# Patient Record
Sex: FEMALE | Race: BLACK OR AFRICAN AMERICAN | NOT HISPANIC OR LATINO | Employment: FULL TIME | URBAN - METROPOLITAN AREA
[De-identification: names, ages, dates, MRNs, and addresses within clinical notes are randomized per-mention and may not be internally consistent; named-entity substitution may affect disease eponyms.]

---

## 2017-01-24 ENCOUNTER — HOSPITAL ENCOUNTER (OUTPATIENT)
Dept: RADIOLOGY | Facility: HOSPITAL | Age: 52
Discharge: HOME | End: 2017-01-24
Attending: NURSE PRACTITIONER
Payer: COMMERCIAL

## 2017-01-24 ENCOUNTER — OFFICE VISIT (OUTPATIENT)
Dept: FAMILY MEDICINE | Facility: CLINIC | Age: 52
End: 2017-01-24
Payer: COMMERCIAL

## 2017-01-24 VITALS
SYSTOLIC BLOOD PRESSURE: 126 MMHG | DIASTOLIC BLOOD PRESSURE: 82 MMHG | OXYGEN SATURATION: 98 % | RESPIRATION RATE: 18 BRPM | HEART RATE: 76 BPM | WEIGHT: 246.69 LBS | BODY MASS INDEX: 41.1 KG/M2 | HEIGHT: 65 IN | TEMPERATURE: 98 F

## 2017-01-24 DIAGNOSIS — M79.662 TENDERNESS OF LEFT CALF: Primary | ICD-10-CM

## 2017-01-24 DIAGNOSIS — E66.9 OBESITY, UNSPECIFIED OBESITY SEVERITY, UNSPECIFIED OBESITY TYPE: ICD-10-CM

## 2017-01-24 DIAGNOSIS — M79.662 TENDERNESS OF LEFT CALF: ICD-10-CM

## 2017-01-24 PROCEDURE — 93971 EXTREMITY STUDY: CPT | Mod: 26,,, | Performed by: RADIOLOGY

## 2017-01-24 PROCEDURE — 1159F MED LIST DOCD IN RCRD: CPT | Mod: S$GLB,,, | Performed by: NURSE PRACTITIONER

## 2017-01-24 PROCEDURE — 99999 PR PBB SHADOW E&M-EST. PATIENT-LVL III: CPT | Mod: PBBFAC,,, | Performed by: NURSE PRACTITIONER

## 2017-01-24 PROCEDURE — 93971 EXTREMITY STUDY: CPT | Mod: TC

## 2017-01-24 PROCEDURE — 99214 OFFICE O/P EST MOD 30 MIN: CPT | Mod: S$GLB,,, | Performed by: NURSE PRACTITIONER

## 2017-01-24 RX ORDER — MAGNESIUM 30 MG
TABLET ORAL
COMMUNITY
End: 2017-06-26

## 2017-01-25 ENCOUNTER — LAB VISIT (OUTPATIENT)
Dept: LAB | Facility: HOSPITAL | Age: 52
End: 2017-01-25
Payer: COMMERCIAL

## 2017-01-25 DIAGNOSIS — E66.9 OBESITY, UNSPECIFIED OBESITY SEVERITY, UNSPECIFIED OBESITY TYPE: ICD-10-CM

## 2017-01-25 DIAGNOSIS — M79.662 TENDERNESS OF LEFT CALF: ICD-10-CM

## 2017-01-25 LAB
ALBUMIN SERPL BCP-MCNC: 3.7 G/DL
ALP SERPL-CCNC: 96 U/L
ALT SERPL W/O P-5'-P-CCNC: 27 U/L
ANION GAP SERPL CALC-SCNC: 9 MMOL/L
AST SERPL-CCNC: 22 U/L
BASOPHILS # BLD AUTO: 0.02 K/UL
BASOPHILS NFR BLD: 0.4 %
BILIRUB SERPL-MCNC: 0.6 MG/DL
BUN SERPL-MCNC: 11 MG/DL
CALCIUM SERPL-MCNC: 9.4 MG/DL
CHLORIDE SERPL-SCNC: 111 MMOL/L
CHOLEST/HDLC SERPL: 3.9 {RATIO}
CO2 SERPL-SCNC: 24 MMOL/L
CREAT SERPL-MCNC: 0.8 MG/DL
DIFFERENTIAL METHOD: ABNORMAL
EOSINOPHIL # BLD AUTO: 0.1 K/UL
EOSINOPHIL NFR BLD: 2 %
ERYTHROCYTE [DISTWIDTH] IN BLOOD BY AUTOMATED COUNT: 15.3 %
EST. GFR  (AFRICAN AMERICAN): >60 ML/MIN/1.73 M^2
EST. GFR  (NON AFRICAN AMERICAN): >60 ML/MIN/1.73 M^2
GLUCOSE SERPL-MCNC: 85 MG/DL
HCT VFR BLD AUTO: 34.6 %
HDL/CHOLESTEROL RATIO: 25.7 %
HDLC SERPL-MCNC: 187 MG/DL
HDLC SERPL-MCNC: 48 MG/DL
HGB BLD-MCNC: 11 G/DL
LDLC SERPL CALC-MCNC: 124.2 MG/DL
LYMPHOCYTES # BLD AUTO: 2.6 K/UL
LYMPHOCYTES NFR BLD: 51 %
MCH RBC QN AUTO: 25.9 PG
MCHC RBC AUTO-ENTMCNC: 31.8 %
MCV RBC AUTO: 82 FL
MONOCYTES # BLD AUTO: 0.4 K/UL
MONOCYTES NFR BLD: 8.6 %
NEUTROPHILS # BLD AUTO: 1.9 K/UL
NEUTROPHILS NFR BLD: 38 %
NONHDLC SERPL-MCNC: 139 MG/DL
PLATELET # BLD AUTO: 253 K/UL
PMV BLD AUTO: 10.7 FL
POTASSIUM SERPL-SCNC: 4.5 MMOL/L
PROT SERPL-MCNC: 7.2 G/DL
RBC # BLD AUTO: 4.24 M/UL
SODIUM SERPL-SCNC: 144 MMOL/L
TRIGL SERPL-MCNC: 74 MG/DL
WBC # BLD AUTO: 5.1 K/UL

## 2017-01-25 PROCEDURE — 80053 COMPREHEN METABOLIC PANEL: CPT

## 2017-01-25 PROCEDURE — 80061 LIPID PANEL: CPT

## 2017-01-25 PROCEDURE — 85025 COMPLETE CBC W/AUTO DIFF WBC: CPT

## 2017-01-25 PROCEDURE — 36415 COLL VENOUS BLD VENIPUNCTURE: CPT

## 2017-01-25 NOTE — PROGRESS NOTES
Subjective:       Patient ID: Mary Naranjo is a 51 y.o. female.    Chief Complaint: Leg Pain (left  )    HPI  Ms Naranjo is here for some Left calf pain that began this am, it is an 8/10, she tried tylenol without relief.  No injury, she is a nonsmoker.  Denies any SOB or CP.  She also reports some easy bruising  Review of Systems   Constitutional: Negative for fever.   Respiratory: Negative.    Cardiovascular: Negative.    Musculoskeletal: Positive for myalgias.       Objective:      Physical Exam   Constitutional: She is oriented to person, place, and time. She appears well-developed and well-nourished. She does not appear ill. No distress.   obese   Cardiovascular: Normal rate, regular rhythm and normal heart sounds.  Exam reveals no friction rub.    No murmur heard.  Pulses:       Dorsalis pedis pulses are 2+ on the left side.        Posterior tibial pulses are 2+ on the left side.   Pulmonary/Chest: Effort normal and breath sounds normal. No respiratory distress. She has no decreased breath sounds. She has no wheezes. She has no rhonchi. She has no rales.   Musculoskeletal: Normal range of motion. She exhibits no edema.        Left lower leg: She exhibits tenderness. She exhibits no swelling.   LLE +ttp, no redness or warmth   Neurological: She is alert and oriented to person, place, and time.   Skin: Skin is warm and dry. No erythema.   Psychiatric: She has a normal mood and affect. Her behavior is normal.   Vitals reviewed.      Assessment:       1. Tenderness of left calf    2. Obesity, unspecified obesity severity, unspecified obesity type        Plan:       Tenderness of left calf  -     CBC auto differential; Future; Expected date: 1/24/17  -     Comprehensive metabolic panel; Future; Expected date: 1/24/17  -     US Lower Extremity Veins Left; Future; Expected date: 1/24/17    Obesity, unspecified obesity severity, unspecified obesity type  -     Lipid panel; Future; Expected date: 1/24/17    She is  due for basic labs, I think risk for DVT is low, but she told me that her twin sister was recently hospitalized with similar symptoms and was +DVT and placed on eliquis.  Concern for hereditary clotting d/o, will get U/S    Verbalized understanding

## 2017-06-02 ENCOUNTER — LAB VISIT (OUTPATIENT)
Dept: LAB | Facility: HOSPITAL | Age: 52
End: 2017-06-02
Attending: INTERNAL MEDICINE
Payer: COMMERCIAL

## 2017-06-02 ENCOUNTER — OFFICE VISIT (OUTPATIENT)
Dept: FAMILY MEDICINE | Facility: CLINIC | Age: 52
End: 2017-06-02
Payer: COMMERCIAL

## 2017-06-02 VITALS
OXYGEN SATURATION: 97 % | WEIGHT: 235.69 LBS | RESPIRATION RATE: 18 BRPM | HEART RATE: 71 BPM | SYSTOLIC BLOOD PRESSURE: 124 MMHG | BODY MASS INDEX: 39.22 KG/M2 | TEMPERATURE: 98 F | DIASTOLIC BLOOD PRESSURE: 72 MMHG

## 2017-06-02 DIAGNOSIS — Z12.31 ENCOUNTER FOR SCREENING MAMMOGRAM FOR BREAST CANCER: ICD-10-CM

## 2017-06-02 DIAGNOSIS — R10.13 EPIGASTRIC PAIN: Primary | ICD-10-CM

## 2017-06-02 DIAGNOSIS — K59.00 CONSTIPATION, UNSPECIFIED CONSTIPATION TYPE: ICD-10-CM

## 2017-06-02 DIAGNOSIS — R10.13 EPIGASTRIC PAIN: ICD-10-CM

## 2017-06-02 DIAGNOSIS — M54.41 ACUTE LEFT-SIDED LOW BACK PAIN WITH BILATERAL SCIATICA: ICD-10-CM

## 2017-06-02 DIAGNOSIS — M54.42 ACUTE LEFT-SIDED LOW BACK PAIN WITH BILATERAL SCIATICA: ICD-10-CM

## 2017-06-02 LAB
ALBUMIN SERPL BCP-MCNC: 3.8 G/DL
ALP SERPL-CCNC: 86 U/L
ALT SERPL W/O P-5'-P-CCNC: 24 U/L
ANION GAP SERPL CALC-SCNC: 6 MMOL/L
AST SERPL-CCNC: 20 U/L
BASOPHILS # BLD AUTO: 0.02 K/UL
BASOPHILS NFR BLD: 0.3 %
BILIRUB SERPL-MCNC: 0.4 MG/DL
BUN SERPL-MCNC: 11 MG/DL
CALCIUM SERPL-MCNC: 9.5 MG/DL
CHLORIDE SERPL-SCNC: 110 MMOL/L
CO2 SERPL-SCNC: 27 MMOL/L
CREAT SERPL-MCNC: 0.7 MG/DL
DIFFERENTIAL METHOD: ABNORMAL
EOSINOPHIL # BLD AUTO: 0.1 K/UL
EOSINOPHIL NFR BLD: 1.7 %
ERYTHROCYTE [DISTWIDTH] IN BLOOD BY AUTOMATED COUNT: 15.4 %
EST. GFR  (AFRICAN AMERICAN): >60 ML/MIN/1.73 M^2
EST. GFR  (NON AFRICAN AMERICAN): >60 ML/MIN/1.73 M^2
GLUCOSE SERPL-MCNC: 87 MG/DL
HCT VFR BLD AUTO: 35.9 %
HGB BLD-MCNC: 11.2 G/DL
LIPASE SERPL-CCNC: 27 U/L
LYMPHOCYTES # BLD AUTO: 2.9 K/UL
LYMPHOCYTES NFR BLD: 44.6 %
MCH RBC QN AUTO: 25.1 PG
MCHC RBC AUTO-ENTMCNC: 31.2 %
MCV RBC AUTO: 81 FL
MONOCYTES # BLD AUTO: 0.8 K/UL
MONOCYTES NFR BLD: 11.8 %
NEUTROPHILS # BLD AUTO: 2.7 K/UL
NEUTROPHILS NFR BLD: 41.4 %
PLATELET # BLD AUTO: 293 K/UL
PMV BLD AUTO: 10.7 FL
POTASSIUM SERPL-SCNC: 4.5 MMOL/L
PROT SERPL-MCNC: 7.4 G/DL
RBC # BLD AUTO: 4.46 M/UL
SODIUM SERPL-SCNC: 143 MMOL/L
WBC # BLD AUTO: 6.46 K/UL

## 2017-06-02 PROCEDURE — 83690 ASSAY OF LIPASE: CPT

## 2017-06-02 PROCEDURE — 36415 COLL VENOUS BLD VENIPUNCTURE: CPT | Mod: PN

## 2017-06-02 PROCEDURE — 80053 COMPREHEN METABOLIC PANEL: CPT

## 2017-06-02 PROCEDURE — 99999 PR PBB SHADOW E&M-EST. PATIENT-LVL III: CPT | Mod: PBBFAC,,, | Performed by: INTERNAL MEDICINE

## 2017-06-02 PROCEDURE — 85025 COMPLETE CBC W/AUTO DIFF WBC: CPT

## 2017-06-02 PROCEDURE — 99214 OFFICE O/P EST MOD 30 MIN: CPT | Mod: S$GLB,,, | Performed by: INTERNAL MEDICINE

## 2017-06-02 RX ORDER — IBUPROFEN 600 MG/1
600 TABLET ORAL 3 TIMES DAILY
Qty: 90 TABLET | Refills: 0 | Status: SHIPPED | OUTPATIENT
Start: 2017-06-02 | End: 2017-07-02

## 2017-06-02 NOTE — MEDICAL/APP STUDENT
Subjective:       Patient ID: Mary Naranjo is a 52 y.o. female.    Chief Complaint: Nausea (Left leg pain)    HPI     Ms. Naranjo had nausea and vomiting Wednesday afternoon. She vomited throughout the evening but did not look at her vomitus for blood. She had a sharp epigastric pain that was relieved from vomiting. She had a fever that went down after taking aleve. She remained afebrile after Thursday morning. Earlier on Wednesday, she had eaten breakfast (weems, pancake, eggs) and lunch (ham sandwich). She had eaten the same thing the day before without any issues. On Thursday, she continued to feel nauseated but was able to eat crackers and soup and drink ginger ale and gatorade. This morning, she continues to feel nauseated, bloated, passing flatulence, and diffuse abdominal tenderness. She had a bowel movement this morning that was neither ericka or soft. She has chronic constipation and reports trying various methods of trying to regulating her bowel movements without success. She had tried miralax but said she would need to have a bowel movement too often or suddenly at times.    Thursday night she started to have stinging pins and needle sensation on the posterior aspect of her left leg from her thigh to her foot. It was difficult for her to sleep since no position made it better. She denies weakness, redness or swelling.    Review of Systems   Constitutional: Positive for appetite change, chills and fever.   HENT: Negative for congestion, rhinorrhea, sinus pressure and sore throat.    Eyes: Negative.    Respiratory: Negative for cough.    Cardiovascular: Negative.    Gastrointestinal: Positive for abdominal distention, abdominal pain, constipation, nausea and vomiting. Negative for blood in stool and diarrhea.   Genitourinary: Negative.    Neurological:        Sharp pain down her left leg       Objective:      Physical Exam   Constitutional: She appears well-developed and well-nourished.   HENT:   Head:  Normocephalic.   Eyes: Conjunctivae and EOM are normal. Pupils are equal, round, and reactive to light.   Neck: Normal range of motion. Neck supple.   Cardiovascular: Normal rate, regular rhythm and intact distal pulses.  Exam reveals no gallop.    No murmur heard.  Pulmonary/Chest: Effort normal and breath sounds normal.   Abdominal: Soft. Bowel sounds are normal. She exhibits distension. She exhibits no mass. There is tenderness. There is no rebound and no guarding. No hernia.   Diffuse tenderness but more pain in epigastric region  with left sided tenderness   Neurological:   Normal strength bilateral lower extremities  Pain and tenderness on palpation of posterior leg with shooting pain to    Skin: Skin is warm and dry.       Assessment:       1. Epigastric pain  2. Constipation  3. Sciatica  Plan:       1. Epigastric pain, likely Viral gastroenteritis  - fluid hydration  - BRAT diet  - CBC  - CMP  - Lipase    2. Constipation  - lactulose    3. Sciatica   - ibuprofen    4. Other  - Mammogram screening    F/u if no improvement or symptoms worsen.    Ekaterina Osman  Medical Student Year 4  [employer] [facility] [email or web address]

## 2017-06-06 NOTE — PROGRESS NOTES
Subjective:       Patient ID: Mary Naranjo is a 52 y.o. female.    Chief Complaint: Nausea (Left leg pain)    She presents with complaints of nausea, vomiting and epigastric discomfort.  She has been unable to keep food down.  She also reports continued constipation, and new pain in her left buttock down her thigh to her lower leg.       Review of Systems   Cardiovascular: Negative for leg swelling.   Gastrointestinal: Positive for abdominal pain, constipation, nausea and vomiting. Negative for diarrhea.       Objective:      Physical Exam   Constitutional: She is oriented to person, place, and time. She appears well-developed and well-nourished. No distress.   HENT:   Head: Normocephalic and atraumatic.   Right Ear: External ear normal.   Left Ear: External ear normal.   Eyes: Conjunctivae are normal. No scleral icterus.   Cardiovascular: Normal rate, regular rhythm and normal heart sounds.  Exam reveals no gallop and no friction rub.    No murmur heard.  Pulmonary/Chest: Effort normal and breath sounds normal. No respiratory distress. She has no wheezes. She has no rales.   Abdominal: Soft. Bowel sounds are normal. She exhibits no distension. There is tenderness in the epigastric area. There is no rebound and no guarding.   Mild epigastric   Neurological: She is alert and oriented to person, place, and time. No cranial nerve deficit.   Skin: Skin is warm and dry.   Psychiatric: She has a normal mood and affect.   Vitals reviewed.      Assessment:       1. Epigastric pain    2. Encounter for screening mammogram for breast cancer    3. Constipation, unspecified constipation type    4. Acute left-sided low back pain with bilateral sciatica        Plan:       Mary was seen today for nausea.    Diagnoses and all orders for this visit:    Epigastric pain - No acute abdomen.  No red flags.  Labs as below.  Likely viral GE  -     CBC auto differential; Future  -     Comprehensive metabolic panel; Future  -      Lipase; Future    Encounter for screening mammogram for breast cancer  -     Mammo Digital Screening Bilat with CAD; Future    Constipation, unspecified constipation type  -     lactulose (CEPHULAC) 20 gram Pack; Take 1 packet (20 g total) by mouth 2 (two) times daily.    Acute left-sided low back pain with bilateral sciatica - new onset symptoms.  Trial of NSAIDs.  She would rather avoid steroids.   -     ibuprofen (ADVIL,MOTRIN) 600 MG tablet; Take 1 tablet (600 mg total) by mouth 3 (three) times daily.       f/u if no improvment

## 2017-06-26 ENCOUNTER — OFFICE VISIT (OUTPATIENT)
Dept: FAMILY MEDICINE | Facility: CLINIC | Age: 52
End: 2017-06-26
Payer: COMMERCIAL

## 2017-06-26 ENCOUNTER — PATIENT MESSAGE (OUTPATIENT)
Dept: FAMILY MEDICINE | Facility: CLINIC | Age: 52
End: 2017-06-26

## 2017-06-26 VITALS
TEMPERATURE: 99 F | WEIGHT: 235.88 LBS | BODY MASS INDEX: 39.3 KG/M2 | HEART RATE: 79 BPM | SYSTOLIC BLOOD PRESSURE: 134 MMHG | HEIGHT: 65 IN | RESPIRATION RATE: 17 BRPM | DIASTOLIC BLOOD PRESSURE: 82 MMHG | OXYGEN SATURATION: 98 %

## 2017-06-26 DIAGNOSIS — G43.009 MIGRAINE WITHOUT AURA AND WITHOUT STATUS MIGRAINOSUS, NOT INTRACTABLE: Primary | ICD-10-CM

## 2017-06-26 PROCEDURE — 96372 THER/PROPH/DIAG INJ SC/IM: CPT | Mod: S$GLB,,, | Performed by: INTERNAL MEDICINE

## 2017-06-26 PROCEDURE — 99214 OFFICE O/P EST MOD 30 MIN: CPT | Mod: 25,S$GLB,, | Performed by: INTERNAL MEDICINE

## 2017-06-26 PROCEDURE — 99999 PR PBB SHADOW E&M-EST. PATIENT-LVL III: CPT | Mod: PBBFAC,,, | Performed by: INTERNAL MEDICINE

## 2017-06-26 RX ORDER — MAGNESIUM 250 MG
TABLET ORAL
COMMUNITY
End: 2018-02-08

## 2017-06-26 RX ORDER — KETOROLAC TROMETHAMINE 30 MG/ML
30 INJECTION, SOLUTION INTRAMUSCULAR; INTRAVENOUS
Status: COMPLETED | OUTPATIENT
Start: 2017-06-26 | End: 2017-06-26

## 2017-06-26 RX ORDER — PROCHLORPERAZINE MALEATE 10 MG
10 TABLET ORAL 3 TIMES DAILY PRN
Qty: 10 TABLET | Refills: 0 | Status: SHIPPED | OUTPATIENT
Start: 2017-06-26 | End: 2019-05-10

## 2017-06-26 RX ADMIN — KETOROLAC TROMETHAMINE 30 MG: 30 INJECTION, SOLUTION INTRAMUSCULAR; INTRAVENOUS at 09:06

## 2017-06-26 NOTE — LETTER
June 26, 2017      [facility]  [address]  [place] [address]  Phone: [phone]       Patient: Mary Naranjo   YOB: 1965  Date of Visit: 06/26/2017    To Whom It May Concern:    Mary  was at [facility] on 06/26/2017.  may return to work/school on 06/27/17 with no restrictions. If you have any questions or concerns, or if I can be of further assistance, please do not hesitate to contact me.    Sincerely,    [facility].

## 2017-06-26 NOTE — PROGRESS NOTES
Subjective:       Patient ID: Mary Naranjo is a 52 y.o. female.    Chief Complaint: Headache and Eye Pain (left )    She c/o new headaches since yesterday that is different from her prior headache.  She reports sharp pain in her right eye and tearing, numbness and tearing across her forehead.    Pain was initially 10/10 but now 8/10.      Headache    This is a new problem. The current episode started yesterday. The problem occurs constantly. The problem has been gradually improving. The pain is located in the right unilateral region. The pain quality is not similar to prior headaches. The quality of the pain is described as sharp. The pain is at a severity of 8/10. Associated symptoms include blurred vision, eye pain, photophobia and tingling. Pertinent negatives include no fever, nausea, rhinorrhea, sinus pressure, vomiting or weakness. Nothing aggravates the symptoms. She has tried NSAIDs for the symptoms. The treatment provided no relief. Her past medical history is significant for cluster headaches and obesity.   Eye Pain    Associated symptoms include blurred vision, photophobia and tingling. Pertinent negatives include no fever, nausea, vomiting or weakness.     Review of Systems   Constitutional: Negative for fever.   HENT: Negative for rhinorrhea and sinus pressure.    Eyes: Positive for blurred vision, photophobia and pain.   Gastrointestinal: Negative for nausea and vomiting.   Neurological: Positive for tingling and headaches. Negative for weakness.       Objective:      Physical Exam   Constitutional: She is oriented to person, place, and time. She appears well-developed and well-nourished. No distress.   HENT:   Head: Normocephalic and atraumatic.   Right Ear: External ear normal.   Left Ear: External ear normal.   Mouth/Throat: Oropharynx is clear and moist. No oropharyngeal exudate.   Eyes: Conjunctivae and EOM are normal. Pupils are equal, round, and reactive to light. No scleral icterus.    Cardiovascular: Normal rate, regular rhythm and normal heart sounds.  Exam reveals no gallop and no friction rub.    No murmur heard.  Pulmonary/Chest: Effort normal and breath sounds normal. No respiratory distress. She has no wheezes. She has no rales.   Abdominal: Soft.   Neurological: She is alert and oriented to person, place, and time. She has normal strength. No cranial nerve deficit.   Reflex Scores:       Bicep reflexes are 2+ on the right side and 2+ on the left side.  Skin: Skin is warm and dry.   Psychiatric: She has a normal mood and affect.   Vitals reviewed.      Assessment:       1. Migraine without aura and without status migrainosus, not intractable        Plan:       Mary was seen today for headache and eye pain.    Diagnoses and all orders for this visit:    Migraine without aura and without status migrainosus, not intractable - suspect migraine.  Normal neuro exam.  Comapzine for home prn.  F/u with neurology if recurrent.  -     ketorolac injection 30 mg; Inject 30 mg into the muscle one time.       f/u if no improvement

## 2017-06-26 NOTE — PROGRESS NOTES
30 mg of Ketorolac Tromethamine Given to the pt as order by the MD. The patient tolerated well, I advised the patient to wait 15 minuets to observe for any vaccine reactions. The pt. Expressed an understanding.

## 2017-06-27 ENCOUNTER — TELEPHONE (OUTPATIENT)
Dept: FAMILY MEDICINE | Facility: CLINIC | Age: 52
End: 2017-06-27

## 2017-06-27 NOTE — TELEPHONE ENCOUNTER
----- Message from Ilsa Moore sent at 6/26/2017  4:37 PM CDT -----  Contact: Self/[phone]  Patient states that the Pharmacy never received her prescription. Thank you.

## 2017-06-27 NOTE — TELEPHONE ENCOUNTER
Compazine sent to [facility] on [address].  Is this the correct pharmacy.  If so please call in.

## 2017-06-28 NOTE — TELEPHONE ENCOUNTER
A message was left for the pt. To notify her that the medication has been [facility] on [facility].

## 2017-06-29 NOTE — TELEPHONE ENCOUNTER
Called the pt to see if she had received her medication and if it was providing relief.  There was not answer.

## 2017-06-30 NOTE — TELEPHONE ENCOUNTER
----- Message from Mar Storm sent at 6/29/2017  3:56 PM CDT -----  Patient is returning Dr Chi's call. Please call at [phone] Thank you!

## 2017-08-21 ENCOUNTER — PATIENT MESSAGE (OUTPATIENT)
Dept: FAMILY MEDICINE | Facility: CLINIC | Age: 52
End: 2017-08-21

## 2017-08-25 ENCOUNTER — OFFICE VISIT (OUTPATIENT)
Dept: FAMILY MEDICINE | Facility: CLINIC | Age: 52
End: 2017-08-25
Payer: COMMERCIAL

## 2017-08-25 ENCOUNTER — HOSPITAL ENCOUNTER (OUTPATIENT)
Dept: RADIOLOGY | Facility: HOSPITAL | Age: 52
Discharge: HOME | End: 2017-08-25
Attending: INTERNAL MEDICINE
Payer: COMMERCIAL

## 2017-08-25 VITALS
HEIGHT: 65 IN | HEART RATE: 62 BPM | TEMPERATURE: 98 F | BODY MASS INDEX: 38.38 KG/M2 | WEIGHT: 230.38 LBS | SYSTOLIC BLOOD PRESSURE: 126 MMHG | OXYGEN SATURATION: 98 % | DIASTOLIC BLOOD PRESSURE: 82 MMHG | RESPIRATION RATE: 17 BRPM

## 2017-08-25 VITALS — HEIGHT: 65 IN | BODY MASS INDEX: 39.15 KG/M2 | WEIGHT: 235 LBS

## 2017-08-25 DIAGNOSIS — Z12.4 CERVICAL CANCER SCREENING: ICD-10-CM

## 2017-08-25 DIAGNOSIS — Z00.00 ROUTINE MEDICAL EXAM: Primary | ICD-10-CM

## 2017-08-25 DIAGNOSIS — Z12.31 ENCOUNTER FOR SCREENING MAMMOGRAM FOR BREAST CANCER: ICD-10-CM

## 2017-08-25 DIAGNOSIS — G43.009 MIGRAINE WITHOUT AURA AND WITHOUT STATUS MIGRAINOSUS, NOT INTRACTABLE: ICD-10-CM

## 2017-08-25 DIAGNOSIS — Z01.419 ENCOUNTER FOR GYNECOLOGICAL EXAMINATION: ICD-10-CM

## 2017-08-25 PROCEDURE — 88175 CYTOPATH C/V AUTO FLUID REDO: CPT

## 2017-08-25 PROCEDURE — 99999 PR PBB SHADOW E&M-EST. PATIENT-LVL III: CPT | Mod: PBBFAC,,, | Performed by: INTERNAL MEDICINE

## 2017-08-25 PROCEDURE — 77063 BREAST TOMOSYNTHESIS BI: CPT | Mod: 26,,, | Performed by: RADIOLOGY

## 2017-08-25 PROCEDURE — 77067 SCR MAMMO BI INCL CAD: CPT | Mod: TC

## 2017-08-25 PROCEDURE — 87480 CANDIDA DNA DIR PROBE: CPT

## 2017-08-25 PROCEDURE — 77067 SCR MAMMO BI INCL CAD: CPT | Mod: 26,,, | Performed by: RADIOLOGY

## 2017-08-25 PROCEDURE — 99396 PREV VISIT EST AGE 40-64: CPT | Mod: S$GLB,,, | Performed by: INTERNAL MEDICINE

## 2017-08-25 PROCEDURE — 87660 TRICHOMONAS VAGIN DIR PROBE: CPT

## 2017-08-25 RX ORDER — SUMATRIPTAN 50 MG/1
50 TABLET, FILM COATED ORAL
Qty: 20 TABLET | Refills: 1 | Status: SHIPPED | OUTPATIENT
Start: 2017-08-25 | End: 2017-11-08

## 2017-08-25 RX ORDER — TIZANIDINE 4 MG/1
4 TABLET ORAL EVERY 6 HOURS PRN
Refills: 2 | COMMUNITY
Start: 2017-08-12 | End: 2017-08-25

## 2017-08-25 NOTE — PROGRESS NOTES
Subjective:       Patient ID: Mary Naranjo is a 52 y.o. female.    Chief Complaint: Annual Exam    She presents for her yearly exam.      Review of Systems   Constitutional: Negative for fatigue and fever.   HENT: Negative for congestion and rhinorrhea.    Eyes: Negative for redness and visual disturbance.   Respiratory: Negative for cough and shortness of breath.    Cardiovascular: Negative for chest pain and leg swelling.   Gastrointestinal: Negative for abdominal pain, blood in stool and constipation.   Genitourinary: Negative for dysuria, frequency, menstrual problem and pelvic pain.        Vaginal itching   Musculoskeletal: Negative for arthralgias and back pain.   Skin: Negative for rash.   Neurological: Positive for headaches. Negative for dizziness and weakness.   Psychiatric/Behavioral: Negative for dysphoric mood and sleep disturbance.       Objective:      Physical Exam   Constitutional: She is oriented to person, place, and time. She appears well-developed and well-nourished.   HENT:   Head: Normocephalic and atraumatic.   Right Ear: External ear normal.   Left Ear: External ear normal.   Mouth/Throat: Oropharynx is clear and moist. No oropharyngeal exudate.   Eyes: Conjunctivae and EOM are normal. Pupils are equal, round, and reactive to light. No scleral icterus.   Neck: Neck supple. No tracheal deviation present. No thyromegaly present.   Cardiovascular: Normal rate, regular rhythm and normal heart sounds.  Exam reveals no gallop and no friction rub.    No murmur heard.  Pulmonary/Chest: Effort normal and breath sounds normal. No respiratory distress. She has no wheezes. She has no rales.   Abdominal: Soft. Bowel sounds are normal. She exhibits no distension and no mass. There is no tenderness. There is no rebound and no guarding.   Genitourinary: Vagina normal and uterus normal. There is no lesion on the right labia. There is no lesion on the left labia. Uterus is not enlarged. Cervix exhibits no  motion tenderness and no discharge. Right adnexum displays no mass, no tenderness and no fullness. Left adnexum displays no mass, no tenderness and no fullness. No vaginal discharge found.   Musculoskeletal: Normal range of motion. She exhibits no edema or tenderness.   Lymphadenopathy:     She has no cervical adenopathy.   Neurological: She is alert and oriented to person, place, and time. No cranial nerve deficit.   Skin: Skin is warm and dry. No rash noted.   Psychiatric: She has a normal mood and affect. Her behavior is normal.   Vitals reviewed.      Assessment:       1. Routine medical exam    2. Cervical cancer screening    3. Encounter for gynecological examination    4. Migraine without aura and without status migrainosus, not intractable        Plan:       Mary was seen today for annual exam.    Diagnoses and all orders for this visit:    Routine medical exam - We discussed daily exercise and a healthy diet.  She has started a boot camp 3 weeks ago.  She has lost 5 pounds.  I will follow up her labs and address any abnormalities.  She is a non smoker.   She is up to date on eye and dental exams  -     Hemoglobin A1c; Future    Cervical cancer screening  -     Liquid-based pap smear, screening    Encounter for gynecological examination - Postmenopausal.  Sister with breast cancer at 34  -     Vaginosis Screen by DNA Probe    Migraine without aura and without status migrainosus, not intractable  -     sumatriptan (IMITREX) 50 MG tablet; Take 1 tablet (50 mg total) by mouth every 2 (two) hours as needed for Migraine. Do not exceed 4 tabs in a 24 hour period       f/u 1 year.

## 2017-08-27 LAB
CANDIDA RRNA VAG QL PROBE: NEGATIVE
G VAGINALIS RRNA GENITAL QL PROBE: NEGATIVE
T VAGINALIS RRNA GENITAL QL PROBE: NEGATIVE

## 2017-11-08 ENCOUNTER — OFFICE VISIT (OUTPATIENT)
Dept: FAMILY MEDICINE | Facility: CLINIC | Age: 52
End: 2017-11-08
Payer: COMMERCIAL

## 2017-11-08 VITALS
HEIGHT: 65 IN | BODY MASS INDEX: 39.41 KG/M2 | OXYGEN SATURATION: 99 % | DIASTOLIC BLOOD PRESSURE: 82 MMHG | RESPIRATION RATE: 18 BRPM | TEMPERATURE: 98 F | SYSTOLIC BLOOD PRESSURE: 144 MMHG | WEIGHT: 236.56 LBS | HEART RATE: 82 BPM

## 2017-11-08 DIAGNOSIS — J06.9 ACUTE URI: Primary | ICD-10-CM

## 2017-11-08 PROCEDURE — 99999 PR PBB SHADOW E&M-EST. PATIENT-LVL III: CPT | Mod: PBBFAC,,, | Performed by: INTERNAL MEDICINE

## 2017-11-08 PROCEDURE — 99213 OFFICE O/P EST LOW 20 MIN: CPT | Mod: 25,S$GLB,, | Performed by: INTERNAL MEDICINE

## 2017-11-08 PROCEDURE — 96372 THER/PROPH/DIAG INJ SC/IM: CPT | Mod: S$GLB,,, | Performed by: INTERNAL MEDICINE

## 2017-11-08 RX ORDER — DEXAMETHASONE SODIUM PHOSPHATE 4 MG/ML
8 INJECTION, SOLUTION INTRA-ARTICULAR; INTRALESIONAL; INTRAMUSCULAR; INTRAVENOUS; SOFT TISSUE
Status: COMPLETED | OUTPATIENT
Start: 2017-11-08 | End: 2017-11-08

## 2017-11-08 RX ADMIN — DEXAMETHASONE SODIUM PHOSPHATE 8 MG: 4 INJECTION, SOLUTION INTRA-ARTICULAR; INTRALESIONAL; INTRAMUSCULAR; INTRAVENOUS; SOFT TISSUE at 02:11

## 2017-11-08 NOTE — PROGRESS NOTES
Subjective:       Patient ID: Mary Naranjo is a 52 y.o. female.    Chief Complaint: Sinusitis; Epistaxis; and Blurred Vision    She presents today with complaints of a one-week history of cold symptoms.  She initially had a productive cough, fever and bodyaches a week ago.  This improved and over the past few days she noted sneezing, congestion as well as a nosebleed.  She has tried over-the-counter Robitussin and Kandice-[place] cold with little improvement.  She will try Afrin.        Review of Systems   Constitutional: Positive for fever.   HENT: Positive for congestion, nosebleeds, rhinorrhea and sneezing. Negative for ear pain and sore throat.    Eyes: Positive for visual disturbance.   Respiratory: Positive for cough. Negative for shortness of breath and wheezing.    Musculoskeletal: Positive for myalgias and neck pain.       Objective:      Physical Exam   Constitutional: She is oriented to person, place, and time. She appears well-developed and well-nourished. No distress.   HENT:   Head: Normocephalic and atraumatic.   Right Ear: Tympanic membrane, external ear and ear canal normal.   Left Ear: Tympanic membrane, external ear and ear canal normal.   Nose: Mucosal edema present.   Mouth/Throat: Oropharynx is clear and moist. No oropharyngeal exudate.   Eyes: Conjunctivae and EOM are normal. Pupils are equal, round, and reactive to light. No scleral icterus.   Neck: Neck supple.   Cardiovascular: Normal rate, regular rhythm and normal heart sounds.  Exam reveals no gallop and no friction rub.    No murmur heard.  Pulmonary/Chest: Effort normal and breath sounds normal. No stridor. No respiratory distress. She has no wheezes. She has no rales.   Lymphadenopathy:     She has no cervical adenopathy.   Neurological: She is alert and oriented to person, place, and time.   Skin: Skin is warm and dry. No rash noted.   Psychiatric: She has a normal mood and affect.   Vitals reviewed.      Assessment:       1. Acute  URI        Plan:       Mary was seen today for sinusitis, epistaxis and blurred vision.    Diagnoses and all orders for this visit:    Acute URI - one-week history of symptoms.  She will start Allegra.  Afrin when necessary but no more than 3 days.  Follow-up if no improvement  -     dexamethasone injection 8 mg; Inject 2 mLs (8 mg total) into the muscle one time.

## 2017-11-09 ENCOUNTER — TELEPHONE (OUTPATIENT)
Dept: FAMILY MEDICINE | Facility: CLINIC | Age: 52
End: 2017-11-09

## 2017-11-09 ENCOUNTER — PATIENT MESSAGE (OUTPATIENT)
Dept: FAMILY MEDICINE | Facility: CLINIC | Age: 52
End: 2017-11-09

## 2017-11-09 DIAGNOSIS — J01.90 ACUTE SINUSITIS, RECURRENCE NOT SPECIFIED, UNSPECIFIED LOCATION: Primary | ICD-10-CM

## 2017-11-09 RX ORDER — DOXYCYCLINE HYCLATE 100 MG
100 TABLET ORAL 2 TIMES DAILY
Qty: 20 TABLET | Refills: 0 | Status: SHIPPED | OUTPATIENT
Start: 2017-11-09 | End: 2017-11-19

## 2017-11-09 NOTE — TELEPHONE ENCOUNTER
Spoke with patient and informed her that we did receive email request and message was sent to Dr Chi. Informed her that Provider want be in clinic today until 10 AM.

## 2017-11-09 NOTE — TELEPHONE ENCOUNTER
----- Message from Taiwo Bueno sent at 11/9/2017  9:35 AM CST -----  Contact: Pt/[phone]  Calling TO confirm if emailed was received by Dr Chi regarding scripts

## 2017-12-04 ENCOUNTER — TELEPHONE (OUTPATIENT)
Dept: FAMILY MEDICINE | Facility: CLINIC | Age: 52
End: 2017-12-04

## 2017-12-04 NOTE — TELEPHONE ENCOUNTER
----- Message from Kaylyn Riley sent at 12/4/2017  1:36 PM CST -----  Contact: self  [phone]  Pt is requesting to speak to you regarding paper work for a wellness check . Pls call pt [phone]. Thanks......Tiffany

## 2018-01-11 ENCOUNTER — HOSPITAL ENCOUNTER (OUTPATIENT)
Dept: RADIOLOGY | Facility: HOSPITAL | Age: 53
Discharge: HOME | End: 2018-01-11
Attending: OBSTETRICS & GYNECOLOGY
Payer: COMMERCIAL

## 2018-01-11 ENCOUNTER — OFFICE VISIT (OUTPATIENT)
Dept: OBSTETRICS AND GYNECOLOGY | Facility: CLINIC | Age: 53
End: 2018-01-11
Payer: COMMERCIAL

## 2018-01-11 VITALS
DIASTOLIC BLOOD PRESSURE: 68 MMHG | WEIGHT: 236.44 LBS | HEIGHT: 65 IN | SYSTOLIC BLOOD PRESSURE: 122 MMHG | BODY MASS INDEX: 39.39 KG/M2

## 2018-01-11 DIAGNOSIS — N95.0 POSTMENOPAUSAL BLEEDING: ICD-10-CM

## 2018-01-11 DIAGNOSIS — N88.9 CERVICAL LESION: ICD-10-CM

## 2018-01-11 DIAGNOSIS — N95.0 POSTMENOPAUSAL BLEEDING: Primary | ICD-10-CM

## 2018-01-11 PROCEDURE — 76830 TRANSVAGINAL US NON-OB: CPT | Mod: 26,,, | Performed by: RADIOLOGY

## 2018-01-11 PROCEDURE — 99203 OFFICE O/P NEW LOW 30 MIN: CPT | Mod: S$GLB,,, | Performed by: OBSTETRICS & GYNECOLOGY

## 2018-01-11 PROCEDURE — 76856 US EXAM PELVIC COMPLETE: CPT | Mod: 26,,, | Performed by: RADIOLOGY

## 2018-01-11 PROCEDURE — 99999 PR PBB SHADOW E&M-EST. PATIENT-LVL III: CPT | Mod: PBBFAC,,, | Performed by: OBSTETRICS & GYNECOLOGY

## 2018-01-11 PROCEDURE — 76830 TRANSVAGINAL US NON-OB: CPT | Mod: TC

## 2018-01-11 RX ORDER — SUMATRIPTAN 50 MG/1
TABLET, FILM COATED ORAL
Refills: 1 | COMMUNITY
Start: 2017-11-09 | End: 2019-05-10

## 2018-01-11 NOTE — PROGRESS NOTES
SUBJECTIVE:   52 y.o. female   for postmenopausal bleeding    No LMP recorded. Patient is postmenopausal..    Pt went into menopause @ age 45, denies HRT  She began to have bleeding per vagina onset 5 days ago,  Soaking through 3 panty liners a day.  Also has cramping associated with bleeding.  Not currently sexually active.   Last pap neg in 2017,  MMG normal and up to date  c-scope neg and up to date      OB History    Para Term  AB Living   2 2 2     2   SAB TAB Ectopic Multiple Live Births                  # Outcome Date GA Lbr Miguel/2nd Weight Sex Delivery Anes PTL Lv   2 Term            1 Term               Obstetric Comments   Menopause @ age 45   MMG 2017 NEG   H/o abnormal pap, no treatment needed.  Pap neg 2017   c-scope in      Past Medical History:   Diagnosis Date    Arthritis     Dental bridge present     upper removable partial    Fibrocystic breast      Past Surgical History:   Procedure Laterality Date    BREAST BIOPSY      BREAST CYST ASPIRATION      COLONOSCOPY N/A 10/7/2015    Procedure: COLONOSCOPY;  Surgeon: Eagle Stack MD;  Location: [facility];  Service: Endoscopy;  Laterality: N/A;    fibrocystic breast       KNEE ARTHROSCOPY W/ MENISCAL REPAIR      TUBAL LIGATION       Social History     Social History    Marital status:      Spouse name: N/A    Number of children: N/A    Years of education: N/A     Occupational History    Not on file.     Social History Main Topics    Smoking status: Never Smoker    Smokeless tobacco: Never Used    Alcohol use No    Drug use: No    Sexual activity: Not Currently     Other Topics Concern    Not on file     Social History Narrative    No narrative on file     Family History   Problem Relation Age of Onset    Heart disease Mother     Cancer Father      colon    Cancer Sister      breast    Breast cancer Sister     COPD Neg Hx          Current Outpatient Prescriptions   Medication Sig Dispense  "Refill    ascorbic acid, vitamin C, (VITAMIN C) 250 mg Chew Take 500 mg by mouth.      magnesium 250 mg Tab Take by mouth.      MULTIVITS,CA,MINERALS/IRON/FA (ONE-A-DAY WOMENS FORMULA ORAL) Take by mouth.      prochlorperazine (COMPAZINE) 10 MG tablet Take 1 tablet (10 mg total) by mouth 3 (three) times daily as needed. 10 tablet 0    sumatriptan (IMITREX) 50 MG tablet TAKE 1 TABLET BY MOUTH EVERY 2 HOURS AS NEEDED FOR MIGRAINE. DO NOT EXCEED 4 TABS IN 24 HOURS  1    BUTTERBUR ROOT EXTRACT ORAL Take by mouth.      fish oil-omega-3 fatty acids 300-1,000 mg capsule Take 2 g by mouth once daily.      FLUARIX QUAD 3833-8987, PF, 60 mcg (15 mcg x 4)/0.5 mL vaccine ADM 0.5ML IM UTD  0    multivitamin with minerals tablet Take 1 tablet by mouth once daily.       No current facility-administered medications for this visit.      Allergies: Patient has no known allergies.       ROS:  GENERAL: Denies weight gain or weight loss. Feeling well overall.   SKIN: Denies rash or lesions.   HEAD: Denies head injury or headache.   NODES: Denies enlarged lymph nodes.   CHEST: Denies chest pain or shortness of breath.   CARDIOVASCULAR: Denies palpitations or left sided chest pain.   ABDOMEN: No abdominal pain, constipation, diarrhea, nausea, vomiting or rectal bleeding.   URINARY: No frequency, dysuria, hematuria, or burning on urination.  REPRODUCTIVE: see HPI  BREASTS: The patient performs breast self-examination and denies pain, lumps, or nipple discharge.   HEMATOLOGIC: No easy bruisability or excessive bleeding.  MUSCULOSKELETAL: Denies joint pain or swelling.   NEUROLOGIC: Denies syncope or weakness.   PSYCHIATRIC: Denies depression, anxiety or mood swings.      OBJECTIVE:   /68   Ht 5' 5" (1.651 m)   Wt 107.2 kg (236 lb 7.1 oz)   BMI 39.35 kg/m²   The patient appears well, alert, oriented x 3, in no distress.  NECK: negative, no thyromegaly, trachea midline  SKIN: normal, good color, good turgor and no acne, " striae, hirsutism  BREAST EXAM: not examined  ABDOMEN: no hernias, masses, or hepatosplenomegaly  GENITALIA: normal external genitalia, no erythema, no discharge  URETHRA: normal appearing urethra with no masses, tenderness or lesions and normal urethra, normal urethral meatus  VAGINA: small amount of blood noted in vaginal vault.  No active bleeding  CERVIX: cervix with < 1 cm friable lesion noted at 6 oclock  UTERUS: normal size, contour, position, consistency, mobility, non-tender  ADNEXA: no mass, fullness, tenderness      ASSESSMENT:   1.  PMB:  Will check pelvic Us.  Discussed with pt that if ES >  4mm, she will need an EMB to determine the cause of bleeding.  2.  Cervical lesion:  Will need biopsy given her current postmenopausal bleeding  3.  rtc for cervical biopsy +/- EMB depends on pelvic US.

## 2018-01-11 NOTE — LETTER
January 11, 2018      Olga Blanca, NP-C  [address] [facility] [address]           [facility] - OB/ GYN  [address] [facility] [address]  [facility] [address]  Phone: [phone]          Patient: Mary Naranjo   MR Number: [id number]   YOB: 1965   Date of Visit: 1/11/2018       Dear Olga Blanca:    Thank you for referring Mary Naranjo to me for evaluation. Attached you will find relevant portions of my assessment and plan of care.    If you have questions, please do not hesitate to call me. I look forward to following Mary Naranjo along with you.    Sincerely,    Amish Vera MD    Enclosure  CC:  No Recipients    If you would like to receive this communication electronically, please contact [email or web address] or [phone] to request more information on [facility] access.    For providers and/or their staff who would like to refer a patient to [facility], please contact us through our one-stop-shop provider referral line, [facility], at [phone].    If you feel you have received this communication in error or would no longer like to receive these types of communications, please e-mail [email or web address]

## 2018-01-11 NOTE — LETTER
January 11, 2018    Mary Naranjo  [address] [place] [address]             [facility] - OB/ GYN  [address] [facility] [address]  [facility] [address]  Phone: [phone] To whomever it may concern:      Mrs. Mary Naranjo  was seen in our office today on 1/11/2018  please excuse.        If you have any questions or concerns, please don't hesitate to call.    Sincerely,        Lorelei Rod LPN

## 2018-01-17 ENCOUNTER — TELEPHONE (OUTPATIENT)
Dept: OBSTETRICS AND GYNECOLOGY | Facility: CLINIC | Age: 53
End: 2018-01-17

## 2018-01-17 ENCOUNTER — PROCEDURE VISIT (OUTPATIENT)
Dept: OBSTETRICS AND GYNECOLOGY | Facility: CLINIC | Age: 53
End: 2018-01-17
Payer: COMMERCIAL

## 2018-01-17 VITALS
HEIGHT: 65 IN | DIASTOLIC BLOOD PRESSURE: 64 MMHG | WEIGHT: 236.44 LBS | BODY MASS INDEX: 39.39 KG/M2 | SYSTOLIC BLOOD PRESSURE: 122 MMHG

## 2018-01-17 DIAGNOSIS — N95.0 PMB (POSTMENOPAUSAL BLEEDING): Primary | ICD-10-CM

## 2018-01-17 DIAGNOSIS — D25.0 SUBMUCOUS UTERINE FIBROID: ICD-10-CM

## 2018-01-17 DIAGNOSIS — N88.9 CERVICAL LESION: ICD-10-CM

## 2018-01-17 DIAGNOSIS — N95.0 POSTMENOPAUSAL BLEEDING: Primary | ICD-10-CM

## 2018-01-17 DIAGNOSIS — D25.0 FIBROIDS, SUBMUCOSAL: ICD-10-CM

## 2018-01-17 DIAGNOSIS — R93.89 THICKENED ENDOMETRIUM: ICD-10-CM

## 2018-01-17 PROCEDURE — 99213 OFFICE O/P EST LOW 20 MIN: CPT | Mod: S$GLB,,, | Performed by: OBSTETRICS & GYNECOLOGY

## 2018-01-17 NOTE — PROCEDURES
Procedures     SUBJECTIVE:   52 y.o. female   for result discussion, surgery planning    LMP . Patient is postmenopausal..    Pt with onset of PMB 1-2 weeks ago.  Bleeding has lighten up and minimal today.  She subsequently had a pelvic US with 10 mm ES and a 9 mm submucosal fibroid.   On pelvic exam last week, she was noted to have a < 1 cm friable cervical lesion at 6 oclock.  Pt is here with her sister and wants to discuss her result and treatment plan    Procedure: Pelvic ultrasound    Technique: Transabdominal and transvaginal ultrasound of the pelvis.    Comparison: 2012    Findings:The uterus is measures 7.8 cm in length by 5.7 x 6.5 cm in transverse dimension.  The endometrium has heterogeneous echogenicity with anechoic and echogenic material possibly representing hemorrhage within the endometrial canal measuring approximately 10 mm in greatest thickness.     heterogeneous hypoechoic focus in the left aspect of the uterine fundus adjacent to the endometrium this measures approximately 9 mm concerning for possible submucosal fibroid    Right ovary not well seen with focus in the right adnexa suggestive for ovary measuring 2.5 CM.    Left ovary not well seen with focus in the left adnexa measuring 1.6 cm suggestive for left ovary     No adnexal mass. There is small  free fluid in the posterior cul-de-sac.   Impression      Heterogeneous material likely within the endometrial canal measuring 1 cm in thickness concerning for hemorrhage underlying lesion not excluded..     9 mm hypoechoic focus left aspect of the uterine fundus adjacent to the endometrium concerning for submucosal fibroid.    Ovaries not well seen however no evidence for adnexal mass.    Small nonspecific fluid posterior cul-de-sac.        Electronically signed by: JOHN ROCK DO  Date: 18  Time: 16:00              OB History    Para Term  AB Living   2 2 2     2   SAB TAB Ectopic Multiple Live Births                   # Outcome Date GA Lbr Miguel/2nd Weight Sex Delivery Anes PTL Lv   2 Term            1 Term               Obstetric Comments   Menopause @ age 45   MMG 6/2017 NEG   H/o abnormal pap, no treatment needed.  Pap neg 8/2017   c-scope in 2015     Past Medical History:   Diagnosis Date    Arthritis     Dental bridge present     upper removable partial    Fibrocystic breast      Past Surgical History:   Procedure Laterality Date    BREAST BIOPSY      BREAST CYST ASPIRATION      COLONOSCOPY N/A 10/7/2015    Procedure: COLONOSCOPY;  Surgeon: Eagle Stack MD;  Location: [facility];  Service: Endoscopy;  Laterality: N/A;    fibrocystic breast       KNEE ARTHROSCOPY W/ MENISCAL REPAIR      TUBAL LIGATION       Social History     Social History    Marital status:      Spouse name: N/A    Number of children: N/A    Years of education: N/A     Occupational History    Not on file.     Social History Main Topics    Smoking status: Never Smoker    Smokeless tobacco: Never Used    Alcohol use No    Drug use: No    Sexual activity: Not Currently     Other Topics Concern    Not on file     Social History Narrative    No narrative on file     Family History   Problem Relation Age of Onset    Heart disease Mother     Cancer Father      colon    Cancer Sister 40     breast    Breast cancer Sister     COPD Neg Hx          Current Outpatient Prescriptions   Medication Sig Dispense Refill    ascorbic acid, vitamin C, (VITAMIN C) 250 mg Chew Take 500 mg by mouth.      BUTTERBUR ROOT EXTRACT ORAL Take by mouth.      fish oil-omega-3 fatty acids 300-1,000 mg capsule Take 2 g by mouth once daily.      FLUARIX QUAD [id number], PF, 60 mcg (15 mcg x 4)/0.5 mL vaccine ADM 0.5ML IM UTD  0    magnesium 250 mg Tab Take by mouth.      multivitamin with minerals tablet Take 1 tablet by mouth once daily.      MULTIVITS,CA,MINERALS/IRON/FA (ONE-A-DAY WOMENS FORMULA ORAL) Take by mouth.      prochlorperazine  "(COMPAZINE) 10 MG tablet Take 1 tablet (10 mg total) by mouth 3 (three) times daily as needed. 10 tablet 0    sumatriptan (IMITREX) 50 MG tablet TAKE 1 TABLET BY MOUTH EVERY 2 HOURS AS NEEDED FOR MIGRAINE. DO NOT EXCEED 4 TABS IN 24 HOURS  1     No current facility-administered medications for this visit.      Allergies: Patient has no known allergies.     ROS:  GENERAL: Denies weight gain or weight loss. Feeling well overall.   SKIN: Denies rash or lesions.   HEAD: Denies head injury or headache.   NODES: Denies enlarged lymph nodes.   CHEST: Denies chest pain or shortness of breath.   CARDIOVASCULAR: Denies palpitations or left sided chest pain.   ABDOMEN: No abdominal pain, constipation, diarrhea, nausea, vomiting or rectal bleeding.   URINARY: No frequency, dysuria, hematuria, or burning on urination.  REPRODUCTIVE: Denies vaginal discharge,  lesions, pelvic pain. Vaginal bleeding improving  BREASTS: The patient performs breast self-examination and denies pain, lumps, or nipple discharge.   HEMATOLOGIC: No easy bruisability or excessive bleeding.  MUSCULOSKELETAL: Denies joint pain or swelling.   NEUROLOGIC: Denies syncope or weakness.   PSYCHIATRIC: Denies depression, anxiety or mood swings.      OBJECTIVE:   /64   Ht 5' 5" (1.651 m)   Wt 107.2 kg (236 lb 7.1 oz)   BMI 39.35 kg/m²   The patient appears well, alert, oriented x 3, in no distress.    Counseling appt only      ASSESSMENT:   1.  PMB/thickened endometrium/submucosal fibroid :  Recommend proceeding with hysteroscopy myomectomy with D&C for further evaluation of PMB given that she has a submucosal fibroid vs. EMB only (which will not address the fibroid).    Pt agreed.  I informed pt that I will be out for maternity leave and return in 6-8 weeks.  Recommend pt to have this done asap and not to wait until I return.  Pt agreed.  Case discussed with Dr. Corado who will be performing this surgery.  We will call pt once surgery date is scheduled.  2. "  Cervical lesion:  Will biopsy at the time of h'scopy D&C.

## 2018-01-30 ENCOUNTER — TELEPHONE (OUTPATIENT)
Dept: FAMILY MEDICINE | Facility: CLINIC | Age: 53
End: 2018-01-30

## 2018-01-30 NOTE — TELEPHONE ENCOUNTER
----- Message from Diane Solano sent at 1/30/2018  1:27 PM CST -----  Contact: [phone]  PINK [employer]_ Fever, congestion, cough, chills.

## 2018-01-31 NOTE — TELEPHONE ENCOUNTER
She's outside of the tamiflu window.  She should hydrate and treat her symptoms   Tylenol or ibuprofen as needed for fever or pain, flonase daily and delsym for cough

## 2018-01-31 NOTE — TELEPHONE ENCOUNTER
Patient return call.  PINK [employer] patient    Have you had any of the following symptoms: fever >100.4/chills, URI symptoms, body aches, nausea, vomiting, or diarrhea?   --Spoke with patient and she informed me that on Friday 1/26/18 was having fever of 100 but non since. Still having chill, body aches, and lots of coughing     How long have you had the symptoms?   --Since 1/26/18.    Did the symptoms start suddenly or did they come on gradually?   --Suddenly    Have you been in contact with anyone who has the flu?   -Yes.     Did you receive the flu shot during this current flu season?  --Yes did receive flu vaccine.

## 2018-02-08 ENCOUNTER — OFFICE VISIT (OUTPATIENT)
Dept: OBSTETRICS AND GYNECOLOGY | Facility: CLINIC | Age: 53
End: 2018-02-08
Payer: COMMERCIAL

## 2018-02-08 ENCOUNTER — HOSPITAL ENCOUNTER (OUTPATIENT)
Dept: PREADMISSION TESTING | Facility: HOSPITAL | Age: 53
Discharge: HOME | End: 2018-02-08
Attending: OBSTETRICS & GYNECOLOGY
Payer: COMMERCIAL

## 2018-02-08 VITALS
HEART RATE: 65 BPM | OXYGEN SATURATION: 100 % | SYSTOLIC BLOOD PRESSURE: 135 MMHG | TEMPERATURE: 98 F | RESPIRATION RATE: 18 BRPM | HEIGHT: 64 IN | DIASTOLIC BLOOD PRESSURE: 71 MMHG | WEIGHT: 233 LBS | BODY MASS INDEX: 39.78 KG/M2

## 2018-02-08 VITALS
HEART RATE: 66 BPM | DIASTOLIC BLOOD PRESSURE: 60 MMHG | BODY MASS INDEX: 38.82 KG/M2 | HEIGHT: 65 IN | SYSTOLIC BLOOD PRESSURE: 130 MMHG | WEIGHT: 233 LBS

## 2018-02-08 DIAGNOSIS — N95.0 PMB (POSTMENOPAUSAL BLEEDING): ICD-10-CM

## 2018-02-08 DIAGNOSIS — Z01.818 PRE-OP TESTING: Primary | ICD-10-CM

## 2018-02-08 DIAGNOSIS — N95.0 PMB (POSTMENOPAUSAL BLEEDING): Primary | ICD-10-CM

## 2018-02-08 LAB
BASOPHILS # BLD AUTO: 0.04 K/UL
BASOPHILS NFR BLD: 0.7 %
DIFFERENTIAL METHOD: ABNORMAL
EOSINOPHIL # BLD AUTO: 0.2 K/UL
EOSINOPHIL NFR BLD: 3.4 %
ERYTHROCYTE [DISTWIDTH] IN BLOOD BY AUTOMATED COUNT: 15.3 %
HCT VFR BLD AUTO: 37.7 %
HGB BLD-MCNC: 12 G/DL
LYMPHOCYTES # BLD AUTO: 3.1 K/UL
LYMPHOCYTES NFR BLD: 50.1 %
MCH RBC QN AUTO: 25.2 PG
MCHC RBC AUTO-ENTMCNC: 31.8 G/DL
MCV RBC AUTO: 79 FL
MONOCYTES # BLD AUTO: 0.5 K/UL
MONOCYTES NFR BLD: 8.1 %
NEUTROPHILS # BLD AUTO: 2.3 K/UL
NEUTROPHILS NFR BLD: 37.5 %
PLATELET # BLD AUTO: 291 K/UL
PMV BLD AUTO: 10.5 FL
RBC # BLD AUTO: 4.76 M/UL
WBC # BLD AUTO: 6.15 K/UL

## 2018-02-08 PROCEDURE — 85025 COMPLETE CBC W/AUTO DIFF WBC: CPT

## 2018-02-08 PROCEDURE — 99499 UNLISTED E&M SERVICE: CPT | Mod: S$GLB,,, | Performed by: OBSTETRICS & GYNECOLOGY

## 2018-02-08 PROCEDURE — 99999 PR PBB SHADOW E&M-EST. PATIENT-LVL III: CPT | Mod: PBBFAC,,, | Performed by: OBSTETRICS & GYNECOLOGY

## 2018-02-08 PROCEDURE — 36415 COLL VENOUS BLD VENIPUNCTURE: CPT

## 2018-02-08 NOTE — DISCHARGE INSTRUCTIONS
Your surgery is scheduled for____2/15/2018_____________.    Call [phone] between 2 pm and 5 pm ____2/14/2018________ to find out your arrival time for the day of surgery.    Report to SAME DAY SURGERY UNIT at _______am on the 2nd floor of the hospital.  Use the front entrance of the hospital before 6 am.  If you need wheelchair assistance, call [phone] from your cell phone,  or call 0 from the courtesy phone in the hospital lobby.    Important instructions:   Do not eat or drink after 12 midnight, including water.  It is okay to brush your teeth.  Do not have gum, candy or mints.     Take only these medications with a small swallow of water on the morning of your surgery.___none__________        Return to the hospital lab on __2/13/2018 or 2/14/2018________for additional blood test.       Please shower the night before and the morning of your surgery.       Do not wear make- up, including mascara.     You may wear deodorant only.      Do not wear powder, body lotion or cologne.     Do not wear any jewelry or have any metal on your body.     Please bring any documents given to you by your doctor.     If you are going home on the same day of surgery, you must have arrangements for a ride home.  You will not be able to drive home if you were given anesthesia or sedation.     Stop taking Aspirin, Ibuprofen, Motrin and Aleve at least 7 days before your surgery. You may use Tylenol.     Stop taking fish oil and vitamin E for least 7 days before surgery.     Wear loose fitting clothes allowing for bandages.     Please leave money and valuables home.       You may bring your cell phone.     Call the doctor if fever or illness should occur before your surgery.    Call [phone] to contact us here at [facility] if needed.

## 2018-02-14 ENCOUNTER — LAB VISIT (OUTPATIENT)
Dept: LAB | Facility: HOSPITAL | Age: 53
End: 2018-02-14
Attending: OBSTETRICS & GYNECOLOGY
Payer: COMMERCIAL

## 2018-02-14 DIAGNOSIS — Z01.818 PRE-OP TESTING: ICD-10-CM

## 2018-02-14 LAB
ABO + RH BLD: NORMAL
BLD GP AB SCN CELLS X3 SERPL QL: NORMAL

## 2018-02-14 PROCEDURE — 36415 COLL VENOUS BLD VENIPUNCTURE: CPT

## 2018-02-14 PROCEDURE — 86901 BLOOD TYPING SEROLOGIC RH(D): CPT

## 2018-02-14 NOTE — H&P
[facility] [place] - OB/ GYN  History & Physical  Gynecology      SUBJECTIVE:     Chief Complaint/Reason for Admission: PMB    History of Present Illness:  Mary Naranjo is a 52 y.o.  without a significant past medical history who presents with postmenopausal bleeding. Pt went into menopause @ age 45, denies history of HRT.  She began to have vaginal bleeding in early January. Soaking through 3 panty liners a day. Also has cramping associated with bleeding.  Not currently sexually active.   Last pap neg in 2017,  MMG normal and up to date  c-scope neg and up to date      (Not in a hospital admission)    Review of patient's allergies indicates:  No Known Allergies    Past Medical History:   Diagnosis Date    Arthritis     Dental bridge present     upper removable partial    Fibrocystic breast     Migraines     Obesity      Past Surgical History:   Procedure Laterality Date    BREAST BIOPSY      BREAST CYST ASPIRATION      COLONOSCOPY N/A 10/7/2015    Procedure: COLONOSCOPY;  Surgeon: Eagle Stack MD;  Location: [facility];  Service: Endoscopy;  Laterality: N/A;    fibrocystic breast       KNEE ARTHROSCOPY W/ MENISCAL REPAIR      TUBAL LIGATION       OB History      Para Term  AB Living    2 2 2     2    SAB TAB Ectopic Multiple Live Births                     Obstetric Comments    Menopause @ age 45  MMG 2017 NEG  H/o abnormal pap, no treatment needed.  Pap neg 2017  c-scope in           Family History   Problem Relation Age of Onset    Heart disease Mother     Colon cancer Father 60    Bladder Cancer Father     Lung cancer Father     Prostate cancer Father     Breast cancer Sister 42    COPD Neg Hx      Social History   Substance Use Topics    Smoking status: Never Smoker    Smokeless tobacco: Never Used    Alcohol use No       Review of Systems:  Constitutional: no fever or chills  Respiratory: no cough or shortness of breath  Cardiovascular: no chest pain or  palpitations  Gastrointestinal: no nausea or vomiting, tolerating diet  Genitourinary: no hematuria or dysuria     OBJECTIVE:     Vital Signs (Most Recent):  Pulse: 66 (02/08/18 0839)  BP: 130/60 (02/08/18 0839)  Body mass index is 38.77 kg/m².    Physical Exam:  General: well developed, well nourished, no distress  Lungs:  clear to auscultation bilaterally and normal respiratory effort  Heart: regular rate and rhythm, S1, S2 normal, no murmur, click, rub or gallop  Abdomen: soft, non-tender non-distented; bowel sounds normal; no masses,  no organomegaly  Extremities: no cyanosis or edema, or clubbing    Laboratory:  Lab Results   Component Value Date    WBC 6.15 02/08/2018    HGB 12.0 02/08/2018    HCT 37.7 02/08/2018    MCV 79 (L) 02/08/2018     02/08/2018        Diagnostic Results:  US: Reviewed --  The uterus is measures 7.8 cm in length by 5.7 x 6.5 cm in transverse dimension.  The endometrium has heterogeneous echogenicity with anechoic and echogenic material possibly representing hemorrhage within the endometrial canal measuring approximately 10 mm in greatest thickness.     ASSESSMENT/PLAN:     1. PMB (postmenopausal bleeding)  Vital signs    Up ad janis    POCT glucose    Notify physician if BS > 180 for hysterectomy patients    Notify Physician/Vital Signs Parameters Urine output less than 0.5mL/kg/hr (with indwelling catheter) or 30 mL/hr (without indwelling catheter) or blood glucose greater than 200 mg/dL    Notify physician    Diet NPO    Place sequential compression device    Chlorohexidine Gluconate Bath    Place in Outpatient    CANCELED: Type & Screen Pre Op    CANCELED: CBC auto differential       I have discussed the risks, benefits, indications, and alternatives of the procedure in detail. The patient verbalizes her understanding.  All questions answered.  Consents signed.  The patient agrees to proceed with procedure as planned.       Sheila Corado MD

## 2018-02-15 ENCOUNTER — ANESTHESIA EVENT (OUTPATIENT)
Dept: SURGERY | Facility: HOSPITAL | Age: 53
End: 2018-02-15
Payer: COMMERCIAL

## 2018-02-15 ENCOUNTER — ANESTHESIA (OUTPATIENT)
Dept: SURGERY | Facility: HOSPITAL | Age: 53
End: 2018-02-15
Payer: COMMERCIAL

## 2018-02-15 ENCOUNTER — HOSPITAL ENCOUNTER (OUTPATIENT)
Facility: HOSPITAL | Age: 53
Discharge: HOME | End: 2018-02-15
Attending: OBSTETRICS & GYNECOLOGY | Admitting: OBSTETRICS & GYNECOLOGY
Payer: COMMERCIAL

## 2018-02-15 VITALS
BODY MASS INDEX: 39.78 KG/M2 | SYSTOLIC BLOOD PRESSURE: 140 MMHG | WEIGHT: 233 LBS | HEART RATE: 60 BPM | RESPIRATION RATE: 20 BRPM | HEIGHT: 64 IN | OXYGEN SATURATION: 97 % | TEMPERATURE: 98 F | DIASTOLIC BLOOD PRESSURE: 79 MMHG

## 2018-02-15 DIAGNOSIS — N95.0 PMB (POSTMENOPAUSAL BLEEDING): Primary | ICD-10-CM

## 2018-02-15 PROCEDURE — 36000706: Performed by: OBSTETRICS & GYNECOLOGY

## 2018-02-15 PROCEDURE — 63600175 PHARM REV CODE 636 W HCPCS: Performed by: NURSE ANESTHETIST, CERTIFIED REGISTERED

## 2018-02-15 PROCEDURE — 88305 TISSUE EXAM BY PATHOLOGIST: CPT | Mod: 26,,, | Performed by: PATHOLOGY

## 2018-02-15 PROCEDURE — 71000016 HC POSTOP RECOV ADDL HR: Performed by: OBSTETRICS & GYNECOLOGY

## 2018-02-15 PROCEDURE — 36000707: Performed by: OBSTETRICS & GYNECOLOGY

## 2018-02-15 PROCEDURE — 27200651 HC AIRWAY, LMA: Performed by: NURSE ANESTHETIST, CERTIFIED REGISTERED

## 2018-02-15 PROCEDURE — 63600175 PHARM REV CODE 636 W HCPCS: Performed by: ANESTHESIOLOGY

## 2018-02-15 PROCEDURE — 27201423 OPTIME MED/SURG SUP & DEVICES STERILE SUPPLY: Performed by: OBSTETRICS & GYNECOLOGY

## 2018-02-15 PROCEDURE — D9220A PRA ANESTHESIA: Mod: ANES,,, | Performed by: ANESTHESIOLOGY

## 2018-02-15 PROCEDURE — 25000003 PHARM REV CODE 250: Performed by: OBSTETRICS & GYNECOLOGY

## 2018-02-15 PROCEDURE — 58558 HYSTEROSCOPY BIOPSY: CPT | Mod: ,,, | Performed by: OBSTETRICS & GYNECOLOGY

## 2018-02-15 PROCEDURE — 37000009 HC ANESTHESIA EA ADD 15 MINS: Performed by: OBSTETRICS & GYNECOLOGY

## 2018-02-15 PROCEDURE — 88305 TISSUE EXAM BY PATHOLOGIST: CPT | Performed by: PATHOLOGY

## 2018-02-15 PROCEDURE — 37000008 HC ANESTHESIA 1ST 15 MINUTES: Performed by: OBSTETRICS & GYNECOLOGY

## 2018-02-15 PROCEDURE — 25000003 PHARM REV CODE 250: Performed by: NURSE ANESTHETIST, CERTIFIED REGISTERED

## 2018-02-15 PROCEDURE — C1782 MORCELLATOR: HCPCS | Performed by: OBSTETRICS & GYNECOLOGY

## 2018-02-15 PROCEDURE — 71000039 HC RECOVERY, EACH ADD'L HOUR: Performed by: OBSTETRICS & GYNECOLOGY

## 2018-02-15 PROCEDURE — 71000015 HC POSTOP RECOV 1ST HR: Performed by: OBSTETRICS & GYNECOLOGY

## 2018-02-15 PROCEDURE — 71000033 HC RECOVERY, INTIAL HOUR: Performed by: OBSTETRICS & GYNECOLOGY

## 2018-02-15 PROCEDURE — D9220A PRA ANESTHESIA: Mod: CRNA,,, | Performed by: NURSE ANESTHETIST, CERTIFIED REGISTERED

## 2018-02-15 RX ORDER — LIDOCAINE HCL/PF 100 MG/5ML
SYRINGE (ML) INTRAVENOUS
Status: DISCONTINUED | OUTPATIENT
Start: 2018-02-15 | End: 2018-02-15

## 2018-02-15 RX ORDER — ONDANSETRON 2 MG/ML
INJECTION INTRAMUSCULAR; INTRAVENOUS
Status: DISCONTINUED | OUTPATIENT
Start: 2018-02-15 | End: 2018-02-15

## 2018-02-15 RX ORDER — DIPHENHYDRAMINE HCL 25 MG
25 CAPSULE ORAL EVERY 4 HOURS PRN
Status: DISCONTINUED | OUTPATIENT
Start: 2018-02-15 | End: 2018-02-15 | Stop reason: HOSPADM

## 2018-02-15 RX ORDER — SODIUM CHLORIDE 0.9 % (FLUSH) 0.9 %
3 SYRINGE (ML) INJECTION
Status: DISCONTINUED | OUTPATIENT
Start: 2018-02-15 | End: 2018-02-15 | Stop reason: HOSPADM

## 2018-02-15 RX ORDER — KETOROLAC TROMETHAMINE 30 MG/ML
INJECTION, SOLUTION INTRAMUSCULAR; INTRAVENOUS
Status: DISCONTINUED | OUTPATIENT
Start: 2018-02-15 | End: 2018-02-15

## 2018-02-15 RX ORDER — HYDROCODONE BITARTRATE AND ACETAMINOPHEN 5; 325 MG/1; MG/1
1 TABLET ORAL EVERY 4 HOURS PRN
Status: DISCONTINUED | OUTPATIENT
Start: 2018-02-15 | End: 2018-02-15 | Stop reason: HOSPADM

## 2018-02-15 RX ORDER — IBUPROFEN 600 MG/1
600 TABLET ORAL EVERY 6 HOURS PRN
Qty: 90 TABLET | Refills: 1 | Status: SHIPPED | OUTPATIENT
Start: 2018-02-15 | End: 2019-05-06

## 2018-02-15 RX ORDER — HYDROCODONE BITARTRATE AND ACETAMINOPHEN 5; 325 MG/1; MG/1
1 TABLET ORAL EVERY 4 HOURS PRN
Qty: 5 TABLET | Refills: 0 | Status: SHIPPED | OUTPATIENT
Start: 2018-02-15 | End: 2019-05-10

## 2018-02-15 RX ORDER — HYDROMORPHONE HYDROCHLORIDE 2 MG/ML
0.2 INJECTION, SOLUTION INTRAMUSCULAR; INTRAVENOUS; SUBCUTANEOUS EVERY 5 MIN PRN
Status: DISCONTINUED | OUTPATIENT
Start: 2018-02-15 | End: 2018-02-15 | Stop reason: HOSPADM

## 2018-02-15 RX ORDER — SODIUM CHLORIDE, SODIUM LACTATE, POTASSIUM CHLORIDE, CALCIUM CHLORIDE 600; 310; 30; 20 MG/100ML; MG/100ML; MG/100ML; MG/100ML
INJECTION, SOLUTION INTRAVENOUS CONTINUOUS PRN
Status: DISCONTINUED | OUTPATIENT
Start: 2018-02-15 | End: 2018-02-15

## 2018-02-15 RX ORDER — FENTANYL CITRATE 50 UG/ML
INJECTION, SOLUTION INTRAMUSCULAR; INTRAVENOUS
Status: DISCONTINUED | OUTPATIENT
Start: 2018-02-15 | End: 2018-02-15

## 2018-02-15 RX ORDER — HYDROCODONE BITARTRATE AND ACETAMINOPHEN 10; 325 MG/1; MG/1
1 TABLET ORAL EVERY 4 HOURS PRN
Status: DISCONTINUED | OUTPATIENT
Start: 2018-02-15 | End: 2018-02-15 | Stop reason: HOSPADM

## 2018-02-15 RX ORDER — ONDANSETRON 4 MG/1
8 TABLET, ORALLY DISINTEGRATING ORAL EVERY 8 HOURS PRN
Status: DISCONTINUED | OUTPATIENT
Start: 2018-02-15 | End: 2018-02-15 | Stop reason: HOSPADM

## 2018-02-15 RX ORDER — IBUPROFEN 600 MG/1
600 TABLET ORAL EVERY 6 HOURS PRN
Status: DISCONTINUED | OUTPATIENT
Start: 2018-02-15 | End: 2018-02-15 | Stop reason: HOSPADM

## 2018-02-15 RX ORDER — GLYCOPYRROLATE 0.2 MG/ML
INJECTION INTRAMUSCULAR; INTRAVENOUS
Status: DISCONTINUED | OUTPATIENT
Start: 2018-02-15 | End: 2018-02-15

## 2018-02-15 RX ORDER — MIDAZOLAM HYDROCHLORIDE 1 MG/ML
INJECTION, SOLUTION INTRAMUSCULAR; INTRAVENOUS
Status: DISCONTINUED | OUTPATIENT
Start: 2018-02-15 | End: 2018-02-15

## 2018-02-15 RX ORDER — DEXAMETHASONE SODIUM PHOSPHATE 4 MG/ML
INJECTION, SOLUTION INTRA-ARTICULAR; INTRALESIONAL; INTRAMUSCULAR; INTRAVENOUS; SOFT TISSUE
Status: DISCONTINUED | OUTPATIENT
Start: 2018-02-15 | End: 2018-02-15

## 2018-02-15 RX ORDER — PROPOFOL 10 MG/ML
VIAL (ML) INTRAVENOUS
Status: DISCONTINUED | OUTPATIENT
Start: 2018-02-15 | End: 2018-02-15

## 2018-02-15 RX ADMIN — HYDROMORPHONE HYDROCHLORIDE 0.2 MG: 2 INJECTION INTRAMUSCULAR; INTRAVENOUS; SUBCUTANEOUS at 08:02

## 2018-02-15 RX ADMIN — LIDOCAINE HYDROCHLORIDE 100 MG: 20 INJECTION, SOLUTION INTRAVENOUS at 07:02

## 2018-02-15 RX ADMIN — HYDROCODONE BITARTRATE AND ACETAMINOPHEN 1 TABLET: 10; 325 TABLET ORAL at 09:02

## 2018-02-15 RX ADMIN — ONDANSETRON 4 MG: 2 INJECTION, SOLUTION INTRAMUSCULAR; INTRAVENOUS at 07:02

## 2018-02-15 RX ADMIN — PROPOFOL 50 MG: 10 INJECTION, EMULSION INTRAVENOUS at 07:02

## 2018-02-15 RX ADMIN — SODIUM CHLORIDE, SODIUM LACTATE, POTASSIUM CHLORIDE, AND CALCIUM CHLORIDE: .6; .31; .03; .02 INJECTION, SOLUTION INTRAVENOUS at 07:02

## 2018-02-15 RX ADMIN — IBUPROFEN 600 MG: 600 TABLET, FILM COATED ORAL at 10:02

## 2018-02-15 RX ADMIN — MIDAZOLAM HYDROCHLORIDE 2 MG: 1 INJECTION, SOLUTION INTRAMUSCULAR; INTRAVENOUS at 07:02

## 2018-02-15 RX ADMIN — PROPOFOL 200 MG: 10 INJECTION, EMULSION INTRAVENOUS at 07:02

## 2018-02-15 RX ADMIN — FENTANYL CITRATE 100 MCG: 50 INJECTION INTRAMUSCULAR; INTRAVENOUS at 07:02

## 2018-02-15 RX ADMIN — KETOROLAC TROMETHAMINE 30 MG: 30 INJECTION, SOLUTION INTRAMUSCULAR; INTRAVENOUS at 08:02

## 2018-02-15 RX ADMIN — GLYCOPYRROLATE 0.2 MG: 0.2 INJECTION, SOLUTION INTRAMUSCULAR; INTRAVENOUS at 07:02

## 2018-02-15 RX ADMIN — WHITE PETROLATUM MINERAL OIL 1 TUBE: 150; 830 OINTMENT OPHTHALMIC at 07:02

## 2018-02-15 RX ADMIN — DEXAMETHASONE SODIUM PHOSPHATE 4 MG: 4 INJECTION, SOLUTION INTRAMUSCULAR; INTRAVENOUS at 07:02

## 2018-02-15 NOTE — DISCHARGE INSTRUCTIONS
***  BATHING:                   You may shower after your dressing is removed, but no tub baths, hot tubs, saunas or swimming until you see the doctor.    DRESSING:  ? PERIPAD AS NEEDED   ACTIVITY LEVEL: If you have received sedation or an anesthetic, you may feel sleepy for   several hours. Rest until you are more awake. Gradually resume your normal activities  ? No heavy lifting or straining, nothing over 10 lbs., like a gallon of milk.  ? Pelvic rest- no sex, tampons or douching until follow up or instructed by doctor.  DIET:  You may resume your home diet. If nausea is present, increase your diet gradually with fluids and bland foods.    Medications:  Pain medication should be taken only if needed and as directed. If antibiotics are prescribed, the medication should be taken until completed. You will be given an updated list of you medications.  ? No driving, alcoholic beverages or signing legal documents for next 24 hours or while taking pain medication      YOUR LAST PAIN PILL WAS GIVEN AT 10:41 AM       CALL THE DOCTOR:    For any obvious bleeding (some dried blood over the incision is normal).      Redness, swelling, foul smell around incision or fever over 101.   Shortness of breath, Coughing up Bloody Sputum or Pains or Swelling in your calves.   Persistent pain or nausea not relieved by medication.   If vaginal bleeding is in excess of a normal period.   Problems urinating    If any unusual problems or difficulties occur contact your doctor. If you cannot contact your doctor but feel your signs and symptoms warrant a physicians attention return to the emergency room.    Fall Prevention  Millions of people fall every year and injure themselves. You may have had anesthesia or sedation which may increase your risk of falling. You may have health issues that put you at an increased risk of falling.     Here are ways to reduce your risk of falling.  ·   · Make your home safe by keeping walkways clear of  objects you may trip over.  · Use non-slip pads under rugs. Do not use area rugs or small throw rugs.  · Use non-slip mats in bathtubs and showers.  · Install handrails and lights on staircases.  · Do not walk in poorly lit areas.  · Do not stand on chairs or wobbly ladders.  · Use caution when reaching overhead or looking upward. This position can cause a loss of balance.  · Be sure your shoes fit properly, have non-slip bottoms and are in good condition.   · Wear shoes both inside and out. Avoid going barefoot or wearing slippers.  · Be cautious when going up and down stairs, curbs, and when walking on uneven sidewalks.  · If your balance is poor, consider using a cane or walker.  · If your fall was related to alcohol use, stop or limit alcohol intake.   · If your fall was related to use of sleeping medicines, talk to your doctor about this. You may need to reduce your dosage at bedtime if you awaken during the night to go to the bathroom.    · To reduce the need for nighttime bathroom trips:  ¨ Avoid drinking fluids for several hours before going to bed  ¨ Empty your bladder before going to bed  ¨ Men can keep a urinal at the bedside  · Stay as active as you can. Balance, flexibility, strength, and endurance all come from exercise. They all play a role in preventing falls. Ask your healthcare provider which types of activity are right for you.  · Get your vision checked on a regular basis.  · If you have pets, know where they are before you stand up or walk so you don't trip over them.  · Use night lights.

## 2018-02-15 NOTE — ANESTHESIA PREPROCEDURE EVALUATION
02/15/2018  Mary Naranjo is a 52 y.o., female with hx of migraines, obesity, postmenopausal bleeding   who presents for:    Pre-operative evaluation for Procedure(s) (LRB):  [id number] (N/A)    Previous Anesthesia:  Present Prior to Hospital Arrival?: No; Placement Date: 06/23/15; Placement Time: 0857; Inserted by: CRNA; Airway Device: LMA; Mask Ventilation: Easy; Intubated: Postinduction; Style: Cuffed; Cuff Inflation: Minimal occlusive pressure; Placement Verified By: Auscultation, Capnometry; Grade: Grade II; Complicating Factors: Obesity; Intubation Findings: Positive EtCO2, Bilateral breath sounds, Atraumatic/Condition of teeth unchanged; Securment: Lips; Complications: None; Breath Sounds: Equal Bilateral; Insertion Attempts: 1; Removal Date: 06/23/15;  Removal Time: 0930    Diagnostic Studies:  No relevant studies.    8/22/14 EKG:  Normal sinus rhythm  LVH  Nonspecific T wave abnormality      Patient Active Problem List   Diagnosis    Prediabetes    Obesity (BMI 30-39.9)    Low back pain    Muscle weakness    Stiffness of vertebral column    Right knee pain    Tear of medial cartilage or meniscus of knee, current    Acute intractable headache    PMB (postmenopausal bleeding)       Review of patient's allergies indicates:  No Known Allergies     No current facility-administered medications on file prior to encounter.      Current Outpatient Prescriptions on File Prior to Encounter   Medication Sig Dispense Refill    sumatriptan (IMITREX) 50 MG tablet TAKE 1 TABLET BY MOUTH EVERY 2 HOURS AS NEEDED FOR MIGRAINE. DO NOT EXCEED 4 TABS IN 24 HOURS  1    ascorbic acid, vitamin C, (VITAMIN C) 250 mg Chew Take 500 mg by mouth.      fish oil-omega-3 fatty acids 300-1,000 mg capsule Take 2 g by mouth once daily.      MULTIVITS,CA,MINERALS/IRON/FA (ONE-A-DAY WOMENS FORMULA  ORAL) Take by mouth.      prochlorperazine (COMPAZINE) 10 MG tablet Take 1 tablet (10 mg total) by mouth 3 (three) times daily as needed. 10 tablet 0       Past Surgical History:   Procedure Laterality Date    BREAST BIOPSY      BREAST CYST ASPIRATION      COLONOSCOPY N/A 10/7/2015    Procedure: COLONOSCOPY;  Surgeon: Eagle Stack MD;  Location: [facility];  Service: Endoscopy;  Laterality: N/A;    fibrocystic breast       KNEE ARTHROSCOPY W/ MENISCAL REPAIR      TUBAL LIGATION         Social History     Social History    Marital status:      Spouse name: N/A    Number of children: N/A    Years of education: N/A     Occupational History    Not on file.     Social History Main Topics    Smoking status: Never Smoker    Smokeless tobacco: Never Used    Alcohol use No    Drug use: No    Sexual activity: Not Currently     Other Topics Concern    Not on file     Social History Narrative    No narrative on file         Vital Signs Range (Last 24H):  Temp:  [36.5 °C (97.7 °F)]   Pulse:  [69]   Resp:  [16]   BP: (134)/(79)   SpO2:  [99 %]       CBC: No results for input(s): WBC, RBC, HGB, HCT, PLT, MCV, MCH, MCHC in the last 72 hours.    CMP: No results for input(s): NA, K, CL, CO2, BUN, CREATININE, GLU, MG, PHOS, CALCIUM, ALBUMIN, PROT, ALKPHOS, ALT, AST, BILITOT in the last 72 hours.    INR  No results for input(s): PT, INR, PROTIME, APTT in the last 72 hours.          Anesthesia Evaluation    I have reviewed the Patient Summary Reports.    I have reviewed the Nursing Notes.   I have reviewed the Medications.     Review of Systems  Anesthesia Hx:  No problems with previous Anesthesia Denies Hx of Anesthetic complications  History of prior surgery of interest to airway management or planning:  Denies Personal Hx of Anesthesia complications.   Social:  Non-Smoker, No Alcohol Use    Hematology/Oncology:  Hematology Normal   Oncology Normal     EENT/Dental:EENT/Dental Normal    Cardiovascular:  Cardiovascular Normal Exercise tolerance: good  ECG has been reviewed.    Pulmonary:  Pulmonary Normal    Renal/:  Renal/ Normal     Hepatic/GI:  Hepatic/GI Normal    Musculoskeletal:  Musculoskeletal Normal    OB/GYN/PEDS:  Postmenopausal bleeding   Neurological:   Headaches    Endocrine:  Endocrine Normal    Psych:  Psychiatric Normal           Physical Exam  General:  Obesity    Airway/Jaw/Neck:  Airway Findings: Mouth Opening: Normal Tongue: Normal  General Airway Assessment: Adult  Oropharynx Findings: Normal Mallampati: II  TM Distance: Normal, at least 6 cm  Jaw/Neck Findings:  Neck ROM: Normal ROM  Neck Findings: Normal    Eyes/Ears/Nose:  EYES/EARS/NOSE FINDINGS: Normal   Dental:  Dental Findings: In tact, upper partial dentures   Chest/Lungs:  Chest/Lungs Findings: Normal Respiratory Rate     Heart/Vascular:  Heart Findings: Rate: Normal        Mental Status:  Mental Status Findings:  Cooperative, Alert and Oriented         Anesthesia Plan  Type of Anesthesia, risks & benefits discussed:  Anesthesia Type:  general  Patient's Preference:   Intra-op Monitoring Plan: standard ASA monitors  Intra-op Monitoring Plan Comments:   Post Op Pain Control Plan: per primary service following discharge from PACU, IV/PO Opioids PRN and multimodal analgesia  Post Op Pain Control Plan Comments:   Induction:   IV  Beta Blocker:  Patient is not currently on a Beta-Blocker (No further documentation required).       Informed Consent: Patient understands risks and agrees with Anesthesia plan.  Questions answered. Anesthesia consent signed with patient.  ASA Score: 2     Day of Surgery Review of History & Physical:    H&P update referred to the surgeon.         Ready For Surgery From Anesthesia Perspective.

## 2018-02-15 NOTE — H&P (VIEW-ONLY)
[facility] - OB/ GYN  History & Physical  Gynecology      SUBJECTIVE:     Chief Complaint/Reason for Admission: PMB    History of Present Illness:  Mary Naranjo is a 52 y.o.  without a significant past medical history who presents with postmenopausal bleeding. Pt went into menopause @ age 45, denies history of HRT.  She began to have vaginal bleeding in early January. Soaking through 3 panty liners a day. Also has cramping associated with bleeding.  Not currently sexually active.   Last pap neg in 2017,  MMG normal and up to date  c-scope neg and up to date      (Not in a hospital admission)    Review of patient's allergies indicates:  No Known Allergies    Past Medical History:   Diagnosis Date    Arthritis     Dental bridge present     upper removable partial    Fibrocystic breast     Migraines     Obesity      Past Surgical History:   Procedure Laterality Date    BREAST BIOPSY      BREAST CYST ASPIRATION      COLONOSCOPY N/A 10/7/2015    Procedure: COLONOSCOPY;  Surgeon: Eagle Stack MD;  Location: [facility];  Service: Endoscopy;  Laterality: N/A;    fibrocystic breast       KNEE ARTHROSCOPY W/ MENISCAL REPAIR      TUBAL LIGATION       OB History      Para Term  AB Living    2 2 2     2    SAB TAB Ectopic Multiple Live Births                     Obstetric Comments    Menopause @ age 45  MMG 2017 NEG  H/o abnormal pap, no treatment needed.  Pap neg 2017  c-scope in           Family History   Problem Relation Age of Onset    Heart disease Mother     Colon cancer Father 60    Bladder Cancer Father     Lung cancer Father     Prostate cancer Father     Breast cancer Sister 42    COPD Neg Hx      Social History   Substance Use Topics    Smoking status: Never Smoker    Smokeless tobacco: Never Used    Alcohol use No       Review of Systems:  Constitutional: no fever or chills  Respiratory: no cough or shortness of breath  Cardiovascular: no chest pain or  palpitations  Gastrointestinal: no nausea or vomiting, tolerating diet  Genitourinary: no hematuria or dysuria     OBJECTIVE:     Vital Signs (Most Recent):  Pulse: 66 (02/08/18 0839)  BP: 130/60 (02/08/18 0839)  Body mass index is 38.77 kg/m².    Physical Exam:  General: well developed, well nourished, no distress  Lungs:  clear to auscultation bilaterally and normal respiratory effort  Heart: regular rate and rhythm, S1, S2 normal, no murmur, click, rub or gallop  Abdomen: soft, non-tender non-distented; bowel sounds normal; no masses,  no organomegaly  Extremities: no cyanosis or edema, or clubbing    Laboratory:  Lab Results   Component Value Date    WBC 6.15 02/08/2018    HGB 12.0 02/08/2018    HCT 37.7 02/08/2018    MCV 79 (L) 02/08/2018     02/08/2018        Diagnostic Results:  US: Reviewed --  The uterus is measures 7.8 cm in length by 5.7 x 6.5 cm in transverse dimension.  The endometrium has heterogeneous echogenicity with anechoic and echogenic material possibly representing hemorrhage within the endometrial canal measuring approximately 10 mm in greatest thickness.     ASSESSMENT/PLAN:     1. PMB (postmenopausal bleeding)  Vital signs    Up ad janis    POCT glucose    Notify physician if BS > 180 for hysterectomy patients    Notify Physician/Vital Signs Parameters Urine output less than 0.5mL/kg/hr (with indwelling catheter) or 30 mL/hr (without indwelling catheter) or blood glucose greater than 200 mg/dL    Notify physician    Diet NPO    Place sequential compression device    Chlorohexidine Gluconate Bath    Place in Outpatient    CANCELED: Type & Screen Pre Op    CANCELED: CBC auto differential       I have discussed the risks, benefits, indications, and alternatives of the procedure in detail. The patient verbalizes her understanding.  All questions answered.  Consents signed.  The patient agrees to proceed with procedure as planned.       Sheila Corado MD

## 2018-02-15 NOTE — ANESTHESIA POSTPROCEDURE EVALUATION
"Anesthesia Post Evaluation    Patient: Mary Naranjo    Procedure(s) Performed: Procedure(s) (LRB):  [id number] (N/A)    Final Anesthesia Type: general  Patient location during evaluation: PACU  Patient participation: Yes- Able to Participate  Level of consciousness: awake and alert and oriented  Post-procedure vital signs: reviewed and stable  Pain management: adequate  Airway patency: patent  PONV status at discharge: No PONV  Anesthetic complications: no      Cardiovascular status: blood pressure returned to baseline and hemodynamically stable  Respiratory status: unassisted, spontaneous ventilation and room air  Hydration status: euvolemic  Follow-up not needed.        Visit Vitals  BP (!) 131/59   Pulse 64   Temp 36.5 °C (97.7 °F) (Oral)   Resp 18   Ht 5' 4" (1.626 m)   Wt 105.7 kg (233 lb)   LMP 01/15/2018   SpO2 96%   BMI 39.99 kg/m²       Pain/Ming Score: Pain Assessment Performed: Yes (2/15/2018  8:16 AM)  Presence of Pain: non-verbal indicators absent (2/15/2018  8:16 AM)  Pain Rating Prior to Med Admin: 7 (2/15/2018  8:38 AM)  Pain Rating Post Med Admin: 3 (2/15/2018  8:45 AM)  Ming Score: 9 (2/15/2018  8:45 AM)      "

## 2018-02-15 NOTE — INTERVAL H&P NOTE
The patient has been examined and the H&P has been reviewed:    I concur with the findings and no changes have occurred since H&P was written.    Anesthesia/Surgery risks, benefits and alternative options discussed and understood by patient/family.          Active Hospital Problems    Diagnosis  POA    PMB (postmenopausal bleeding) [N95.0]  Yes      Resolved Hospital Problems    Diagnosis Date Resolved POA   No resolved problems to display.         Sheila Corado MD

## 2018-02-15 NOTE — DISCHARGE SUMMARY
[facility]  Discharge Note  Short Stay    Admit Date: 2/15/2018    Discharge Date and Time: 2/15/2018 11:20 AM     Attending Physician: Sheila Corado MD     Discharge Provider: Sheila Corado    Diagnoses:  Active Hospital Problems    Diagnosis  POA    *PMB (postmenopausal bleeding) [N95.0]  Yes      Resolved Hospital Problems    Diagnosis Date Resolved POA   No resolved problems to display.       Discharged Condition: good    Hospital Course: Patient was admitted for an outpatient procedure and tolerated the procedure well with no complications.    Final Diagnoses: Same as principal problem.    Disposition: Home or Self Care    Follow up/Patient Instructions:    Medications:  Reconciled Home Medications:   Current Discharge Medication List      START taking these medications    Details   hydrocodone-acetaminophen 5-325mg (NORCO) 5-325 mg per tablet Take 1 tablet by mouth every 4 (four) hours as needed for Pain.  Qty: 5 tablet, Refills: 0      ibuprofen (ADVIL,MOTRIN) 600 MG tablet Take 1 tablet (600 mg total) by mouth every 6 (six) hours as needed for Pain.  Qty: 90 tablet, Refills: 1         CONTINUE these medications which have NOT CHANGED    Details   sumatriptan (IMITREX) 50 MG tablet TAKE 1 TABLET BY MOUTH EVERY 2 HOURS AS NEEDED FOR MIGRAINE. DO NOT EXCEED 4 TABS IN 24 HOURS  Refills: 1      ascorbic acid, vitamin C, (VITAMIN C) 250 mg Chew Take 500 mg by mouth.      fish oil-omega-3 fatty acids 300-1,000 mg capsule Take 2 g by mouth once daily.      MULTIVITS,CA,MINERALS/IRON/FA (ONE-A-DAY WOMENS FORMULA ORAL) Take by mouth.      prochlorperazine (COMPAZINE) 10 MG tablet Take 1 tablet (10 mg total) by mouth 3 (three) times daily as needed.  Qty: 10 tablet, Refills: 0    Associated Diagnoses: Migraine without aura and without status migrainosus, not intractable             Discharge Procedure Orders  Diet general     Activity as tolerated     Call MD for:  temperature >100.4     Call MD for:  persistent  nausea and vomiting     Call MD for:  severe uncontrolled pain     Call MD for:  difficulty breathing, headache or visual disturbances     Call MD for:  redness, tenderness, or signs of infection (pain, swelling, redness, odor or green/yellow discharge around incision site)     Call MD for:  persistent dizziness or light-headedness     No dressing needed       Follow-up Information     Sheila Corado MD In 2 weeks.    Specialty:  Obstetrics and Gynecology  Why:  Post-Op Check  Contact information:  [address] [place], [place] [id number] [phone]                   Discharge Procedure Orders (must include Diet, Follow-up, Activity):    Discharge Procedure Orders (must include Diet, Follow-up, Activity)  Diet general     Activity as tolerated     Call MD for:  temperature >100.4     Call MD for:  persistent nausea and vomiting     Call MD for:  severe uncontrolled pain     Call MD for:  difficulty breathing, headache or visual disturbances     Call MD for:  redness, tenderness, or signs of infection (pain, swelling, redness, odor or green/yellow discharge around incision site)     Call MD for:  persistent dizziness or light-headedness     No dressing needed

## 2018-02-15 NOTE — OP NOTE
[facility]  Surgery Department  Operative Note    SUMMARY     Date of Procedure: 2/15/2018     Procedure: Procedure(s) (LRB):  [id number] (N/A)     Surgeon(s) and Role:     * Sheila Corado MD - Primary    Assisting Surgeon: None    Pre-Operative Diagnosis: PMB (postmenopausal bleeding) [N95.0]  Submucous uterine fibroid [D25.0]    Post-Operative Diagnosis: Post-Op Diagnosis Codes:     * PMB (postmenopausal bleeding) [N95.0]     * Submucous uterine fibroid [D25.0]    Anesthesia: General    Technical Procedures Used: Sound 8cm. Endometrial polyp noted. No uterine fibroid noted.     Description of the Findings of the Procedure: Pt was taken to the OR where general anesthesia was found to be adequate. She was placed in the dorsal lithotomy position with candy cane stirrups. An exam under anesthesia revealed an approximately 10 wk size uterus with closed cervix. Pt was prepared and draped in normal sterile fashion.   A weighted speculum was placed in posterior aspect of the vagina. A single-tooth tenaculum was used to grasp the anterior lip of the cervix. The uterus sounded to 8cm. The 5 mm hysteroscope was introduced without difficulty. The uterus distended with normal saline. A large endometrial polyp was noted with the base at the fundus on the right. The tubal ostia were identified without difficulty, and appeared normal. The truclear device was used to resect the polyp. The hysteroscope was withdrawn without difficulty.   The endometrial scraping and polyp fragments were sent to pathology. The tenaculum was removed and hemostasis was noted at the puncture sites in the cervix.   The patient tolerated the procedure well. Instrument and sponge counts were correct times 2. Patient tolerated the procedure well and was taken to the recovery room in stable condition.       Significant Surgical Tasks Conducted by the Assistant(s), if Applicable: none    Complications: No    Estimated  Blood Loss (EBL): * No values recorded between 2/15/2018  7:45 AM and 2/15/2018  8:13 AM *           Implants: * No implants in log *    Specimens:   Endometrial polyp.             Condition: Good    Disposition: PACU - hemodynamically stable.    Attestation: I was present and scrubbed for the entire procedure.       Sheila Corado MD

## 2018-02-15 NOTE — TRANSFER OF CARE
"Anesthesia Transfer of Care Note    Patient: Mary Naranjo    Procedure(s) Performed: Procedure(s) (LRB):  [id number] (N/A)    Patient location: PACU    Anesthesia Type: general    Transport from OR: Transported from OR on room air with adequate spontaneous ventilation    Post pain: adequate analgesia    Post assessment: no apparent anesthetic complications    Post vital signs: stable    Level of consciousness: sedated    Nausea/Vomiting: no nausea/vomiting    Complications: none    Transfer of care protocol was followed      Last vitals:   Visit Vitals  BP (!) 175/79 (BP Location: Left arm, Patient Position: Lying)   Pulse 85   Temp 36.5 °C (97.7 °F) (Oral)   Resp 16   Ht 5' 4" (1.626 m)   Wt 105.7 kg (233 lb)   LMP 01/15/2018   SpO2 100%   BMI 39.99 kg/m²     "

## 2018-02-20 ENCOUNTER — TELEPHONE (OUTPATIENT)
Dept: OBSTETRICS AND GYNECOLOGY | Facility: CLINIC | Age: 53
End: 2018-02-20

## 2018-02-20 NOTE — TELEPHONE ENCOUNTER
----- Message from Lizzy Storm sent at 2/20/2018  8:00 AM CST -----  Contact: Self   Patient says she has right side burning and vaginal bleeding. Patient says she did go back to work today. Please call patient at [phone].

## 2018-02-20 NOTE — TELEPHONE ENCOUNTER
Patient reports vaginal bleeding none directly after surgery but 2 weeks out and started bleeding on yesterday like a period. Patient appointment made for tomorrow.

## 2018-02-21 ENCOUNTER — OFFICE VISIT (OUTPATIENT)
Dept: OBSTETRICS AND GYNECOLOGY | Facility: CLINIC | Age: 53
End: 2018-02-21
Payer: COMMERCIAL

## 2018-02-21 VITALS
HEIGHT: 64 IN | WEIGHT: 235 LBS | BODY MASS INDEX: 40.12 KG/M2 | SYSTOLIC BLOOD PRESSURE: 135 MMHG | DIASTOLIC BLOOD PRESSURE: 69 MMHG

## 2018-02-21 DIAGNOSIS — G89.18 POST-OP PAIN: Primary | ICD-10-CM

## 2018-02-21 LAB
BILIRUB SERPL-MCNC: NORMAL MG/DL
BLOOD URINE, POC: 2
COLOR, POC UA: YELLOW
GLUCOSE UR QL STRIP: NORMAL
KETONES UR QL STRIP: NORMAL
LEUKOCYTE ESTERASE URINE, POC: NORMAL
NITRITE, POC UA: NORMAL
PH, POC UA: NORMAL
PROTEIN, POC: NORMAL
SPECIFIC GRAVITY, POC UA: NORMAL
UROBILINOGEN, POC UA: NORMAL

## 2018-02-21 PROCEDURE — 81002 URINALYSIS NONAUTO W/O SCOPE: CPT | Mod: S$GLB,,, | Performed by: OBSTETRICS & GYNECOLOGY

## 2018-02-21 PROCEDURE — 99999 PR PBB SHADOW E&M-EST. PATIENT-LVL III: CPT | Mod: PBBFAC,,, | Performed by: OBSTETRICS & GYNECOLOGY

## 2018-02-21 PROCEDURE — 99024 POSTOP FOLLOW-UP VISIT: CPT | Mod: S$GLB,,, | Performed by: OBSTETRICS & GYNECOLOGY

## 2018-02-21 NOTE — PROGRESS NOTES
Subjective:       Patient ID: Mary Naranjo is a 52 y.o. female.    Chief Complaint:  Post-op Evaluation      History of Present Illness  HPI  Postoperative Follow-up  Patient presents to the clinic 1 weeks status post operative hysteroscopy for abnormal uterine bleeding. Eating a regular diet without difficulty. Bowel movements are abnormal with constipation but loose stool. Pt is reporting pain, but she is not taking any medication.   Pt reports lower abdominal pain that radiates around to back. She also reports back pain, and some discomfort in the RUQ. No muscle weakness, some occasional tingling in legs.     Pathology:   Fragments of endometrium (specimen submitted as endometrial polyp):  -Fragments of endometrial polyp      GYN & OB History  Patient's last menstrual period was 01/15/2018.   Date of Last Pap: 2017    OB History    Para Term  AB Living   2 2 2     2   SAB TAB Ectopic Multiple Live Births                  # Outcome Date GA Lbr Miguel/2nd Weight Sex Delivery Anes PTL Lv   2 Term            1 Term               Obstetric Comments   Menopause @ age 45   MMG 2017 NEG   H/o abnormal pap, no treatment needed.  Pap neg 2017   c-scope in        Review of Systems  Review of Systems   Gastrointestinal: Positive for abdominal pain and constipation. Negative for diarrhea, nausea and vomiting.   Genitourinary: Positive for pelvic pain and vaginal bleeding. Negative for vaginal discharge, vaginal pain and vaginal odor.           Objective:    Physical Exam:   Constitutional: She is oriented to person, place, and time. She appears well-developed and well-nourished. No distress.      Neck: Normal range of motion. Neck supple.    Cardiovascular: Normal rate, regular rhythm and normal heart sounds.  Exam reveals no clubbing, no cyanosis and no edema.    No murmur heard.   Pulmonary/Chest: Effort normal and breath sounds normal. No respiratory distress. She has no wheezes. She has no  rales.        Abdominal: Soft. Normal appearance and bowel sounds are normal. She exhibits no distension and no mass. There is tenderness in the right upper quadrant, right lower quadrant and epigastric area. There is no rigidity, no rebound and no guarding.     Genitourinary: Vagina normal and uterus normal. There is no rash, tenderness, lesion or injury on the right labia. There is no rash, tenderness, lesion or injury on the left labia. Uterus is not deviated, not enlarged, not fixed and not hosting fibroids. Cervix is normal. Right adnexum displays no mass, no tenderness and no fullness. Left adnexum displays no mass, no tenderness and no fullness. No erythema, tenderness or bleeding in the vagina. No signs of injury around the vagina. No vaginal discharge found. Cervix exhibits no motion tenderness, no discharge and no friability.   Genitourinary Comments: Old blood in vault. Some light spotting from cervix.                Neurological: She is alert and oriented to person, place, and time.    Skin: Skin is warm and dry. No rash noted. No cyanosis. Nails show no clubbing.    Psychiatric: She has a normal mood and affect. Her behavior is normal.          Assessment:        1. Post-op pain              Plan:      1. Post-op pain  - Pt will need to refrain from work for this week. Recommended patient to take pain medication (ibuprofen) as directed to help with inflammation & rest. Will check urine for bladder infection. Possible some strain on hips from positioning during the surgery. Discussed with patient to monitor for improvement.   - Discussed pathology results-- benign. Explained bleeding will stop over the next week and it shouldn't come back. All questions answered.        Follow-up if symptoms worsen or fail to improve.

## 2018-03-19 ENCOUNTER — TELEPHONE (OUTPATIENT)
Dept: FAMILY MEDICINE | Facility: CLINIC | Age: 53
End: 2018-03-19

## 2018-03-19 ENCOUNTER — OFFICE VISIT (OUTPATIENT)
Dept: FAMILY MEDICINE | Facility: CLINIC | Age: 53
End: 2018-03-19
Payer: COMMERCIAL

## 2018-03-19 VITALS
BODY MASS INDEX: 40.49 KG/M2 | TEMPERATURE: 98 F | SYSTOLIC BLOOD PRESSURE: 132 MMHG | WEIGHT: 237.19 LBS | DIASTOLIC BLOOD PRESSURE: 72 MMHG | HEIGHT: 64 IN | HEART RATE: 60 BPM | OXYGEN SATURATION: 98 % | RESPIRATION RATE: 20 BRPM

## 2018-03-19 DIAGNOSIS — B02.9 HERPES ZOSTER WITHOUT COMPLICATION: Primary | ICD-10-CM

## 2018-03-19 PROCEDURE — 99999 PR PBB SHADOW E&M-EST. PATIENT-LVL IV: CPT | Mod: PBBFAC,,, | Performed by: NURSE PRACTITIONER

## 2018-03-19 PROCEDURE — 96372 THER/PROPH/DIAG INJ SC/IM: CPT | Mod: S$GLB,,, | Performed by: FAMILY MEDICINE

## 2018-03-19 PROCEDURE — 99214 OFFICE O/P EST MOD 30 MIN: CPT | Mod: 25,S$GLB,, | Performed by: NURSE PRACTITIONER

## 2018-03-19 RX ORDER — GABAPENTIN 300 MG/1
300 CAPSULE ORAL EVERY 8 HOURS PRN
Qty: 90 CAPSULE | Refills: 0 | Status: SHIPPED | OUTPATIENT
Start: 2018-03-19 | End: 2019-05-10

## 2018-03-19 RX ORDER — METHYLPREDNISOLONE 4 MG/1
TABLET ORAL
Qty: 1 PACKAGE | Refills: 0 | Status: SHIPPED | OUTPATIENT
Start: 2018-03-19 | End: 2018-04-09

## 2018-03-19 RX ORDER — DEXAMETHASONE SODIUM PHOSPHATE 4 MG/ML
8 INJECTION, SOLUTION INTRA-ARTICULAR; INTRALESIONAL; INTRAMUSCULAR; INTRAVENOUS; SOFT TISSUE
Status: COMPLETED | OUTPATIENT
Start: 2018-03-19 | End: 2018-03-19

## 2018-03-19 RX ORDER — CETIRIZINE HYDROCHLORIDE 10 MG/1
10 TABLET ORAL DAILY
Qty: 30 TABLET | Refills: 0 | Status: SHIPPED | OUTPATIENT
Start: 2018-03-19 | End: 2019-02-13

## 2018-03-19 RX ORDER — ACYCLOVIR 800 MG/1
800 TABLET ORAL
Qty: 35 TABLET | Refills: 0 | Status: CANCELLED | OUTPATIENT
Start: 2018-03-19 | End: 2019-03-19

## 2018-03-19 RX ADMIN — DEXAMETHASONE SODIUM PHOSPHATE 8 MG: 4 INJECTION, SOLUTION INTRA-ARTICULAR; INTRALESIONAL; INTRAMUSCULAR; INTRAVENOUS; SOFT TISSUE at 12:03

## 2018-03-19 NOTE — LETTER
March 19, 2018      [facility]  [address]  [place] [address]  Phone: [phone]       Patient: Mary Naranjo   YOB: 1965  Date of Visit: 03/19/2018    To Whom It May Concern:    Sheila Naranjo  was at [facility] on 03/19/2018. She may return to work/school on 03/21/2018 with no restrictions. If you have any questions or concerns, or if I can be of further assistance, please do not hesitate to contact me.    Sincerely,    Olga Blanca, BRAYAN-C

## 2018-03-19 NOTE — PATIENT INSTRUCTIONS
Follow up with primary care provider if not improved  Go to ER for new, worse or concerning symptoms  Domeboro or Aveeno soaks  Begin steroid tomorrow    Shingles  Shingles is a viral infection caused by the same virus as chicken pox. Anyone who has had chicken pox may get shingles later in life. The virus stays in the body, but remains dormant (asleep). Shingles often occurs in older persons or persons with lowered immunity. But it can affect anyone at any age.  Shingles starts as a tingling patch of skin on one side of the body. Small, painful blisters may then appear. The rash does not spread to the rest of the body.  Exposure to shingles cannot cause shingles. However, it can cause chicken pox in anyone who has not had chicken pox or has not been vaccinated. The contagious period ends when all blisters have crusted over (generally about 2 weeks after the illness begins).  After the blisters heal, the affected skin may be sensitive or painful for months (neuralgia). This often gradually goes away.  A shingles vaccine is available. This can help prevent shingles or make it less painful. It is generally recommended for adults over the age of 60 who have had chicken pox in the past, but who have never had shingles. Adults over 60 who have had neither chicken pox nor shingles can prevent both diseases with the chicken pox vaccine. Ask your healthcare provider about these vaccines.  Home care  · Medicines may be prescribed to help relieve pain. Take these medicines as directed. Ask your healthcare provider or pharmacist before using over-the-counter medicines for helping treat pain and itching.  · In certain cases, antiviral medicines may be prescribed to reduce pain, shorten the illness, and prevent neuralgia. Take these medicines as directed.  · Compresses made from a solution of cool water mixed with cornstarch or baking soda may help relieve pain and itching.   · Gently wash skin daily with soap and water to help  prevent infection.  Be certain to rinse off all of the soap, which can be irritating.  · Trim fingernails and try not to scratch. Scratching the sores may leave scars.  · Stay home from work or school until all blisters have formed a crust and you are no longer contagious.  Follow-up care  Follow up with your healthcare provider or as directed by our staff.  When to seek medical advice  · Fever of 100.4°F (38°C) or higher, or as directed by your healthcare provider  · Affected skin is on the face or neck and any of the following occur:  ¨ Headache  ¨ Eye pain  ¨ Changes in vision  ¨ Sores near the eye  ¨ Weakness of facial muscles  · Pain, redness, or swelling of a joint  · Signs of skin infection: colored drainage from the sores, warmth, increasing redness, or increasing pain  Date Last Reviewed: 9/25/2015  © 3885-7746 [employer]. [address] [place] [address], [place] [address]. All rights reserved. This information is not intended as a substitute for professional medical care. Always follow your healthcare professional's instructions.

## 2018-03-19 NOTE — PROGRESS NOTES
Subjective:       Patient ID: Mary Naranjo is a 52 y.o. female.    Chief Complaint: Herpes Zoster    HPI Ms Naranjo is here for a 1 week h/o rash to her back.  It itches and is painful, she's been using Benadryl topical and oral.  It has not improved.  No fever, no sick contacts.  Review of Systems   Constitutional: Negative.  Negative for fever.   Respiratory: Negative.    Cardiovascular: Negative.    Skin: Positive for rash.       Objective:      Physical Exam   Constitutional: She is oriented to person, place, and time. She appears well-developed and well-nourished. She does not appear ill. No distress.   HENT:   Head: Normocephalic and atraumatic.   Cardiovascular: Normal rate, regular rhythm and normal heart sounds.  Exam reveals no friction rub.    No murmur heard.  Pulmonary/Chest: Effort normal and breath sounds normal. No respiratory distress. She has no wheezes.   Musculoskeletal: Normal range of motion.        Back:    Neurological: She is alert and oriented to person, place, and time.   Skin: Skin is warm and dry.   Psychiatric: She has a normal mood and affect. Her behavior is normal.   Vitals reviewed.      Assessment:       1. Herpes zoster without complication        Plan:       Herpes zoster without complication  -     dexamethasone injection 8 mg; Inject 2 mLs (8 mg total) into the muscle one time.  -     methylPREDNISolone (MEDROL DOSEPACK) 4 mg tablet; use as directed  Dispense: 1 Package; Refill: 0  -     cetirizine (ZYRTEC) 10 MG tablet; Take 1 tablet (10 mg total) by mouth once daily.  Dispense: 30 tablet; Refill: 0  -     gabapentin (NEURONTIN) 300 MG capsule; Take 1 capsule (300 mg total) by mouth every 8 (eight) hours as needed (shingles pain).  Dispense: 90 capsule; Refill: 0    She is a week out, will try symptomatic treatment.  F/u prn  Instructed to use domeboro or aveeno soak to dry the vesicles further    Follow up with primary care provider if not improved  Go to ER for new, worse  or concerning symptoms

## 2018-03-19 NOTE — TELEPHONE ENCOUNTER
----- Message from Lizzy Storm sent at 3/19/2018 12:52 PM CDT -----  Contact: Self   Patient says she need to speak with the nurse about her return to work status . Please call patient at [phone].

## 2018-03-19 NOTE — TELEPHONE ENCOUNTER
Patient said that she received a letter saying she can go back to work on 3/21/18. 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 She need the note to say that she can go back to work on Monday. Please advise.

## 2018-03-20 NOTE — TELEPHONE ENCOUNTER
Spoke with patient and informed her that letter date was approved. She request letter to be fax to employee : Norma Ha at  [phone]. Done.

## 2018-04-17 DIAGNOSIS — B02.9 HERPES ZOSTER WITHOUT COMPLICATION: ICD-10-CM

## 2018-04-17 RX ORDER — GABAPENTIN 300 MG/1
300 CAPSULE ORAL EVERY 8 HOURS PRN
Qty: 90 CAPSULE | Refills: 0 | OUTPATIENT
Start: 2018-04-17 | End: 2019-04-17

## 2018-04-17 RX ORDER — CETIRIZINE HYDROCHLORIDE 10 MG/1
10 TABLET ORAL DAILY
Qty: 30 TABLET | Refills: 0 | OUTPATIENT
Start: 2018-04-17 | End: 2019-04-17

## 2018-07-17 ENCOUNTER — OFFICE VISIT (OUTPATIENT)
Dept: FAMILY MEDICINE | Facility: CLINIC | Age: 53
End: 2018-07-17
Payer: COMMERCIAL

## 2018-07-17 ENCOUNTER — HOSPITAL ENCOUNTER (OUTPATIENT)
Dept: RADIOLOGY | Facility: HOSPITAL | Age: 53
Discharge: HOME | End: 2018-07-17
Attending: FAMILY MEDICINE
Payer: COMMERCIAL

## 2018-07-17 VITALS
WEIGHT: 237.88 LBS | BODY MASS INDEX: 39.63 KG/M2 | OXYGEN SATURATION: 98 % | HEART RATE: 57 BPM | SYSTOLIC BLOOD PRESSURE: 126 MMHG | HEIGHT: 65 IN | DIASTOLIC BLOOD PRESSURE: 78 MMHG | TEMPERATURE: 98 F | RESPIRATION RATE: 18 BRPM

## 2018-07-17 DIAGNOSIS — R10.32 LLQ PAIN: ICD-10-CM

## 2018-07-17 DIAGNOSIS — K57.92 DIVERTICULITIS: ICD-10-CM

## 2018-07-17 DIAGNOSIS — K57.92 DIVERTICULITIS: Primary | ICD-10-CM

## 2018-07-17 LAB
B-HCG UR QL: NEGATIVE
BILIRUB SERPL-MCNC: NEGATIVE MG/DL
BLOOD URINE, POC: NEGATIVE
COLOR, POC UA: NORMAL
CTP QC/QA: YES
GLUCOSE UR QL STRIP: NORMAL
KETONES UR QL STRIP: NEGATIVE
LEUKOCYTE ESTERASE URINE, POC: NORMAL
NITRITE, POC UA: NORMAL
PH, POC UA: 5
PROTEIN, POC: POSITIVE
SPECIFIC GRAVITY, POC UA: 1.02
UROBILINOGEN, POC UA: NORMAL

## 2018-07-17 PROCEDURE — 87088 URINE BACTERIA CULTURE: CPT

## 2018-07-17 PROCEDURE — 99214 OFFICE O/P EST MOD 30 MIN: CPT | Mod: 25,S$GLB,, | Performed by: FAMILY MEDICINE

## 2018-07-17 PROCEDURE — 25500020 PHARM REV CODE 255: Performed by: FAMILY MEDICINE

## 2018-07-17 PROCEDURE — 99999 PR PBB SHADOW E&M-EST. PATIENT-LVL IV: CPT | Mod: PBBFAC,,, | Performed by: FAMILY MEDICINE

## 2018-07-17 PROCEDURE — 81025 URINE PREGNANCY TEST: CPT | Mod: S$GLB,,, | Performed by: FAMILY MEDICINE

## 2018-07-17 PROCEDURE — S0119 ONDANSETRON 4 MG: HCPCS | Mod: S$GLB,,, | Performed by: FAMILY MEDICINE

## 2018-07-17 PROCEDURE — 74177 CT ABD & PELVIS W/CONTRAST: CPT | Mod: 26,,, | Performed by: RADIOLOGY

## 2018-07-17 PROCEDURE — 87147 CULTURE TYPE IMMUNOLOGIC: CPT

## 2018-07-17 PROCEDURE — 87086 URINE CULTURE/COLONY COUNT: CPT

## 2018-07-17 PROCEDURE — 3008F BODY MASS INDEX DOCD: CPT | Mod: CPTII,S$GLB,, | Performed by: FAMILY MEDICINE

## 2018-07-17 PROCEDURE — 81001 URINALYSIS AUTO W/SCOPE: CPT | Mod: S$GLB,,, | Performed by: FAMILY MEDICINE

## 2018-07-17 PROCEDURE — 74177 CT ABD & PELVIS W/CONTRAST: CPT | Mod: TC

## 2018-07-17 RX ORDER — IBUPROFEN 800 MG/1
TABLET ORAL
Refills: 0 | COMMUNITY
Start: 2018-06-07 | End: 2019-05-06

## 2018-07-17 RX ORDER — ONDANSETRON 8 MG/1
8 TABLET, ORALLY DISINTEGRATING ORAL EVERY 8 HOURS PRN
Qty: 15 TABLET | Refills: 0 | Status: SHIPPED | OUTPATIENT
Start: 2018-07-17 | End: 2018-09-25 | Stop reason: SDUPTHER

## 2018-07-17 RX ORDER — ONDANSETRON 4 MG/1
4 TABLET, ORALLY DISINTEGRATING ORAL
Status: COMPLETED | OUTPATIENT
Start: 2018-07-17 | End: 2018-07-17

## 2018-07-17 RX ORDER — AMOXICILLIN AND CLAVULANATE POTASSIUM 875; 125 MG/1; MG/1
1 TABLET, FILM COATED ORAL EVERY 12 HOURS
Qty: 14 TABLET | Refills: 0 | Status: SHIPPED | OUTPATIENT
Start: 2018-07-17 | End: 2018-07-24

## 2018-07-17 RX ORDER — AMOXICILLIN 500 MG/1
CAPSULE ORAL
Refills: 0 | COMMUNITY
Start: 2018-06-07 | End: 2019-05-10

## 2018-07-17 RX ADMIN — IOHEXOL 100 ML: 350 INJECTION, SOLUTION INTRAVENOUS at 01:07

## 2018-07-17 RX ADMIN — IOHEXOL 30 ML: 300 INJECTION, SOLUTION INTRAVENOUS at 01:07

## 2018-07-17 RX ADMIN — ONDANSETRON 4 MG: 4 TABLET, ORALLY DISINTEGRATING ORAL at 11:07

## 2018-07-17 NOTE — LETTER
July 17, 2018      [facility]  [address]  [place] [address]  Phone: [phone]       Patient: Mary Naranjo   YOB: 1965  Date of Visit: 07/17/2018    To Whom It May Concern:    Mary Naranjo  was at [facility] on 07/17/2018. She may return to work on 7/19/2018 with no restrictions. If you have any questions or concerns, or if I can be of further assistance, please do not hesitate to contact me.        Sincerely,    Ramon Olivares Jr., MD

## 2018-07-17 NOTE — PROGRESS NOTES
Routine Office Visit    Patient Name: Mary Naranjo    : 1965  MRN: [id number]    Subjective:  Mary is a 53 y.o. female who presents today for:   Chief Complaint   Patient presents with    Diarrhea     past DX with Diverticulitis    Nausea     53-year-old female with a previous history of diverticulitis comes in with complaint of a left lower quadrant abdominal pain, diarrhea, and nausea for several days.  She states that it feels exactly like when she had diverticulitis previously.  She denies fevers.  She denies urinary symptoms.  She denies vomiting.  She denies blood in her stools.  She denies eating anything out of the ordinary.  She denies any recent travels.  She had been on antibiotics for a dental procedure, but this was over a month ago.    Past Medical History  Past Medical History:   Diagnosis Date    Arthritis     Dental bridge present     upper removable partial    Fibrocystic breast     Migraines     Obesity        Past Surgical History  Past Surgical History:   Procedure Laterality Date    BREAST BIOPSY      BREAST CYST ASPIRATION      COLONOSCOPY N/A 10/7/2015    Procedure: COLONOSCOPY;  Surgeon: Eagle Stack MD;  Location: [facility];  Service: Endoscopy;  Laterality: N/A;    DILATION AND CURETTAGE OF UTERUS      fibrocystic breast       KNEE ARTHROSCOPY W/ MENISCAL REPAIR      TUBAL LIGATION          Family History  Family History   Problem Relation Age of Onset    Heart disease Mother     Colon cancer Father 60    Bladder Cancer Father     Lung cancer Father     Prostate cancer Father     Breast cancer Sister 42    COPD Neg Hx        Social History  Social History     Social History    Marital status:      Spouse name: N/A    Number of children: N/A    Years of education: N/A     Occupational History    Not on file.     Social History Main Topics    Smoking status: Never Smoker    Smokeless tobacco: Never Used    Alcohol use No    Drug use: No     Sexual activity: Not Currently     Other Topics Concern    Not on file     Social History Narrative    No narrative on file       Current Medications  Current Outpatient Prescriptions on File Prior to Visit   Medication Sig Dispense Refill    ascorbic acid, vitamin C, (VITAMIN C) 250 mg Chew Take 500 mg by mouth.      cetirizine (ZYRTEC) 10 MG tablet Take 1 tablet (10 mg total) by mouth once daily. 30 tablet 0    fish oil-omega-3 fatty acids 300-1,000 mg capsule Take 2 g by mouth once daily.      gabapentin (NEURONTIN) 300 MG capsule Take 1 capsule (300 mg total) by mouth every 8 (eight) hours as needed (shingles pain). 90 capsule 0    hydrocodone-acetaminophen 5-325mg (NORCO) 5-325 mg per tablet Take 1 tablet by mouth every 4 (four) hours as needed for Pain. 5 tablet 0    ibuprofen (ADVIL,MOTRIN) 600 MG tablet Take 1 tablet (600 mg total) by mouth every 6 (six) hours as needed for Pain. 90 tablet 1    MULTIVITS,CA,MINERALS/IRON/FA (ONE-A-DAY WOMENS FORMULA ORAL) Take by mouth.      prochlorperazine (COMPAZINE) 10 MG tablet Take 1 tablet (10 mg total) by mouth 3 (three) times daily as needed. 10 tablet 0    sumatriptan (IMITREX) 50 MG tablet TAKE 1 TABLET BY MOUTH EVERY 2 HOURS AS NEEDED FOR MIGRAINE. DO NOT EXCEED 4 TABS IN 24 HOURS  1     No current facility-administered medications on file prior to visit.        Allergies   Review of patient's allergies indicates:  No Known Allergies    Review of Systems   Constitutional: Positive for fatigue. Negative for chills.   Respiratory: Negative for shortness of breath.    Cardiovascular: Negative for chest pain and palpitations.   Gastrointestinal: Positive for abdominal pain, diarrhea and nausea. Negative for constipation and vomiting.   Genitourinary: Negative for dysuria, menstrual problem, vaginal bleeding and vaginal discharge.   Musculoskeletal: Negative for back pain.   Skin: Negative for rash.         /78 (BP Location: Right arm, Patient  "Position: Sitting, BP Method: Medium (Manual))   Pulse (!) 57   Temp 98.1 °F (36.7 °C) (Oral)   Resp 18   Ht 5' 5" (1.651 m)   Wt 107.9 kg (237 lb 14 oz)   LMP 01/15/2018   SpO2 98%   BMI 39.58 kg/m²     Physical Exam   Constitutional: She appears well-developed and well-nourished. No distress.   Cardiovascular: Normal rate, regular rhythm, normal heart sounds and intact distal pulses.    Pulmonary/Chest: Effort normal and breath sounds normal. No respiratory distress. She has no wheezes. She has no rhonchi.   Abdominal: Soft. Bowel sounds are normal. There is generalized tenderness. There is no rigidity, no rebound, no guarding and no CVA tenderness.   Vitals reviewed.      Assessment/Plan:  Mary was seen today for diarrhea and nausea.    Diagnoses and all orders for this visit:    Diverticulitis  -     CT Abdomen Pelvis With Contrast; Future  -     ondansetron disintegrating tablet 4 mg; Take 1 tablet (4 mg total) by mouth one time.  -     ondansetron (ZOFRAN-ODT) 8 MG TbDL; Take 1 tablet (8 mg total) by mouth every 8 (eight) hours as needed.  -     Comprehensive metabolic panel; Future  -     POCT URINE DIPSTICK WITH MICROSCOPE, AUTOMATED  -     Urine culture  -     POCT Urine Pregnancy  -     CBC auto differential; Future  -     amoxicillin-clavulanate 875-125mg (AUGMENTIN) 875-125 mg per tablet; Take 1 tablet by mouth every 12 (twelve) hours. for 7 days  CT scan from 2013 was reviewed and did in fact show diverticulitis.  Dose of Zofran was given in the office.  Urine dipstick in the office did not show leukocyte esterase or nitrites.  Urine pregnancy test was negative.  Patient to do CT abdomen pelvis today.  After CT patient to go to the pharmacy and start antibiotic for diverticulitis.  Will call patient later today with results.  Follow-up in the office next week.  Discussed with patient precautions to go to the emergency room.    LLQ pain  -     CT Abdomen Pelvis With Contrast; Future  -     " ondansetron disintegrating tablet 4 mg; Take 1 tablet (4 mg total) by mouth one time.  -     ondansetron (ZOFRAN-ODT) 8 MG TbDL; Take 1 tablet (8 mg total) by mouth every 8 (eight) hours as needed.  -     Comprehensive metabolic panel; Future  -     POCT URINE DIPSTICK WITH MICROSCOPE, AUTOMATED  -     Urine culture  -     POCT Urine Pregnancy  -     CBC auto differential; Future  -     amoxicillin-clavulanate 875-125mg (AUGMENTIN) 875-125 mg per tablet; Take 1 tablet by mouth every 12 (twelve) hours. for 7 days          This office note has been dictated.  This dictation has been generated using [employer] Fluency Direct dictation; some phonetic errors may occur.

## 2018-07-19 ENCOUNTER — TELEPHONE (OUTPATIENT)
Dept: FAMILY MEDICINE | Facility: CLINIC | Age: 53
End: 2018-07-19

## 2018-07-19 DIAGNOSIS — Z12.39 SCREENING FOR BREAST CANCER: Primary | ICD-10-CM

## 2018-07-19 LAB — BACTERIA UR CULT: NORMAL

## 2018-07-19 NOTE — TELEPHONE ENCOUNTER
Called patient. She reports that she feels better. Symptoms resolved.  She states does not need appt for 23rd.

## 2018-08-27 ENCOUNTER — HOSPITAL ENCOUNTER (OUTPATIENT)
Dept: RADIOLOGY | Facility: HOSPITAL | Age: 53
Discharge: HOME | End: 2018-08-27
Attending: FAMILY MEDICINE
Payer: COMMERCIAL

## 2018-08-27 VITALS — WEIGHT: 237 LBS | BODY MASS INDEX: 39.49 KG/M2 | HEIGHT: 65 IN

## 2018-08-27 DIAGNOSIS — Z12.39 SCREENING FOR BREAST CANCER: ICD-10-CM

## 2018-08-27 PROCEDURE — 77063 BREAST TOMOSYNTHESIS BI: CPT | Mod: TC

## 2018-08-27 PROCEDURE — 77067 SCR MAMMO BI INCL CAD: CPT | Mod: 26,,, | Performed by: RADIOLOGY

## 2018-08-27 PROCEDURE — 77063 BREAST TOMOSYNTHESIS BI: CPT | Mod: 26,,, | Performed by: RADIOLOGY

## 2018-09-25 ENCOUNTER — OFFICE VISIT (OUTPATIENT)
Dept: FAMILY MEDICINE | Facility: CLINIC | Age: 53
End: 2018-09-25
Payer: COMMERCIAL

## 2018-09-25 ENCOUNTER — PATIENT MESSAGE (OUTPATIENT)
Dept: FAMILY MEDICINE | Facility: CLINIC | Age: 53
End: 2018-09-25

## 2018-09-25 VITALS
SYSTOLIC BLOOD PRESSURE: 122 MMHG | DIASTOLIC BLOOD PRESSURE: 78 MMHG | WEIGHT: 235.25 LBS | OXYGEN SATURATION: 99 % | RESPIRATION RATE: 20 BRPM | BODY MASS INDEX: 39.2 KG/M2 | HEART RATE: 75 BPM | HEIGHT: 65 IN | TEMPERATURE: 98 F

## 2018-09-25 DIAGNOSIS — A08.4 VIRAL GASTROENTERITIS: Primary | ICD-10-CM

## 2018-09-25 PROCEDURE — 3008F BODY MASS INDEX DOCD: CPT | Mod: CPTII,S$GLB,, | Performed by: FAMILY MEDICINE

## 2018-09-25 PROCEDURE — 99999 PR PBB SHADOW E&M-EST. PATIENT-LVL III: CPT | Mod: PBBFAC,,, | Performed by: FAMILY MEDICINE

## 2018-09-25 PROCEDURE — 99214 OFFICE O/P EST MOD 30 MIN: CPT | Mod: S$GLB,,, | Performed by: FAMILY MEDICINE

## 2018-09-25 RX ORDER — DIPHENOXYLATE HYDROCHLORIDE AND ATROPINE SULFATE 2.5; .025 MG/1; MG/1
1 TABLET ORAL 4 TIMES DAILY PRN
Qty: 20 TABLET | Refills: 0 | Status: SHIPPED | OUTPATIENT
Start: 2018-09-25 | End: 2018-10-05

## 2018-09-25 RX ORDER — ONDANSETRON 8 MG/1
8 TABLET, ORALLY DISINTEGRATING ORAL EVERY 8 HOURS PRN
Qty: 15 TABLET | Refills: 0 | Status: SHIPPED | OUTPATIENT
Start: 2018-09-25 | End: 2019-05-10

## 2018-09-25 NOTE — LETTER
September 25, 2018                 [facility]  Family Medicine  [address]  [place] [address]  Phone: [phone]   September 25, 2018     Patient: Mary Naranjo   YOB: 1965   Date of Visit: 9/25/2018       To Whom it May Concern:    Mary Naranjo was seen in my clinic on 9/25/2018. Please excuse from school on 9/24/18 and 9/25/18..    If you have any questions or concerns, please don't hesitate to call.    Sincerely,         Ramon Olivares Jr., MD

## 2018-09-26 NOTE — PROGRESS NOTES
Routine Office Visit    Patient Name: Mary Naranjo    : 1965  MRN: [id number]    Subjective:  Mary is a 53 y.o. female who presents today for:   Chief Complaint   Patient presents with    Diarrhea     2 day onset       53-year-old female comes in for evaluation of diarrhea that started this past Saturday at around 3:30 a.m. the patient reports that her grandson had recently visited and he had been having similar symptoms.  She reports that her stools are basically watery.  There is no blood the stools wiping has caused her external skin to become a bit raw and she has noticed some blood on the toilet paper from wiping.  She denies any abdominal pain but does have a bit of bloating.  She also reports some nausea but no vomiting.  She denies any chills or fevers.  She states that her  started to get similar symptoms yesterday.  Prior to her teenage grandson visiting she had a baby grandson in the house last week, who attends .    Past Medical History  Past Medical History:   Diagnosis Date    Arthritis     Dental bridge present     upper removable partial    Fibrocystic breast     Migraines     Obesity        Past Surgical History  Past Surgical History:   Procedure Laterality Date    ARTHROSCOPY-KNEE W/MM Right 2015    [facility] OR    BREAST BIOPSY      BREAST CYST ASPIRATION      COLONOSCOPY N/A 10/7/2015    Procedure: COLONOSCOPY;  Surgeon: Eagle Stack MD;  Location: [facility] ENDO;  Service: Endoscopy;  Laterality: N/A;    COLONOSCOPY N/A 10/7/2015    Performed by Eagle Stack [facility] at [facility] ENDO    DILATION AND CURETTAGE OF UTERUS      fibrocystic breast       [id number] N/A 2/15/2018    [facility]ado [facility] at [facility] OR    KNEE ARTHROSCOPY W/ MENISCAL REPAIR      TUBAL LIGATION          Family History  Family History   Problem Relation Age of Onset    Heart disease Mother     Colon cancer Father 60    Bladder  Cancer Father     Lung cancer Father     Prostate cancer Father     Breast cancer Sister 42    COPD Neg Hx        Social History  Social History     Socioeconomic History    Marital status:      Spouse name: Not on file    Number of children: Not on file    Years of education: Not on file    Highest education level: Not on file   Social Needs    Financial resource strain: Not on file    Food insecurity - worry: Not on file    Food insecurity - inability: Not on file    Transportation needs - medical: Not on file    Transportation needs - non-medical: Not on file   Occupational History    Not on file   Tobacco Use    Smoking status: Never Smoker    Smokeless tobacco: Never Used   Substance and Sexual Activity    Alcohol use: No    Drug use: No    Sexual activity: Not Currently   Other Topics Concern    Not on file   Social History Narrative    Not on file       Current Medications  Current Outpatient Medications on File Prior to Visit   Medication Sig Dispense Refill     mg tablet TAKE 1 TABLET BY MOUTH EVERY 6 TO 8 HOURS AS NEEDED FOR PAIN  0    MULTIVITS,CA,MINERALS/IRON/FA (ONE-A-DAY WOMENS FORMULA ORAL) Take by mouth.      amoxicillin (AMOXIL) 500 MG capsule TAKE 1 CAPSULE BY MOUTH EVERY 8 HOURS UNTIL GONE  0    ascorbic acid, vitamin C, (VITAMIN C) 250 mg Chew Take 500 mg by mouth.      cetirizine (ZYRTEC) 10 MG tablet Take 1 tablet (10 mg total) by mouth once daily. 30 tablet 0    fish oil-omega-3 fatty acids 300-1,000 mg capsule Take 2 g by mouth once daily.      gabapentin (NEURONTIN) 300 MG capsule Take 1 capsule (300 mg total) by mouth every 8 (eight) hours as needed (shingles pain). 90 capsule 0    hydrocodone-acetaminophen 5-325mg (NORCO) 5-325 mg per tablet Take 1 tablet by mouth every 4 (four) hours as needed for Pain. 5 tablet 0    ibuprofen (ADVIL,MOTRIN) 600 MG tablet Take 1 tablet (600 mg total) by mouth every 6 (six) hours as needed for Pain. 90 tablet 1  "   prochlorperazine (COMPAZINE) 10 MG tablet Take 1 tablet (10 mg total) by mouth 3 (three) times daily as needed. 10 tablet 0    sumatriptan (IMITREX) 50 MG tablet TAKE 1 TABLET BY MOUTH EVERY 2 HOURS AS NEEDED FOR MIGRAINE. DO NOT EXCEED 4 TABS IN 24 HOURS  1     No current facility-administered medications on file prior to visit.        Allergies   Review of patient's allergies indicates:  No Known Allergies    Review of Systems   Constitutional: Negative for chills, diaphoresis and fever.   Respiratory: Negative for shortness of breath and wheezing.    Cardiovascular: Negative for chest pain and palpitations.   Gastrointestinal: Positive for diarrhea and nausea. Negative for constipation and vomiting.        See HPI   Genitourinary: Negative for decreased urine volume, dysuria, hematuria, urgency, vaginal bleeding and vaginal discharge.       /78 (BP Location: Left arm, Patient Position: Sitting, BP Method: Medium (Manual))   Pulse 75   Temp 98.1 °F (36.7 °C) (Oral)   Resp 20   Ht 5' 5" (1.651 m)   Wt 106.7 kg (235 lb 3.7 oz)   LMP 01/15/2018   SpO2 99%   BMI 39.14 kg/m²     Physical Exam   Constitutional: She appears well-developed and well-nourished.   HENT:   Head: Normocephalic and atraumatic.   Right Ear: External ear normal.   Left Ear: External ear normal.   Nose: Nose normal.   Mouth/Throat: Oropharynx is clear and moist. No oropharyngeal exudate.   Eyes: Conjunctivae and EOM are normal. Pupils are equal, round, and reactive to light. Right eye exhibits no discharge. Left eye exhibits no discharge.   Neck: Normal range of motion. Neck supple. No tracheal deviation present.   Cardiovascular: Normal rate, regular rhythm, normal heart sounds and intact distal pulses.   No murmur heard.  Pulmonary/Chest: Effort normal and breath sounds normal. She has no wheezes. She has no rales.   Abdominal: Soft. Bowel sounds are normal. She exhibits no mass. There is no tenderness.   Lymphadenopathy:     " She has no cervical adenopathy.   Psychiatric: She has a normal mood and affect.   Vitals reviewed.        Assessment/Plan:  Mary was seen today for diarrhea.    Diagnoses and all orders for this visit:    Viral gastroenteritis  -     diphenoxylate-atropine 2.5-0.025 mg (LOMOTIL) 2.5-0.025 mg per tablet; Take 1 tablet by mouth 4 (four) times daily as needed for Diarrhea.  -     ondansetron (ZOFRAN-ODT) 8 MG TbDL; Take 1 tablet (8 mg total) by mouth every 8 (eight) hours as needed.     Discussed with patient that her symptoms appear to be from viral gastroenteritis.  Advised patient to stay well hydrated.  Advised good hand hygiene to decrease risk of contaminating others.  We also discussed symptomatic treatment with prescribed medications-Lomotil and Zofran.  Side effects of these were reviewed.  Patient to follow up in the office if not resolved within the next 5 days, sooner if any worsening.  I informed the patient that if at any point she started having avery blood in the diarrhea she needs to go to the emergency room, and patient expressed understanding this.      This office note has been dictated.  This dictation has been generated using M-[employer] Fluency Direct dictation; some phonetic errors may occur.

## 2018-10-28 ENCOUNTER — HOSPITAL ENCOUNTER (EMERGENCY)
Facility: HOSPITAL | Age: 53
Discharge: HOME | End: 2018-10-28
Attending: EMERGENCY MEDICINE
Payer: COMMERCIAL

## 2018-10-28 VITALS
RESPIRATION RATE: 18 BRPM | BODY MASS INDEX: 39.09 KG/M2 | TEMPERATURE: 98 F | OXYGEN SATURATION: 100 % | WEIGHT: 229 LBS | HEIGHT: 64 IN | SYSTOLIC BLOOD PRESSURE: 128 MMHG | HEART RATE: 66 BPM | DIASTOLIC BLOOD PRESSURE: 72 MMHG

## 2018-10-28 DIAGNOSIS — S60.222A CONTUSION OF LEFT HAND, INITIAL ENCOUNTER: Primary | ICD-10-CM

## 2018-10-28 PROCEDURE — 99283 EMERGENCY DEPT VISIT LOW MDM: CPT | Mod: 25

## 2018-10-28 PROCEDURE — 25000003 PHARM REV CODE 250: Performed by: EMERGENCY MEDICINE

## 2018-10-28 RX ORDER — KETOROLAC TROMETHAMINE 10 MG/1
10 TABLET, FILM COATED ORAL EVERY 6 HOURS
Qty: 20 TABLET | Refills: 0 | Status: SHIPPED | OUTPATIENT
Start: 2018-10-28 | End: 2019-05-10

## 2018-10-28 RX ORDER — KETOROLAC TROMETHAMINE 10 MG/1
10 TABLET, FILM COATED ORAL
Status: COMPLETED | OUTPATIENT
Start: 2018-10-28 | End: 2018-10-28

## 2018-10-28 RX ADMIN — KETOROLAC TROMETHAMINE 10 MG: 10 TABLET, FILM COATED ORAL at 03:10

## 2018-10-28 NOTE — ED PROVIDER NOTES
Encounter Date: 10/28/2018    SCRIBE #1 NOTE: I, Shmuel Overton, am scribing for, and in the presence of,  Dr. Maynard . I have scribed the following portions of the note - Other sections scribed: HPI, ROS, PE.   SCRIBE #2 NOTE: I, Heydijonathan Pelletier, am scribing for, and in the presence of,  Dr. Maynard. I have scribed the following portions of the note - Other sections scribed: HPI, ROS, PE.     History     Chief Complaint   Patient presents with    Hand Pain     pt reports she was in a resturant, scooting out of a martinez and injured the top of her left hand. Swelling and bruising noted to left hand. pt reports pain that begins in left hand and radiates to left arm     53 year old female presents to ED complaining of left hand pain radiating to arms and fingers since apprx 1:00pm today. She currently has an ice pack on left hand. Patient states she was scooting out of a martinez at a restaurant, using her hand to propel herself, when she heard a cracking sound in her hand. She describes pain as a constant 10 and denies taking any medication for to alleviate it.  Pain worsens when she touches or moves her hand. Patient is experiencing associated symptoms of swelling and bruising. She denies being on blood thinners. Patient is right handed.           The history is provided by the patient.     Review of patient's allergies indicates:  No Known Allergies  Past Medical History:   Diagnosis Date    Arthritis     Dental bridge present     upper removable partial    Fibrocystic breast     Migraines     Obesity      Past Surgical History:   Procedure Laterality Date    ARTHROSCOPY-KNEE W/MM Right 6/23/2015    [facility]    BREAST BIOPSY      BREAST CYST ASPIRATION      COLONOSCOPY N/A 10/7/2015    Procedure: COLONOSCOPY;  Surgeon: Eagle Stack MD;  Location: [facility];  Service: Endoscopy;  Laterality: N/A;    COLONOSCOPY N/A 10/7/2015    Performed by Eagle Stack MD  at [facility]O    DILATION AND CURETTAGE OF UTERUS      fibrocystic breast       [id number] N/A 2/15/2018    [facility] by [facility] at [facility] OR    KNEE ARTHROSCOPY W/ MENISCAL REPAIR      TUBAL LIGATION       Family History   Problem Relation Age of Onset    Heart disease Mother     Colon cancer Father 60    Bladder Cancer Father     Lung cancer Father     Prostate cancer Father     Breast cancer Sister 42    COPD Neg Hx      Social History     Tobacco Use    Smoking status: Never Smoker    Smokeless tobacco: Never Used   Substance Use Topics    Alcohol use: No    Drug use: No     Review of Systems   Musculoskeletal:        +Left hand swelling and pain    Skin: Positive for color change.   Neurological: Negative for weakness and numbness.   Hematological: Does not bruise/bleed easily.       Physical Exam     Initial Vitals [10/28/18 1443]   BP Pulse Resp Temp SpO2   130/75 66 18 97.9 °F (36.6 °C) 100 %      MAP       --         Physical Exam    Nursing note and vitals reviewed.  Constitutional: She appears well-developed. She is Obese .   HENT:   Head: Normocephalic and atraumatic.   Eyes: Conjunctivae are normal.   Neck: Normal range of motion. Neck supple.   Cardiovascular: Normal rate and intact distal pulses.   Pulses:       Radial pulses are 2+ on the left side.   Pulmonary/Chest: Effort normal. No respiratory distress.   Musculoskeletal: She exhibits edema and tenderness.        Left wrist: She exhibits normal range of motion, no tenderness and no deformity.        Left hand: She exhibits decreased range of motion, tenderness and swelling. She exhibits normal capillary refill and no deformity. Normal sensation noted.   Tenderness, decrease ROM, edema, and ecchymosis noted to left hand    Neurological: She is alert and oriented to person, place, and time. No sensory deficit.   Skin: Skin is warm, dry and intact. Capillary refill takes less than 2 seconds. Ecchymosis noted.    Psychiatric: She has a normal mood and affect. Her behavior is normal.             ED Course   Procedures  Labs Reviewed - No data to display       Imaging Results          X-Ray Hand 3 view Left (Final result)  Result time 10/28/18 15:12:08    Final result by Stephanie Talavera MD (10/28/18 15:12:08)                 Impression:      No fracture or dislocation.      Electronically signed by: Stephanie Talavera MD  Date:    10/28/2018  Time:    15:12             Narrative:    EXAMINATION:  XR HAND COMPLETE 3 VIEW LEFT    CLINICAL HISTORY:  left hand xray;.    TECHNIQUE:  PA, lateral, and oblique views of the left hand were performed.    COMPARISON:  None.    FINDINGS:  Bone mineralization is within normal limits. Joint spaces are relatively well maintained.  No fracture or dislocation.                                 Medical Decision Making:   Initial Assessment:   This is a 53 year old female complaining of left hand pain since 1:00pm today after scooting out of a martinez and putting her body weight on her hand. Exam significant for ecchymosis, edema, tenderness, and decreased ROM to left hand.   Clinical Tests:   Radiological Study: Ordered  ED Management:  Will order Toradol 10 mg. Will order x-ray of the left hand. No evidence of fracture dislocation found on x-ray. Patient will be treated with ice pack, ace wrap and toradol            Scribe Attestation:   Scribe #1: I performed the above scribed service and the documentation accurately describes the services I performed. I attest to the accuracy of the note.  Scribe #2: I performed the above scribed service and the documentation accurately describes the services I performed. I attest to the accuracy of the note.       I, Dr. Mildred Maynard, personally performed the services described in this documentation. All medical record entries made by the scribe were at my direction and in my presence.  I have reviewed the chart and agree that the record reflects my personal  performance and is accurate and complete. Mildred Maynard MD.  3:51 PM 10/28/2018             Clinical Impression:     1. Contusion of left hand, initial encounter                                   Mildred Maynard MD  10/28/18 1556

## 2019-02-13 ENCOUNTER — OFFICE VISIT (OUTPATIENT)
Dept: FAMILY MEDICINE | Facility: CLINIC | Age: 54
End: 2019-02-13
Payer: COMMERCIAL

## 2019-02-13 VITALS
RESPIRATION RATE: 19 BRPM | BODY MASS INDEX: 39.9 KG/M2 | HEIGHT: 64 IN | OXYGEN SATURATION: 99 % | DIASTOLIC BLOOD PRESSURE: 76 MMHG | HEART RATE: 78 BPM | SYSTOLIC BLOOD PRESSURE: 126 MMHG | WEIGHT: 233.69 LBS | TEMPERATURE: 98 F

## 2019-02-13 DIAGNOSIS — Z91.09 ENVIRONMENTAL ALLERGIES: Primary | ICD-10-CM

## 2019-02-13 PROCEDURE — 3008F BODY MASS INDEX DOCD: CPT | Mod: CPTII,S$GLB,, | Performed by: FAMILY MEDICINE

## 2019-02-13 PROCEDURE — 3008F PR BODY MASS INDEX (BMI) DOCUMENTED: ICD-10-PCS | Mod: CPTII,S$GLB,, | Performed by: FAMILY MEDICINE

## 2019-02-13 PROCEDURE — 99214 PR OFFICE/OUTPT VISIT, EST, LEVL IV, 30-39 MIN: ICD-10-PCS | Mod: S$GLB,,, | Performed by: FAMILY MEDICINE

## 2019-02-13 PROCEDURE — 99999 PR PBB SHADOW E&M-EST. PATIENT-LVL III: CPT | Mod: PBBFAC,,, | Performed by: FAMILY MEDICINE

## 2019-02-13 PROCEDURE — 99214 OFFICE O/P EST MOD 30 MIN: CPT | Mod: S$GLB,,, | Performed by: FAMILY MEDICINE

## 2019-02-13 PROCEDURE — 99999 PR PBB SHADOW E&M-EST. PATIENT-LVL III: ICD-10-PCS | Mod: PBBFAC,,, | Performed by: FAMILY MEDICINE

## 2019-02-13 RX ORDER — PREDNISONE 20 MG/1
20 TABLET ORAL DAILY
Qty: 3 TABLET | Refills: 0 | Status: SHIPPED | OUTPATIENT
Start: 2019-02-13 | End: 2019-02-16

## 2019-02-13 RX ORDER — CETIRIZINE HYDROCHLORIDE 10 MG/1
10 TABLET ORAL DAILY
Qty: 30 TABLET | Refills: 0 | Status: CANCELLED | OUTPATIENT
Start: 2019-02-13 | End: 2020-02-13

## 2019-02-13 RX ORDER — LEVOCETIRIZINE DIHYDROCHLORIDE 5 MG/1
5 TABLET, FILM COATED ORAL NIGHTLY
Qty: 30 TABLET | Refills: 0 | Status: SHIPPED | OUTPATIENT
Start: 2019-02-13 | End: 2019-03-12 | Stop reason: SDUPTHER

## 2019-02-13 NOTE — LETTER
February 13, 2019      [facility]  [address]  [place] [address]  Phone: [phone]       Patient: Mary Naranjo   YOB: 1965  Date of Visit: 02/13/2019    To Whom It May Concern:    Sheila Naranjo  was at [facility] on 02/13/2019. She may return to work on 2/14/2019. If you have any questions or concerns, or if I can be of further assistance, please do not hesitate to contact me.      Sincerely,      Ramon Olivares Jr., MD

## 2019-02-14 NOTE — PROGRESS NOTES
Routine Office Visit    Patient Name: Mary Naranjo    : 1965  MRN: [id number]    Subjective:  Mary is a 53 y.o. female who presents today for:   Chief Complaint   Patient presents with    Sinusitis    Nasal Congestion     right side    Eye Drainage     right side     53-year-old female comes in with complaint of right-sided nasal congestion, and pressure under the eye.  She has reports a lot of sneezing, and nasal itchiness and sore throat, and itchy throat.  She attributes all her symptoms to a [employer] classroom where she takes classes at night.  She states that her symptoms get worse when she goes to the classroom.  She has been taking a cetirizine every day but this is not helping.  She denies wheezing, fevers, chills, and shortness of breath.  She reports several years ago she went to an ENT but states that she cannot remember what she was told about the findings.  She does not want to use nasal sprays as in the past this has caused nosebleeds.  She is not open to any type of nasal sprays.    Past Medical History  Past Medical History:   Diagnosis Date    Arthritis     Dental bridge present     upper removable partial    Fibrocystic breast     Migraines     Obesity        Past Surgical History  Past Surgical History:   Procedure Laterality Date    ARTHROSCOPY-KNEE W/MM Right 2015    [facility]    BREAST BIOPSY      BREAST CYST ASPIRATION      COLONOSCOPY N/A 10/7/2015    Performed by Eagle Stack [facility]O    DILATION AND CURETTAGE OF UTERUS      fibrocystic breast       [id number] N/A 2/15/2018    [facility]    KNEE ARTHROSCOPY W/ MENISCAL REPAIR      TUBAL LIGATION          Family History  Family History   Problem Relation Age of Onset    Heart disease Mother     Colon cancer Father 60    Bladder Cancer Father     Lung cancer Father     Prostate cancer Father     Breast cancer Sister  42    COPD Neg Hx        Social History  Social History     Socioeconomic History    Marital status:      Spouse name: Not on file    Number of children: Not on file    Years of education: Not on file    Highest education level: Not on file   Social Needs    Financial resource strain: Not on file    Food insecurity - worry: Not on file    Food insecurity - inability: Not on file    Transportation needs - medical: Not on file    Transportation needs - non-medical: Not on file   Occupational History    Not on file   Tobacco Use    Smoking status: Never Smoker    Smokeless tobacco: Never Used   Substance and Sexual Activity    Alcohol use: No    Drug use: No    Sexual activity: Not Currently   Other Topics Concern    Not on file   Social History Narrative    Not on file       Current Medications  Current Outpatient Medications on File Prior to Visit   Medication Sig Dispense Refill    AFLURIA QUAD 1207-0424, PF, 60 mcg (15 mcg x 4)/0.5 mL Syrg vaccine ADM 0.5ML IM UTD  0    amoxicillin (AMOXIL) 500 MG capsule TAKE 1 CAPSULE BY MOUTH EVERY 8 HOURS UNTIL GONE  0    ascorbic acid, vitamin C, (VITAMIN C) 250 mg Chew Take 500 mg by mouth.      fish oil-omega-3 fatty acids 300-1,000 mg capsule Take 2 g by mouth once daily.      gabapentin (NEURONTIN) 300 MG capsule Take 1 capsule (300 mg total) by mouth every 8 (eight) hours as needed (shingles pain). 90 capsule 0    hydrocodone-acetaminophen 5-325mg (NORCO) 5-325 mg per tablet Take 1 tablet by mouth every 4 (four) hours as needed for Pain. 5 tablet 0     mg tablet TAKE 1 TABLET BY MOUTH EVERY 6 TO 8 HOURS AS NEEDED FOR PAIN  0    ibuprofen (ADVIL,MOTRIN) 600 MG tablet Take 1 tablet (600 mg total) by mouth every 6 (six) hours as needed for Pain. 90 tablet 1    ketorolac (TORADOL) 10 mg tablet Take 1 tablet (10 mg total) by mouth every 6 (six) hours. 20 tablet 0    MULTIVITS,CA,MINERALS/IRON/FA (ONE-A-DAY WOMENS FORMULA ORAL) Take by  "mouth.      ondansetron (ZOFRAN-ODT) 8 MG TbDL Take 1 tablet (8 mg total) by mouth every 8 (eight) hours as needed. 15 tablet 0    prochlorperazine (COMPAZINE) 10 MG tablet Take 1 tablet (10 mg total) by mouth 3 (three) times daily as needed. 10 tablet 0    sumatriptan (IMITREX) 50 MG tablet TAKE 1 TABLET BY MOUTH EVERY 2 HOURS AS NEEDED FOR MIGRAINE. DO NOT EXCEED 4 TABS IN 24 HOURS  1    [DISCONTINUED] cetirizine (ZYRTEC) 10 MG tablet Take 1 tablet (10 mg total) by mouth once daily. 30 tablet 0     No current facility-administered medications on file prior to visit.        Allergies   Review of patient's allergies indicates:  No Known Allergies    Review of Systems   Constitutional: Positive for fatigue. Negative for chills and fever.   HENT: Positive for congestion, postnasal drip, rhinorrhea, sinus pressure, sneezing and sore throat.    Respiratory: Positive for cough. Negative for chest tightness, shortness of breath and wheezing.    Cardiovascular: Negative for chest pain and palpitations.   Gastrointestinal: Negative for abdominal pain.   Genitourinary: Negative for dysuria.     /76 (BP Location: Left arm, Patient Position: Sitting, BP Method: Medium (Manual))   Pulse 78   Temp 98 °F (36.7 °C) (Oral)   Resp 19   Ht 5' 4" (1.626 m)   Wt 106 kg (233 lb 11 oz)   LMP 01/15/2018   SpO2 99%   BMI 40.11 kg/m²     Physical Exam   Constitutional: She is active. She does not appear ill. No distress.   HENT:   Head: Normocephalic and atraumatic. Head is without right periorbital erythema and without left periorbital erythema.   Right Ear: Tympanic membrane, external ear and ear canal normal.   Left Ear: Tympanic membrane, external ear and ear canal normal.   Nose: Rhinorrhea present. Right sinus exhibits no maxillary sinus tenderness and no frontal sinus tenderness. Left sinus exhibits no maxillary sinus tenderness and no frontal sinus tenderness.   Mouth/Throat: Oropharynx is clear and moist. No " oropharyngeal exudate.   Eyes: EOM and lids are normal. Pupils are equal, round, and reactive to light. Right eye exhibits chemosis and discharge (clear). No foreign body present in the right eye. Left eye exhibits chemosis and discharge (clear). No foreign body present in the left eye. Right conjunctiva is injected. Left conjunctiva is injected.   Cardiovascular: Normal rate, regular rhythm, normal heart sounds and intact distal pulses.   Pulmonary/Chest: Effort normal and breath sounds normal. No respiratory distress. She has no wheezes. She has no rales.   Neurological: She is alert.   Vitals reviewed.    Asessment/Plan:  Mary was seen today for sinusitis, nasal congestion and eye drainage.    Diagnoses and all orders for this visit:    Environmental allergies  -     predniSONE (DELTASONE) 20 MG tablet; Take 1 tablet (20 mg total) by mouth once daily. for 3 days  -     levocetirizine (XYZAL) 5 MG tablet; Take 1 tablet (5 mg total) by mouth every evening.  -     Ambulatory referral to Allergy  Discussed with patient switching her cetirizine to levocetirizine at bedtime.  Advised patient to use nasal saline washes multiple times today.  Patient was advised to use Atrovent nasal spray however she declined this in lieu of this I offered her oral steroid treatment for 3 days.  Advised patient to follow up with allergist.  Patient to return to office sooner if symptoms worsen.            This office note has been dictated.  This dictation has been generated using M-Modal Fluency Direct dictation; some phonetic errors may occur.

## 2019-03-12 DIAGNOSIS — Z91.09 ENVIRONMENTAL ALLERGIES: ICD-10-CM

## 2019-03-12 RX ORDER — LEVOCETIRIZINE DIHYDROCHLORIDE 5 MG/1
TABLET, FILM COATED ORAL
Qty: 30 TABLET | Refills: 0 | Status: SHIPPED | OUTPATIENT
Start: 2019-03-12 | End: 2019-04-14 | Stop reason: SDUPTHER

## 2019-04-14 DIAGNOSIS — Z91.09 ENVIRONMENTAL ALLERGIES: ICD-10-CM

## 2019-04-14 RX ORDER — LEVOCETIRIZINE DIHYDROCHLORIDE 5 MG/1
TABLET, FILM COATED ORAL
Qty: 30 TABLET | Refills: 0 | Status: SHIPPED | OUTPATIENT
Start: 2019-04-14 | End: 2019-06-18

## 2019-05-06 ENCOUNTER — OFFICE VISIT (OUTPATIENT)
Dept: FAMILY MEDICINE | Facility: CLINIC | Age: 54
End: 2019-05-06
Payer: COMMERCIAL

## 2019-05-06 VITALS
SYSTOLIC BLOOD PRESSURE: 126 MMHG | WEIGHT: 236.56 LBS | RESPIRATION RATE: 18 BRPM | BODY MASS INDEX: 40.39 KG/M2 | HEART RATE: 68 BPM | OXYGEN SATURATION: 98 % | DIASTOLIC BLOOD PRESSURE: 80 MMHG | TEMPERATURE: 99 F | HEIGHT: 64 IN

## 2019-05-06 DIAGNOSIS — R51.9 RECURRENT HEADACHE: Primary | ICD-10-CM

## 2019-05-06 PROCEDURE — 99214 PR OFFICE/OUTPT VISIT, EST, LEVL IV, 30-39 MIN: ICD-10-PCS | Mod: S$GLB,,, | Performed by: FAMILY MEDICINE

## 2019-05-06 PROCEDURE — 3008F PR BODY MASS INDEX (BMI) DOCUMENTED: ICD-10-PCS | Mod: CPTII,S$GLB,, | Performed by: FAMILY MEDICINE

## 2019-05-06 PROCEDURE — 99214 OFFICE O/P EST MOD 30 MIN: CPT | Mod: S$GLB,,, | Performed by: FAMILY MEDICINE

## 2019-05-06 PROCEDURE — 99999 PR PBB SHADOW E&M-EST. PATIENT-LVL IV: CPT | Mod: PBBFAC,,, | Performed by: FAMILY MEDICINE

## 2019-05-06 PROCEDURE — 3008F BODY MASS INDEX DOCD: CPT | Mod: CPTII,S$GLB,, | Performed by: FAMILY MEDICINE

## 2019-05-06 PROCEDURE — 99999 PR PBB SHADOW E&M-EST. PATIENT-LVL IV: ICD-10-PCS | Mod: PBBFAC,,, | Performed by: FAMILY MEDICINE

## 2019-05-06 RX ORDER — IBUPROFEN 800 MG/1
800 TABLET ORAL 3 TIMES DAILY PRN
Qty: 30 TABLET | Refills: 0 | Status: ON HOLD | OUTPATIENT
Start: 2019-05-06 | End: 2019-07-19 | Stop reason: HOSPADM

## 2019-05-06 RX ORDER — SUMATRIPTAN 50 MG/1
TABLET, FILM COATED ORAL
Refills: 1 | Status: CANCELLED | OUTPATIENT
Start: 2019-05-06

## 2019-05-06 NOTE — LETTER
May 6, 2019      [facility]  [address]  [place] [address]  Phone: [phone]       Patient: Mary Naranjo   YOB: 1965  Date of Visit: 05/06/2019    To Whom It May Concern:    Mary Naranjo  was at [facility] on 05/06/2019. She may return to work on 05/07/2019.     If you have any questions or concerns, or if I can be of further assistance, please do not hesitate to contact me.       Sincerely,      Ramon Olivares Jr., MD

## 2019-05-09 NOTE — PROGRESS NOTES
Routine Office Visit    Patient Name: Mary Naranjo    : 1965  MRN: [id number]    Subjective:  Mary is a 54 y.o. female who presents today for:   Chief Complaint   Patient presents with    Migraine     6 day onset & (10) elevated BP pt recorded 161/87 friday--tightness around forehead     54-year-old female comes in for evaluation where she calls migraines.  She states that she was diagnosed with migraines many years ago and she gets flare up from time to time.  She states that she has been having headache for the last six days.  The headache comes and goes.  She states that the headache is located on each side of her head.  It radiates like a band around her head.  The she states lights bother her a little bit but it does not make her nauseous.  Sounds are not bother her.  She does not get any flashing lights when she gets the headaches.  She reports no other neurological symptoms.  She reports no eye tearing.  She was prescribed Imitrex for this in the past however that has never helped.  She does not have a headache today.  She states that the headache started to improve about three days ago.    Past Medical History  Past Medical History:   Diagnosis Date    Arthritis     Dental bridge present     upper removable partial    Fibrocystic breast     Migraines     Obesity        Past Surgical History  Past Surgical History:   Procedure Laterality Date    ARTHROSCOPY-KNEE W/MM Right 2015    [facility]    BREAST BIOPSY      BREAST CYST ASPIRATION      COLONOSCOPY N/A 10/7/2015    [facility] Eagle Stack [facility] ENDO    DILATION AND CURETTAGE OF UTERUS      fibrocystic breast       [id number] N/A 2/15/2018    [facility] OR    KNEE ARTHROSCOPY W/ MENISCAL REPAIR      TUBAL LIGATION          Family History  Family History   Problem Relation Age of Onset    Heart disease Mother     Colon cancer Father 60     Bladder Cancer Father     Lung cancer Father     Prostate cancer Father     Breast cancer Sister 42    COPD Neg Hx        Social History  Social History     Socioeconomic History    Marital status:      Spouse name: Not on file    Number of children: Not on file    Years of education: Not on file    Highest education level: Not on file   Occupational History    Not on file   Social Needs    Financial resource strain: Not on file    Food insecurity:     Worry: Not on file     Inability: Not on file    Transportation needs:     Medical: Not on file     Non-medical: Not on file   Tobacco Use    Smoking status: Never Smoker    Smokeless tobacco: Never Used   Substance and Sexual Activity    Alcohol use: No    Drug use: No    Sexual activity: Not Currently   Lifestyle    Physical activity:     Days per week: Not on file     Minutes per session: Not on file    Stress: Not on file   Relationships    Social connections:     Talks on phone: Not on file     Gets together: Not on file     Attends Denominational service: Not on file     Active member of club or organization: Not on file     Attends meetings of clubs or organizations: Not on file     Relationship status: Not on file   Other Topics Concern    Not on file   Social History Narrative    Not on file       Current Medications  Current Outpatient Medications on File Prior to Visit   Medication Sig Dispense Refill    AFLURIA QUAD 8157-4058, PF, 60 mcg (15 mcg x 4)/0.5 mL Syrg vaccine ADM 0.5ML IM UTD  0    amoxicillin (AMOXIL) 500 MG capsule TAKE 1 CAPSULE BY MOUTH EVERY 8 HOURS UNTIL GONE  0    ascorbic acid, vitamin C, (VITAMIN C) 250 mg Chew Take 500 mg by mouth.      fish oil-omega-3 fatty acids 300-1,000 mg capsule Take 2 g by mouth once daily.      hydrocodone-acetaminophen 5-325mg (NORCO) 5-325 mg per tablet Take 1 tablet by mouth every 4 (four) hours as needed for Pain. 5 tablet 0    ketorolac (TORADOL) 10 mg tablet Take 1  "tablet (10 mg total) by mouth every 6 (six) hours. 20 tablet 0    levocetirizine (XYZAL) 5 MG tablet TAKE 1 TABLET BY MOUTH EVERY DAY IN THE EVENING 30 tablet 0    MULTIVITS,CA,MINERALS/IRON/FA (ONE-A-DAY WOMENS FORMULA ORAL) Take by mouth.      ondansetron (ZOFRAN-ODT) 8 MG TbDL Take 1 tablet (8 mg total) by mouth every 8 (eight) hours as needed. 15 tablet 0    prochlorperazine (COMPAZINE) 10 MG tablet Take 1 tablet (10 mg total) by mouth 3 (three) times daily as needed. 10 tablet 0    sumatriptan (IMITREX) 50 MG tablet TAKE 1 TABLET BY MOUTH EVERY 2 HOURS AS NEEDED FOR MIGRAINE. DO NOT EXCEED 4 TABS IN 24 HOURS  1    gabapentin (NEURONTIN) 300 MG capsule Take 1 capsule (300 mg total) by mouth every 8 (eight) hours as needed (shingles pain). 90 capsule 0     No current facility-administered medications on file prior to visit.        Allergies   Review of patient's allergies indicates:  No Known Allergies    Review of Systems   Constitutional: Negative for chills and fever.   Eyes: Negative for pain, discharge, redness and visual disturbance.   Respiratory: Negative for shortness of breath and wheezing.    Cardiovascular: Negative for chest pain and palpitations.   Neurological: Positive for headaches. Negative for dizziness, tremors, syncope, speech difficulty, weakness and light-headedness.     /80 (BP Location: Left arm, Patient Position: Sitting, BP Method: Medium (Manual))   Pulse 68   Temp 98.5 °F (36.9 °C) (Oral)   Resp 18   Ht 5' 4" (1.626 m)   Wt 107.3 kg (236 lb 8.9 oz)   LMP 01/15/2018   SpO2 98%   BMI 40.60 kg/m²     Physical Exam   Constitutional: She appears well-developed and well-nourished.   HENT:   Head: Normocephalic and atraumatic.   Right Ear: External ear normal.   Left Ear: External ear normal.   Nose: Nose normal.   Mouth/Throat: Oropharynx is clear and moist. No oropharyngeal exudate.   Eyes: Pupils are equal, round, and reactive to light. Conjunctivae and EOM are normal. " Right eye exhibits no discharge. Left eye exhibits no discharge.   Neck: Normal range of motion. Neck supple. No tracheal deviation present.   Cardiovascular: Normal rate, regular rhythm, normal heart sounds and intact distal pulses.   No murmur heard.  Pulmonary/Chest: Effort normal and breath sounds normal. She has no wheezes. She has no rales.   Abdominal: Soft. Bowel sounds are normal. She exhibits no mass. There is no tenderness.   Lymphadenopathy:     She has no cervical adenopathy.   Neurological: She has normal strength. No cranial nerve deficit or sensory deficit.   Psychiatric: She has a normal mood and affect.   Vitals reviewed.        Assessment/Plan:  Mary was seen today for migraine.    Diagnoses and all orders for this visit:    Recurrent headache  -     ibuprofen (ADVIL,MOTRIN) 800 MG tablet; Take 1 tablet (800 mg total) by mouth 3 (three) times daily as needed for Pain. With a meal  -     Ambulatory referral to Neurology  -     CBC auto differential; Future  -     Comprehensive metabolic panel; Future  -     Lipid panel; Future  -     TSH; Future  -     Hemoglobin A1c; Future  Patient's current headaches do not seem consistent with migraines.  Discussed with patient that there are many different types of headaches and people often call them all migraines.  Different headaches are treated differently.  I advised ibuprofen and I will get some preliminary labs.  Also referral placed to Neurology for further evaluation.  The patient was advised that NSAID-type medications have two very important potential side effects: gastrointestinal irritation including hemorrhage and renal injuries. She was asked to take the medication with food and to stop if she experiences any GI upset. I asked her to call for vomiting, abdominal pain or black/bloody stools. The patient expresses understanding of these issues and questions were answered.          This office note has been dictated.  This dictation has been  generated using M-Modal Fluency Direct dictation; some phonetic errors may occur.

## 2019-05-10 ENCOUNTER — LAB VISIT (OUTPATIENT)
Dept: LAB | Facility: HOSPITAL | Age: 54
End: 2019-05-10
Attending: FAMILY MEDICINE
Payer: COMMERCIAL

## 2019-05-10 ENCOUNTER — OFFICE VISIT (OUTPATIENT)
Dept: ALLERGY | Facility: CLINIC | Age: 54
End: 2019-05-10
Payer: COMMERCIAL

## 2019-05-10 VITALS
HEIGHT: 64 IN | SYSTOLIC BLOOD PRESSURE: 122 MMHG | DIASTOLIC BLOOD PRESSURE: 72 MMHG | WEIGHT: 235.88 LBS | BODY MASS INDEX: 40.27 KG/M2

## 2019-05-10 DIAGNOSIS — R09.82 POST-NASAL DRIP: ICD-10-CM

## 2019-05-10 DIAGNOSIS — R51.9 RECURRENT HEADACHE: ICD-10-CM

## 2019-05-10 DIAGNOSIS — J31.0 RHINITIS, UNSPECIFIED TYPE: Primary | ICD-10-CM

## 2019-05-10 DIAGNOSIS — R05.9 COUGH: ICD-10-CM

## 2019-05-10 LAB
ALBUMIN SERPL BCP-MCNC: 4 G/DL (ref 3.5–5.2)
ALP SERPL-CCNC: 101 U/L (ref 55–135)
ALT SERPL W/O P-5'-P-CCNC: 24 U/L (ref 10–44)
ANION GAP SERPL CALC-SCNC: 11 MMOL/L (ref 8–16)
AST SERPL-CCNC: 24 U/L (ref 10–40)
BASOPHILS # BLD AUTO: 0.03 K/UL (ref 0–0.2)
BASOPHILS NFR BLD: 0.5 % (ref 0–1.9)
BILIRUB SERPL-MCNC: 0.4 MG/DL (ref 0.1–1)
BUN SERPL-MCNC: 13 MG/DL (ref 6–20)
CALCIUM SERPL-MCNC: 9.9 MG/DL (ref 8.7–10.5)
CHLORIDE SERPL-SCNC: 108 MMOL/L (ref 95–110)
CHOLEST SERPL-MCNC: 206 MG/DL (ref 120–199)
CHOLEST/HDLC SERPL: 3.9 {RATIO} (ref 2–5)
CO2 SERPL-SCNC: 23 MMOL/L (ref 23–29)
CREAT SERPL-MCNC: 0.7 MG/DL (ref 0.5–1.4)
DIFFERENTIAL METHOD: ABNORMAL
EOSINOPHIL # BLD AUTO: 0.1 K/UL (ref 0–0.5)
EOSINOPHIL NFR BLD: 2.3 % (ref 0–8)
ERYTHROCYTE [DISTWIDTH] IN BLOOD BY AUTOMATED COUNT: 15.7 % (ref 11.5–14.5)
EST. GFR  (AFRICAN AMERICAN): >60 ML/MIN/1.73 M^2
EST. GFR  (NON AFRICAN AMERICAN): >60 ML/MIN/1.73 M^2
ESTIMATED AVG GLUCOSE: 114 MG/DL (ref 68–131)
GLUCOSE SERPL-MCNC: 83 MG/DL (ref 70–110)
HBA1C MFR BLD HPLC: 5.6 % (ref 4–5.6)
HCT VFR BLD AUTO: 36.5 % (ref 37–48.5)
HDLC SERPL-MCNC: 53 MG/DL (ref 40–75)
HDLC SERPL: 25.7 % (ref 20–50)
HGB BLD-MCNC: 10.9 G/DL (ref 12–16)
IMM GRANULOCYTES # BLD AUTO: 0.01 K/UL (ref 0–0.04)
IMM GRANULOCYTES NFR BLD AUTO: 0.2 % (ref 0–0.5)
LDLC SERPL CALC-MCNC: 133.2 MG/DL (ref 63–159)
LYMPHOCYTES # BLD AUTO: 3.1 K/UL (ref 1–4.8)
LYMPHOCYTES NFR BLD: 49.9 % (ref 18–48)
MCH RBC QN AUTO: 24.8 PG (ref 27–31)
MCHC RBC AUTO-ENTMCNC: 29.9 G/DL (ref 32–36)
MCV RBC AUTO: 83 FL (ref 82–98)
MONOCYTES # BLD AUTO: 0.5 K/UL (ref 0.3–1)
MONOCYTES NFR BLD: 8.6 % (ref 4–15)
NEUTROPHILS # BLD AUTO: 2.4 K/UL (ref 1.8–7.7)
NEUTROPHILS NFR BLD: 38.5 % (ref 38–73)
NONHDLC SERPL-MCNC: 153 MG/DL
NRBC BLD-RTO: 0 /100 WBC
PLATELET # BLD AUTO: 283 K/UL (ref 150–350)
PMV BLD AUTO: 11.3 FL (ref 9.2–12.9)
POTASSIUM SERPL-SCNC: 4.7 MMOL/L (ref 3.5–5.1)
PROT SERPL-MCNC: 7.4 G/DL (ref 6–8.4)
RBC # BLD AUTO: 4.39 M/UL (ref 4–5.4)
SODIUM SERPL-SCNC: 142 MMOL/L (ref 136–145)
TRIGL SERPL-MCNC: 99 MG/DL (ref 30–150)
TSH SERPL DL<=0.005 MIU/L-ACNC: 1.84 UIU/ML (ref 0.4–4)
WBC # BLD AUTO: 6.17 K/UL (ref 3.9–12.7)

## 2019-05-10 PROCEDURE — 99203 PR OFFICE/OUTPT VISIT, NEW, LEVL III, 30-44 MIN: ICD-10-PCS | Mod: S$GLB,,, | Performed by: STUDENT IN AN ORGANIZED HEALTH CARE EDUCATION/TRAINING PROGRAM

## 2019-05-10 PROCEDURE — 85025 COMPLETE CBC W/AUTO DIFF WBC: CPT

## 2019-05-10 PROCEDURE — 84443 ASSAY THYROID STIM HORMONE: CPT

## 2019-05-10 PROCEDURE — 83036 HEMOGLOBIN GLYCOSYLATED A1C: CPT

## 2019-05-10 PROCEDURE — 99999 PR PBB SHADOW E&M-EST. PATIENT-LVL III: ICD-10-PCS | Mod: PBBFAC,,, | Performed by: STUDENT IN AN ORGANIZED HEALTH CARE EDUCATION/TRAINING PROGRAM

## 2019-05-10 PROCEDURE — 80061 LIPID PANEL: CPT

## 2019-05-10 PROCEDURE — 3008F PR BODY MASS INDEX (BMI) DOCUMENTED: ICD-10-PCS | Mod: CPTII,S$GLB,, | Performed by: STUDENT IN AN ORGANIZED HEALTH CARE EDUCATION/TRAINING PROGRAM

## 2019-05-10 PROCEDURE — 99203 OFFICE O/P NEW LOW 30 MIN: CPT | Mod: S$GLB,,, | Performed by: STUDENT IN AN ORGANIZED HEALTH CARE EDUCATION/TRAINING PROGRAM

## 2019-05-10 PROCEDURE — 99999 PR PBB SHADOW E&M-EST. PATIENT-LVL III: CPT | Mod: PBBFAC,,, | Performed by: STUDENT IN AN ORGANIZED HEALTH CARE EDUCATION/TRAINING PROGRAM

## 2019-05-10 PROCEDURE — 80053 COMPREHEN METABOLIC PANEL: CPT

## 2019-05-10 PROCEDURE — 36415 COLL VENOUS BLD VENIPUNCTURE: CPT | Mod: PO

## 2019-05-10 PROCEDURE — 3008F BODY MASS INDEX DOCD: CPT | Mod: CPTII,S$GLB,, | Performed by: STUDENT IN AN ORGANIZED HEALTH CARE EDUCATION/TRAINING PROGRAM

## 2019-05-10 RX ORDER — AZELASTINE 1 MG/ML
2 SPRAY, METERED NASAL 2 TIMES DAILY PRN
Qty: 30 ML | Refills: 11 | Status: SHIPPED | OUTPATIENT
Start: 2019-05-10 | End: 2020-08-19

## 2019-05-10 NOTE — PATIENT INSTRUCTIONS
Testing  Blood work for allergy testing today       Check portal in one week for results or call [phone]       Contact me with questions or concerns       I will contact you if anything needs immediate attention.        Treatment  Astelin = azelastine nasal spray:    2 squirts each nostril as needed, up to twice a day   Do not snort (because it burns and tastes bad)

## 2019-05-10 NOTE — LETTER
May 10, 2019      Ramon Olivares Jr., MD  [address] [facility] [address]  [place] [address]           [facility]  [address]  [place] [address]  Phone: [phone]          Patient: Mary Naranjo   MR Number: [id number]   YOB: 1965   Date of Visit: 5/10/2019       Dear Dr. Ramon Olivares Jr.:    Thank you for referring Mary Naranjo to me for evaluation. Attached you will find relevant portions of my assessment and plan of care.    If you have questions, please do not hesitate to call me. I look forward to following Mary Naranjo along with you.    Sincerely,    Lucero Blanco MD    Enclosure  CC:  No Recipients    If you would like to receive this communication electronically, please contact [email or web address] or [phone] to request more information on [employer] access.    For providers and/or their staff who would like to refer a patient to [facility], please contact us through our one-stop-shop provider referral line, [facility], at [phone].    If you feel you have received this communication in error or would no longer like to receive these types of communications, please e-mail [email or web address]

## 2019-05-10 NOTE — LETTER
May 10, 2019      [facility]  [address]  [place] [address]  Phone: [phone]       Patient: Mary Naranjo   YOB: 1965  Date of Visit: 05/10/2019    To Whom It May Concern:    Sheila Naranjo  was at [facility] on 05/10/2019.  If you have any questions or concerns, or if I can be of further assistance, please do not hesitate to contact me.    Sincerely,      Elizabeth A Bosworth, LPN

## 2019-05-10 NOTE — PROGRESS NOTES
"Allergy Clinic Note  [facility]    Subjective:      Patient ID: Mary Naranjo is a 54 y.o. female.    Chief Complaint: Nasal Congestion (multiple nose bleeds) and Allergies (chronic sneezing possibly due to mold)      Referring Provider: Ramon Olivares Jr.    History of Present Illness:  54-year-old female referred from Family Medicine for evaluation and treatment of rhinitis.    Her chief complaints is nasal congestion and postnasal drip.  Associated symptoms include runny nose, itchy eyes, itchy nose, cough at the level of her throat, and throat clearing.  She denies any sneezing, wheezing, shortness of breath, chest tightness, ear symptoms, or skin symptoms.  She believes symptoms are precipitated by mold:  There is 1 particular trailer on the [facility] that smells like mildew and causes her symptoms immediately on arriving.  She is also concerned about carpet and dust.  Smoke is an additional precipitant.  She says her symptoms are worse in the fall.  She has been helped somewhat by saline nasal spray, Xyzal, and Flonase.  She stopped the Flonase about a month ago because it was causing nose bleeds.  She has had no testing to date.    She also has a history of severe, intermittent headaches.  These occur in the left temporoparietal area and prevent her from working.  There is associated with nausea and dizziness (described faint miss) and sometimes blurry vision.  She says they feel like something is going to burst out of [her] head."  Other words she uses are "sharp and stabbing.  These headaches last hours to days.  There is no warning. She has been referred to neurology.    Additional History:  Past medical history is significant for  prediabetes.  No Hx of ENT surgery. Family history is negative for allergies.  She has a sister with asthma..Client  reports that she has never smoked. She has never used smokeless tobacco.  Exposures are notable for mold exposure at school " and mold versus dust exposure in the carpet at work.  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  Denies exposure to household smoke, pets, or unusual exposures.       Patient Active Problem List   Diagnosis    Prediabetes    Obesity (BMI 30-39.9)    Low back pain    Muscle weakness    Stiffness of vertebral column    Right knee pain    Tear of medial cartilage or meniscus of knee, current    Acute intractable headache    PMB (postmenopausal bleeding)     Current Outpatient Medications on File Prior to Visit   Medication Sig Dispense Refill    fish oil-omega-3 fatty acids 300-1,000 mg capsule Take 2 g by mouth once daily.      ibuprofen (ADVIL,MOTRIN) 800 MG tablet Take 1 tablet (800 mg total) by mouth 3 (three) times daily as needed for Pain. With a meal 30 tablet 0    levocetirizine (XYZAL) 5 MG tablet TAKE 1 TABLET BY MOUTH EVERY DAY IN THE EVENING (Patient taking differently: TAKE 1 TABLET BY MOUTH EVERY DAY IN THE EVENING as needed) 30 tablet 0    MULTIVITS,CA,MINERALS/IRON/FA (ONE-A-DAY WOMENS FORMULA ORAL) Take by mouth.      [DISCONTINUED] AFLURIA QUAD 5521-6995, PF, 60 mcg (15 mcg x 4)/0.5 mL Syrg vaccine ADM 0.5ML IM UTD  0    [DISCONTINUED] amoxicillin (AMOXIL) 500 MG capsule TAKE 1 CAPSULE BY MOUTH EVERY 8 HOURS UNTIL GONE  0    [DISCONTINUED] ascorbic acid, vitamin C, (VITAMIN C) 250 mg Chew Take 500 mg by mouth.      [DISCONTINUED] gabapentin (NEURONTIN) 300 MG capsule Take 1 capsule (300 mg total) by mouth every 8 (eight) hours as needed (shingles pain). 90 capsule 0    [DISCONTINUED] hydrocodone-acetaminophen 5-325mg (NORCO) 5-325 mg per tablet Take 1 tablet by mouth every 4 (four) hours as needed for Pain. 5 tablet 0    [DISCONTINUED] ketorolac (TORADOL) 10 mg tablet Take 1 tablet (10 mg total) by mouth every 6 (six) hours. 20 tablet 0    [DISCONTINUED] ondansetron (ZOFRAN-ODT) 8 MG TbDL Take 1 tablet (8 mg total) by mouth every 8 (eight) hours  "as needed. 15 tablet 0    [DISCONTINUED] prochlorperazine (COMPAZINE) 10 MG tablet Take 1 tablet (10 mg total) by mouth 3 (three) times daily as needed. 10 tablet 0    [DISCONTINUED] sumatriptan (IMITREX) 50 MG tablet TAKE 1 TABLET BY MOUTH EVERY 2 HOURS AS NEEDED FOR MIGRAINE. DO NOT EXCEED 4 TABS IN 24 HOURS  1     No current facility-administered medications on file prior to visit.          Review of Systems   Constitutional: Negative for chills and fever.   HENT: Positive for congestion. Negative for ear discharge and nosebleeds.    Eyes: Negative for discharge and redness.   Respiratory: Negative for hemoptysis, sputum production and stridor.    Gastrointestinal: Negative for blood in stool, melena and vomiting.   Genitourinary: Negative for dysuria and hematuria.   Skin: Negative for itching and rash.   Neurological: Positive for headaches. Negative for seizures and loss of consciousness.       Objective:   /72   Ht 5' 4" (1.626 m)   Wt 107 kg (235 lb 14.3 oz)   LMP 01/15/2018   BMI 40.49 kg/m²     Physical Exam   Constitutional: She is oriented to person, place, and time and well-developed, well-nourished, and in no distress.   HENT:   Head: Normocephalic and atraumatic.   Nose: Nose normal.   Mouth/Throat: No oropharyngeal exudate.   TMs clear.  Nares pink with mild to moderate turbinates swelling.  Oropharynx is benign.   Eyes: Conjunctivae are normal. No scleral icterus.   Neck: Neck supple.   Cardiovascular: Normal rate, regular rhythm and intact distal pulses.   Pulmonary/Chest: Effort normal. No stridor. No respiratory distress.   Abdominal: Soft. She exhibits no distension. There is no tenderness.   Musculoskeletal: She exhibits no edema or deformity.   Lymphadenopathy:     She has no cervical adenopathy.   Neurological: She is alert and oriented to person, place, and time.   Skin: No rash noted. No erythema.   Psychiatric: Memory and affect normal.       Data:   Eo count 100 on CBC from " July 2018    Assessment:     1. Rhinitis, unspecified type    2. Cough    3. Post-nasal drip        Plan:     Medical decision making: Ms Dyer is presenting with rhinitis which may or may not be allergic.  For her complaints of postnasal drip cough and throat clearing, I am recommending a trial of topical antihistamines.        I believe her cough is due to postnasal drip.  GERD is also on the differential.  I do not suspect any cardiopulmonary etiology.        I agree with the referral to neurology for evaluation of her severe headaches.    Mary was seen today for nasal congestion and allergies.    Diagnoses and all orders for this visit:    Rhinitis, unspecified type  -     azelastine (ASTELIN) 137 mcg (0.1 %) nasal spray; 2 sprays (274 mcg total) by Nasal route 2 (two) times daily as needed for Rhinitis. Donot snort (because it tastes bad)  -     IgE; Future  -     D. farinae IgE; Future  -     D. pteronyssinus IgE; Future  -     Bermuda grass IgE; Future  -     Mike IgE; Future  -     Allergen-Cedar; Future  -     Plantain, English IgE; Future  -     Oak, white IgE; Future  -     Allergen, Pecan Tree IgE; Future  -     Marsh elder, rough IgE; Future  -     Ragweed, Western IgE; Future  -     Allergen-Alternaria Alternata; Future  -     Aspergillus fumagatus IgE; Future  -     Cat epithelium IgE; Future  -     Cockroach, American IgE; Future  -     Dog dander IgE; Future    Cough probably seconday to Post-nasal drip  -     azelastine (ASTELIN) 137 mcg (0.1 %) nasal spray; 2 sprays (274 mcg total) by Nasal route 2 (two) times daily as needed for Rhinitis. Donot snort (because it tastes bad)        Patient Instructions   Testing  Blood work for allergy testing today       Check portal in one week for results or call [phone]       Contact me with questions or concerns       I will contact you if anything needs immediate attention.        Treatment  Astelin = azelastine nasal spray:    2 squirts each nostril  as needed, up to twice a day   Do not snort (because it burns and tastes bad)            Follow up in about 2 weeks (around 5/24/2019).    Lucero Blanco MD

## 2019-05-15 ENCOUNTER — TELEPHONE (OUTPATIENT)
Dept: ADMINISTRATIVE | Facility: HOSPITAL | Age: 54
End: 2019-05-15

## 2019-05-23 NOTE — PROGRESS NOTES
Allergy Clinic Note  [facility]    Subjective:          Chief Complaint: Follow-up      Allergy problem list  Rhinitis manifest by nasal congestion  Cough attributed to Postnasal drip  Nosebleeds  Possible mold exposure at [place]  Headaches    History of Present Illness: Mary Naranjo is a 54 y.o. female with rhinitis and cough who returns at my request to follow up symptoms and test results.    At last visit, I prescribed topical antihistamine.  Today she reports significant improvement in nasal congestion, postnasal drip, headaches, and marked increase in nasal discharge.  She denies any change in her cough.  She denies any side effects or other problems related to her Astelin.  She believes it is worth continuing despite the lack of affect on her cough    Related medications  Astelin 2 squirts each nostril twice a day as needed    No new problems or complaints.    Additional History:   Interval Hx is unremarkable.  Client is a lifetime nonsmoker.Past medical, family, and social histories are unchanged.       Patient Active Problem List   Diagnosis    Prediabetes    Obesity (BMI 30-39.9)    Low back pain    Muscle weakness    Stiffness of vertebral column    Right knee pain    Tear of medial cartilage or meniscus of knee, current    Acute intractable headache    PMB (postmenopausal bleeding)     Current Outpatient Medications on File Prior to Visit   Medication Sig Dispense Refill    azelastine (ASTELIN) 137 mcg (0.1 %) nasal spray 2 sprays (274 mcg total) by Nasal route 2 (two) times daily as needed for Rhinitis. Donot snort (because it tastes bad) 30 mL 11    fish oil-omega-3 fatty acids 300-1,000 mg capsule Take 2 g by mouth once daily.      MULTIVITS,CA,MINERALS/IRON/FA (ONE-A-DAY WOMENS FORMULA ORAL) Take by mouth.      ibuprofen (ADVIL,MOTRIN) 800 MG tablet Take 1 tablet (800 mg total) by mouth 3 (three) times daily as needed for Pain. With a meal 30 tablet 0    levocetirizine  "(XYZAL) 5 MG tablet TAKE 1 TABLET BY MOUTH EVERY DAY IN THE EVENING (Patient taking differently: TAKE 1 TABLET BY MOUTH EVERY DAY IN THE EVENING as needed) 30 tablet 0     No current facility-administered medications on file prior to visit.          Review of Systems   Constitutional: Negative for chills and fever.   HENT: Negative for ear discharge and nosebleeds.    Eyes: Negative for discharge and redness.   Respiratory: Negative for hemoptysis, sputum production, shortness of breath, wheezing and stridor.    Cardiovascular: Negative for chest pain and palpitations.   Gastrointestinal: Negative for blood in stool, melena and vomiting.   Genitourinary: Negative for dysuria and hematuria.   Skin: Negative for itching and rash.   Neurological: Negative for seizures and loss of consciousness.       Objective:   Ht 5' 4.75" (1.645 m)   Wt 104.7 kg (230 lb 13.2 oz)   LMP 01/15/2018   BMI 38.71 kg/m²       Physical Exam   Constitutional: She is oriented to person, place, and time and well-developed, well-nourished, and in no distress.   HENT:   Head: Normocephalic and atraumatic.   Nose: Nose normal.   Eyes: Conjunctivae are normal. No scleral icterus.   Neck: Neck supple.   Cardiovascular: Normal rate, regular rhythm and intact distal pulses.   Pulmonary/Chest: Effort normal. No stridor. No respiratory distress.   Abdominal: She exhibits no distension.   Musculoskeletal: She exhibits no edema or deformity.   Neurological: She is alert and oriented to person, place, and time.   Skin: No rash noted. No erythema.   Psychiatric: Affect and judgment normal.       Data:   Aeroallergen testing by the serum Immunocap method (05/10/2019) was entirely negative.    Total serum IgE < 35    Eo count 100 on CBC from July 2018  Assessment:     1. Nonallergic rhinitis    2. Cough        Plan:     Medical Decision Making:  Patient is presenting with nonallergic rhinitis and cough.  A topical antihistamine of provided a next " improvement in all of her symptoms except cough.  She is opting to continue the Astelin nasal spray.    Mary was seen today for follow-up.    Diagnoses and all orders for this visit:    Nonallergic rhinitis - improved  Lab results discussed and copy given  Discussed triggers and handout given  Continue Astelin 1-2 squirts each nostril once or twice daily    Cough - unchanged  follow        Patient Instructions   No evidence of allergies.    I do not recommend environmental controls.  I do not recommend allergy shots.    Recommend medicines:    Astelin 1-2 squirts 1-2 times daily      Return as needed.      Follow up if symptoms worsen or fail to improve.    Lucero Blanco MD

## 2019-05-24 ENCOUNTER — OFFICE VISIT (OUTPATIENT)
Dept: ALLERGY | Facility: CLINIC | Age: 54
End: 2019-05-24
Payer: COMMERCIAL

## 2019-05-24 VITALS — HEIGHT: 65 IN | WEIGHT: 230.81 LBS | BODY MASS INDEX: 38.45 KG/M2

## 2019-05-24 DIAGNOSIS — R05.9 COUGH: ICD-10-CM

## 2019-05-24 DIAGNOSIS — J31.0 NONALLERGIC RHINITIS: Primary | ICD-10-CM

## 2019-05-24 PROCEDURE — 99213 OFFICE O/P EST LOW 20 MIN: CPT | Mod: S$GLB,,, | Performed by: STUDENT IN AN ORGANIZED HEALTH CARE EDUCATION/TRAINING PROGRAM

## 2019-05-24 PROCEDURE — 3008F PR BODY MASS INDEX (BMI) DOCUMENTED: ICD-10-PCS | Mod: CPTII,S$GLB,, | Performed by: STUDENT IN AN ORGANIZED HEALTH CARE EDUCATION/TRAINING PROGRAM

## 2019-05-24 PROCEDURE — 99213 PR OFFICE/OUTPT VISIT, EST, LEVL III, 20-29 MIN: ICD-10-PCS | Mod: S$GLB,,, | Performed by: STUDENT IN AN ORGANIZED HEALTH CARE EDUCATION/TRAINING PROGRAM

## 2019-05-24 PROCEDURE — 99999 PR PBB SHADOW E&M-EST. PATIENT-LVL III: ICD-10-PCS | Mod: PBBFAC,,, | Performed by: STUDENT IN AN ORGANIZED HEALTH CARE EDUCATION/TRAINING PROGRAM

## 2019-05-24 PROCEDURE — 3008F BODY MASS INDEX DOCD: CPT | Mod: CPTII,S$GLB,, | Performed by: STUDENT IN AN ORGANIZED HEALTH CARE EDUCATION/TRAINING PROGRAM

## 2019-05-24 PROCEDURE — 99999 PR PBB SHADOW E&M-EST. PATIENT-LVL III: CPT | Mod: PBBFAC,,, | Performed by: STUDENT IN AN ORGANIZED HEALTH CARE EDUCATION/TRAINING PROGRAM

## 2019-05-24 NOTE — PATIENT INSTRUCTIONS
No evidence of allergies.    I do not recommend environmental controls.  I do not recommend allergy shots.    Recommend medicines:    Astelin 1-2 squirts 1-2 times daily      Return as needed.

## 2019-05-24 NOTE — LETTER
May 24, 2019      [facility]  [address]  [place] [address]  Phone: [phone]       Patient: Mary Naranjo   YOB: 1965  Date of Visit: 05/24/2019    To Whom It May Concern:    Sheila Naranjo  was at [facility] on 05/24/2019. Ms. Naranjo may return to work on 5/27/19 with no restrictions. If you have any questions or concerns, or if I can be of further assistance, please do not hesitate to contact me.    Sincerely,        Elizabeth A Bosworth, LPN

## 2019-05-27 ENCOUNTER — TELEPHONE (OUTPATIENT)
Dept: FAMILY MEDICINE | Facility: CLINIC | Age: 54
End: 2019-05-27

## 2019-05-27 NOTE — TELEPHONE ENCOUNTER
Please let patient know that her labs showed worsening anemia compared to previous.  Very important that she follow up in the office so we can discuss possible causes.  It is a very important that she does follow up

## 2019-05-29 NOTE — TELEPHONE ENCOUNTER
Attempted to contact patient regarding lab results. No answer, LVM for patient to contact clinic.

## 2019-05-30 ENCOUNTER — TELEPHONE (OUTPATIENT)
Dept: FAMILY MEDICINE | Facility: CLINIC | Age: 54
End: 2019-05-30

## 2019-05-31 NOTE — TELEPHONE ENCOUNTER
I left a voicemail for the pt to call the office. We have attempted to contact the pt three times by telephone. Letter mailed to pt regarding need for follow up appointment.

## 2019-06-10 ENCOUNTER — PATIENT MESSAGE (OUTPATIENT)
Dept: CARDIOLOGY | Facility: CLINIC | Age: 54
End: 2019-06-10

## 2019-06-18 ENCOUNTER — OFFICE VISIT (OUTPATIENT)
Dept: NEUROLOGY | Facility: CLINIC | Age: 54
End: 2019-06-18
Payer: COMMERCIAL

## 2019-06-18 ENCOUNTER — OFFICE VISIT (OUTPATIENT)
Dept: FAMILY MEDICINE | Facility: CLINIC | Age: 54
End: 2019-06-18
Payer: COMMERCIAL

## 2019-06-18 ENCOUNTER — LAB VISIT (OUTPATIENT)
Dept: LAB | Facility: HOSPITAL | Age: 54
End: 2019-06-18
Attending: PSYCHIATRY & NEUROLOGY
Payer: COMMERCIAL

## 2019-06-18 VITALS
RESPIRATION RATE: 17 BRPM | BODY MASS INDEX: 39.97 KG/M2 | DIASTOLIC BLOOD PRESSURE: 76 MMHG | HEART RATE: 66 BPM | HEIGHT: 64 IN | TEMPERATURE: 98 F | WEIGHT: 234.13 LBS | SYSTOLIC BLOOD PRESSURE: 130 MMHG | OXYGEN SATURATION: 97 %

## 2019-06-18 VITALS
DIASTOLIC BLOOD PRESSURE: 64 MMHG | HEIGHT: 64 IN | BODY MASS INDEX: 39.85 KG/M2 | HEART RATE: 62 BPM | SYSTOLIC BLOOD PRESSURE: 132 MMHG | WEIGHT: 233.44 LBS

## 2019-06-18 DIAGNOSIS — G43.009 MIGRAINE WITHOUT AURA AND WITHOUT STATUS MIGRAINOSUS, NOT INTRACTABLE: Primary | ICD-10-CM

## 2019-06-18 DIAGNOSIS — D50.9 MICROCYTIC ANEMIA: Primary | ICD-10-CM

## 2019-06-18 DIAGNOSIS — G43.009 MIGRAINE WITHOUT AURA AND WITHOUT STATUS MIGRAINOSUS, NOT INTRACTABLE: ICD-10-CM

## 2019-06-18 DIAGNOSIS — H53.8 BLURRY VISION: ICD-10-CM

## 2019-06-18 LAB
CRP SERPL-MCNC: 10 MG/L (ref 0–8.2)
ERYTHROCYTE [SEDIMENTATION RATE] IN BLOOD BY WESTERGREN METHOD: 20 MM/HR (ref 0–20)

## 2019-06-18 PROCEDURE — 99999 PR PBB SHADOW E&M-EST. PATIENT-LVL III: CPT | Mod: PBBFAC,,, | Performed by: FAMILY MEDICINE

## 2019-06-18 PROCEDURE — 99214 OFFICE O/P EST MOD 30 MIN: CPT | Mod: S$GLB,,, | Performed by: FAMILY MEDICINE

## 2019-06-18 PROCEDURE — 99999 PR PBB SHADOW E&M-EST. PATIENT-LVL III: ICD-10-PCS | Mod: PBBFAC,,, | Performed by: FAMILY MEDICINE

## 2019-06-18 PROCEDURE — 99999 PR PBB SHADOW E&M-EST. PATIENT-LVL III: CPT | Mod: PBBFAC,,, | Performed by: PSYCHIATRY & NEUROLOGY

## 2019-06-18 PROCEDURE — 36415 COLL VENOUS BLD VENIPUNCTURE: CPT

## 2019-06-18 PROCEDURE — 99214 OFFICE O/P EST MOD 30 MIN: CPT | Mod: S$GLB,,, | Performed by: PSYCHIATRY & NEUROLOGY

## 2019-06-18 PROCEDURE — 99214 PR OFFICE/OUTPT VISIT, EST, LEVL IV, 30-39 MIN: ICD-10-PCS | Mod: S$GLB,,, | Performed by: FAMILY MEDICINE

## 2019-06-18 PROCEDURE — 99214 PR OFFICE/OUTPT VISIT, EST, LEVL IV, 30-39 MIN: ICD-10-PCS | Mod: S$GLB,,, | Performed by: PSYCHIATRY & NEUROLOGY

## 2019-06-18 PROCEDURE — 86140 C-REACTIVE PROTEIN: CPT

## 2019-06-18 PROCEDURE — 99999 PR PBB SHADOW E&M-EST. PATIENT-LVL III: ICD-10-PCS | Mod: PBBFAC,,, | Performed by: PSYCHIATRY & NEUROLOGY

## 2019-06-18 PROCEDURE — 3008F BODY MASS INDEX DOCD: CPT | Mod: CPTII,S$GLB,, | Performed by: FAMILY MEDICINE

## 2019-06-18 PROCEDURE — 3008F PR BODY MASS INDEX (BMI) DOCUMENTED: ICD-10-PCS | Mod: CPTII,S$GLB,, | Performed by: FAMILY MEDICINE

## 2019-06-18 PROCEDURE — 3008F PR BODY MASS INDEX (BMI) DOCUMENTED: ICD-10-PCS | Mod: CPTII,S$GLB,, | Performed by: PSYCHIATRY & NEUROLOGY

## 2019-06-18 PROCEDURE — 85652 RBC SED RATE AUTOMATED: CPT

## 2019-06-18 PROCEDURE — 3008F BODY MASS INDEX DOCD: CPT | Mod: CPTII,S$GLB,, | Performed by: PSYCHIATRY & NEUROLOGY

## 2019-06-18 NOTE — LETTER
June 18, 2019      [facility]  [address]  [place] [address]  Phone: [phone]       Patient: Mary Naranjo   YOB: 1965  Date of Visit: 06/18/2019    To Whom It May Concern:    Mary Naranjo  was at [facility] on 06/18/2019. She may return to work on 06/19/2019. If you have any questions or concerns, or if I can be of further assistance, please do not hesitate to contact me.      Sincerely,      Ramon Olivares Jr., MD

## 2019-06-18 NOTE — LETTER
June 18, 2019      Ramon Olivares Jr., MD  [facility] [address]           [facility] [address] [facility] [address]  [facility] [address]  Phone: [phone]  Fax: [phone]          Patient: Mary Naranjo   MR Number: [id number]   YOB: 1965   Date of Visit: 6/18/2019       Dear Dr. Ramon Olivares Jr.:    Thank you for referring Mary Naranjo to me for evaluation. Attached you will find relevant portions of my assessment and plan of care.    If you have questions, please do not hesitate to call me. I look forward to following Mary Naranjo along with you.    Sincerely,    Anai Mckeon,     Enclosure  CC:  No Recipients    If you would like to receive this communication electronically, please contact [email or web address] or [phone] to request more information on [facility] Link access.    For providers and/or their staff who would like to refer a patient to [facility], please contact us through our one-stop-shop provider referral line, [facility], at [phone].    If you feel you have received this communication in error or would no longer like to receive these types of communications, please e-mail [email or web address]

## 2019-06-18 NOTE — PROGRESS NOTES
Neurology Consult Note    Chief Complaint: headaches    HPI:   Mary Naranjo is a 54 y.o. female with medical conditions as outlined below who presents for further evaluation of headaches. She states she has been having headaches since her 30s. She describes them as either left or right sided and sharp. She states they start off gradually and can worsen to a 10/10 pain. At times they are associated with blurry vision. She states at times she has blurry vision even without the headaches. She states she see's an eye doctor yearly and was told she had cataracts. She admits to photophobia and nausea with the headaches, but she denies vomiting or phonophobia. She denies neck pain. She denies changes in speech, focal numbness, focal weakness, or difficulty walking with her headaches. She states her headaches occur 3-4 times a month and resolve within 10 minutes with either ibuprofen 800mg as needed or excedrin migraine. She had an MRI brain w/wo contrast in 2016 which was unremarkable. She has no further complaints.     Past Medical History:  Past Medical History:   Diagnosis Date    Arthritis     Dental bridge present     upper removable partial    Fibrocystic breast     Migraines     Obesity        Past Surgical History:  Past Surgical History:   Procedure Laterality Date    ARTHROSCOPY-KNEE W/MM Right 6/23/2015    [facility] OR    BREAST BIOPSY      BREAST CYST ASPIRATION      COLONOSCOPY N/A 10/7/2015    Performed by Eagle Stack MD at [facility] ENDO    DILATION AND CURETTAGE OF UTERUS      fibrocystic breast       [id number] N/A 2/15/2018    Per[facility] by [facility]ila Corado [facility] at [facility] OR    KNEE ARTHROSCOPY W/ MENISCAL REPAIR      TUBAL LIGATION         Social History:  Social History     Socioeconomic History    Marital status:      Spouse name: Not on file    Number of children: Not on file    Years of education: Not on file    Highest education  level: Not on file   Occupational History    Not on file   Social Needs    Financial resource strain: Not on file    Food insecurity:     Worry: Not on file     Inability: Not on file    Transportation needs:     Medical: Not on file     Non-medical: Not on file   Tobacco Use    Smoking status: Never Smoker    Smokeless tobacco: Never Used   Substance and Sexual Activity    Alcohol use: No    Drug use: No    Sexual activity: Not Currently   Lifestyle    Physical activity:     Days per week: Not on file     Minutes per session: Not on file    Stress: Not on file   Relationships    Social connections:     Talks on phone: Not on file     Gets together: Not on file     Attends Buddhism service: Not on file     Active member of club or organization: Not on file     Attends meetings of clubs or organizations: Not on file     Relationship status: Not on file   Other Topics Concern    Not on file   Social History Narrative    Not on file       Family History:  Family History   Problem Relation Age of Onset    Heart disease Mother     Colon cancer Father 60    Bladder Cancer Father     Lung cancer Father     Prostate cancer Father     Breast cancer Sister 42    COPD Neg Hx        Medications:  Current Outpatient Medications   Medication Sig Dispense Refill    azelastine (ASTELIN) 137 mcg (0.1 %) nasal spray 2 sprays (274 mcg total) by Nasal route 2 (two) times daily as needed for Rhinitis. Donot snort (because it tastes bad) 30 mL 11    fish oil-omega-3 fatty acids 300-1,000 mg capsule Take 2 g by mouth once daily.      ibuprofen (ADVIL,MOTRIN) 800 MG tablet Take 1 tablet (800 mg total) by mouth 3 (three) times daily as needed for Pain. With a meal 30 tablet 0    levocetirizine (XYZAL) 5 MG tablet TAKE 1 TABLET BY MOUTH EVERY DAY IN THE EVENING (Patient taking differently: TAKE 1 TABLET BY MOUTH EVERY DAY IN THE EVENING as needed) 30 tablet 0    MULTIVITS,CA,MINERALS/IRON/FA (ONE-A-DAY WOMENS  FORMULA ORAL) Take by mouth.       No current facility-administered medications for this visit.        Allergies:  Review of patient's allergies indicates:  No Known Allergies    ROS:  A 12 point review of system was negative aside from pertinent positives and negatives as outlined above.    Physical Exam  Vitals:    06/18/19 1119   BP: 132/64   Pulse: 62       General: well nourished, well developed  Eyes: no scleral icterus   Nose: nasal turbinates intact  Neck: supple, ROM intact  Skin: no rash or ecchymosis  Joints: no swelling or erythema  Cardiac: regular rate and rhythm  Lungs: clear to auscultation bilaterally    Neuro:  Mental status: AAO x 3, no dysarthria, no aphasia, communicating appropriately  CN: PERRL, EOMI, VFF, V1-V3 sensation intact, no facial asymmetry, hearing grossly intact, tongue midline  Motor:   RUE 5/5  RLE 5/5  LUE 5/5  LLE 5/5    Normal bulk and tone    Reflexes: 2+ throughout, toes equivocal bilaterally  Sensory: intact to light touch throughout  Coordination: no dysmetria on FTN  Gait: steady    Prior Imaging/Labs:  Reviewed      Assessment and Plan:    54 y.o. female with headaches likely due to migraine, however, brief duration is atypical for this. Given associated blurry vision, will refer patient to optometry for dilated eye exam. I will also check and Esr/crp given patient's age, but clinical suspicion for temporal arteritis is low at this time.     1. Migraine  As headaches occur only 3-4 times a month, patient was advised to take ibuprofen 800mg once a day as needed for headache. She was advised not to do this more than 2 -3 times a week. She was educated on medication overuse headache  She was advised to keep a headache diary and bring to her next visit. She was advised to document when she has a headache, a description of the headache and how long it lasts. She was advised to also document any triggers    2. Blurry vision  - Ambulatory Referral to Ophthalmology  -  Sedimentation rate; Future  - C-reactive protein; Future              Patient was advised to notify me for worsening symptoms. I will see patient back in 6 weeks or sooner if necessary.     Thank you for this consultation.    Anai Mckeon DO  [facility]  [address] [facility] [address]  [place] [address]  [phone]

## 2019-06-20 ENCOUNTER — TELEPHONE (OUTPATIENT)
Dept: HEMATOLOGY/ONCOLOGY | Facility: CLINIC | Age: 54
End: 2019-06-20

## 2019-06-20 NOTE — PROGRESS NOTES
Routine Office Visit    Patient Name: Mary Naranjo    : 1965  MRN: [id number]    Subjective:  Mary is a 54 y.o. female who presents today for:   Chief Complaint   Patient presents with    Results     anemia     54-year-old female comes in for evaluation of a anemia after being recalled with the abnormal lab.  The patient has been anemic in the past and used to be on iron.  She has not been on iron in a while.  She no longer has a menstrual period.  She denies any blood in the urine and denies any blood in her stools. he denies dark stools.  She had a colonoscopy approximately four years ago and had a polyp.  She was advised to repeat it in five years.  She states that she is unaware of any family history of blood disorders.  She does report bladder cancer, colon cancer, lung cancer, prostate cancer on her dad's side.  She reports breast cancer with her sister.    Since her last visit she has seen Neurology who has diagnosed her with migraines, but since her headaches are not often a she was advised that she did not need prophylaxis medication.  She was advised to just use ibuprofen when she gets the headaches.    Past Medical History  Past Medical History:   Diagnosis Date    Arthritis     Dental bridge present     upper removable partial    Fibrocystic breast     Migraines     Obesity        Past Surgical History  Past Surgical History:   Procedure Laterality Date    ARTHROSCOPY-KNEE W/MM Right 2015    Per[facility] at [facility] OR    BREAST BIOPSY      BREAST CYST ASPIRATION      COLONOSCOPY N/A 10/7/2015    Performed by Eagle Stack MD at [facility] ENDO    DILATION AND CURETTAGE OF UTERUS      fibrocystic breast       [id number] N/A 2/15/2018    Performed by Sheila Corado MD at [facility] OR    KNEE ARTHROSCOPY W/ MENISCAL REPAIR      TUBAL LIGATION          Family History  Family History   Problem Relation Age of Onset    Heart disease Mother     Colon  cancer Father 60    Bladder Cancer Father     Lung cancer Father     Prostate cancer Father     Breast cancer Sister 42    COPD Neg Hx        Social History  Social History     Socioeconomic History    Marital status:      Spouse name: Not on file    Number of children: Not on file    Years of education: Not on file    Highest education level: Not on file   Occupational History    Not on file   Social Needs    Financial resource strain: Not on file    Food insecurity:     Worry: Not on file     Inability: Not on file    Transportation needs:     Medical: Not on file     Non-medical: Not on file   Tobacco Use    Smoking status: Never Smoker    Smokeless tobacco: Never Used   Substance and Sexual Activity    Alcohol use: No    Drug use: No    Sexual activity: Not Currently   Lifestyle    Physical activity:     Days per week: Not on file     Minutes per session: Not on file    Stress: Not on file   Relationships    Social connections:     Talks on phone: Not on file     Gets together: Not on file     Attends Latter day service: Not on file     Active member of club or organization: Not on file     Attends meetings of clubs or organizations: Not on file     Relationship status: Not on file   Other Topics Concern    Not on file   Social History Narrative    Not on file       Current Medications  Current Outpatient Medications on File Prior to Visit   Medication Sig Dispense Refill    azelastine (ASTELIN) 137 mcg (0.1 %) nasal spray 2 sprays (274 mcg total) by Nasal route 2 (two) times daily as needed for Rhinitis. Donot snort (because it tastes bad) 30 mL 11    fish oil-omega-3 fatty acids 300-1,000 mg capsule Take 2 g by mouth once daily.      ibuprofen (ADVIL,MOTRIN) 800 MG tablet Take 1 tablet (800 mg total) by mouth 3 (three) times daily as needed for Pain. With a meal 30 tablet 0    MULTIVITS,CA,MINERALS/IRON/FA (ONE-A-DAY WOMENS FORMULA ORAL) Take by mouth.       No current  "facility-administered medications on file prior to visit.        Allergies   Review of patient's allergies indicates:  No Known Allergies    Review of Systems   Constitutional: Negative for chills and fever.   Eyes: Negative for pain, discharge, redness and visual disturbance.   Respiratory: Negative for shortness of breath and wheezing.    Cardiovascular: Negative for chest pain and palpitations.   Neurological: Negative for dizziness, tremors, syncope, speech difficulty, weakness and light-headedness.       /76 (BP Location: Left arm, Patient Position: Sitting, BP Method: Medium (Manual))   Pulse 66   Temp 98.3 °F (36.8 °C) (Oral)   Resp 17   Ht 5' 4" (1.626 m)   Wt 106.2 kg (234 lb 2.1 oz)   LMP 01/15/2018   SpO2 97%   BMI 40.19 kg/m²     Physical Exam   Constitutional: She is oriented to person, place, and time. She appears well-developed and well-nourished.   HENT:   Head: Normocephalic and atraumatic.   Right Ear: External ear normal.   Left Ear: External ear normal.   Nose: Nose normal.   Mouth/Throat: Oropharynx is clear and moist. No oropharyngeal exudate.   Eyes: Pupils are equal, round, and reactive to light. Conjunctivae and EOM are normal. Right eye exhibits no discharge. Left eye exhibits no discharge.   Neck: Normal range of motion. Neck supple. No tracheal deviation present.   Cardiovascular: Normal rate, regular rhythm, normal heart sounds and intact distal pulses.   No murmur heard.  Pulmonary/Chest: Effort normal and breath sounds normal. She has no wheezes. She has no rales.   Abdominal: Soft. Bowel sounds are normal. She exhibits no mass. There is no tenderness. There is no rigidity, no guarding and no CVA tenderness.   Lymphadenopathy:     She has no cervical adenopathy.   Neurological: She is oriented to person, place, and time. She has normal strength. She displays no atrophy. No sensory deficit. Gait normal.   Reflex Scores:       Patellar reflexes are 2+ on the right side and " 2+ on the left side.  Psychiatric: She has a normal mood and affect.   Vitals reviewed.        Assessment/Plan:  Mary was seen today for results.    Diagnoses and all orders for this visit:    Microcytic anemia  -     CBC auto differential; Future  -     Iron and TIBC; Future  -     Ferritin; Future  -     Reticulocytes; Future  -     Protein electrophoresis, serum; Future  -     TRANSFERRIN; Future  -     Ambulatory referral to Hematology / Oncology  Will do further anemia labs, and then have patient follow up with Hematology.  I also discussed with patient possible need for colonoscopy however she would prefer to wait until she sees Hematology to see if she will need this.    Migraine without aura and without status migrainosus, not intractable  Neurology's notes were reviewed.  Patient to use ibuprofen when needed.              -Ramon Olivares Jr., MD, AAHIVS          This office note has been dictated.  This dictation has been generated using M-Modal Fluency Direct dictation; some phonetic errors may occur.

## 2019-06-21 ENCOUNTER — TELEPHONE (OUTPATIENT)
Dept: FAMILY MEDICINE | Facility: CLINIC | Age: 54
End: 2019-06-21

## 2019-06-21 NOTE — TELEPHONE ENCOUNTER
Please let patient know that labs do show that anemia is slight better but still anemic. Should see hematology. They tried contacting her but could not reach er on 6/20. She should call back to make an appointment.

## 2019-07-16 ENCOUNTER — INITIAL CONSULT (OUTPATIENT)
Dept: HEMATOLOGY/ONCOLOGY | Facility: CLINIC | Age: 54
End: 2019-07-16
Payer: COMMERCIAL

## 2019-07-16 ENCOUNTER — LAB VISIT (OUTPATIENT)
Dept: LAB | Facility: HOSPITAL | Age: 54
End: 2019-07-16
Attending: INTERNAL MEDICINE
Payer: COMMERCIAL

## 2019-07-16 VITALS
BODY MASS INDEX: 39.37 KG/M2 | WEIGHT: 230.63 LBS | OXYGEN SATURATION: 97 % | HEART RATE: 62 BPM | HEIGHT: 64 IN | DIASTOLIC BLOOD PRESSURE: 60 MMHG | TEMPERATURE: 98 F | SYSTOLIC BLOOD PRESSURE: 125 MMHG

## 2019-07-16 DIAGNOSIS — D64.9 ANEMIA, UNSPECIFIED TYPE: Primary | ICD-10-CM

## 2019-07-16 DIAGNOSIS — D64.9 ANEMIA, UNSPECIFIED TYPE: ICD-10-CM

## 2019-07-16 PROCEDURE — 99999 PR PBB SHADOW E&M-EST. PATIENT-LVL III: ICD-10-PCS | Mod: PBBFAC,,, | Performed by: INTERNAL MEDICINE

## 2019-07-16 PROCEDURE — 99204 PR OFFICE/OUTPT VISIT, NEW, LEVL IV, 45-59 MIN: ICD-10-PCS | Mod: S$GLB,,, | Performed by: INTERNAL MEDICINE

## 2019-07-16 PROCEDURE — 99204 OFFICE O/P NEW MOD 45 MIN: CPT | Mod: S$GLB,,, | Performed by: INTERNAL MEDICINE

## 2019-07-16 PROCEDURE — 99999 PR PBB SHADOW E&M-EST. PATIENT-LVL III: CPT | Mod: PBBFAC,,, | Performed by: INTERNAL MEDICINE

## 2019-07-16 PROCEDURE — 36415 COLL VENOUS BLD VENIPUNCTURE: CPT

## 2019-07-16 PROCEDURE — 83020 HEMOGLOBIN ELECTROPHORESIS: CPT

## 2019-07-16 PROCEDURE — 3008F BODY MASS INDEX DOCD: CPT | Mod: CPTII,S$GLB,, | Performed by: INTERNAL MEDICINE

## 2019-07-16 PROCEDURE — 3008F PR BODY MASS INDEX (BMI) DOCUMENTED: ICD-10-PCS | Mod: CPTII,S$GLB,, | Performed by: INTERNAL MEDICINE

## 2019-07-16 NOTE — LETTER
July 16, 2019      Ramon Olivares Jr., MD  [address]  [facility] [address]           [facility]  [address] [facility] [address]  [facility] [address]  Phone: [phone]          Patient: Mary Naranjo   MR Number: [id number]   YOB: 1965   Date of Visit: 7/16/2019       Dear Dr. Ramon Olivares Jr.:    Thank you for referring Mary Naranjo to me for evaluation. Attached you will find relevant portions of my assessment and plan of care.    If you have questions, please do not hesitate to call me. I look forward to following Mary Naranjo along with you.    Sincerely,    Harriet Vazquez MD    Enclosure  CC:  No Recipients    If you would like to receive this communication electronically, please contact [email or web address] or [phone] to request more information on [facility] [employer] access.    For providers and/or their staff who would like to refer a patient to [facility], please contact us through our one-stop-shop provider referral line, [facility], at [phone].    If you feel you have received this communication in error or would no longer like to receive these types of communications, please e-mail [email or web address]

## 2019-07-16 NOTE — PROGRESS NOTES
Subjective:       Patient ID: Mary Naranjo is a 54 y.o. female.    Chief Complaint: Consult  REASON FOR CONSULT: Microcytic anemia  REFERRING PHYSICIAN: Nkechi LarsenHenna Naranjo is a  54-year-old female comes in for evaluation of a anemia. She has been anemic in the past, her labs at least from 2008 reveal her baseline hemoglobin between 10-11 and microcytic. She currently has no menstruation. Iron studies are normal. She notes fatigue but denies any other complaints especially blood in stool. Colonoscopy in 2015 revealed One 4 mm polyp in the sigmoid colon. Resected and  Retrieved.        Review of Systems   Constitutional: Positive for fatigue. Negative for appetite change and unexpected weight change.   HENT: Negative for mouth sores.    Eyes: Negative for visual disturbance.   Respiratory: Negative for cough and shortness of breath.    Cardiovascular: Negative for chest pain.   Gastrointestinal: Negative for abdominal pain and diarrhea.   Genitourinary: Negative for frequency.   Musculoskeletal: Negative for back pain.   Skin: Negative for rash.   Neurological: Negative for headaches.   Hematological: Negative for adenopathy.   Psychiatric/Behavioral: The patient is not nervous/anxious.    All other systems reviewed and are negative.      Objective:      Physical Exam   Constitutional: She is oriented to person, place, and time. She appears well-developed and well-nourished.   HENT:   Mouth/Throat: No oropharyngeal exudate.   Cardiovascular: Normal rate and normal heart sounds.   Pulmonary/Chest: Effort normal and breath sounds normal. She has no wheezes.   Abdominal: Soft. Bowel sounds are normal. There is no tenderness.   Musculoskeletal: She exhibits no edema or tenderness.   Lymphadenopathy:     She has no cervical adenopathy.   Neurological: She is alert and oriented to person, place, and time. Coordination normal.   Skin: Skin is warm and dry. No rash noted.   Psychiatric: She has a normal mood and  affect. Judgment and thought content normal.   Vitals reviewed.      LABS:  WBC   Date Value Ref Range Status   06/18/2019 5.73 3.90 - 12.70 K/uL Final     Hemoglobin   Date Value Ref Range Status   06/18/2019 11.4 (L) 12.0 - 16.0 g/dL Final     Hematocrit   Date Value Ref Range Status   06/18/2019 36.5 (L) 37.0 - 48.5 % Final     Platelets   Date Value Ref Range Status   06/18/2019 305 150 - 350 K/uL Final     Gran # (ANC)   Date Value Ref Range Status   06/18/2019 2.4 1.8 - 7.7 K/uL Final     Gran%   Date Value Ref Range Status   06/18/2019 41.3 38.0 - 73.0 % Final       Chemistry        Component Value Date/Time     05/10/2019 1450    K 4.7 05/10/2019 1450     05/10/2019 1450    CO2 23 05/10/2019 1450    BUN 13 05/10/2019 1450    CREATININE 0.7 05/10/2019 1450    GLU 83 05/10/2019 1450        Component Value Date/Time    CALCIUM 9.9 05/10/2019 1450    ALKPHOS 101 05/10/2019 1450    AST 24 05/10/2019 1450    ALT 24 05/10/2019 1450    BILITOT 0.4 05/10/2019 1450    ESTGFRAFRICA >60.0 05/10/2019 1450    EGFRNONAA >60.0 05/10/2019 1450        Iron   Date Value Ref Range Status   06/18/2019 47 30 - 160 ug/dL Final     Ferritin   Date Value Ref Range Status   06/18/2019 108 20.0 - 300.0 ng/mL Final        Assessment:       1. Anemia, unspecified type        Plan:        1. Her anemia is chronic and is at baseline. WBC, platelets and iron studies are normal. Recommended an EGD but will send for hemoglobin electrophoresis to evaluate for Thalassemia.   No further work-up is necessary. Will call with results     Above care plan was discussed with patient and all questions were addressed to her satisfaction

## 2019-07-17 LAB
HGB A2 MFR BLD HPLC: 2.5 % (ref 2.2–3.2)
HGB FRACT BLD ELPH-IMP: NORMAL
HGB FRACT BLD ELPH-IMP: NORMAL

## 2019-07-19 ENCOUNTER — HOSPITAL ENCOUNTER (OUTPATIENT)
Facility: HOSPITAL | Age: 54
Discharge: HOME | End: 2019-07-19
Attending: FAMILY MEDICINE | Admitting: INTERNAL MEDICINE
Payer: COMMERCIAL

## 2019-07-19 ENCOUNTER — ANESTHESIA EVENT (OUTPATIENT)
Dept: ENDOSCOPY | Facility: HOSPITAL | Age: 54
End: 2019-07-19
Payer: COMMERCIAL

## 2019-07-19 ENCOUNTER — ANESTHESIA (OUTPATIENT)
Dept: ENDOSCOPY | Facility: HOSPITAL | Age: 54
End: 2019-07-19
Payer: COMMERCIAL

## 2019-07-19 VITALS
WEIGHT: 230 LBS | TEMPERATURE: 98 F | OXYGEN SATURATION: 100 % | RESPIRATION RATE: 18 BRPM | HEIGHT: 64 IN | DIASTOLIC BLOOD PRESSURE: 76 MMHG | HEART RATE: 61 BPM | BODY MASS INDEX: 39.27 KG/M2 | SYSTOLIC BLOOD PRESSURE: 135 MMHG

## 2019-07-19 DIAGNOSIS — D50.9 MICROCYTIC ANEMIA: Primary | ICD-10-CM

## 2019-07-19 DIAGNOSIS — Z12.11 COLON CANCER SCREENING: ICD-10-CM

## 2019-07-19 PROCEDURE — D9220A PRA ANESTHESIA: Mod: ANES,,, | Performed by: ANESTHESIOLOGY

## 2019-07-19 PROCEDURE — 88305 TISSUE EXAM BY PATHOLOGIST: CPT | Performed by: PATHOLOGY

## 2019-07-19 PROCEDURE — 25000003 PHARM REV CODE 250: Performed by: ANESTHESIOLOGY

## 2019-07-19 PROCEDURE — 43239 EGD BIOPSY SINGLE/MULTIPLE: CPT | Mod: ,,, | Performed by: INTERNAL MEDICINE

## 2019-07-19 PROCEDURE — D9220A PRA ANESTHESIA: Mod: CRNA,,, | Performed by: REGISTERED NURSE

## 2019-07-19 PROCEDURE — 63600175 PHARM REV CODE 636 W HCPCS: Performed by: REGISTERED NURSE

## 2019-07-19 PROCEDURE — 27201012 HC FORCEPS, HOT/COLD, DISP: Performed by: INTERNAL MEDICINE

## 2019-07-19 PROCEDURE — D9220A PRA ANESTHESIA: ICD-10-PCS | Mod: CRNA,,, | Performed by: REGISTERED NURSE

## 2019-07-19 PROCEDURE — 43239 PR EGD, FLEX, W/BIOPSY, SGL/MULTI: ICD-10-PCS | Mod: ,,, | Performed by: INTERNAL MEDICINE

## 2019-07-19 PROCEDURE — 88305 TISSUE SPECIMEN TO PATHOLOGY - SURGERY: ICD-10-PCS | Mod: 26,,, | Performed by: PATHOLOGY

## 2019-07-19 PROCEDURE — D9220A PRA ANESTHESIA: ICD-10-PCS | Mod: ANES,,, | Performed by: ANESTHESIOLOGY

## 2019-07-19 PROCEDURE — 37000008 HC ANESTHESIA 1ST 15 MINUTES: Performed by: INTERNAL MEDICINE

## 2019-07-19 PROCEDURE — 43239 EGD BIOPSY SINGLE/MULTIPLE: CPT | Performed by: INTERNAL MEDICINE

## 2019-07-19 PROCEDURE — 88305 TISSUE EXAM BY PATHOLOGIST: CPT | Mod: 26,,, | Performed by: PATHOLOGY

## 2019-07-19 RX ORDER — PROPOFOL 10 MG/ML
VIAL (ML) INTRAVENOUS
Status: DISCONTINUED | OUTPATIENT
Start: 2019-07-19 | End: 2019-07-19

## 2019-07-19 RX ORDER — PROPOFOL 10 MG/ML
VIAL (ML) INTRAVENOUS
Status: COMPLETED
Start: 2019-07-19 | End: 2019-07-19

## 2019-07-19 RX ORDER — SODIUM CHLORIDE 9 MG/ML
INJECTION, SOLUTION INTRAVENOUS CONTINUOUS
Status: DISCONTINUED | OUTPATIENT
Start: 2019-07-19 | End: 2019-07-19 | Stop reason: HOSPADM

## 2019-07-19 RX ORDER — LIDOCAINE HYDROCHLORIDE 10 MG/ML
1 INJECTION, SOLUTION EPIDURAL; INFILTRATION; INTRACAUDAL; PERINEURAL ONCE
Status: DISCONTINUED | OUTPATIENT
Start: 2019-07-19 | End: 2019-07-19 | Stop reason: HOSPADM

## 2019-07-19 RX ORDER — LIDOCAINE HYDROCHLORIDE 20 MG/ML
INJECTION, SOLUTION EPIDURAL; INFILTRATION; INTRACAUDAL; PERINEURAL
Status: DISCONTINUED
Start: 2019-07-19 | End: 2019-07-19 | Stop reason: HOSPADM

## 2019-07-19 RX ORDER — PANTOPRAZOLE SODIUM 40 MG/1
40 TABLET, DELAYED RELEASE ORAL 2 TIMES DAILY
Qty: 60 TABLET | Refills: 0 | Status: SHIPPED | OUTPATIENT
Start: 2019-07-19 | End: 2019-08-15 | Stop reason: SDUPTHER

## 2019-07-19 RX ORDER — LIDOCAINE HCL/PF 100 MG/5ML
SYRINGE (ML) INTRAVENOUS
Status: DISCONTINUED | OUTPATIENT
Start: 2019-07-19 | End: 2019-07-19

## 2019-07-19 RX ADMIN — PROPOFOL 70 MG: 10 INJECTION, EMULSION INTRAVENOUS at 11:07

## 2019-07-19 RX ADMIN — PROPOFOL 20 MG: 10 INJECTION, EMULSION INTRAVENOUS at 11:07

## 2019-07-19 RX ADMIN — SODIUM CHLORIDE: 0.9 INJECTION, SOLUTION INTRAVENOUS at 10:07

## 2019-07-19 RX ADMIN — LIDOCAINE HYDROCHLORIDE 100 MG: 20 INJECTION, SOLUTION INTRAVENOUS at 11:07

## 2019-07-19 NOTE — DISCHARGE INSTRUCTIONS
Gastritis (Adult)    Gastritis is inflammation and irritation of the stomach lining. It can be present for a short time (acute) or be long lasting (chronic). Gastritis is often caused by infection with bacteria called H pylori. More than a third of people in the [place] have this bacteria in their bodies. In many cases, H pylori causes no problems or symptoms. In some people, though, the infection irritates the stomach lining and causes gastritis. Other causes of stomach irritation include drinking alcohol or taking pain-relieving medicines called NSAIDs (such as aspirin or ibuprofen).   Symptoms of gastritis can include:  · Abdominal pain or bloating  · Loss of appetite  · Nausea or vomiting  · Vomiting blood or having black stools  · Feeling more tired than usual  An inflamed and irritated stomach lining is more likely to develop a sore called an ulcer. To help prevent this, gastritis should be treated.  Home care  If needed, medicines may be prescribed. If you have H pylori infection, treating it will likely relieve your symptoms. Other changes can help reduce stomach irritation and help it heal.  · If you have been prescribed medicines for H pylori infection, take them as directed. Take all of the medicine until it is finished or your healthcare provider tells you to stop, even if you feel better.  · Your healthcare provider may recommend avoiding NSAIDs. If you take daily aspirin for your heart or other medical reasons, do not stop without talking to your healthcare provider first.  · Avoid drinking alcohol.  · Stop smoking. Smoking can irritate the stomach and delay healing. As much as possible, stay away from second hand smoke.  Follow-up care  Follow up with your healthcare provider, or as advised by our staff. Testing may be needed to check for inflammation or an ulcer.  When to seek medical advice  Call your healthcare provider for any of the following:  · Stomach pain that gets worse or moves to the lower  right abdomen (appendix area)  · Chest pain that appears or gets worse, or spreads to the back, neck, shoulder, or arm  · Frequent vomiting (cant keep down liquids)  · Blood in the stool or vomit (red or black in color)  · Feeling weak or dizzy  · Fever of 100.4ºF (38ºC) or higher, or as directed by your healthcare provider  Date Last Reviewed: 6/22/2015  © 3654-5905 [employer].. [address] [place] [address]. All rights reserved. This information is not intended as a substitute for professional medical care. Always follow your healthcare professional's instructions.

## 2019-07-19 NOTE — OR NURSING
MD at bedside with patient and family. Results and follow-up discussed, patient and family verbalized understanding. No acute distress voiced or observed.

## 2019-07-19 NOTE — H&P
Endoscopy Pre-Procedure H&P    Reason for Procedure: anemia    HPI:  Pt is a 54 y.o. female who presents for EGD to evaluate microcytic anemia.  No GI symptoms.  Last colonoscopy in 2015 with 1 polyp (HP).      Past Medical History:   Diagnosis Date    Arthritis     Dental bridge present     upper removable partial    Fibrocystic breast     Migraines     Obesity        Past Surgical History:   Procedure Laterality Date    ARTHROSCOPY-KNEE W/MM Right 6/23/2015    [facility]    BREAST BIOPSY      BREAST CYST ASPIRATION      COLONOSCOPY N/A 10/7/2015    [facility] by Eagle Stack MD [facility] ENDO    DILATION AND CURETTAGE OF UTERUS      fibrocystic breast       [id number] N/A 2/15/2018    [facility]ila [facility]ado [facility] at [facility] OR    KNEE ARTHROSCOPY W/ MENISCAL REPAIR      TUBAL LIGATION         Family History   Problem Relation Age of Onset    Heart disease Mother     Colon cancer Father 60    Bladder Cancer Father     Lung cancer Father     Prostate cancer Father     Breast cancer Sister 42    COPD Neg Hx        Social History     Socioeconomic History    Marital status:      Spouse name: Not on file    Number of children: Not on file    Years of education: Not on file    Highest education level: Not on file   Occupational History    Not on file   Social Needs    Financial resource strain: Not on file    Food insecurity:     Worry: Not on file     Inability: Not on file    Transportation needs:     Medical: Not on file     Non-medical: Not on file   Tobacco Use    Smoking status: Never Smoker    Smokeless tobacco: Never Used   Substance and Sexual Activity    Alcohol use: No    Drug use: No    Sexual activity: Not Currently   Lifestyle    Physical activity:     Days per week: Not on file     Minutes per session: Not on file    Stress: Not on file   Relationships    Social connections:     Talks on phone: Not on file      "Gets together: Not on file     Attends Restoration service: Not on file     Active member of club or organization: Not on file     Attends meetings of clubs or organizations: Not on file     Relationship status: Not on file   Other Topics Concern    Not on file   Social History Narrative    Not on file       Review of patient's allergies indicates:  No Known Allergies    No current facility-administered medications on file prior to encounter.      Current Outpatient Medications on File Prior to Encounter   Medication Sig Dispense Refill    azelastine (ASTELIN) 137 mcg (0.1 %) nasal spray 2 sprays (274 mcg total) by Nasal route 2 (two) times daily as needed for Rhinitis. Donot snort (because it tastes bad) 30 mL 11    fish oil-omega-3 fatty acids 300-1,000 mg capsule Take 2 g by mouth once daily.      ibuprofen (ADVIL,MOTRIN) 800 MG tablet Take 1 tablet (800 mg total) by mouth 3 (three) times daily as needed for Pain. With a meal 30 tablet 0    MULTIVITS,CA,MINERALS/IRON/FA (ONE-A-DAY WOMENS FORMULA ORAL) Take by mouth.         ROS: Negative x 10    Patient Vitals for the past 24 hrs:   BP Temp Temp src Pulse Resp SpO2 Height Weight   07/19/19 0945 (!) 135/59 97.9 °F (36.6 °C) Oral 62 20 98 % 5' 4.3" (1.633 m) 104.3 kg (230 lb)     Gen: Well developed, well nourished, no apparent distress  HEENT: Anicteric, PERRL, MMM  CV: Regular rate and rhythm, no murmurs   Lungs: CTAB, no increased work of breathing  Abd: Soft, NT, ND, normal BS, no HSM  Ext: No C/C/E  Neuro: Alert and oriented to person, place, time; no weakness  Skin: No rashes/lesions  Psych: Normal mood and affect    Assessment:  Mary Naranjo is a 54 y.o. female who presents for EGD to evaluate microcytic anemia.  Last colonoscopy was in 2015 with 1 polyp.  No overt bleeding.    Recommendations:  1. EGD  2. F/U with PCP     Cherrie Sheets MD    "

## 2019-07-19 NOTE — ANESTHESIA PREPROCEDURE EVALUATION
07/19/2019    Pre-operative evaluation for Procedure(s) (LRB):  ESOPHAGOGASTRODUODENOSCOPY (EGD) (N/A)    Mary Naranjo is a 54 y.o. female     Patient Active Problem List   Diagnosis    Prediabetes    Obesity (BMI 30-39.9)    Low back pain    Muscle weakness    Stiffness of vertebral column    Right knee pain    Tear of medial cartilage or meniscus of knee, current    Acute intractable headache    PMB (postmenopausal bleeding)       Review of patient's allergies indicates:  No Known Allergies    No current facility-administered medications on file prior to encounter.      Current Outpatient Medications on File Prior to Encounter   Medication Sig Dispense Refill    azelastine (ASTELIN) 137 mcg (0.1 %) nasal spray 2 sprays (274 mcg total) by Nasal route 2 (two) times daily as needed for Rhinitis. Donot snort (because it tastes bad) 30 mL 11    fish oil-omega-3 fatty acids 300-1,000 mg capsule Take 2 g by mouth once daily.      ibuprofen (ADVIL,MOTRIN) 800 MG tablet Take 1 tablet (800 mg total) by mouth 3 (three) times daily as needed for Pain. With a meal 30 tablet 0    MULTIVITS,CA,MINERALS/IRON/FA (ONE-A-DAY WOMENS FORMULA ORAL) Take by mouth.             Anesthesia Evaluation    I have reviewed the Patient Summary Reports.    I have reviewed the Nursing Notes.   I have reviewed the Medications.     Review of Systems  Anesthesia Hx:  No problems with previous Anesthesia Denies Hx of Anesthetic complications  History of prior surgery of interest to airway management or planning:  Denies Personal Hx of Anesthesia complications.   Social:  Non-Smoker, No Alcohol Use    Hematology/Oncology:     Oncology Normal    -- Anemia:   EENT/Dental:EENT/Dental Normal   Cardiovascular:  Cardiovascular Normal Exercise tolerance: good  ECG has been reviewed.    Pulmonary:  Pulmonary Normal     Renal/:  Renal/ Normal     Hepatic/GI:  Hepatic/GI Normal    Musculoskeletal:  Musculoskeletal Normal    OB/GYN/PEDS:  Postmenopausal bleeding   Neurological:   Headaches    Endocrine:  Metabolic Disorders, Obesity / BMI > 30  Psych:  Psychiatric Normal           Physical Exam  General:  Obesity    Airway/Jaw/Neck:  Airway Findings: Mouth Opening: Normal Tongue: Normal  General Airway Assessment: Adult  Oropharynx Findings: Normal Mallampati: II  TM Distance: Normal, at least 6 cm  Jaw/Neck Findings:  Neck ROM: Normal ROM  Neck Findings: Normal    Eyes/Ears/Nose:  EYES/EARS/NOSE FINDINGS: Normal   Dental:  Dental Findings: In tact, upper partial dentures   Chest/Lungs:  Chest/Lungs Findings: Normal Respiratory Rate     Heart/Vascular:  Heart Findings: Rate: Normal        Mental Status:  Mental Status Findings:  Cooperative, Alert and Oriented         Anesthesia Plan  Type of Anesthesia, risks & benefits discussed:  Anesthesia Type:  general  Patient's Preference:   Intra-op Monitoring Plan: standard ASA monitors  Intra-op Monitoring Plan Comments:   Post Op Pain Control Plan: per primary service following discharge from PACU, IV/PO Opioids PRN and multimodal analgesia  Post Op Pain Control Plan Comments:   Induction:   IV  Beta Blocker:  Patient is not currently on a Beta-Blocker (No further documentation required).       Informed Consent: Patient understands risks and agrees with Anesthesia plan.  Questions answered. Anesthesia consent signed with patient.  ASA Score: 2     Day of Surgery Review of History & Physical: I have interviewed and examined the patient. I have reviewed the patient's H&P dated:  There are no significant changes.  H&P update referred to the surgeon.         Ready For Surgery From Anesthesia Perspective.

## 2019-07-19 NOTE — [employer] PATIENT INSTRUCTIONS
Discharge Summary/Instructions after an Endoscopic Procedure  Patient Name: Mary Naranjo  Patient MRN: [id number]  Patient YOB: 1965 Friday, July 19, 2019  Cherrie Sheets MD  RESTRICTIONS:  During your procedure today, you received medications for sedation.  These   medications may affect your judgment, balance and coordination.  Therefore,   for 24 hours, you have the following restrictions:   - DO NOT drive a car, operate machinery, make legal/financial decisions,   sign important papers or drink alcohol.    ACTIVITY:  Today: no heavy lifting, straining or running due to procedural   sedation/anesthesia.  The following day: return to full activity including work.  DIET:  Eat and drink normally unless instructed otherwise.     TREATMENT FOR COMMON SIDE EFFECTS:  - Mild abdominal pain, nausea, belching, bloating or excessive gas:  rest,   eat lightly and use a heating pad.  - Sore Throat: treat with throat lozenges and/or gargle with warm salt   water.  - Because air was used during the procedure, expelling large amounts of air   from your rectum or belching is normal.  - If a bowel prep was taken, you may not have a bowel movement for 1-3 days.    This is normal.  SYMPTOMS TO WATCH FOR AND REPORT TO YOUR PHYSICIAN:  1. Abdominal pain or bloating, other than gas cramps.  2. Chest pain.  3. Back pain.  4. Signs of infection such as: chills or fever occurring within 24 hours   after the procedure.  5. Rectal bleeding, which would show as bright red, maroon, or black stools.   (A tablespoon of blood from the rectum is not serious, especially if   hemorrhoids are present.)  6. Vomiting.  7. Weakness or dizziness.  GO DIRECTLY TO THE NEAREST EMERGENCY ROOM IF YOU HAVE ANY OF THE FOLLOWING:      Difficulty breathing              Chills and/or fever over 101 F   Persistent vomiting and/or vomiting blood   Severe abdominal pain   Severe chest pain   Black, tarry stools   Bleeding- more than one tablespoon   Any  other symptom or condition that you feel may need urgent attention  Your doctor recommends these additional instructions:  If any biopsies were taken, your doctors clinic will contact you in 1 to 2   weeks with any results.  - Patient has a contact number available for emergencies.  The signs and   symptoms of potential delayed complications were discussed with the   patient.  Return to normal activities tomorrow.  Written discharge   instructions were provided to the patient.   - Discharge patient to home.   - Resume previous diet today.   - Continue present medications.   - No ibuprofen, naproxen, or other non-steroidal anti-inflammatory drugs.   - Await pathology results. Treat for H. pylori if positive.  - Use Protonix (pantoprazole) 40 mg PO BID for 1 month.   - Return to primary care physician in 1 month.   - The findings and recommendations were discussed with the patient and their   family.  For questions, problems or results please call your physician - Cherrie Sheets MD at Work:  [phone].  [facility] can be reached at [phone]     IF A COMPLICATION OR EMERGENCY SITUATION ARISES AND YOU ARE UNABLE TO REACH   YOUR PHYSICIAN - GO DIRECTLY TO THE EMERGENCY ROOM.  Cherrie Sheets MD  7/19/2019 11:17:34 AM  This report has been verified and signed electronically.  [employer]

## 2019-07-19 NOTE — ANESTHESIA POSTPROCEDURE EVALUATION
Anesthesia Post Evaluation    Patient: Mary Naranjo    Procedure(s) Performed: Procedure(s) (LRB):  ESOPHAGOGASTRODUODENOSCOPY (EGD) (N/A)    Final Anesthesia Type: general  Patient location during evaluation: GI PACU  Patient participation: Yes- Able to Participate  Level of consciousness: awake and alert and oriented  Post-procedure vital signs: reviewed and stable  Pain management: adequate  Airway patency: patent  PONV status at discharge: No PONV  Anesthetic complications: no      Cardiovascular status: blood pressure returned to baseline and hemodynamically stable  Respiratory status: unassisted, spontaneous ventilation and room air  Hydration status: euvolemic  Follow-up not needed.          Vitals Value Taken Time   /76 7/19/2019 11:45 AM   Temp 36.7 °C (98.1 °F) 7/19/2019 11:15 AM   Pulse 61 7/19/2019 11:45 AM   Resp 18 7/19/2019 11:45 AM   SpO2 100 % 7/19/2019 11:45 AM         Event Time     Out of Recovery 11:48:39          Pain/Ming Score: Ming Score: 10 (7/19/2019 11:45 AM)

## 2019-07-19 NOTE — TRANSFER OF CARE
"Anesthesia Transfer of Care Note    Patient: Mary Naranjo    Procedure(s) Performed: Procedure(s) (LRB):  ESOPHAGOGASTRODUODENOSCOPY (EGD) (N/A)    Patient location: GI    Anesthesia Type: general    Transport from OR: Transported from OR on room air with adequate spontaneous ventilation    Post pain: adequate analgesia    Post assessment: no apparent anesthetic complications and tolerated procedure well    Post vital signs: stable    Level of consciousness: awake and alert    Nausea/Vomiting: no nausea/vomiting    Complications: none    Transfer of care protocol was followed      Last vitals:   Visit Vitals  BP (!) 140/65   Pulse 74   Temp 36.7 °C (98.1 °F) (Oral)   Resp 20   Ht 5' 4.3" (1.633 m)   Wt 104.3 kg (230 lb)   LMP 01/15/2018   SpO2 99%   Breastfeeding? No   BMI 39.11 kg/m²     "

## 2019-07-22 ENCOUNTER — TELEPHONE (OUTPATIENT)
Dept: HEMATOLOGY/ONCOLOGY | Facility: CLINIC | Age: 54
End: 2019-07-22

## 2019-07-22 NOTE — TELEPHONE ENCOUNTER
----- Message from Harriet Vazquez MD sent at 7/22/2019  1:04 PM CDT -----  Please let her know that stomach biopsy is negative and her hemoglobin electrophoresis so she is good. That H/H is her baseline, does not need further follow-up  Please let her know.

## 2019-08-08 ENCOUNTER — TELEPHONE (OUTPATIENT)
Dept: FAMILY MEDICINE | Facility: CLINIC | Age: 54
End: 2019-08-08

## 2019-08-08 ENCOUNTER — OFFICE VISIT (OUTPATIENT)
Dept: FAMILY MEDICINE | Facility: CLINIC | Age: 54
End: 2019-08-08
Payer: COMMERCIAL

## 2019-08-08 VITALS
OXYGEN SATURATION: 97 % | HEIGHT: 64 IN | WEIGHT: 229.06 LBS | BODY MASS INDEX: 39.11 KG/M2 | TEMPERATURE: 98 F | HEART RATE: 72 BPM | RESPIRATION RATE: 16 BRPM | DIASTOLIC BLOOD PRESSURE: 82 MMHG | SYSTOLIC BLOOD PRESSURE: 130 MMHG

## 2019-08-08 DIAGNOSIS — J06.9 VIRAL URI WITH COUGH: Primary | ICD-10-CM

## 2019-08-08 DIAGNOSIS — Z12.39 SCREENING FOR BREAST CANCER: Primary | ICD-10-CM

## 2019-08-08 LAB
CTP QC/QA: YES
S PYO RRNA THROAT QL PROBE: NEGATIVE

## 2019-08-08 PROCEDURE — 99999 PR PBB SHADOW E&M-EST. PATIENT-LVL III: CPT | Mod: PBBFAC,,, | Performed by: NURSE PRACTITIONER

## 2019-08-08 PROCEDURE — 87880 STREP A ASSAY W/OPTIC: CPT | Mod: QW,S$GLB,, | Performed by: NURSE PRACTITIONER

## 2019-08-08 PROCEDURE — 99999 PR PBB SHADOW E&M-EST. PATIENT-LVL III: ICD-10-PCS | Mod: PBBFAC,,, | Performed by: NURSE PRACTITIONER

## 2019-08-08 PROCEDURE — 3008F PR BODY MASS INDEX (BMI) DOCUMENTED: ICD-10-PCS | Mod: CPTII,S$GLB,, | Performed by: NURSE PRACTITIONER

## 2019-08-08 PROCEDURE — 99214 PR OFFICE/OUTPT VISIT, EST, LEVL IV, 30-39 MIN: ICD-10-PCS | Mod: S$GLB,,, | Performed by: NURSE PRACTITIONER

## 2019-08-08 PROCEDURE — 3008F BODY MASS INDEX DOCD: CPT | Mod: CPTII,S$GLB,, | Performed by: NURSE PRACTITIONER

## 2019-08-08 PROCEDURE — 87880 POCT RAPID STREP A: ICD-10-PCS | Mod: QW,S$GLB,, | Performed by: NURSE PRACTITIONER

## 2019-08-08 PROCEDURE — 99214 OFFICE O/P EST MOD 30 MIN: CPT | Mod: S$GLB,,, | Performed by: NURSE PRACTITIONER

## 2019-08-08 RX ORDER — LEVOCETIRIZINE DIHYDROCHLORIDE 5 MG/1
5 TABLET, FILM COATED ORAL NIGHTLY
Qty: 30 TABLET | Refills: 0 | Status: SHIPPED | OUTPATIENT
Start: 2019-08-08 | End: 2019-09-04 | Stop reason: SDUPTHER

## 2019-08-08 RX ORDER — FLUTICASONE PROPIONATE 50 MCG
1 SPRAY, SUSPENSION (ML) NASAL DAILY
Qty: 15.8 ML | Refills: 0 | Status: SHIPPED | OUTPATIENT
Start: 2019-08-08 | End: 2020-08-19

## 2019-08-08 RX ORDER — BENZONATATE 100 MG/1
100 CAPSULE ORAL 3 TIMES DAILY PRN
Qty: 30 CAPSULE | Refills: 0 | Status: SHIPPED | OUTPATIENT
Start: 2019-08-08 | End: 2019-08-18

## 2019-08-08 NOTE — PROGRESS NOTES
"Routine Office Visit    Patient Name: Mary Naranjo    : 1965  MRN: [id number]    Chief Complaint:  URI    Subjective:  Mary is a 54 y.o. female who presents today for:    1.  URI - patient reports today for evaluation of 3 days of sore throat, chills, slight nasal congestion, slight runny nose, and productive cough.  All the symptoms started 3 days ago.  She states that yesterday she did cough up some white phlegm with some bloody streaks but she has not coughed up any blood since then.  She denies ever taking her temperature but she felt hot" and feels hot every night but not as severe as that 1st night.  Denies any chest pain, shortness of breath, wheezing, or palpitations.  Also endorses body aches.  Denies any specific sick contacts.  For the symptoms she has taken [place] cold and flu which is only provided mild relief at best.    Past Medical History  Past Medical History:   Diagnosis Date    Arthritis     Dental bridge present     upper removable partial    Fibrocystic breast     Migraines     Obesity        Past Surgical History  Past Surgical History:   Procedure Laterality Date    ARTHROSCOPY-KNEE W/MM Right 2015    [facility]    BREAST BIOPSY      BREAST CYST ASPIRATION      COLONOSCOPY N/A 10/7/2015    [facility] Eagle Stack [facility]O    DILATION AND CURETTAGE OF UTERUS      ESOPHAGOGASTRODUODENOSCOPY (EGD) N/A 2019    [facility]    fibrocystic breast       [id number] N/A 2/15/2018    [facility] OR    KNEE ARTHROSCOPY W/ MENISCAL REPAIR      TUBAL LIGATION         Family History  Family History   Problem Relation Age of Onset    Heart disease Mother     Colon cancer Father 60    Bladder Cancer Father     Lung cancer Father     Prostate cancer Father     Breast cancer Sister 42    COPD Neg Hx        Social History  Social History " I discussed the case with Dr. Chavez will decide as to what would be the best timing of this upcoming procedure. In my opinion there is no urgency for management of the stones emergently and I would be comfortable waiting 1-3 months if needed.       I saw the pt during the entire office visit, / consultation, interviewed and examined the pt and personally performed patient's clinical impressions and treatment plan while AUBREE Montilla  functioned as my scribe.          Socioeconomic History    Marital status:      Spouse name: Not on file    Number of children: Not on file    Years of education: Not on file    Highest education level: Not on file   Occupational History    Not on file   Social Needs    Financial resource strain: Not on file    Food insecurity:     Worry: Not on file     Inability: Not on file    Transportation needs:     Medical: Not on file     Non-medical: Not on file   Tobacco Use    Smoking status: Never Smoker    Smokeless tobacco: Never Used   Substance and Sexual Activity    Alcohol use: No    Drug use: No    Sexual activity: Not Currently   Lifestyle    Physical activity:     Days per week: Not on file     Minutes per session: Not on file    Stress: Not on file   Relationships    Social connections:     Talks on phone: Not on file     Gets together: Not on file     Attends Spiritism service: Not on file     Active member of club or organization: Not on file     Attends meetings of clubs or organizations: Not on file     Relationship status: Not on file   Other Topics Concern    Not on file   Social History Narrative    Not on file       Current Medications  Current Outpatient Medications on File Prior to Visit   Medication Sig Dispense Refill    fish oil-omega-3 fatty acids 300-1,000 mg capsule Take 2 g by mouth once daily.      MULTIVITS,CA,MINERALS/IRON/FA (ONE-A-DAY WOMENS FORMULA ORAL) Take by mouth.      pantoprazole (PROTONIX) 40 MG tablet Take 1 tablet (40 mg total) by mouth 2 (two) times daily. 60 tablet 0    azelastine (ASTELIN) 137 mcg (0.1 %) nasal spray 2 sprays (274 mcg total) by Nasal route 2 (two) times daily as needed for Rhinitis. Donot snort (because it tastes bad) 30 mL 11     No current facility-administered medications on file prior to visit.        Allergies   Review of patient's allergies indicates:  No Known Allergies    Review of Systems (Pertinent positives)  Review of Systems   Constitutional:  "Positive for chills and fever. Negative for diaphoresis, malaise/fatigue and weight loss.   HENT: Positive for congestion and sore throat. Negative for ear discharge, ear pain, hearing loss, nosebleeds, sinus pain and tinnitus.         Runny nose   Eyes: Negative.    Respiratory: Positive for cough and sputum production. Negative for hemoptysis, shortness of breath, wheezing and stridor.    Cardiovascular: Negative.  Negative for chest pain, palpitations, orthopnea, claudication, leg swelling and PND.   Gastrointestinal: Negative for abdominal pain, blood in stool, constipation, diarrhea, heartburn, melena, nausea and vomiting.   Genitourinary: Negative.    Musculoskeletal: Negative.    Neurological: Negative.    Endo/Heme/Allergies: Negative.    Psychiatric/Behavioral: Negative.        /82 (BP Location: Right arm, Patient Position: Sitting, BP Method: Small (Manual))   Pulse 72   Temp 98.3 °F (36.8 °C) (Oral)   Resp 16   Ht 5' 4.3" (1.633 m)   Wt 103.9 kg (229 lb 0.9 oz)   LMP 01/15/2018   SpO2 97%   BMI 38.95 kg/m²     Physical Exam   Constitutional: She is oriented to person, place, and time. She appears well-developed and well-nourished. No distress.   HENT:   Head: Normocephalic and atraumatic.   Right Ear: Tympanic membrane, external ear and ear canal normal.   Left Ear: Tympanic membrane, external ear and ear canal normal.   Nose: No mucosal edema or rhinorrhea. Right sinus exhibits no maxillary sinus tenderness and no frontal sinus tenderness. Left sinus exhibits no maxillary sinus tenderness and no frontal sinus tenderness.   Mouth/Throat: Uvula is midline and mucous membranes are normal. Posterior oropharyngeal erythema present. No oropharyngeal exudate, posterior oropharyngeal edema or tonsillar abscesses. Tonsils are 0 on the right. Tonsils are 0 on the left. No tonsillar exudate.       Eyes: Pupils are equal, round, and reactive to light. Conjunctivae and EOM are normal.   Neck: Normal " range of motion. Neck supple.   Cardiovascular: Normal rate, regular rhythm, normal heart sounds and intact distal pulses. Exam reveals no gallop and no friction rub.   No murmur heard.  Pulmonary/Chest: Effort normal and breath sounds normal. No stridor. No respiratory distress. She has no wheezes. She has no rales. She exhibits no tenderness.   Abdominal: Soft. Bowel sounds are normal. She exhibits no distension and no mass. There is no tenderness. There is no rebound and no guarding. No hernia.   Musculoskeletal: Normal range of motion.   Lymphadenopathy:     She has cervical adenopathy.   Neurological: She is alert and oriented to person, place, and time.   Skin: Skin is warm and dry. Capillary refill takes less than 2 seconds. She is not diaphoretic.        Assessment/Plan:  Mary Naranjo is a 54 y.o. female who presents today for :    Mary was seen today for sore throat and generalized body aches.    Diagnoses and all orders for this visit:    Viral URI with cough  -     fluticasone propionate (FLONASE) 50 mcg/actuation nasal spray; 1 spray (50 mcg total) by Each Nostril route once daily.  -     levocetirizine (XYZAL) 5 MG tablet; Take 1 tablet (5 mg total) by mouth every evening.  -     benzonatate (TESSALON) 100 MG capsule; Take 1 capsule (100 mg total) by mouth 3 (three) times daily as needed for Cough.  -     POCT Rapid Strep A     Rapid strep negative.  Likely viral cause given nasal congestion, runny nose, and cough.  Will treat symptomatically at this point.  Encouraged hydration, rest, and warm salt water gargles or tea with honey as needed for any sore throat.  Instructed patient to use Flonase and Xyzal daily for at least 2-3 weeks to provide the most relief.  I offered the patient a steroid shot, however she would like to avoid this at this time.  Follow-up to the clinic if no relief in signs or symptoms in the next week.  Patient verbalized understanding of instructions.        This office  note has been dictated.  This dictation has been generated using M-Modal Fluency Direct dictation; some phonetic errors may occur.   My collaborating physician is Dr. Bridger Mittal.

## 2019-08-08 NOTE — LETTER
August 8, 2019      [facility]  [address], [address]  [place] [address]  Phone: [phone]       Patient: Mary Naranjo   YOB: 1965  Date of Visit: 08/08/2019    To Whom It May Concern:    Sheila Naranjo  was at [facility] on 08/08/2019. She may return to work/school on 8/12/19 with no restrictions. If you have any questions or concerns, or if I can be of further assistance, please do not hesitate to contact me.    Sincerely,    Zane Santiago, NP

## 2019-08-17 RX ORDER — PANTOPRAZOLE SODIUM 40 MG/1
TABLET, DELAYED RELEASE ORAL
Qty: 60 TABLET | Refills: 0 | Status: SHIPPED | OUTPATIENT
Start: 2019-08-17 | End: 2020-08-19

## 2019-08-28 ENCOUNTER — HOSPITAL ENCOUNTER (OUTPATIENT)
Dept: RADIOLOGY | Facility: HOSPITAL | Age: 54
Discharge: HOME | End: 2019-08-28
Attending: FAMILY MEDICINE
Payer: COMMERCIAL

## 2019-08-28 ENCOUNTER — OFFICE VISIT (OUTPATIENT)
Dept: OBSTETRICS AND GYNECOLOGY | Facility: CLINIC | Age: 54
End: 2019-08-28
Payer: COMMERCIAL

## 2019-08-28 VITALS
DIASTOLIC BLOOD PRESSURE: 76 MMHG | SYSTOLIC BLOOD PRESSURE: 118 MMHG | BODY MASS INDEX: 39.18 KG/M2 | HEIGHT: 64 IN | WEIGHT: 229.5 LBS

## 2019-08-28 DIAGNOSIS — Z01.419 WELL WOMAN EXAM WITH ROUTINE GYNECOLOGICAL EXAM: Primary | ICD-10-CM

## 2019-08-28 DIAGNOSIS — Z12.39 SCREENING FOR BREAST CANCER: ICD-10-CM

## 2019-08-28 PROCEDURE — 99999 PR PBB SHADOW E&M-EST. PATIENT-LVL III: ICD-10-PCS | Mod: PBBFAC,,, | Performed by: OBSTETRICS & GYNECOLOGY

## 2019-08-28 PROCEDURE — 77063 BREAST TOMOSYNTHESIS BI: CPT | Mod: 26,,, | Performed by: RADIOLOGY

## 2019-08-28 PROCEDURE — 77067 MAMMO DIGITAL SCREENING BILAT WITH TOMOSYNTHESIS_CAD: ICD-10-PCS | Mod: 26,,, | Performed by: RADIOLOGY

## 2019-08-28 PROCEDURE — 99396 PR PREVENTIVE VISIT,EST,40-64: ICD-10-PCS | Mod: S$GLB,,, | Performed by: OBSTETRICS & GYNECOLOGY

## 2019-08-28 PROCEDURE — 77067 SCR MAMMO BI INCL CAD: CPT | Mod: 26,,, | Performed by: RADIOLOGY

## 2019-08-28 PROCEDURE — 77067 SCR MAMMO BI INCL CAD: CPT | Mod: TC

## 2019-08-28 PROCEDURE — 99396 PREV VISIT EST AGE 40-64: CPT | Mod: S$GLB,,, | Performed by: OBSTETRICS & GYNECOLOGY

## 2019-08-28 PROCEDURE — 99999 PR PBB SHADOW E&M-EST. PATIENT-LVL III: CPT | Mod: PBBFAC,,, | Performed by: OBSTETRICS & GYNECOLOGY

## 2019-08-28 PROCEDURE — 77063 MAMMO DIGITAL SCREENING BILAT WITH TOMOSYNTHESIS_CAD: ICD-10-PCS | Mod: 26,,, | Performed by: RADIOLOGY

## 2019-08-28 NOTE — PROGRESS NOTES
History & Physical  Gynecology      SUBJECTIVE:     Chief Complaint: Well Woman       History of Present Illness:  Annual Exam-Postmenopausal  Ms. Naranjo is a 53 y/o female who presents for annual exam. The patient has no complaints today. Patient has hysteroscopy D&C in 2018 for PMB. She reports that she has not had any vaginal bleeding since the procedure. The patient is not sexually active. GYN screening history: last pap: approximate date  and was normal and last mammogram: approximate date 2019 and was normal. The patient is not taking hormone replacement therapy. Patient denies post-menopausal vaginal bleeding. The patient wears seatbelts: yes. The patient participates in regular exercise: no. Has the patient ever been transfused or tattooed?: not asked. The patient reports that there is not domestic violence in her life.      Patient has an older sister who had Breast Cancer at age 35. She was not BRCA tested.     Review of patient's allergies indicates:  No Known Allergies    Past Medical History:   Diagnosis Date    Arthritis     Dental bridge present     upper removable partial    Fibrocystic breast     Migraines     Obesity      Past Surgical History:   Procedure Laterality Date    ARTHROSCOPY-KNEE W/MM Right 2015    [facility]    BREAST BIOPSY      BREAST CYST ASPIRATION      COLONOSCOPY N/A 10/7/2015    Performed by Eagle Stack [facility] at [facility] ENDO    DILATION AND CURETTAGE OF UTERUS      ESOPHAGOGASTRODUODENOSCOPY (EGD) N/A 2019    [facility]rie Sheets [facility] at [facility] ENDO    fibrocystic breast       [id number] N/A 2/15/2018    [facility] OR    KNEE ARTHROSCOPY W/ MENISCAL REPAIR      TUBAL LIGATION       OB History        2    Para   2    Term   2            AB        Living   2       SAB        TAB        Ectopic        Multiple        Live Births               Obstetric  Comments   Menopause @ age 45  MMG 6/2017 NEG  H/o abnormal pap, no treatment needed.  Pap neg 8/2017  c-scope in 2015             Family History   Problem Relation Age of Onset    Heart disease Mother     Cataracts Mother     Glaucoma Mother     Colon cancer Father 60    Bladder Cancer Father     Lung cancer Father     Prostate cancer Father     Breast cancer Sister 42    No Known Problems Brother     No Known Problems Maternal Aunt     No Known Problems Paternal Aunt     No Known Problems Paternal Uncle     No Known Problems Maternal Grandmother     No Known Problems Maternal Grandfather     No Known Problems Paternal Grandmother     No Known Problems Paternal Grandfather     COPD Neg Hx     Amblyopia Neg Hx     Blindness Neg Hx     Cancer Neg Hx     Diabetes Neg Hx     Hypertension Neg Hx     Macular degeneration Neg Hx     Retinal detachment Neg Hx     Strabismus Neg Hx     Stroke Neg Hx     Thyroid disease Neg Hx      Social History     Tobacco Use    Smoking status: Never Smoker    Smokeless tobacco: Never Used   Substance Use Topics    Alcohol use: No    Drug use: No       Current Outpatient Medications   Medication Sig    azelastine (ASTELIN) 137 mcg (0.1 %) nasal spray 2 sprays (274 mcg total) by Nasal route 2 (two) times daily as needed for Rhinitis. Donot snort (because it tastes bad)    fish oil-omega-3 fatty acids 300-1,000 mg capsule Take 2 g by mouth once daily.    fluticasone propionate (FLONASE) 50 mcg/actuation nasal spray 1 spray (50 mcg total) by Each Nostril route once daily.    MULTIVITS,CA,MINERALS/IRON/FA (ONE-A-DAY WOMENS FORMULA ORAL) Take by mouth.    levocetirizine (XYZAL) 5 MG tablet Take 1 tablet (5 mg total) by mouth every evening.    pantoprazole (PROTONIX) 40 MG tablet TAKE 1 TABLET BY MOUTH TWICE A DAY     No current facility-administered medications for this visit.          Review of Systems:  Review of Systems   Constitutional: Negative for  chills and fever.   Respiratory: Negative for cough and wheezing.    Cardiovascular: Negative for chest pain and palpitations.   Gastrointestinal: Negative for abdominal pain, blood in stool, nausea and vomiting.   Genitourinary: Negative for dysuria, frequency, hematuria, pelvic pain, vaginal bleeding, vaginal discharge and vaginal pain.   Neurological: Negative for headaches.        OBJECTIVE:     Physical Exam:  Physical Exam   Constitutional: She is oriented to person, place, and time. She appears well-developed and well-nourished.   Cardiovascular: Normal rate.   Pulmonary/Chest: Effort normal. No respiratory distress. No breast swelling, tenderness, discharge or bleeding. Breasts are symmetrical.   Abdominal: Soft. She exhibits no distension. There is no tenderness.   Genitourinary: No breast swelling, tenderness, discharge or bleeding.   Genitourinary Comments: Uterus 8-10w size, midline, mobile and non-tender. Small amount of vaginal d/c in vault likely physiologic   Neurological: She is alert and oriented to person, place, and time.   Skin: Skin is warm and dry.   Psychiatric: She has a normal mood and affect.   Nursing note and vitals reviewed.        ASSESSMENT:       ICD-10-CM ICD-9-CM    1. Well woman exam with routine gynecological exam Z01.419 V72.31      Plan:      Mary was seen today for well woman.    Diagnoses and all orders for this visit:    Well woman exam with routine gynecological exam  - Pap smear done in 2017  - Colonoscopy done 2015  - Mammogram today    No orders of the defined types were placed in this encounter.      Follow up in about 1 year (around 8/28/2020) for WWE.    Counseling time: 15 minutes    Araceli Rojas

## 2019-08-28 NOTE — PATIENT INSTRUCTIONS
Prevention Guidelines, Women Ages 50 to 64  Screening tests and vaccines are an important part of managing your health. Health counseling is essential, too. Below are guidelines for these, for women ages 50 to 64. Talk with your healthcare provider to make sure youre up to date on what you need.  Screening Who needs it How often   Type 2 diabetes or prediabetes All adults beginning at age 45 and adults without symptoms at any age who are overweight or obese and have 1 or more additional risk factors for diabetes. At  least every 3 years   Alcohol misuse All women in this age group At routine exams   Blood pressure All women in this age group Every 2 years if your blood pressure is less than 120/80 mm Hg; yearly if your systolic blood pressure is 120 to 139 mm Hg, or your diastolic blood pressure reading is 80 to 89 mm Hg   Breast cancer All women in this age group Yearly mammogram and clinical breast exam1   Cervical cancer All women in this age group, except women who have had a complete hysterectomy Pap test every 3 years or Pap test with human papillomavirus (HPV) test every 5 years   Chlamydia Women at increased risk for infection At routine exams   Colorectal cancer All women in this age group Flexible sigmoidoscopy every 5 years, or colonoscopy every 10 years, or double-contrast barium enema every 5 years; yearly fecal occult blood test or fecal immunochemical test; or a stool DNA test as often as your health care provider advises; talk with your health care provider about which tests are best for you   Depression All women in this age group At routine exams   Gonorrhea Sexually active women at increased risk for infection At routine exams   Hepatitis C Anyone at increased risk; 1 time for those born between 1945 and 1965 At routine exams   High cholesterol or triglycerides All women in this age group who are at risk for coronary artery disease At least every 5 years   HIV All women At routine exams   Lung  cancer Adults age 55 to 80 who have smoked Yearly screening in smokers with 30 pack-year history of smoking or who quit within 15 years   Obesity All women in this age group At routine exams   Osteoporosis Women who are postmenopausal Ask your healthcare provider   Syphilis Women at increased risk for infection - talk with your healthcare provider At routine exams   Tuberculosis Women at increased risk for infection - talk with your healthcare provider Ask your healthcare provider   Vision All women in this age group Ask your healthcare provider   Vaccine Who needs it How often   Chickenpox (varicella) All women in this age group who have no record of this infection or vaccine 2 doses; the second dose should be given at least 4 weeks after the first dose   Hepatitis A Women at increased risk for infection - talk with your healthcare provider 2 doses given at least 6 months apart   Hepatitis B Women at increased risk for infection - talk with your healthcare provider 3 doses over 6 months; second dose should be given 1 month after the first dose; the third dose should be given at least 2 months after the second dose and at least 4 months after the first dose   Haemophilus influenzaeType B (HIB) Women at increased risk for infection - talk with your healthcare provider 1 to 3 doses   Influenza (flu) All women in this age group Once a year   Measles, mumps, rubella (MMR) Women in this age group through their late 50s who have no record of these infections or vaccines 1 dose   Meningococcal Women at increased risk for infection - talk with your healthcare provider 1 or more doses   Pneumococcal conjugate vaccine (PCV13) and pneumococcal polysaccharide vaccine (PPSV23) Women at increased risk for infection - talk with your healthcare provider PCV13: 1 dose ages 19 to 65 (protects against 13 types of pneumococcal bacteria)  PPSV23: 1 to 2 doses through age 64, or 1 dose at 65 or older (protects against 23 types of  pneumococcal bacteria)   Tetanus/diphtheria/pertussis (Td/Tdap) booster All women in this age group Td every 10 years, or a one-time dose of Tdap instead of a Td booster after age 18, then Td every 10 years   Zoster All women ages 60 and older 1 dose   Counseling Who needs it How often   BRCA gene mutation testing for breast and ovarian cancer susceptibility Women with increased risk for having gene mutation When your risk is known   Breast cancer and chemoprevention Women at high risk for breast cancer When your risk is known   Diet and exercise Women who are overweight or obese When diagnosed, and then at routine exams   Sexually transmitted infection prevention Women at increased risk for infection - talk with your healthcare provider At routine exams   Use of daily aspirin Women ages 55 and up in this age group who are at risk for cardiovascular health problems such as stroke When your risk is known   Use of tobacco and the health effects it can cause All women in this age group Every exam   1[employer]  Date Last Reviewed: 1/26/2016  © 2427-2445 [employer], [place] [employer]. [address] [place] [address], [place] [address]. All rights reserved. This information is not intended as a substitute for professional medical care. Always follow your healthcare professional's instructions.

## 2019-09-04 DIAGNOSIS — J06.9 VIRAL URI WITH COUGH: ICD-10-CM

## 2019-09-04 RX ORDER — LEVOCETIRIZINE DIHYDROCHLORIDE 5 MG/1
TABLET, FILM COATED ORAL
Qty: 30 TABLET | Refills: 0 | Status: SHIPPED | OUTPATIENT
Start: 2019-09-04 | End: 2020-08-19

## 2019-11-22 ENCOUNTER — HOSPITAL ENCOUNTER (EMERGENCY)
Facility: HOSPITAL | Age: 54
Discharge: HOME | End: 2019-11-22
Attending: EMERGENCY MEDICINE
Payer: COMMERCIAL

## 2019-11-22 VITALS
HEIGHT: 64 IN | WEIGHT: 229 LBS | HEART RATE: 69 BPM | TEMPERATURE: 98 F | OXYGEN SATURATION: 100 % | SYSTOLIC BLOOD PRESSURE: 137 MMHG | RESPIRATION RATE: 20 BRPM | BODY MASS INDEX: 39.09 KG/M2 | DIASTOLIC BLOOD PRESSURE: 63 MMHG

## 2019-11-22 DIAGNOSIS — N39.0 URINARY TRACT INFECTION WITHOUT HEMATURIA, SITE UNSPECIFIED: Primary | ICD-10-CM

## 2019-11-22 LAB
ALBUMIN SERPL BCP-MCNC: 4.2 G/DL (ref 3.5–5.2)
ALP SERPL-CCNC: 102 U/L (ref 55–135)
ALT SERPL W/O P-5'-P-CCNC: 33 U/L (ref 10–44)
ANION GAP SERPL CALC-SCNC: 10 MMOL/L (ref 8–16)
AST SERPL-CCNC: 23 U/L (ref 10–40)
BASOPHILS # BLD AUTO: 0.03 K/UL (ref 0–0.2)
BASOPHILS NFR BLD: 0.4 % (ref 0–1.9)
BILIRUB SERPL-MCNC: 0.4 MG/DL (ref 0.1–1)
BILIRUB UR QL STRIP: NEGATIVE
BUN SERPL-MCNC: 12 MG/DL (ref 6–20)
CALCIUM SERPL-MCNC: 10.1 MG/DL (ref 8.7–10.5)
CHLORIDE SERPL-SCNC: 109 MMOL/L (ref 95–110)
CLARITY UR: CLEAR
CO2 SERPL-SCNC: 24 MMOL/L (ref 23–29)
COLOR UR: YELLOW
CREAT SERPL-MCNC: 0.7 MG/DL (ref 0.5–1.4)
DIFFERENTIAL METHOD: ABNORMAL
EOSINOPHIL # BLD AUTO: 0.2 K/UL (ref 0–0.5)
EOSINOPHIL NFR BLD: 2.6 % (ref 0–8)
ERYTHROCYTE [DISTWIDTH] IN BLOOD BY AUTOMATED COUNT: 15.1 % (ref 11.5–14.5)
EST. GFR  (AFRICAN AMERICAN): >60 ML/MIN/1.73 M^2
EST. GFR  (NON AFRICAN AMERICAN): >60 ML/MIN/1.73 M^2
GLUCOSE SERPL-MCNC: 88 MG/DL (ref 70–110)
GLUCOSE UR QL STRIP: NEGATIVE
HCT VFR BLD AUTO: 38.1 % (ref 37–48.5)
HGB BLD-MCNC: 11.6 G/DL (ref 12–16)
HGB UR QL STRIP: ABNORMAL
IMM GRANULOCYTES # BLD AUTO: 0.04 K/UL (ref 0–0.04)
IMM GRANULOCYTES NFR BLD AUTO: 0.5 % (ref 0–0.5)
KETONES UR QL STRIP: NEGATIVE
LEUKOCYTE ESTERASE UR QL STRIP: ABNORMAL
LIPASE SERPL-CCNC: 39 U/L (ref 4–60)
LYMPHOCYTES # BLD AUTO: 3.8 K/UL (ref 1–4.8)
LYMPHOCYTES NFR BLD: 52.1 % (ref 18–48)
MCH RBC QN AUTO: 24.8 PG (ref 27–31)
MCHC RBC AUTO-ENTMCNC: 30.4 G/DL (ref 32–36)
MCV RBC AUTO: 81 FL (ref 82–98)
MICROSCOPIC COMMENT: ABNORMAL
MONOCYTES # BLD AUTO: 0.7 K/UL (ref 0.3–1)
MONOCYTES NFR BLD: 9 % (ref 4–15)
NEUTROPHILS # BLD AUTO: 2.6 K/UL (ref 1.8–7.7)
NEUTROPHILS NFR BLD: 35.4 % (ref 38–73)
NITRITE UR QL STRIP: NEGATIVE
NRBC BLD-RTO: 0 /100 WBC
PH UR STRIP: 6 [PH] (ref 5–8)
PLATELET # BLD AUTO: 301 K/UL (ref 150–350)
PMV BLD AUTO: 10.8 FL (ref 9.2–12.9)
POTASSIUM SERPL-SCNC: 4.1 MMOL/L (ref 3.5–5.1)
PROT SERPL-MCNC: 8 G/DL (ref 6–8.4)
PROT UR QL STRIP: NEGATIVE
RBC # BLD AUTO: 4.68 M/UL (ref 4–5.4)
RBC #/AREA URNS HPF: 0 /HPF (ref 0–4)
SODIUM SERPL-SCNC: 143 MMOL/L (ref 136–145)
SP GR UR STRIP: 1.02 (ref 1–1.03)
URN SPEC COLLECT METH UR: ABNORMAL
UROBILINOGEN UR STRIP-ACNC: NEGATIVE EU/DL
WBC # BLD AUTO: 7.37 K/UL (ref 3.9–12.7)
WBC #/AREA URNS HPF: 15 /HPF (ref 0–5)

## 2019-11-22 PROCEDURE — 83690 ASSAY OF LIPASE: CPT

## 2019-11-22 PROCEDURE — 80053 COMPREHEN METABOLIC PANEL: CPT

## 2019-11-22 PROCEDURE — 96375 TX/PRO/DX INJ NEW DRUG ADDON: CPT

## 2019-11-22 PROCEDURE — 63600175 PHARM REV CODE 636 W HCPCS: Performed by: EMERGENCY MEDICINE

## 2019-11-22 PROCEDURE — 99285 EMERGENCY DEPT VISIT HI MDM: CPT | Mod: 25

## 2019-11-22 PROCEDURE — 96365 THER/PROPH/DIAG IV INF INIT: CPT

## 2019-11-22 PROCEDURE — 96361 HYDRATE IV INFUSION ADD-ON: CPT

## 2019-11-22 PROCEDURE — 87086 URINE CULTURE/COLONY COUNT: CPT

## 2019-11-22 PROCEDURE — 81000 URINALYSIS NONAUTO W/SCOPE: CPT

## 2019-11-22 PROCEDURE — 25500020 PHARM REV CODE 255: Performed by: EMERGENCY MEDICINE

## 2019-11-22 PROCEDURE — 85025 COMPLETE CBC W/AUTO DIFF WBC: CPT

## 2019-11-22 RX ORDER — MORPHINE SULFATE 10 MG/ML
4 INJECTION INTRAMUSCULAR; INTRAVENOUS; SUBCUTANEOUS
Status: COMPLETED | OUTPATIENT
Start: 2019-11-22 | End: 2019-11-22

## 2019-11-22 RX ORDER — DOCUSATE SODIUM 100 MG/1
100 CAPSULE, LIQUID FILLED ORAL 2 TIMES DAILY
Qty: 14 CAPSULE | Refills: 0 | Status: SHIPPED | OUTPATIENT
Start: 2019-11-22 | End: 2019-11-29

## 2019-11-22 RX ORDER — CEPHALEXIN 500 MG/1
500 CAPSULE ORAL EVERY 12 HOURS
Qty: 10 CAPSULE | Refills: 0 | Status: SHIPPED | OUTPATIENT
Start: 2019-11-22 | End: 2019-11-27

## 2019-11-22 RX ORDER — ACETAMINOPHEN 325 MG/1
650 TABLET ORAL EVERY 6 HOURS PRN
Qty: 13 TABLET | Refills: 0 | Status: SHIPPED | OUTPATIENT
Start: 2019-11-22 | End: 2020-08-19

## 2019-11-22 RX ORDER — ONDANSETRON 2 MG/ML
4 INJECTION INTRAMUSCULAR; INTRAVENOUS
Status: COMPLETED | OUTPATIENT
Start: 2019-11-22 | End: 2019-11-22

## 2019-11-22 RX ORDER — PHENAZOPYRIDINE HYDROCHLORIDE 200 MG/1
200 TABLET, FILM COATED ORAL 3 TIMES DAILY
Qty: 9 TABLET | Refills: 0 | Status: SHIPPED | OUTPATIENT
Start: 2019-11-22 | End: 2019-11-25

## 2019-11-22 RX ADMIN — CEFTRIAXONE 1 G: 1 INJECTION, SOLUTION INTRAVENOUS at 07:11

## 2019-11-22 RX ADMIN — IOHEXOL 100 ML: 350 INJECTION, SOLUTION INTRAVENOUS at 08:11

## 2019-11-22 RX ADMIN — ONDANSETRON HYDROCHLORIDE 4 MG: 2 SOLUTION INTRAMUSCULAR; INTRAVENOUS at 06:11

## 2019-11-22 RX ADMIN — SODIUM CHLORIDE 1000 ML: 0.9 INJECTION, SOLUTION INTRAVENOUS at 06:11

## 2019-11-22 RX ADMIN — MORPHINE SULFATE 4 MG: 10 INJECTION INTRAVENOUS at 06:11

## 2019-11-23 NOTE — ED TRIAGE NOTES
Patient reports pain to left lower abdomen. Reports nausea but denies vomiting or diarrhea. States urine is clear. Dr. Mejia at bedside.

## 2019-11-23 NOTE — ED PROVIDER NOTES
Encounter Date: 11/22/2019       History     Chief Complaint   Patient presents with    Abdominal Pain     c/o LLQ pain that started on yesterday along with nausea; denies v/d, constipation     54-year-old female past medical history migraines and obesity presenting secondary to left lower quadrant abdominal pain that has been ongoing since yesterday.  No fevers or chills.  No increased frequency of urination or burning on urination.  No change in her bowel movements.  Last bowel movement was earlier today which was normal.  No vomiting.  Pain is not really radiating anywhere.  Is staying steady in that left lower left middle quadrant.  No CVA tenderness. No blood in her urine.  No bleeding reported.  Pain is 7 in 10, achy, sore, nonradiating, no alleviating or worsening factors.        Review of patient's allergies indicates:  No Known Allergies  Past Medical History:   Diagnosis Date    Arthritis     Dental bridge present     upper removable partial    Fibrocystic breast     Migraines     Obesity      Past Surgical History:   Procedure Laterality Date    BREAST BIOPSY      BREAST CYST ASPIRATION      COLONOSCOPY N/A 10/7/2015    Procedure: COLONOSCOPY;  Surgeon: Eagle Stack MD;  Location: [facility];  Service: Endoscopy;  Laterality: N/A;    DILATION AND CURETTAGE OF UTERUS      ESOPHAGOGASTRODUODENOSCOPY N/A 7/19/2019    Procedure: ESOPHAGOGASTRODUODENOSCOPY (EGD);  Surgeon: Cherrie Sheets MD;  Location: [facility];  Service: Endoscopy;  Laterality: N/A;    fibrocystic breast       KNEE ARTHROSCOPY W/ MENISCAL REPAIR      TUBAL LIGATION       Family History   Problem Relation Age of Onset    Heart disease Mother     Cataracts Mother     Glaucoma Mother     Colon cancer Father 60    Bladder Cancer Father     Lung cancer Father     Prostate cancer Father     Breast cancer Sister 42    No Known Problems Brother     No Known Problems Maternal Aunt     No Known Problems Paternal Aunt     No  Known Problems Paternal Uncle     No Known Problems Maternal Grandmother     No Known Problems Maternal Grandfather     No Known Problems Paternal Grandmother     No Known Problems Paternal Grandfather     COPD Neg Hx     Amblyopia Neg Hx     Blindness Neg Hx     Cancer Neg Hx     Diabetes Neg Hx     Hypertension Neg Hx     Macular degeneration Neg Hx     Retinal detachment Neg Hx     Strabismus Neg Hx     Stroke Neg Hx     Thyroid disease Neg Hx      Social History     Tobacco Use    Smoking status: Never Smoker    Smokeless tobacco: Never Used   Substance Use Topics    Alcohol use: No    Drug use: No     Review of Systems   Constitutional: Negative for fever.   HENT: Negative for sore throat.    Respiratory: Negative for shortness of breath.    Cardiovascular: Negative for chest pain.   Gastrointestinal: Positive for abdominal pain. Negative for blood in stool, constipation, diarrhea and nausea.   Genitourinary: Negative for dysuria.   Musculoskeletal: Negative for back pain.   Skin: Negative for rash.   Neurological: Negative for weakness.   Hematological: Does not bruise/bleed easily.   All other systems reviewed and are negative.      Physical Exam     Initial Vitals [11/22/19 1816]   BP Pulse Resp Temp SpO2   (!) 152/67 65 20 98.1 °F (36.7 °C) 100 %      MAP       --         Physical Exam    Nursing note and vitals reviewed.  Constitutional: She appears well-developed and well-nourished.   Obese   HENT:   Head: Normocephalic and atraumatic.   Eyes: EOM are normal. Pupils are equal, round, and reactive to light.   Neck: Normal range of motion.   Cardiovascular: Normal rate and regular rhythm.   Pulmonary/Chest: Breath sounds normal. No stridor. She has no wheezes.   Abdominal: Soft. Bowel sounds are normal.   Left lower quadrant left middle quadrant tenderness    No rebound or guarding   Genitourinary: Vagina normal. No vaginal discharge found.   Genitourinary Comments: No cervical motion  tenderness   Musculoskeletal: Normal range of motion. She exhibits no edema.   Neurological: She is alert and oriented to person, place, and time. She has normal strength.   Skin: Skin is warm and dry. Capillary refill takes less than 2 seconds.   Psychiatric: She has a normal mood and affect. Thought content normal.         ED Course   Procedures  Labs Reviewed   CBC W/ AUTO DIFFERENTIAL - Abnormal; Notable for the following components:       Result Value    Hemoglobin 11.6 (*)     Mean Corpuscular Volume 81 (*)     Mean Corpuscular Hemoglobin 24.8 (*)     Mean Corpuscular Hemoglobin Conc 30.4 (*)     RDW 15.1 (*)     Gran% 35.4 (*)     Lymph% 52.1 (*)     All other components within normal limits   URINALYSIS, REFLEX TO URINE CULTURE - Abnormal; Notable for the following components:    Occult Blood UA 1+ (*)     Leukocytes, UA 3+ (*)     All other components within normal limits    Narrative:     Preferred Collection Type->Urine, Clean Catch   URINALYSIS MICROSCOPIC - Abnormal; Notable for the following components:    WBC, UA 15 (*)     All other components within normal limits    Narrative:     Preferred Collection Type->Urine, Clean Catch   CULTURE, URINE   COMPREHENSIVE METABOLIC PANEL   LIPASE          Imaging Results          CT Abdomen Pelvis With Contrast (Final result)  Result time 11/22/19 20:17:52    Final result by Petr Louis MD (11/22/19 20:17:52)                 Impression:      No acute process or CT findings identified to explain patient's symptoms of localized left lower quadrant pain.  Specifically, no significant colonic diverticular disease.    Moderate colonic stool burden which may represent constipation, without associated bowel obstruction.      Electronically signed by: Petr Louis MD  Date:    11/22/2019  Time:    20:17             Narrative:    EXAMINATION:  CT ABDOMEN PELVIS WITH CONTRAST    CLINICAL HISTORY:  LLQ pain, suspect diverticulitis;    TECHNIQUE:  Low dose axial images,  sagittal and coronal reformations were obtained from the lung bases to the pubic symphysis following the IV administration of 100 mL of Omnipaque 350 .  Oral contrast was not given.    COMPARISON:  CT abdomen and pelvis 07/17/2018 and pelvic ultrasound 01/11/2018    FINDINGS:  Imaged lung bases show minimal dependent atelectasis.  Base of the heart is within normal limits.    Liver, gallbladder, pancreas, spleen, stomach, duodenum and bilateral adrenal glands are within normal limits.  No biliary ductal dilatation.  Small accessory splenule.    Bilateral kidneys are normal in size, shape and location with symmetric normal enhancement.  No hydronephrosis or significant perinephric stranding.  Ureters are nondilated.  Urinary bladder is within normal limits.  Uterus and bilateral necks regions are within normal limits.    No ascites, free air or lymphadenopathy.  No significant atherosclerosis.  No aortic aneurysm or dissection.    Tiny fat containing umbilical hernia.  Appendix and terminal ileum are within normal limits.  Moderate amount of scattered fecal material throughout the colon and rectum.  No evidence of bowel obstruction or inflammation.  Specifically, no significant diverticulosis of the colon as clinically questioned.  No pneumatosis or portal venous gas.    Osseous structures show mild degenerative change without acute or destructive process seen.                                 Medical Decision Making:   Initial Assessment:   54 year old patient presenting 2/2 abdominal pain in LLQ. IV fluids, zofran, morphine ordered for interventions    Also considered but less likely:     AAA: location inconsistent, no bruits, no history of HTN  Cholecystitis: location inconsistent, no relation with meals, negative lópez's  SBO: normal BM and flatus. No vomiting  Appendicitis: location inconsistent, no fever, no rebound/guarding  Mesenteric ischemia: HPI inconsistent, does not coincide with meals, other dx more  likely  Kidney stone: no radiation to back or cva tenderness, no dysuria, no hematuria  Pyelonephritis: no cva tenderness, no dysuria, no fever  Pancreatitis: no history of alcohol abuse, unlikely gallstone obstructing, location inconsistent  Diverticulitis: age and location not most common, no history of diverticulitis, no fever, no wbc  TOA: no systemic symptoms, location inconsistent, pelvic exam benign  Ectopic: s/p menopause  Torsion: no adnexal tenderness and hpi not consistent  PID: no history of STDs, no vaginal discharge, no cervical motion tenderness    Labs notable for UTI.  Imaging reassuring.  IV ceftriaxone for patient.  Repeat abdominal exam reassuring.  Minor stool burden and the CT so starting on Colace    Patient discharged home with Keflex, Pyridium, Tylenol. Return precautions given, patient understands and agrees with plan. All questions answered.  Instructed to follow up with PCP. I discussed with the patient the diagnosis, treatment plan, indications for return to the emergency department, and for expected follow-up. The patient verbalized an understanding. The patient is asked if there are any questions or concerns. We discuss the case, until all issues are addressed to the patients satisfaction. Patient understands and is agreeable to the plan.   Servando Mejia      Clinical Tests:   Lab Tests: Reviewed and Ordered  Radiological Study: Ordered and Reviewed                                 Clinical Impression:       ICD-10-CM ICD-9-CM   1. Urinary tract infection without hematuria, site unspecified N39.0 599.0                             Servando Mejia MD  11/22/19 2030

## 2019-11-24 LAB — BACTERIA UR CULT: NORMAL

## 2020-02-07 ENCOUNTER — OFFICE VISIT (OUTPATIENT)
Dept: FAMILY MEDICINE | Facility: CLINIC | Age: 55
End: 2020-02-07
Payer: COMMERCIAL

## 2020-02-07 VITALS
SYSTOLIC BLOOD PRESSURE: 132 MMHG | HEIGHT: 65 IN | WEIGHT: 234.56 LBS | BODY MASS INDEX: 39.08 KG/M2 | TEMPERATURE: 98 F | OXYGEN SATURATION: 99 % | HEART RATE: 72 BPM | DIASTOLIC BLOOD PRESSURE: 74 MMHG

## 2020-02-07 DIAGNOSIS — J30.9 ALLERGIC RHINITIS, UNSPECIFIED SEASONALITY, UNSPECIFIED TRIGGER: Primary | ICD-10-CM

## 2020-02-07 PROCEDURE — 99214 OFFICE O/P EST MOD 30 MIN: CPT | Mod: S$GLB,,, | Performed by: FAMILY MEDICINE

## 2020-02-07 PROCEDURE — 99999 PR PBB SHADOW E&M-EST. PATIENT-LVL III: ICD-10-PCS | Mod: PBBFAC,,, | Performed by: FAMILY MEDICINE

## 2020-02-07 PROCEDURE — 3008F BODY MASS INDEX DOCD: CPT | Mod: CPTII,S$GLB,, | Performed by: FAMILY MEDICINE

## 2020-02-07 PROCEDURE — 3008F PR BODY MASS INDEX (BMI) DOCUMENTED: ICD-10-PCS | Mod: CPTII,S$GLB,, | Performed by: FAMILY MEDICINE

## 2020-02-07 PROCEDURE — 99999 PR PBB SHADOW E&M-EST. PATIENT-LVL III: CPT | Mod: PBBFAC,,, | Performed by: FAMILY MEDICINE

## 2020-02-07 PROCEDURE — 99214 PR OFFICE/OUTPT VISIT, EST, LEVL IV, 30-39 MIN: ICD-10-PCS | Mod: S$GLB,,, | Performed by: FAMILY MEDICINE

## 2020-02-07 RX ORDER — MONTELUKAST SODIUM 10 MG/1
10 TABLET ORAL NIGHTLY
Qty: 30 TABLET | Refills: 3 | Status: SHIPPED | OUTPATIENT
Start: 2020-02-07 | End: 2020-05-18

## 2020-02-07 NOTE — LETTER
February 7, 2020      [facility]  [address]  [place] [address]  Phone: [phone]  Fax: [phone]       Patient: Mary Naranjo   YOB: 1965  Date of Visit: 02/07/2020    To Whom It May Concern:    Sheila Naranjo  was at [facility] on 02/07/2020. She may return to work/school on 2/10/20 with no restrictions. If you have any questions or concerns, or if I can be of further assistance, please do not hesitate to contact me.    Sincerely,        Adan Crabtree MD

## 2020-02-07 NOTE — PROGRESS NOTES
[facility]  Progress Note    SUBJECTIVE:     Chief Complaint   Patient presents with    Allergies       HPI   Mary Naranjo  is a 54 y.o. female here for c/o sinus allergies. A lot of congestion, post nasal drip, itchy eyes for the past couple weeks. Been taking xyzal, nasal saline rinses, and occasional flonase without much help. No fevers, chills, sinus tenderness. No known sick contacts.  does change air filters. Patient has no other new complaints/problems at this time.      Review of patient's allergies indicates:  No Known Allergies    Past Medical History:   Diagnosis Date    Arthritis     Dental bridge present     upper removable partial    Fibrocystic breast     Migraines     Obesity      Past Surgical History:   Procedure Laterality Date    BREAST BIOPSY      BREAST CYST ASPIRATION      COLONOSCOPY N/A 10/7/2015    Procedure: COLONOSCOPY;  Surgeon: Eagle Stack MD;  Location: [facility];  Service: Endoscopy;  Laterality: N/A;    DILATION AND CURETTAGE OF UTERUS      ESOPHAGOGASTRODUODENOSCOPY N/A 7/19/2019    Procedure: ESOPHAGOGASTRODUODENOSCOPY (EGD);  Surgeon: Cherrie Sheets MD;  Location: [facility];  Service: Endoscopy;  Laterality: N/A;    fibrocystic breast       KNEE ARTHROSCOPY W/ MENISCAL REPAIR      TUBAL LIGATION       Family History   Problem Relation Age of Onset    Heart disease Mother     Cataracts Mother     Glaucoma Mother     Colon cancer Father 60    Bladder Cancer Father     Lung cancer Father     Prostate cancer Father     Breast cancer Sister 42    No Known Problems Brother     No Known Problems Maternal Aunt     No Known Problems Paternal Aunt     No Known Problems Paternal Uncle     No Known Problems Maternal Grandmother     No Known Problems Maternal Grandfather     No Known Problems Paternal Grandmother     No Known Problems Paternal Grandfather     COPD Neg Hx     Amblyopia Neg Hx     Blindness Neg Hx     Cancer Neg Hx      Diabetes Neg Hx     Hypertension Neg Hx     Macular degeneration Neg Hx     Retinal detachment Neg Hx     Strabismus Neg Hx     Stroke Neg Hx     Thyroid disease Neg Hx      Social History     Tobacco Use    Smoking status: Never Smoker    Smokeless tobacco: Never Used   Substance Use Topics    Alcohol use: No    Drug use: No        Review of Systems   Constitutional: Negative for chills, fever and malaise/fatigue.   HENT: Positive for congestion (sinus\). Negative for sinus pain and sore throat.    Respiratory: Negative.  Negative for cough, sputum production and shortness of breath.    Cardiovascular: Negative.  Negative for chest pain.   Gastrointestinal: Negative.  Negative for abdominal pain, nausea and vomiting.   Genitourinary: Negative.    Neurological: Negative for weakness and headaches.   All other systems reviewed and are negative.    OBJECTIVE:     Vitals:    02/07/20 1332   BP: 132/74   Pulse: 72   Temp: 97.9 °F (36.6 °C)     Body mass index is 39.34 kg/m².    Physical Exam   Constitutional: She is oriented to person, place, and time and well-developed, well-nourished, and in no distress. No distress.   HENT:   Head: Normocephalic and atraumatic.   Right Ear: External ear normal. Tympanic membrane is not perforated, not erythematous, not retracted and not bulging. No hemotympanum.   Left Ear: External ear normal. Tympanic membrane is not perforated, not erythematous, not retracted and not bulging. No hemotympanum.   Nose: Nose normal.   Mouth/Throat: Oropharynx is clear and moist. No oropharyngeal exudate.   Eyes: Conjunctivae and EOM are normal.   Cardiovascular: Normal rate, regular rhythm and normal heart sounds. Exam reveals no gallop and no friction rub.   No murmur heard.  Pulmonary/Chest: Effort normal and breath sounds normal. No respiratory distress. She has no wheezes. She has no rales. She exhibits no tenderness.   Abdominal: Soft. Bowel sounds are normal. She exhibits no  distension. There is no tenderness. There is no rebound.   Lymphadenopathy:     She has no cervical adenopathy.   Neurological: She is alert and oriented to person, place, and time.   Skin: Skin is warm. She is not diaphoretic.       Old records were reviewed. Labs and/or images were independently reviewed.    ASSESSMENT     1. Allergic rhinitis, unspecified seasonality, unspecified trigger        PLAN:     Allergic rhinitis, unspecified seasonality, unspecified trigger  -     Start montelukast (SINGULAIR) 10 mg tablet; Take 1 tablet (10 mg total) by mouth every evening.  Dispense: 30 tablet; Refill: 3  -     Ok to take xyzal, flonase, nasal saline rinses as well. Advised to change air filters as well.        RTC PRN    Adan Crabtree MD  02/07/2020 1:49 PM

## 2020-04-30 ENCOUNTER — PATIENT MESSAGE (OUTPATIENT)
Dept: ADMINISTRATIVE | Facility: HOSPITAL | Age: 55
End: 2020-04-30

## 2020-05-15 DIAGNOSIS — J30.9 ALLERGIC RHINITIS, UNSPECIFIED SEASONALITY, UNSPECIFIED TRIGGER: ICD-10-CM

## 2020-05-18 RX ORDER — MONTELUKAST SODIUM 10 MG/1
TABLET ORAL
Qty: 30 TABLET | Refills: 3 | Status: SHIPPED | OUTPATIENT
Start: 2020-05-18 | End: 2020-08-19

## 2020-05-18 NOTE — PROGRESS NOTES
Refill Routing Note    Medication(s) are not appropriate for processing by [facility]:       Non-participating provider           Medication reconciliation completed: No      Automatic Epic Protocol Generated Data:    Requested Prescriptions   Pending Prescriptions Disp Refills    montelukast (SINGULAIR) 10 mg tablet [Pharmacy Med Name: MONTELUKAST SOD 10 MG TABLET] 30 tablet 3     Sig: TAKE 1 TABLET BY MOUTH EVERY DAY IN THE EVENING       Pulmonology:  Leukotriene Inhibitors Passed - 5/15/2020  7:32 AM        Passed - Patient is at least 18 years old        Passed - Office visit in past 12 months or future 90 days.     Recent Outpatient Visits            3 months ago Allergic rhinitis, unspecified seasonality, unspecified trigger    [facility] Adan Crabtree MD    8 months ago Well woman exam with routine gynecological exam    [facility] Araceli Rojas MD    9 months ago Viral URI with cough    [facility] Zane Santiago NP    11 months ago Microcytic anemia    [facility] Ramon Olivares Jr., MD    11 months ago Migraine without aura and without status migrainosus, not intractable    [facility] Anai Mckeon DO                    Passed - An appropriate indication is on the problem list     Allergic Rhinitis  Sinusitis  Seasonal Allergies   Asthma              Powered by [employer] - 5/15/2020  7:32 AM        The requested medication is not on the active medication list.           Appointments  past 12m or future 3m with PCP    Date Provider   Last Visit   6/18/2019 Ramon Olivares Jr., MD   Next Visit   Visit date not found Ramon Olivares Jr., MD   ED visits in past 90 days: 0     Note composed:12:21 PM 05/18/2020

## 2020-05-18 NOTE — TELEPHONE ENCOUNTER
Care Due:                  Date            Visit Type   Department     Provider  --------------------------------------------------------------------------------    Last Visit: None Found      None         None Found  Next Visit: None Scheduled  None         None Found                                                            Last  Test          Frequency    Reason                     Performed    Due Date  --------------------------------------------------------------------------------    Office Visit  12 months..  acetaminophen............  Not Found    Overdue    Powered by [employer]. Reference number: [id number]. 5/18/2020 12:22:44 PM CDT

## 2020-06-28 ENCOUNTER — HOSPITAL ENCOUNTER (EMERGENCY)
Facility: HOSPITAL | Age: 55
Discharge: HOME | End: 2020-06-28
Attending: EMERGENCY MEDICINE
Payer: COMMERCIAL

## 2020-06-28 VITALS
OXYGEN SATURATION: 100 % | DIASTOLIC BLOOD PRESSURE: 72 MMHG | HEART RATE: 53 BPM | SYSTOLIC BLOOD PRESSURE: 164 MMHG | RESPIRATION RATE: 21 BRPM

## 2020-06-28 DIAGNOSIS — R07.89 ACUTE CHEST WALL PAIN: Primary | ICD-10-CM

## 2020-06-28 DIAGNOSIS — R07.9 CHEST PAIN: ICD-10-CM

## 2020-06-28 LAB
ALBUMIN SERPL BCP-MCNC: 3.9 G/DL (ref 3.5–5.2)
ALP SERPL-CCNC: 99 U/L (ref 55–135)
ALT SERPL W/O P-5'-P-CCNC: 30 U/L (ref 10–44)
ANION GAP SERPL CALC-SCNC: 8 MMOL/L (ref 8–16)
AST SERPL-CCNC: 22 U/L (ref 10–40)
BASOPHILS # BLD AUTO: 0.03 K/UL (ref 0–0.2)
BASOPHILS NFR BLD: 0.6 % (ref 0–1.9)
BILIRUB SERPL-MCNC: 0.4 MG/DL (ref 0.1–1)
BUN SERPL-MCNC: 15 MG/DL (ref 6–20)
CALCIUM SERPL-MCNC: 9.4 MG/DL (ref 8.7–10.5)
CHLORIDE SERPL-SCNC: 108 MMOL/L (ref 95–110)
CO2 SERPL-SCNC: 27 MMOL/L (ref 23–29)
CREAT SERPL-MCNC: 0.7 MG/DL (ref 0.5–1.4)
DIFFERENTIAL METHOD: ABNORMAL
EOSINOPHIL # BLD AUTO: 0.1 K/UL (ref 0–0.5)
EOSINOPHIL NFR BLD: 1.9 % (ref 0–8)
ERYTHROCYTE [DISTWIDTH] IN BLOOD BY AUTOMATED COUNT: 15.1 % (ref 11.5–14.5)
EST. GFR  (AFRICAN AMERICAN): >60 ML/MIN/1.73 M^2
EST. GFR  (NON AFRICAN AMERICAN): >60 ML/MIN/1.73 M^2
GLUCOSE SERPL-MCNC: 88 MG/DL (ref 70–110)
HCT VFR BLD AUTO: 37.9 % (ref 37–48.5)
HGB BLD-MCNC: 11.4 G/DL (ref 12–16)
IMM GRANULOCYTES # BLD AUTO: 0.01 K/UL (ref 0–0.04)
IMM GRANULOCYTES NFR BLD AUTO: 0.2 % (ref 0–0.5)
LIPASE SERPL-CCNC: 53 U/L (ref 4–60)
LYMPHOCYTES # BLD AUTO: 2.8 K/UL (ref 1–4.8)
LYMPHOCYTES NFR BLD: 52.4 % (ref 18–48)
MCH RBC QN AUTO: 24.4 PG (ref 27–31)
MCHC RBC AUTO-ENTMCNC: 30.1 G/DL (ref 32–36)
MCV RBC AUTO: 81 FL (ref 82–98)
MONOCYTES # BLD AUTO: 0.5 K/UL (ref 0.3–1)
MONOCYTES NFR BLD: 8.7 % (ref 4–15)
NEUTROPHILS # BLD AUTO: 2 K/UL (ref 1.8–7.7)
NEUTROPHILS NFR BLD: 36.2 % (ref 38–73)
NRBC BLD-RTO: 0 /100 WBC
PLATELET # BLD AUTO: 261 K/UL (ref 150–350)
PMV BLD AUTO: 10.8 FL (ref 9.2–12.9)
POTASSIUM SERPL-SCNC: 4.2 MMOL/L (ref 3.5–5.1)
PROT SERPL-MCNC: 7.3 G/DL (ref 6–8.4)
RBC # BLD AUTO: 4.67 M/UL (ref 4–5.4)
SODIUM SERPL-SCNC: 143 MMOL/L (ref 136–145)
TROPONIN I SERPL DL<=0.01 NG/ML-MCNC: <0.006 NG/ML (ref 0–0.03)
WBC # BLD AUTO: 5.38 K/UL (ref 3.9–12.7)

## 2020-06-28 PROCEDURE — 83690 ASSAY OF LIPASE: CPT

## 2020-06-28 PROCEDURE — 25000003 PHARM REV CODE 250: Performed by: EMERGENCY MEDICINE

## 2020-06-28 PROCEDURE — 99283 EMERGENCY DEPT VISIT LOW MDM: CPT

## 2020-06-28 PROCEDURE — 84484 ASSAY OF TROPONIN QUANT: CPT

## 2020-06-28 PROCEDURE — 80053 COMPREHEN METABOLIC PANEL: CPT

## 2020-06-28 PROCEDURE — 85025 COMPLETE CBC W/AUTO DIFF WBC: CPT

## 2020-06-28 RX ORDER — ACETAMINOPHEN 500 MG
1000 TABLET ORAL
Status: COMPLETED | OUTPATIENT
Start: 2020-06-28 | End: 2020-06-28

## 2020-06-28 RX ORDER — KETOROLAC TROMETHAMINE 30 MG/ML
15 INJECTION, SOLUTION INTRAMUSCULAR; INTRAVENOUS
Status: DISCONTINUED | OUTPATIENT
Start: 2020-06-28 | End: 2020-06-28

## 2020-06-28 RX ADMIN — ACETAMINOPHEN 1000 MG: 500 TABLET ORAL at 08:06

## 2020-06-28 NOTE — ED PROVIDER NOTES
Encounter Date: 6/28/2020    SCRIBE #1 NOTE: I, Nacho Hardy, am scribing for, and in the presence of,  Guzman Ng MD. I have scribed the following portions of the note - Other sections scribed: HPI/ROS/PE.       History   No chief complaint on file.    Pt seen by provider at 06:25    This 55 y.o. female with a medical hx of arthritis and obesity presents to the ED for an emergent evaluation of jaw pain and chest pain. Pt reports she initially began with jaw pain followed by chest pain and then RUE tingling beginning to the R shoulder that radiates to the R hand. Pt also reports pain to the R shoulder and R forearm. Onset of symptoms was yesterday. No personal hx of cardiac complications. No episodes of similar symptoms in the past. Pt reports she only takes vitamins daily and Meloxicam currently d/t bilateral wrist pain from a recent mechanical fall. No known allergies to medications. Otherwise, pt denies fever, chills, n/v, cough, SOB, and any other associated symptoms.     The history is provided by the patient. No  was used.     Review of patient's allergies indicates:  No Known Allergies  Past Medical History:   Diagnosis Date    Arthritis     Dental bridge present     upper removable partial    Fibrocystic breast     Migraines     Obesity      Past Surgical History:   Procedure Laterality Date    BREAST BIOPSY      BREAST CYST ASPIRATION      COLONOSCOPY N/A 10/7/2015    Procedure: COLONOSCOPY;  Surgeon: Eagle Stack MD;  Location: [facility];  Service: Endoscopy;  Laterality: N/A;    DILATION AND CURETTAGE OF UTERUS      ESOPHAGOGASTRODUODENOSCOPY N/A 7/19/2019    Procedure: ESOPHAGOGASTRODUODENOSCOPY (EGD);  Surgeon: Cherrie Sheets MD;  Location: [facility];  Service: Endoscopy;  Laterality: N/A;    fibrocystic breast       KNEE ARTHROSCOPY W/ MENISCAL REPAIR      TUBAL LIGATION       Family History   Problem Relation Age of Onset    Heart disease Mother      Cataracts Mother     Glaucoma Mother     Colon cancer Father 60    Bladder Cancer Father     Lung cancer Father     Prostate cancer Father     Breast cancer Sister 42    No Known Problems Brother     No Known Problems Maternal Aunt     No Known Problems Paternal Aunt     No Known Problems Paternal Uncle     No Known Problems Maternal Grandmother     No Known Problems Maternal Grandfather     No Known Problems Paternal Grandmother     No Known Problems Paternal Grandfather     COPD Neg Hx     Amblyopia Neg Hx     Blindness Neg Hx     Cancer Neg Hx     Diabetes Neg Hx     Hypertension Neg Hx     Macular degeneration Neg Hx     Retinal detachment Neg Hx     Strabismus Neg Hx     Stroke Neg Hx     Thyroid disease Neg Hx      Social History     Tobacco Use    Smoking status: Never Smoker    Smokeless tobacco: Never Used   Substance Use Topics    Alcohol use: No    Drug use: No     Review of Systems   Constitutional: Negative.    HENT:        (+) jaw pain    Eyes: Negative.    Respiratory: Negative.    Cardiovascular: Positive for chest pain.   Gastrointestinal: Negative.    Genitourinary: Negative.    Musculoskeletal: Positive for arthralgias and myalgias.   Skin: Negative.    Neurological: Positive for numbness.       Physical Exam     Initial Vitals   BP Pulse Resp Temp SpO2   06/28/20 0803 06/28/20 0748 06/28/20 0748 -- 06/28/20 0748   (!) 164/72 (!) 50 14  99 %      MAP       --                Physical Exam    Nursing note and vitals reviewed.  Constitutional: She appears well-developed and well-nourished. She is not diaphoretic.  Non-toxic appearance. No distress.   HENT:   Head: Normocephalic and atraumatic.   Eyes: EOM are normal. Pupils are equal, round, and reactive to light.   Neck: Normal range of motion. Neck supple.   Cardiovascular: Normal rate and regular rhythm.   Pulmonary/Chest: Breath sounds normal. No respiratory distress.   Abdominal: Soft. There is no abdominal  tenderness.   Musculoskeletal: Normal range of motion. No edema.   Neurological: She is alert and oriented to person, place, and time. No cranial nerve deficit or sensory deficit. GCS score is 15. GCS eye subscore is 4. GCS verbal subscore is 5. GCS motor subscore is 6.   Skin: Skin is warm and dry. No rash noted.   Psychiatric: She has a normal mood and affect.         ED Course   Procedures  Labs Reviewed - No data to display       Imaging Results    None                     Scribe Attestation:   Scribe #1: I performed the above scribed service and the documentation accurately describes the services I performed. I attest to the accuracy of the note.            ED Course as of Jun 29 1021   Sun Jun 28, 2020   0801 CBC showing hemoglobin 11.4, otherwise unremarkable.    [BB]   0801 Chest x-ray unremarkable.    [BB]   0802 Troponin negative, lipase 53.  CMP within normal limits.    [BB]      ED Course User Index  [BB] Guzman Ng MD     EKG - nsr, rate 53, no ischemic pattern noted, no STEMI.    MDM:    55 y.o.female with PMHx as noted above, presents with chest pain, with numbness/tingling in right arm, and radiation into her right jaw. Physical exam remarkable for well appearing female, in NAD, conversing with ease, abdomen soft, nt/nd, CTAB, RRR, no peripheral edema noted, moving all extremiteis with ease, gait normal, strength equal in all extremities.  ED workup as noted above. Pt presentation consistent with chest pain, without acute finding on workup today, with unremarkable exam of RUE, with some ttp over right shoulder with patient endorsing increased activity over the last few days, suggesting MSK etiology.  At this time given patient's history, physical exam, and ED workup do not suspect arrhythmia, MI/ACS, PE, PTX, aortic dissection, pericarditis, PNA, shingles, or any further malignant cause.  Pt treated with tylenol with marked improvement in sxms. Discussed diagnosis and further treatment with  patient, return precautions given and all questions answered.  Patient in understanding of plan.  Pt discharged to home improved and stable.             Clinical Impression:       ICD-10-CM ICD-9-CM   1. Acute chest wall pain  R07.89 786.52   2. Chest pain  R07.9 786.50           Scribe Attestation: I, Guzman Ng M.D., personally performed the services described in this documentation. All medical record entries made by the scribe were at my direction and in my presence. I have reviewed the chart and agree that the record reflects my personal performance and is accurate and complete.   ED Disposition Condition    Discharge Stable        ED Prescriptions     None        Follow-up Information     Follow up With Specialties Details Why Contact Info    [facility] Emergency Medicine Go to  If symptoms worsen [address] [facility] [address] [phone]    Ramon Olivares Jr., MD Family Medicine Go in 1 week As needed [address] [facility] [address]  [phone]                                       Guzman Ng MD  06/29/20 1029

## 2020-08-14 DIAGNOSIS — Z11.59 NEED FOR HEPATITIS C SCREENING TEST: ICD-10-CM

## 2020-08-19 ENCOUNTER — OFFICE VISIT (OUTPATIENT)
Dept: FAMILY MEDICINE | Facility: CLINIC | Age: 55
End: 2020-08-19
Payer: COMMERCIAL

## 2020-08-19 VITALS
HEIGHT: 64 IN | OXYGEN SATURATION: 95 % | HEART RATE: 98 BPM | RESPIRATION RATE: 18 BRPM | WEIGHT: 227.5 LBS | SYSTOLIC BLOOD PRESSURE: 128 MMHG | BODY MASS INDEX: 38.84 KG/M2 | DIASTOLIC BLOOD PRESSURE: 78 MMHG | TEMPERATURE: 97 F

## 2020-08-19 DIAGNOSIS — R10.32 PAIN, ABDOMINAL, LLQ: Primary | ICD-10-CM

## 2020-08-19 DIAGNOSIS — Z12.31 VISIT FOR SCREENING MAMMOGRAM: ICD-10-CM

## 2020-08-19 DIAGNOSIS — Z80.9 FAMILY HISTORY OF CANCER: ICD-10-CM

## 2020-08-19 PROCEDURE — 99999 PR PBB SHADOW E&M-EST. PATIENT-LVL IV: CPT | Mod: PBBFAC,,, | Performed by: FAMILY MEDICINE

## 2020-08-19 PROCEDURE — 99214 OFFICE O/P EST MOD 30 MIN: CPT | Mod: S$GLB,,, | Performed by: FAMILY MEDICINE

## 2020-08-19 PROCEDURE — 99999 PR PBB SHADOW E&M-EST. PATIENT-LVL IV: ICD-10-PCS | Mod: PBBFAC,,, | Performed by: FAMILY MEDICINE

## 2020-08-19 PROCEDURE — 99214 PR OFFICE/OUTPT VISIT, EST, LEVL IV, 30-39 MIN: ICD-10-PCS | Mod: S$GLB,,, | Performed by: FAMILY MEDICINE

## 2020-08-19 PROCEDURE — 3008F PR BODY MASS INDEX (BMI) DOCUMENTED: ICD-10-PCS | Mod: CPTII,S$GLB,, | Performed by: FAMILY MEDICINE

## 2020-08-19 PROCEDURE — 3008F BODY MASS INDEX DOCD: CPT | Mod: CPTII,S$GLB,, | Performed by: FAMILY MEDICINE

## 2020-08-19 RX ORDER — AMOXICILLIN AND CLAVULANATE POTASSIUM 875; 125 MG/1; MG/1
1 TABLET, FILM COATED ORAL EVERY 12 HOURS
Qty: 14 TABLET | Refills: 0 | Status: SHIPPED | OUTPATIENT
Start: 2020-08-19 | End: 2020-08-26

## 2020-08-19 RX ORDER — FLUCONAZOLE 150 MG/1
TABLET ORAL
Qty: 2 TABLET | Refills: 0 | Status: SHIPPED | OUTPATIENT
Start: 2020-08-19 | End: 2020-08-21

## 2020-08-19 NOTE — LETTER
August 19, 2020      [facility]  [address], [address]  [place] [address]  Phone: [phone]       Patient: Mary Naranjo   YOB: 1965  Date of Visit: 08/19/2020    To Whom It May Concern:    Sheila Naranjo  was at [facility] on 08/19/2020. She may return to work/school on 08/24/2020. If you have any questions or concerns, or if I can be of further assistance, please do not hesitate to contact me.      Sincerely,      Ramon Olivares Jr., MD

## 2020-08-21 ENCOUNTER — LAB VISIT (OUTPATIENT)
Dept: LAB | Facility: HOSPITAL | Age: 55
End: 2020-08-21
Payer: COMMERCIAL

## 2020-08-21 ENCOUNTER — OFFICE VISIT (OUTPATIENT)
Dept: HEMATOLOGY/ONCOLOGY | Facility: CLINIC | Age: 55
End: 2020-08-21
Payer: COMMERCIAL

## 2020-08-21 DIAGNOSIS — Z80.3 FAMILY HISTORY OF BREAST CANCER: ICD-10-CM

## 2020-08-21 DIAGNOSIS — Z80.9 FAMILY HISTORY OF CANCER: ICD-10-CM

## 2020-08-21 DIAGNOSIS — Z71.83 ENCOUNTER FOR NONPROCREATIVE GENETIC COUNSELING: Primary | ICD-10-CM

## 2020-08-21 PROCEDURE — 99999 PR PBB SHADOW E&M-EST. PATIENT-LVL II: ICD-10-PCS | Mod: PBBFAC,,, | Performed by: NURSE PRACTITIONER

## 2020-08-21 PROCEDURE — 99215 OFFICE O/P EST HI 40 MIN: CPT | Mod: S$GLB,,, | Performed by: NURSE PRACTITIONER

## 2020-08-21 PROCEDURE — 99215 PR OFFICE/OUTPT VISIT, EST, LEVL V, 40-54 MIN: ICD-10-PCS | Mod: S$GLB,,, | Performed by: NURSE PRACTITIONER

## 2020-08-21 PROCEDURE — 99999 PR PBB SHADOW E&M-EST. PATIENT-LVL II: CPT | Mod: PBBFAC,,, | Performed by: NURSE PRACTITIONER

## 2020-08-21 PROCEDURE — 36415 COLL VENOUS BLD VENIPUNCTURE: CPT

## 2020-08-21 NOTE — LETTER
August 21, 2020      Ramon Olivares Jr., MD  [address]  [place] [address]           [facility]reditary and High [facility]  [address]  [facility] [address]  Phone: [phone]  Fax: [phone]          Patient: Mary Naranjo   MR Number: [id number]   YOB: 1965   Date of Visit: 8/21/2020       Dear Dr. Ramon Olivares Jr.:    Thank you for referring Mary Naranjo to me for evaluation. Attached you will find relevant portions of my assessment and plan of care.    If you have questions, please do not hesitate to call me. I look forward to following Mary Naranjo along with you.    Sincerely,    Kade Owens, DNP    Enclosure  CC:  No Recipients    If you would like to receive this communication electronically, please contact [email or web address] or [phone] to request more information on [facility] Link access.    For providers and/or their staff who would like to refer a patient to [facility], please contact us through our one-stop-shop provider referral line, [facility], at [phone].    If you feel you have received this communication in error or would no longer like to receive these types of communications, please e-mail [email or web address]

## 2020-08-21 NOTE — PROGRESS NOTES
Hereditary and High-Risk Clinic  Department of Hematology and Oncology  [facility]  [facility] [place], [place]    08/21/2020  Provider:  Kade Owens DNP    Patient ID: Mary Naranjo is a 55 y.o. female.    Chief Complaint: Genetic Evaluation      Referring Provider:  Ramon Olivares Jr., MD  [address] [facility]  [place] [address]    SUBJECTIVE:      History of Present Illness (HPI):  Established patient of the Department of Hematology and Oncology presents today for an evaluation as it pertains to hereditary cancer syndromes.    Pedigree      Review of Systems   - See HPI.    - Patient's Distress Score today was 0/10 (with 10 being the worst).       OBJECTIVE:     Physical Exam   Constitutional: She appears well developed and well nourished. No distress.   Pulmonary/Chest: Effort normal.   Neurological: She is alert.   Psychiatric: She has a normal mood and affect. Her speech is normal and behavior is normal. Her thought content is normal.     Relative's Genetic Testing Results  Provided by the patient today:      COUNSELING:     Based on the information provided to me by the patient, Mary Naranjo, she meets criteria for germline genetic testing for Breast and Ovarian Cancer Susceptibility Genes and Pancreatic Cancer Susceptibility Genes based on current National Comprehensive Cancer Network (NCCN) Guidelines; however, her two full sisters, Yajaira and Ervin, are the more appropriate candidates for initial testing in the family.  Ervin did undergo germline cancer-genetic panel testing, which revealed only a RET gene variant of uncertain significance, for which [facility] per the results report, does not offer complimentary family variant testing; therefore, Mary does not need to be tested based on Ervin's pancreatic cancer alone.  There are significant limitations of interpreting a negative genetic testing result in a patient unaffected with cancer prior to testing  "appropriate affected relative(s). However, given the numerous other cancer diagnoses among multiple relatives of Giancarlo, we can consider having Mary undergo germline cancer-genetic testing now or after testing of appropriate relatives if indicated, given she is independently motivated to do so.  Given Mary's father's cancer history alone, without further clarification as to primaries versus secondaries, and the fact that he is unfortunately , I believe it is reasonable for the patient to undergo germline genetic testing in the setting of her independent motivation to do so, and she verbalizes understanding of the limitations of interpreting her potential results.    Germline genetic oncology panel testing consists of testing multiple genes known to be related to hereditary cancer syndromes.  These genes are known as "tumor suppressor genes."  A key role of tumor suppressor genes is to prevent cancer.  When a tumor suppressor gene has a clinically significant mutation, it affects the functioning of the gene, and the carrier may be more likely to develop cancers in certain organs.      Only some cancers are hereditary.  When a gene mutation is not identified, it does not completely rule out the possibility of hereditary cancers but does make them less likely.  Additionally, it is possible to see familial clustering of related cancer amongst family members, and these are sometimes caused by environmental factors and/or lifestyle factors that may be shared amongst family members.      Results of genetic testing include positive, negative, and variant of uncertain significance (VUS) (i.e. unclear) results.  If positive, the patient's specific cancer risks vary depending upon the tumor suppressor gene(s) in which there is/are a mutation(s).  In some cases, depending upon the result and the patient's clinical history, modified management may be recommended, including measures for risk reduction and/or " surveillance, though modified management is not always an option.  Modified management may also be recommended, even with a negative result, based upon risk assessment that incorporates the family history.  A VUS indicates that there is not presently enough data for the laboratory to make a determination as to whether the variant is benign or pathogenic; VUSs are not typically acted upon clinically.       If Mary tests positive for a mutation inherited in an autosomal-dominant manner, her first-degree relatives would each have a 50% chance of having the same mutation, and other blood-relatives would also be at (a lesser) risk of having the same mutation.       The Genetic Information Nondiscrimination Act (RANDOLPH) prohibits most health insurance agencies and employers with 16 or more employees from discriminating against an individual based on the results of genetic testing; however, RANDOLPH does not protect individuals from discrimination by other types of policies/entities, including but not limited to life insurance, disability, long-term care insurance,  benefits or insurance, and  Health Services benefits).     An outside laboratory would perform the testing after a blood sample is collected at an [facility] laboratory or a saliva sample is collected by the patient at home.  With genetic testing, there is a potential for the patient to incur out-of-pocket costs.  Results can take several weeks.       Offered Mary germline genetic oncology testing today, versus deferring testing at this time or declining testing altogether.  Mary desires to proceed with germline genetic oncology testing today and has provided informed consent to proceed with the test(s) indicated in the plan as below, after a discussion regarding various genetic testing panels that could be performed as well as associated risks.     Post-test genetic counseling will be conducted once the genetic testing results are available.      Questions were encouraged and answered to the patient's satisfaction, and she verbalized understanding of information and agreement with the plan.       ASSESSMENT/PLAN:       Mary was seen today for genetic evaluation.    Diagnoses and all orders for this visit:    Encounter for nonprocreative genetic counseling  Based on the information provided to me by the patient, Mary Naranjo, she meets criteria for germline genetic testing for Breast and Ovarian Cancer Susceptibility Genes and Pancreatic Cancer Susceptibility Genes based on current National Comprehensive Cancer Network (NCCN) Guidelines; however, her two full sisters, Yajaira and Ervin, are the more appropriate candidates for initial testing in the family.  Ervin did undergo germline cancer-genetic panel testing, which revealed only a RET gene variant of uncertain significance, for which [facility], per the results report, does not offer complimentary family variant testing; therefore, Mary does not need to be tested based on Ervin's pancreatic cancer alone.  There are significant limitations of interpreting a negative genetic testing result in a patient unaffected with cancer prior to testing appropriate affected relative(s). However, given the numerous other cancer diagnoses among multiple relatives of Radhas, we can consider having Mary undergo germline cancer-genetic testing now or after testing of appropriate relatives if indicated, given she is independently motivated to do so.  Given Mary's father's cancer history alone, without further clarification as to primaries versus secondaries, and the fact that he is unfortunately , I believe it is reasonable for the patient to undergo germline genetic testing in the setting of her independent motivation to do so, and she verbalizes understanding of the limitations of interpreting her potential results.        - Mary desires to proceed with germline genetic oncology testing today  and has provided informed consent to proceed with the test(s) indicated in the plan as below, after a discussion regarding various genetic testing panels that could be performed as well as associated risks.        - A blood sample for the [facility] Common Hereditary Cancers Panel was collected by [facility] today, with results expected in approximately 2-3 weeks, at which point post-test genetic counseling is to be conducted either in person or via audiovisual virtual visit.    Family history of cancer  -     Ambulatory referral/consult to Genetics: COMPLETED  -     Genetic Misc Sendout Test, Blood; Future    Family history of breast cancer  -     Genetic Misc Sendout Test, Blood; Future      Follow up in about 3 weeks (around 9/11/2020) for post-test genetics visit.        During this encounter, I spent approximately 45 minutes face-to-face with this patient, more than half of which was spent counseling the patient and/or coordinating her care.    Kade Olson, DNP, APRN, FNP-BC, AOCNP  Nurse Practitioner, Hereditary and High-Risk Clinic  Department of Hematology and Oncology  [facility] [place], 3rd floor  [facility] [address], [place]  [address]  office phone: [phone]    office fax: [phone]     08/21/2020

## 2020-08-24 DIAGNOSIS — Z12.31 SCREENING MAMMOGRAM, ENCOUNTER FOR: Primary | ICD-10-CM

## 2020-09-02 ENCOUNTER — TELEPHONE (OUTPATIENT)
Dept: FAMILY MEDICINE | Facility: CLINIC | Age: 55
End: 2020-09-02

## 2020-09-02 NOTE — TELEPHONE ENCOUNTER
----- Message from Charlotte Brown sent at 9/2/2020  4:44 PM CDT -----  Type: Patient Call Back    Who called: pt     What is the request in detail: pt asking for a call back from nurse to ask for the date of her last colonoscopy.  She believes it has been 5 years since last one. She states  Dr. Stack is requesting a f/u from colonoscopy from 2015 and would like to know if she needs to complete pt wanted to provider their office phone # [phone]. --- pt also asking for order for labs needed for annual exam on  09/18.    Can the clinic reply by [facility]? No     Would the patient rather a call back or a response via [facility]? Call back     Best call back number: [phone]    Additional Information:

## 2020-09-04 ENCOUNTER — PATIENT OUTREACH (OUTPATIENT)
Dept: ADMINISTRATIVE | Facility: HOSPITAL | Age: 55
End: 2020-09-04

## 2020-09-09 NOTE — PROGRESS NOTES
"Subjective:       Patient ID: Mary Naranjo is a 55 y.o. female.    Chief Complaint: Follow-up and Rectal Bleeding (pt states she noticed it on yesterday and today. Pt denies any pain while making a bowel movement . )    HPI   55 year old female comes in with 2 complaints. She reports that her twin sister recently  of pancreatic cancer. She states that her sister had previously seen a genetic specialist for genetic testing as their father also had cancer. She would like genetic testing ordered for herself.    The patient is also complaining of left lower quadrant pain for over a week. Initially started with diarrhea then became constipation. She noticed an episode of blood in stool yesterday. States she refuses to go to hospital because of COVID concern.     Review of Systems   Constitutional: Positive for appetite change. Negative for chills and fever.   Respiratory: Negative for shortness of breath.    Cardiovascular: Negative for chest pain, palpitations and leg swelling.   Gastrointestinal: Positive for abdominal pain, blood in stool, change in bowel habit and change in bowel habit. Negative for nausea and vomiting.   Genitourinary: Negative for dysuria, frequency and urgency.         Objective:     /78 (BP Location: Left arm, Patient Position: Sitting, BP Method: Medium (Manual))   Pulse 98   Temp 97.3 °F (36.3 °C) (Oral)   Resp 18   Ht 5' 4" (1.626 m)   Wt 103.2 kg (227 lb 8.2 oz)   LMP 01/15/2018   SpO2 95%   BMI 39.05 kg/m²     Physical Exam  Vitals signs reviewed.   Constitutional:       Appearance: She is well-developed.   HENT:      Head: Normocephalic and atraumatic.      Right Ear: External ear normal.      Left Ear: External ear normal.      Nose: Nose normal.      Mouth/Throat:      Pharynx: No oropharyngeal exudate.   Eyes:      General:         Right eye: No discharge.         Left eye: No discharge.      Conjunctiva/sclera: Conjunctivae normal.      Pupils: Pupils are equal, " round, and reactive to light.   Neck:      Musculoskeletal: Normal range of motion and neck supple.      Trachea: No tracheal deviation.   Cardiovascular:      Rate and Rhythm: Normal rate and regular rhythm.      Heart sounds: Normal heart sounds. No murmur.   Pulmonary:      Effort: Pulmonary effort is normal.      Breath sounds: Normal breath sounds. No wheezing or rales.   Abdominal:      General: Bowel sounds are normal.      Palpations: Abdomen is soft. Abdomen is not rigid. There is no mass.      Tenderness: There is no abdominal tenderness. There is no guarding.   Lymphadenopathy:      Cervical: No cervical adenopathy.   Neurological:      Mental Status: She is oriented to person, place, and time.      Sensory: No sensory deficit.      Motor: No atrophy.      Gait: Gait normal.      Deep Tendon Reflexes:      Reflex Scores:       Patellar reflexes are 2+ on the right side and 2+ on the left side.        Assessment:       1. Family history of cancer    2. Pain, abdominal, LLQ    3. Visit for screening mammogram        Plan:       Mary was seen today for follow-up and rectal bleeding.    Diagnoses and all orders for this visit:    Pain, abdominal, LLQ  -     amoxicillin-clavulanate 875-125mg (AUGMENTIN) 875-125 mg per tablet; Take 1 tablet by mouth every 12 (twelve) hours. for 7 days  -     Discontinue: fluconazole (DIFLUCAN) 150 MG Tab; 1 tablet orally at first sign of yeast infection and 1 tablet orally 3 days later  Discussed with patient that symptoms are consistent with diverticulitis but CT needed to confirm and to rule out other causes. She states she does not want to go to the hospital. Risk of not getting CT explained.  Will empirically treat with Augmentin but patient advised if not improved within next 5 days needs to urgent evaluation    Family history of cancer  -     Ambulatory referral/consult to Genetics; Future  Referral to genetics placed    Visit for screening mammogram  -     Mammo  Digital Screening Bilat w/ [facility]; Future

## 2020-09-10 ENCOUNTER — TELEPHONE (OUTPATIENT)
Dept: HEMATOLOGY/ONCOLOGY | Facility: CLINIC | Age: 55
End: 2020-09-10

## 2020-09-10 NOTE — TELEPHONE ENCOUNTER
This message is to inform you that the patient has not yet read the following message. (Notification date: September 9, 2020)   Your response is needed    From   Kade Owens DNP To   Mary Naranjo Sent and Delivered   9/8/2020 11:22 AM   Jorge Hernandez,     We are performing [facility]'s 47-gene Common Hereditary Cancers Panel for you, and I was informed by [facility] that this panel does not include the RET gene.  The RET gene is the gene in which your sister had a variant of uncertain significance noted on the report you brought in.  Would you like for me to add the RET gene onto your test panel?  Please let me know, and don't hesitate to reach out with any questions or concerns.     Kade Owens, DERIAN       [facility] [place]    Patient Portal User Last Read On   Mary Naranjo Not Read           ---    Pt provided her permission to add on RET gene.  Notified [facility].  Advised pt we may need to reschedule tomorrow's results appt.

## 2020-09-11 ENCOUNTER — OFFICE VISIT (OUTPATIENT)
Dept: HEMATOLOGY/ONCOLOGY | Facility: CLINIC | Age: 55
End: 2020-09-11
Payer: COMMERCIAL

## 2020-09-11 ENCOUNTER — TELEPHONE (OUTPATIENT)
Dept: HEMATOLOGY/ONCOLOGY | Facility: CLINIC | Age: 55
End: 2020-09-11

## 2020-09-11 VITALS — BODY MASS INDEX: 40.89 KG/M2 | WEIGHT: 230.81 LBS | HEIGHT: 63 IN

## 2020-09-11 DIAGNOSIS — Z80.3 FAMILY HISTORY OF BREAST CANCER: ICD-10-CM

## 2020-09-11 DIAGNOSIS — Z80.0 FAMILY HISTORY OF PANCREATIC CANCER: ICD-10-CM

## 2020-09-11 DIAGNOSIS — Z71.83 ENCOUNTER FOR NONPROCREATIVE GENETIC COUNSELING: Primary | ICD-10-CM

## 2020-09-11 DIAGNOSIS — Z00.00 GENERAL MEDICAL EXAM: ICD-10-CM

## 2020-09-11 PROCEDURE — 99999 PR PBB SHADOW E&M-EST. PATIENT-LVL III: CPT | Mod: PBBFAC,,, | Performed by: NURSE PRACTITIONER

## 2020-09-11 PROCEDURE — 3008F PR BODY MASS INDEX (BMI) DOCUMENTED: ICD-10-PCS | Mod: CPTII,S$GLB,, | Performed by: NURSE PRACTITIONER

## 2020-09-11 PROCEDURE — 3008F BODY MASS INDEX DOCD: CPT | Mod: CPTII,S$GLB,, | Performed by: NURSE PRACTITIONER

## 2020-09-11 PROCEDURE — 99999 PR PBB SHADOW E&M-EST. PATIENT-LVL III: ICD-10-PCS | Mod: PBBFAC,,, | Performed by: NURSE PRACTITIONER

## 2020-09-11 PROCEDURE — 99213 PR OFFICE/OUTPT VISIT, EST, LEVL III, 20-29 MIN: ICD-10-PCS | Mod: S$GLB,,, | Performed by: NURSE PRACTITIONER

## 2020-09-11 PROCEDURE — 99213 OFFICE O/P EST LOW 20 MIN: CPT | Mod: S$GLB,,, | Performed by: NURSE PRACTITIONER

## 2020-09-11 RX ORDER — MELATONIN 10 MG/ML
2000 DROPS ORAL DAILY
COMMUNITY

## 2020-09-11 NOTE — TELEPHONE ENCOUNTER
Call to pt.   Informed pt that Kade Owens has results in for her visit today.   Pt confirmed appt for this afternoon and verbalized understanding.

## 2020-09-11 NOTE — PROGRESS NOTES
"Hereditary and High-Risk Clinic  Department of Hematology and Oncology  [facility]  [facility] [place], [place]    09/11/2020  Provider:  Kade Owens DNP    Patient ID: Mary Naranjo is a 55 y.o. female.    Chief Complaint: encounter for nonprocreative genetic counseling    Referring Provider:  Ramon Olivares Jr., MD  [address] [facility] [facility]  [place] [address]    SUBJECTIVE:      History of Present Illness (HPI):  Established patient presents today, with her , for post-test genetic counseling.    Pedigree    Updates 09/11/2020:  - Family oncology-related history, Genetic testing: Yes, identical twin w/ RET VUS    NidhiEVRGRJulissa, version 8.0b (estimated lifetime risk of breast cancer):  - Height:  5'4.75"  - Weight:  225 lb  - Breast density per BI-RADS:  scattered fibroglandular densities  - Age at menarche:  13 YO  - Age at first live birth:  19 YO  - Age at menopause, if applicable:  ~42 YO  - HRT usage:  never  - BRCA testing:  Performed- negative  - Personal history of ovarian cancer:  no  - Personal history of breast biopsy:  no proliferative disease  - Ashkenazi Advent inheritance:  no  - Family history:  as detailed in pedigree    Review of Systems   - See HPI.    - Patient's Distress Score today was 0/10 (with 10 being the worst).       OBJECTIVE:     Physical Exam   Constitutional: She appears well developed and well nourished. No distress.   Pulmonary/Chest: Effort normal.   Neurological: She is alert.   Psychiatric: She has a normal mood and affect. Her speech is normal and behavior is normal. Her thought content is normal.     Germline Cancer-Genetic Test Results  · Test(s) performed:  [facility] Common Hereditary Cancers Panel + RET gene  Number of genes analyzed:  48 genes  Laboratory:  [facility]  Sample collection date:  08/21/2020  Results report date:  09/10/2020  Full report to be scanned into Epic under the Labs and/or Media tab(s).  FINDINGS:    Gene Variant " "Zygosity Variant Classification   RET C.31C>A ([id number]) heterozygous Uncertain significance     Breast Cancer Risk  Estimated lifetime risk: ~9.6-9.9% per Tyrer-Cuzick version 8.0b today.    COUNSELING:     Mary Naranjo underwent germline genetic testing for hereditary cancer syndromes as detailed above, and it revealed only a variant of uncertain significance (VUS).  A VUS indicates the presence of a variation in the gene's sequencing, without enough data, presently, to classify it as being benign versus pathogenic.  If [facility] reclassifies a VUS as "benign" or "pathogenic," at that time, [facility] should provide notification.  Patients can also call [facility] once per year directly to inquire as to the status of their VUS(s) and to notify a healthcare provider right away of any positive findings.  Provided patient with a copy of her results report and cancer pedigree today.      These results do not completely rule out the possibility of a hereditary cancer, nor do they necessarily indicate that the patient is not at increased risk for certain cancers, as she may share other risk factors, such as environmental and lifestyle factors, with her family members affected by cancer.     Mary does not wish to pursue testing of additional cancer genes after a discussion regarding such today.     Mary is to continue following up with all healthcare providers as they have indicated to her and should ensure that they are each aware of her personal and family histories, especially of cancers, colon polyps, and pancreatic issues, so that her medical management including cancer screenings can be based in part off of these histories.      Mary is established with primary care provider (PCP) Dr. Olivaers and Gynecology provider Dr. Rojas.  I recommend that the patient undergo total-body skin examinations annually with a Dermatology provider; Mary is not established with Dermatology provider, so I have placed " "a referral to Dermatology for the patient.  Patient states her next colonoscopy is scheduled for October 1, 2020.      Based on Mary's family history of pancreatic cancer in her identical twin sister, a visit with a pancreatic gastroenterology (GI) specialist can be considered for pancreatic cancer risk evaluation and management; I have placed a referral for this.    Further regarding the patient's relatives, it is possible for them to have cancer-related genetic mutations that were not identified in Mary.  The patient's relatives should consider speaking with a healthcare provider regarding undergoing genetic counseling/testing; I am available for this purpose, or they can visit [email or web address] to locate a local genetic counselor, and I have shared the contact information for both with the patient and encouraged her to share that information with her relatives.     Mary should update me with any changes to her personal or family history of cancers, colon polyps, and pancreatic issues and with any relative's genetic testing results and check in with me annually to determine if updated genetic testing is indicated for her.     Questions were encouraged and answered to the patient's satisfaction, and she verbalized understanding of information and agreement with the plan.      ASSESSMENT/PLAN:     Mary was seen today for encounter for nonprocreative genetic counseling.    Diagnoses and all orders for this visit:    Encounter for nonprocreative genetic counseling  Mary Naranjo underwent germline genetic testing for hereditary cancer syndromes as detailed above, and it revealed only a variant of uncertain significance (VUS).  A VUS indicates the presence of a variation in the gene's sequencing, without enough data, presently, to classify it as being benign versus pathogenic.  If [facility] reclassifies a VUS as "benign" or "pathogenic," at that time, [facility] should provide notification.  Patients can also call " [facility] once per year directly to inquire as to the status of their VUS(s) and to notify a healthcare provider right away of any positive findings.  Provided patient with a copy of her results report and cancer pedigree today.     Family history of pancreatic cancer  -     Ambulatory referral/consult to Gastroenterology; Future    Family history of breast cancer  Breast Cancer Risk  Estimated lifetime risk: ~9.6-9.9% per Tyrer-Cuzick version 8.0b today.        - Annual mammograms and annual clinical breast exams with gynecologist.         - Monthly breast self-exams, and patient is to report any concerns to her gynecologist promptly.    General medical exam  -     Ambulatory referral/consult to Dermatology; Future    Follow up in about 1 year (around 9/11/2021) for determination of need for updated genetic testing.      During this encounter, I spent approximately 19 minutes face-to-face with this patient, more than half of which was spent counseling the patient and/or coordinating her care.    Kade Olson, LUTHER, APRN, FNP-BC, AOCNP  Nurse Practitioner, Hereditary and High-Risk Clinic  Department of Hematology and Oncology  [facility] [place], 3rd floor  [facility] [address], [place]  [address]  office phone: [phone]    office fax: [phone]     09/11/2020

## 2020-09-11 NOTE — Clinical Note
Daniela- Please schedule with pancreatic GI. Referral is in. Family hx of pancreatic CA in identical twin sister. Thanks.

## 2020-09-12 ENCOUNTER — TELEPHONE (OUTPATIENT)
Dept: ENDOSCOPY | Facility: HOSPITAL | Age: 55
End: 2020-09-12

## 2020-09-16 ENCOUNTER — OFFICE VISIT (OUTPATIENT)
Dept: DERMATOLOGY | Facility: CLINIC | Age: 55
End: 2020-09-16
Payer: COMMERCIAL

## 2020-09-16 DIAGNOSIS — Z12.83 SKIN CANCER SCREENING: ICD-10-CM

## 2020-09-16 DIAGNOSIS — D22.9 MULTIPLE BENIGN NEVI: ICD-10-CM

## 2020-09-16 DIAGNOSIS — D48.5 NEOPLASM OF UNCERTAIN BEHAVIOR OF SKIN: ICD-10-CM

## 2020-09-16 DIAGNOSIS — L72.0 EIC (EPIDERMAL INCLUSION CYST): Primary | ICD-10-CM

## 2020-09-16 DIAGNOSIS — L82.1 SK (SEBORRHEIC KERATOSIS): ICD-10-CM

## 2020-09-16 PROCEDURE — 11102 TANGNTL BX SKIN SINGLE LES: CPT | Mod: S$GLB,,, | Performed by: DERMATOLOGY

## 2020-09-16 PROCEDURE — 88305 TISSUE EXAM BY PATHOLOGIST: ICD-10-PCS | Mod: 26,,, | Performed by: DERMATOLOGY

## 2020-09-16 PROCEDURE — 99203 PR OFFICE/OUTPT VISIT, NEW, LEVL III, 30-44 MIN: ICD-10-PCS | Mod: 25,S$GLB,, | Performed by: DERMATOLOGY

## 2020-09-16 PROCEDURE — 88305 TISSUE EXAM BY PATHOLOGIST: CPT | Mod: 26,,, | Performed by: DERMATOLOGY

## 2020-09-16 PROCEDURE — 99999 PR PBB SHADOW E&M-EST. PATIENT-LVL III: CPT | Mod: PBBFAC,,, | Performed by: DERMATOLOGY

## 2020-09-16 PROCEDURE — 99999 PR PBB SHADOW E&M-EST. PATIENT-LVL III: ICD-10-PCS | Mod: PBBFAC,,, | Performed by: DERMATOLOGY

## 2020-09-16 PROCEDURE — 88342 IMHCHEM/IMCYTCHM 1ST ANTB: CPT | Mod: 26,,, | Performed by: DERMATOLOGY

## 2020-09-16 PROCEDURE — 99203 OFFICE O/P NEW LOW 30 MIN: CPT | Mod: 25,S$GLB,, | Performed by: DERMATOLOGY

## 2020-09-16 PROCEDURE — 88305 TISSUE EXAM BY PATHOLOGIST: CPT | Performed by: DERMATOLOGY

## 2020-09-16 PROCEDURE — 88342 CHG IMMUNOCYTOCHEMISTRY: ICD-10-PCS | Mod: 26,,, | Performed by: DERMATOLOGY

## 2020-09-16 PROCEDURE — 11102 PR TANGENTIAL BIOPSY, SKIN, SINGLE LESION: ICD-10-PCS | Mod: S$GLB,,, | Performed by: DERMATOLOGY

## 2020-09-16 PROCEDURE — 88342 IMHCHEM/IMCYTCHM 1ST ANTB: CPT | Performed by: DERMATOLOGY

## 2020-09-16 NOTE — PROGRESS NOTES
"  Subjective:       Patient ID:  Mary Naranjo is a 55 y.o. female who presents for   Chief Complaint   Patient presents with    Skin Check     TBSE     HPI   Patient is here today for a "mole" check.   Pt has a history of  moderate sun exposure in the past.   Pt recalls several blistering sunburns in the past- no  Pt has history of tanning bed use- no  Pt has  had moles removed in the past- no  Pt has history of melanoma in first degree relatives- No    Pt presents today for TBSE.  Her identical twin sister had pancreatic cancer, and it was recommended that she have a TBSE after counseling with genetics.  Denies any new, changing, or symptomatic lesions on the skin.      Review of Systems   Constitutional: Negative for fever, chills, weight loss, weight gain, fatigue, night sweats and malaise.   Skin: Negative for daily sunscreen use, activity-related sunscreen use and recent sunburn.   Hematologic/Lymphatic: Does not bruise/bleed easily.        Objective:    Physical Exam   Constitutional: She appears well-developed and well-nourished. No distress.   Neurological: She is alert and oriented to person, place, and time. She is not disoriented.   Psychiatric: She has a normal mood and affect.   Skin:   Areas Examined (abnormalities noted in diagram):   Scalp / Hair Palpated and Inspected  Head / Face Inspection Performed  Neck Inspection Performed  Chest / Axilla Inspection Performed  Abdomen Inspection Performed  Genitals / Buttocks / Groin Inspection Performed  Back Inspection Performed  RUE Inspected  LUE Inspection Performed  RLE Inspected  LLE Inspection Performed  Nails and Digits Inspection Performed                           Diagram Legend     Erythematous scaling macule/papule c/w actinic keratosis       Vascular papule c/w angioma      Pigmented verrucoid papule/plaque c/w seborrheic keratosis      Yellow umbilicated papule c/w sebaceous hyperplasia      Irregularly shaped tan macule c/w lentigo     1-2 " mm smooth white papules consistent with Milia      Movable subcutaneous cyst with punctum c/w epidermal inclusion cyst      Subcutaneous movable cyst c/w pilar cyst      Firm pink to brown papule c/w dermatofibroma      Pedunculated fleshy papule(s) c/w skin tag(s)      Evenly pigmented macule c/w junctional nevus     Mildly variegated pigmented, slightly irregular-bordered macule c/w mildly atypical nevus      Flesh colored to evenly pigmented papule c/w intradermal nevus       Pink pearly papule/plaque c/w basal cell carcinoma      Erythematous hyperkeratotic cursted plaque c/w SCC      Surgical scar with no sign of skin cancer recurrence      Open and closed comedones      Inflammatory papules and pustules      Verrucoid papule consistent consistent with wart     Erythematous eczematous patches and plaques     Dystrophic onycholytic nail with subungual debris c/w onychomycosis     Umbilicated papule    Erythematous-base heme-crusted tan verrucoid plaque consistent with inflamed seborrheic keratosis     Erythematous Silvery Scaling Plaque c/w Psoriasis     See annotation      Assessment / Plan:      Neoplasm of uncertain behavior of skin  Shave biopsy procedure note:    Shave biopsy performed after verbal consent including risk of infection, scar, recurrence, need for additional treatment of site. Area prepped with alcohol, anesthetized with approximately 1.0cc of 1% lidocaine with epinephrine. Lesional tissue shaved with razor blade. Hemostasis achieved with application of aluminum chloride followed by hyfrecation. No complications. Dressing applied. Wound care explained.    -     Specimen to Pathology, Dermatology  Pathology Orders:     Normal Orders This Visit    Specimen to Pathology, Dermatology     Comments:    Number of Specimens:->1  ------------------------->-------------------------  Spec 1 Procedure:->Biopsy  Spec 1 Clinical Impression:->r/o IDN vs atypical nevus vs  melanoma vs other  Spec 1 Source:->R  posterior shoulder    Questions:    Procedure Type: Dermatology and skin neoplasms    Number of Specimens: 1    ------------------------: -------------------------    Spec 1 Procedure: Biopsy    Spec 1 Clinical Impression: r/o IDN vs atypical nevus vs melanoma vs other    Spec 1 Source: R posterior shoulder        EIC (epidermal inclusion cyst)  This is a benign cyst of the hair follicle. Reassurance provided. No treatment is necessary unless it is symptomatic.     SK (seborrheic keratosis)  These are benign inherited growths without a malignant potential. Reassurance given to patient. No treatment is necessary.   Treatment of benign, asymptomatic lesions may be considered cosmetic.  Warned about risk of hypo- or hyperpigmentation with treatment and risk of recurrence.    Multiple benign nevi  Benign-appearing on exam today. Counseled pt to monitor mole(s) and return to clinic if any changes noted or symptoms (bleeding, itching, pain, etc) noted. Brochure provided.    Skin cancer screening  Total body skin examination performed today including at least 12 points as noted in physical examination. Suspicious lesions noted above.  Patient instructed in importance of daily broad spectrum sunscreen use with spf at least 30. Sun avoidance and topical protection/protective clothing discussed.    Follow up in about 1 year (around 9/16/2021) for skin check or sooner pending biopsy results.

## 2020-09-16 NOTE — PATIENT INSTRUCTIONS
Summer Sun Protection      The [facility] Department of Dermatology would like to remind you of the importance of sun protection all year round and particularly during the summer when the suns rays are the strongest. It has been proven that both acute and chronic sun exposure damages our cells and leads to skin cancer. Beyond skin cancer, the sun causes 90% of the symptoms of pre-mature skin aging, including wrinkles, lentigines (brown spots), and thin, easily bruised skin. Proper sun protection can help prevent these unwanted conditions.    Many patients report that the dont go in the sun. It has been shown that the average person receives 18 hours of incidental sun exposure per week during activities such as walking through parking lots, driving, or sitting next to windows. This accumulates to several bad sunburns per year!    In choosing sunscreen, you want one that protects against both UVA and UVB rays. It is recommended that you use one of SPF 30 or higher. It is important to apply the sunscreen about 20 minutes prior to sun exposure. Most sunscreens are chemical sunscreens and a reaction must take place in the skin so that they are effective. If they are applied and then you are immediately exposed to the sun or start sweating, this reaction has not had time to take place and you are therefore unprotected. Sunscreen needs to be reapplied every 2 hours if you are participating in water sports or sweating. We recommend Elta MD or Neutrogena Ultra Sheer Dry Touch SPF 55 for daily use; however there are many options and it is most important for you to find one that you will use on a consistent basis.    If you have sensitive skin, you may do best with a sunscreen that contains only physical blockers such as titanium dioxide or zinc oxide. These are typically thicker and harder to apply, however they afford very good protection. Neutrogena Sensitive Skin, Blue Lizard Sensitive Skin (pink top) or Neutrogena Pure  "and Free are popular ones.     Aside from sunscreen, clothes with UV protection, wide brimmed hats, and sunglasses are other means of sun protection that we recommend.                        [facility] [address]  [facility] [address]  Dept: [phone]  Dept Fax: [phone]                                                                                Shave Biopsy Wound Care    Your doctor has performed a shave biopsy today.  A band aid and vaseline ointment has been placed over the site.  This should remain in place for 24 hours.  It is recommended that you keep the area dry for the first 24 hours.  After 24 hours, you may remove the band aid and wash the area with warm soap and water and apply Vaseline jelly.  Many patients prefer to use Neosporin or Bacitracin ointment.  This is acceptable; however, know that you can develop an allergy to this medication even if you have used it safely for years.  It is important to keep the area moist.  Letting it dry out and get air slows healing time, and will worsen the scar.  Band aid is optional after first 24 hours.      If you notice increasing redness, tenderness, pain, or yellow drainage at the biopsy site, please notify your doctor.  These are signs of an infection.    If your biopsy site is bleeding, apply firm pressure for 15 minutes straight.  Repeat for another 15 minutes, if it is still bleeding.   If the surgical site continues to bleed, then please contact your doctor.      For [facility] users:   You will receive a [facility] notification after the pathologist has finished reviewing your biopsy specimen. Pathology results, however, will not be released online so you will see a "no content" message. Once your dermatologist reviews and clinically correlates your biopsy results, you will either receive a letter in the mail with the results of a phone call from your doctor's office if further " explanation or treatment is warranted.       [address] [place] [address]/ [phone] FAX/ [email or web address]

## 2020-09-16 NOTE — LETTER
September 16, 2020      [facility] - Dermatology [address]  [address]  [facility] [address]  Phone: [phone]  Fax: [phone]       Patient: Mary Naranjo   YOB: 1965  Date of Visit: 09/16/2020    To Whom It May Concern:    Sheila Naranjo  was at [facility] on 09/16/2020. She may return to work/school on 9/16/2020 with no restrictions. If you have any questions or concerns, or if I can be of further assistance, please do not hesitate to contact me.    Sincerely,    Kade Rosas MA

## 2020-09-17 ENCOUNTER — PATIENT OUTREACH (OUTPATIENT)
Dept: ADMINISTRATIVE | Facility: OTHER | Age: 55
End: 2020-09-17

## 2020-09-17 ENCOUNTER — TELEPHONE (OUTPATIENT)
Dept: FAMILY MEDICINE | Facility: CLINIC | Age: 55
End: 2020-09-17

## 2020-09-17 NOTE — PROGRESS NOTES
Health Maintenance Due   Topic Date Due    Hepatitis C Screening  1965    Shingles Vaccine (1 of 2) 04/30/2016    Influenza Vaccine (1) 08/01/2020    Cervical Cancer Screening  08/25/2020    Colorectal Cancer Screening  10/07/2020     Updates were requested from care everywhere.  Chart was reviewed for overdue Proactive [facility] Encounters (ROXY) topics (CRS, Breast Cancer Screening, Eye exam)  Health Maintenance has been updated.  LINKS immunization registry triggered.  Immunizations were reconciled.

## 2020-09-17 NOTE — TELEPHONE ENCOUNTER
----- Message from Robin Corea sent at 9/17/2020  3:59 PM CDT -----  Name of Who is Calling: ANA PORTILLO [[id number]]    What is the request in detail: Patient is requesting lab orders Please contact to further discuss and advise      Can the clinic reply by [facility]: No    [facility]  [phone] (home)

## 2020-09-18 ENCOUNTER — TELEPHONE (OUTPATIENT)
Dept: ENDOSCOPY | Facility: HOSPITAL | Age: 55
End: 2020-09-18

## 2020-09-18 ENCOUNTER — OFFICE VISIT (OUTPATIENT)
Dept: FAMILY MEDICINE | Facility: CLINIC | Age: 55
End: 2020-09-18
Payer: COMMERCIAL

## 2020-09-18 ENCOUNTER — OFFICE VISIT (OUTPATIENT)
Dept: OBSTETRICS AND GYNECOLOGY | Facility: CLINIC | Age: 55
End: 2020-09-18
Payer: COMMERCIAL

## 2020-09-18 ENCOUNTER — HOSPITAL ENCOUNTER (OUTPATIENT)
Dept: RADIOLOGY | Facility: HOSPITAL | Age: 55
Discharge: HOME | End: 2020-09-18
Attending: OBSTETRICS & GYNECOLOGY
Payer: COMMERCIAL

## 2020-09-18 VITALS
WEIGHT: 227.63 LBS | BODY MASS INDEX: 40.33 KG/M2 | HEIGHT: 63 IN | SYSTOLIC BLOOD PRESSURE: 130 MMHG | DIASTOLIC BLOOD PRESSURE: 62 MMHG

## 2020-09-18 VITALS
TEMPERATURE: 98 F | SYSTOLIC BLOOD PRESSURE: 124 MMHG | HEIGHT: 63 IN | OXYGEN SATURATION: 98 % | DIASTOLIC BLOOD PRESSURE: 71 MMHG | WEIGHT: 228.63 LBS | BODY MASS INDEX: 40.51 KG/M2 | RESPIRATION RATE: 18 BRPM | HEART RATE: 60 BPM

## 2020-09-18 DIAGNOSIS — Z11.59 NEED FOR HEPATITIS C SCREENING TEST: ICD-10-CM

## 2020-09-18 DIAGNOSIS — R10.2 PELVIC PAIN: ICD-10-CM

## 2020-09-18 DIAGNOSIS — Z00.00 ROUTINE MEDICAL EXAM: Primary | ICD-10-CM

## 2020-09-18 DIAGNOSIS — Z12.31 SCREENING MAMMOGRAM, ENCOUNTER FOR: ICD-10-CM

## 2020-09-18 DIAGNOSIS — Z80.0 FAMILY HISTORY OF PANCREATIC CANCER: ICD-10-CM

## 2020-09-18 DIAGNOSIS — Z23 NEED FOR VACCINATION: ICD-10-CM

## 2020-09-18 DIAGNOSIS — Z12.11 SCREENING FOR COLORECTAL CANCER: ICD-10-CM

## 2020-09-18 DIAGNOSIS — D50.9 MICROCYTIC ANEMIA: ICD-10-CM

## 2020-09-18 DIAGNOSIS — Z13.6 ENCOUNTER FOR LIPID SCREENING FOR CARDIOVASCULAR DISEASE: ICD-10-CM

## 2020-09-18 DIAGNOSIS — Z01.419 WELL WOMAN EXAM WITH ROUTINE GYNECOLOGICAL EXAM: Primary | ICD-10-CM

## 2020-09-18 DIAGNOSIS — Z12.12 SCREENING FOR COLORECTAL CANCER: ICD-10-CM

## 2020-09-18 DIAGNOSIS — Z13.220 ENCOUNTER FOR LIPID SCREENING FOR CARDIOVASCULAR DISEASE: ICD-10-CM

## 2020-09-18 PROCEDURE — 99999 PR PBB SHADOW E&M-EST. PATIENT-LVL IV: CPT | Mod: PBBFAC,,, | Performed by: FAMILY MEDICINE

## 2020-09-18 PROCEDURE — 77063 BREAST TOMOSYNTHESIS BI: CPT | Mod: 26,,, | Performed by: RADIOLOGY

## 2020-09-18 PROCEDURE — 3008F BODY MASS INDEX DOCD: CPT | Mod: CPTII,S$GLB,, | Performed by: OBSTETRICS & GYNECOLOGY

## 2020-09-18 PROCEDURE — 99396 PREV VISIT EST AGE 40-64: CPT | Mod: S$GLB,,, | Performed by: OBSTETRICS & GYNECOLOGY

## 2020-09-18 PROCEDURE — 3008F PR BODY MASS INDEX (BMI) DOCUMENTED: ICD-10-PCS | Mod: CPTII,S$GLB,, | Performed by: FAMILY MEDICINE

## 2020-09-18 PROCEDURE — 99999 PR PBB SHADOW E&M-EST. PATIENT-LVL III: ICD-10-PCS | Mod: PBBFAC,,, | Performed by: OBSTETRICS & GYNECOLOGY

## 2020-09-18 PROCEDURE — 77067 MAMMO DIGITAL SCREENING BILAT WITH TOMO: ICD-10-PCS | Mod: 26,,, | Performed by: RADIOLOGY

## 2020-09-18 PROCEDURE — 90471 FLU VACCINE (QUAD) GREATER THAN OR EQUAL TO 3YO PRESERVATIVE FREE IM: ICD-10-PCS | Mod: S$GLB,,, | Performed by: FAMILY MEDICINE

## 2020-09-18 PROCEDURE — 87624 HPV HI-RISK TYP POOLED RSLT: CPT

## 2020-09-18 PROCEDURE — 99396 PREV VISIT EST AGE 40-64: CPT | Mod: 25,S$GLB,, | Performed by: FAMILY MEDICINE

## 2020-09-18 PROCEDURE — 77067 SCR MAMMO BI INCL CAD: CPT | Mod: TC

## 2020-09-18 PROCEDURE — 99396 PR PREVENTIVE VISIT,EST,40-64: ICD-10-PCS | Mod: 25,S$GLB,, | Performed by: FAMILY MEDICINE

## 2020-09-18 PROCEDURE — 3008F BODY MASS INDEX DOCD: CPT | Mod: CPTII,S$GLB,, | Performed by: FAMILY MEDICINE

## 2020-09-18 PROCEDURE — 99396 PR PREVENTIVE VISIT,EST,40-64: ICD-10-PCS | Mod: S$GLB,,, | Performed by: OBSTETRICS & GYNECOLOGY

## 2020-09-18 PROCEDURE — 90471 IMMUNIZATION ADMIN: CPT | Mod: S$GLB,,, | Performed by: FAMILY MEDICINE

## 2020-09-18 PROCEDURE — 77063 MAMMO DIGITAL SCREENING BILAT WITH TOMO: ICD-10-PCS | Mod: 26,,, | Performed by: RADIOLOGY

## 2020-09-18 PROCEDURE — 88175 CYTOPATH C/V AUTO FLUID REDO: CPT

## 2020-09-18 PROCEDURE — 99999 PR PBB SHADOW E&M-EST. PATIENT-LVL III: CPT | Mod: PBBFAC,,, | Performed by: OBSTETRICS & GYNECOLOGY

## 2020-09-18 PROCEDURE — 90686 FLU VACCINE (QUAD) GREATER THAN OR EQUAL TO 3YO PRESERVATIVE FREE IM: ICD-10-PCS | Mod: S$GLB,,, | Performed by: FAMILY MEDICINE

## 2020-09-18 PROCEDURE — 90686 IIV4 VACC NO PRSV 0.5 ML IM: CPT | Mod: S$GLB,,, | Performed by: FAMILY MEDICINE

## 2020-09-18 PROCEDURE — 77067 SCR MAMMO BI INCL CAD: CPT | Mod: 26,,, | Performed by: RADIOLOGY

## 2020-09-18 PROCEDURE — 99999 PR PBB SHADOW E&M-EST. PATIENT-LVL IV: ICD-10-PCS | Mod: PBBFAC,,, | Performed by: FAMILY MEDICINE

## 2020-09-18 PROCEDURE — 3008F PR BODY MASS INDEX (BMI) DOCUMENTED: ICD-10-PCS | Mod: CPTII,S$GLB,, | Performed by: OBSTETRICS & GYNECOLOGY

## 2020-09-18 NOTE — PROGRESS NOTES
History & Physical  Gynecology      SUBJECTIVE:     Chief Complaint: Well Woman and Pelvic Pain       History of Present Illness:  Annual Exam-Postmenopausal  Ms. Naranjo is a 54 y/o female who presents for annual exam. The patient reports that she has been experiencing some pelvic/leg pain. She reports that her twin sister recently passed away and she has been taking care of her. She had to pick her up out of bed and move her herself. She reports that she noticed the pain in the later part of the last 6 months. She takes Tylenol which does not help much. She reports that the discomfort is constant.      The patient is not sexually active. GYN screening history: last pap: approximate date  and was normal and last mammogram: today; results pending. The patient is not taking hormone replacement therapy. Patient denies post-menopausal vaginal bleeding. The patient wears seatbelts: yes. The patient participates in regular exercise: no. Has the patient ever been transfused or tattooed?: not asked. The patient reports that there is not domestic violence in her life.    Review of patient's allergies indicates:  No Known Allergies    Past Medical History:   Diagnosis Date    Arthritis     Dental bridge present     upper removable partial    Fibrocystic breast     Migraines     Obesity      Past Surgical History:   Procedure Laterality Date    BREAST BIOPSY      BREAST CYST ASPIRATION      COLONOSCOPY N/A 10/7/2015    Procedure: COLONOSCOPY;  Surgeon: Eagle Stack MD;  Location: [facility];  Service: Endoscopy;  Laterality: N/A;    DILATION AND CURETTAGE OF UTERUS      ESOPHAGOGASTRODUODENOSCOPY N/A 2019    Procedure: ESOPHAGOGASTRODUODENOSCOPY (EGD);  Surgeon: Cherrie Sheets MD;  Location: [facility];  Service: Endoscopy;  Laterality: N/A;    fibrocystic breast       KNEE ARTHROSCOPY W/ MENISCAL REPAIR      TUBAL LIGATION       OB History        2    Para   2    Term   2            AB         Living   2       SAB        TAB        Ectopic        Multiple        Live Births               Obstetric Comments   Menopause @ age 45  MMG 6/2017 NEG  H/o abnormal pap, no treatment needed.  Pap neg 8/2017  c-scope in 2015             Family History   Problem Relation Age of Onset    Heart disease Mother     Cataracts Mother     Glaucoma Mother     Cancer Father         colon at ~60; lung, gallbladder, skin, prostate, brain- unsure which are primary vs secondary    No Known Problems Brother     Asthma Maternal Aunt     No Known Problems Maternal Grandmother     No Known Problems Maternal Grandfather     No Known Problems Paternal Grandmother     No Known Problems Paternal Grandfather     Pancreatic cancer Sister 54    Breast cancer Sister 42        unilateral    Cancer Maternal Cousin         unknown origin    Cervical cancer Other     Cancer Other         possible ovarian cancer    Breast cancer Maternal Cousin     Cancer Maternal Cousin         prostate?    Prostate cancer Maternal Cousin     COPD Neg Hx     Amblyopia Neg Hx     Blindness Neg Hx     Diabetes Neg Hx     Hypertension Neg Hx     Macular degeneration Neg Hx     Retinal detachment Neg Hx     Strabismus Neg Hx     Stroke Neg Hx     Thyroid disease Neg Hx      Social History     Tobacco Use    Smoking status: Never Smoker    Smokeless tobacco: Never Used   Substance Use Topics    Alcohol use: No    Drug use: No       Current Outpatient Medications   Medication Sig    cholecalciferol, vitamin D3, 125 mcg (5,000 unit) Tab Take 10,000 Units by mouth once daily. Per patient    multivit/folic acid/vit K1 (ONE-A-DAY WOMEN'S 50 PLUS ORAL) Take 1 capsule by mouth once daily. Per patient, One-a-Day Women's 50+ Multivitamin Complete    UNABLE TO FIND Take 2 capsules by mouth once daily. medication name: Digestive Advantage     No current facility-administered medications for this visit.          Review of Systems:  Review of  Systems   Constitutional: Negative for chills and fever.   Eyes: Negative for visual disturbance.   Respiratory: Negative for cough and wheezing.    Cardiovascular: Negative for chest pain and palpitations.   Gastrointestinal: Negative for abdominal pain, nausea and vomiting.   Genitourinary: Positive for pelvic pain. Negative for dysuria, frequency, hematuria, vaginal bleeding, vaginal discharge and vaginal pain.        Pain in bilateral hip vs pelvis   Neurological: Negative for headaches.   Psychiatric/Behavioral: Negative for depression.        OBJECTIVE:     Physical Exam:  Physical Exam  Vitals signs and nursing note reviewed. Exam conducted with a chaperone present.   Constitutional:       Appearance: She is well-developed.   Cardiovascular:      Rate and Rhythm: Normal rate.   Pulmonary:      Effort: Pulmonary effort is normal. No respiratory distress.   Chest:      Breasts: Breasts are symmetrical.     Abdominal:      General: There is no distension.      Palpations: Abdomen is soft.      Tenderness: There is no abdominal tenderness.   Genitourinary:     Vagina: No vaginal discharge.      Comments: Uterus small and mobile. Pain in bilateral inguinal areas. No pelvic pain identified.  Skin:     General: Skin is warm and dry.   Neurological:      Mental Status: She is alert and oriented to person, place, and time.           ASSESSMENT:       ICD-10-CM ICD-9-CM    1. Well woman exam with routine gynecological exam  Z01.419 V72.31 Liquid-Based Pap Smear, Screening      HPV High Risk Genotypes, PCR   2. Pelvic pain  R10.2 [id number]       Plan:      Mary was seen today for well woman and pelvic pain.    Diagnoses and all orders for this visit:    Well woman exam with routine gynecological exam  -     Liquid-Based Pap Smear, Screening  -     HPV High Risk Genotypes, PCR  - Mammogram done today  - Colonoscopy scheduled for 10/01    Pelvic pain  - Pain likely 2/2 strain from picking up ill twin sister  - Advised  patient to try ibuprofen but declined so advised her to use heating pad (precautions given) and to soak in warm bath with epsom salts      Orders Placed This Encounter   Procedures    HPV High Risk Genotypes, PCR       Follow up in about 1 year (around 9/18/2021) for Well Woman/Annual.    Counseling time: 15 minutes    Araceli Rojas

## 2020-09-18 NOTE — PATIENT INSTRUCTIONS
Prevention Guidelines, Women Ages 50 to 64  Screening tests and vaccines are an important part of managing your health. Health counseling is essential, too. Below are guidelines for these, for women ages 50 to 64. Talk with your healthcare provider to make sure youre up to date on what you need.  Screening Who needs it How often   Type 2 diabetes or prediabetes All adults beginning at age 45 and adults without symptoms at any age who are overweight or obese and have 1 or more additional risk factors for diabetes. At  least every 3 years   Alcohol misuse All women in this age group At routine exams   Blood pressure All women in this age group Every 2 years if your blood pressure is less than 120/80 mm Hg; yearly if your systolic blood pressure is 120 to 139 mm Hg, or your diastolic blood pressure reading is 80 to 89 mm Hg   Breast cancer All women in this age group Yearly mammogram and clinical breast exam1   Cervical cancer All women in this age group, except women who have had a complete hysterectomy Pap test every 3 years or Pap test with human papillomavirus (HPV) test every 5 years   Chlamydia Women at increased risk for infection At routine exams   Colorectal cancer All women in this age group Flexible sigmoidoscopy every 5 years, or colonoscopy every 10 years, or double-contrast barium enema every 5 years; yearly fecal occult blood test or fecal immunochemical test; or a stool DNA test as often as your health care provider advises; talk with your health care provider about which tests are best for you   Depression All women in this age group At routine exams   Gonorrhea Sexually active women at increased risk for infection At routine exams   Hepatitis C Anyone at increased risk; 1 time for those born between 1945 and 1965 At routine exams   High cholesterol or triglycerides All women in this age group who are at risk for coronary artery disease At least every 5 years   HIV All women At routine exams   Lung  cancer Adults age 55 to 80 who have smoked Yearly screening in smokers with 30 pack-year history of smoking or who quit within 15 years   Obesity All women in this age group At routine exams   Osteoporosis Women who are postmenopausal Ask your healthcare provider   Syphilis Women at increased risk for infection - talk with your healthcare provider At routine exams   Tuberculosis Women at increased risk for infection - talk with your healthcare provider Ask your healthcare provider   Vision All women in this age group Ask your healthcare provider   Vaccine Who needs it How often   Chickenpox (varicella) All women in this age group who have no record of this infection or vaccine 2 doses; the second dose should be given at least 4 weeks after the first dose   Hepatitis A Women at increased risk for infection - talk with your healthcare provider 2 doses given at least 6 months apart   Hepatitis B Women at increased risk for infection - talk with your healthcare provider 3 doses over 6 months; second dose should be given 1 month after the first dose; the third dose should be given at least 2 months after the second dose and at least 4 months after the first dose   Haemophilus influenzaeType B (HIB) Women at increased risk for infection - talk with your healthcare provider 1 to 3 doses   Influenza (flu) All women in this age group Once a year   Measles, mumps, rubella (MMR) Women in this age group through their late 50s who have no record of these infections or vaccines 1 dose   Meningococcal Women at increased risk for infection - talk with your healthcare provider 1 or more doses   Pneumococcal conjugate vaccine (PCV13) and pneumococcal polysaccharide vaccine (PPSV23) Women at increased risk for infection - talk with your healthcare provider PCV13: 1 dose ages 19 to 65 (protects against 13 types of pneumococcal bacteria)  PPSV23: 1 to 2 doses through age 64, or 1 dose at 65 or older (protects against 23 types of  pneumococcal bacteria)   Tetanus/diphtheria/pertussis (Td/Tdap) booster All women in this age group Td every 10 years, or a one-time dose of Tdap instead of a Td booster after age 18, then Td every 10 years   Zoster All women ages 60 and older 1 dose   Counseling Who needs it How often   BRCA gene mutation testing for breast and ovarian cancer susceptibility Women with increased risk for having gene mutation When your risk is known   Breast cancer and chemoprevention Women at high risk for breast cancer When your risk is known   Diet and exercise Women who are overweight or obese When diagnosed, and then at routine exams   Sexually transmitted infection prevention Women at increased risk for infection - talk with your healthcare provider At routine exams   Use of daily aspirin Women ages 55 and up in this age group who are at risk for cardiovascular health problems such as stroke When your risk is known   Use of tobacco and the health effects it can cause All women in this age group Every exam   1American [employer] Society  Date Last Reviewed: 1/26/2016  © 7704-9774 [employer]. [address] [place] [address], [place] [address]. All rights reserved. This information is not intended as a substitute for professional medical care. Always follow your healthcare professional's instructions.

## 2020-09-22 LAB
FINAL PATHOLOGIC DIAGNOSIS: NORMAL
GROSS: NORMAL
MICROSCOPIC EXAM: NORMAL

## 2020-09-24 LAB
HPV HR 12 DNA SPEC QL NAA+PROBE: NEGATIVE
HPV16 AG SPEC QL: NEGATIVE
HPV18 DNA SPEC QL NAA+PROBE: NEGATIVE

## 2020-09-25 LAB
GENETIC COUNSELING?: YES
GENSO SPECIMEN TYPE: NORMAL
MISCELLANEOUS GENETIC TEST NAME: NORMAL
PARTENTAL OR SIBLING TESTING?: NO
REFERENCE LAB: NORMAL
TEST RESULT: NORMAL

## 2020-09-28 NOTE — PROGRESS NOTES
Subjective:       Patient ID: Mary Naranjo is a 55 y.o. female.    Chief Complaint: Annual Exam (Pt has no complaints on today )    HPI   55 year old female comes in for annual exam. Reports concern of lower abdominal/pelvic pain for last several weeks. No vaginal discharge/bleeding. Not related to voiding or bowel movements.    Review of Systems   Constitutional: Negative for unexpected weight change.   HENT: Negative for ear pain and sore throat.    Eyes: Negative for visual disturbance.   Respiratory: Negative for shortness of breath.    Cardiovascular: Negative for chest pain.   Gastrointestinal: Negative for abdominal pain and blood in stool.   Endocrine: Negative for cold intolerance and heat intolerance.   Genitourinary: Positive for pelvic pain. Negative for dysuria and frequency.   Integumentary:  Negative for rash.   Neurological: Negative for weakness, numbness and headaches.   Hematological: Negative for adenopathy.   Psychiatric/Behavioral: Negative for suicidal ideas.         Objective:      Physical Exam  Vitals signs reviewed.   Constitutional:       General: She is not in acute distress.     Appearance: She is well-developed. She is not diaphoretic.   HENT:      Head: Normocephalic and atraumatic.      Right Ear: Tympanic membrane, ear canal and external ear normal.      Left Ear: Tympanic membrane, ear canal and external ear normal.      Nose: Nose normal.   Eyes:      General:         Right eye: No discharge.         Left eye: No discharge.      Conjunctiva/sclera: Conjunctivae normal.      Pupils: Pupils are equal, round, and reactive to light.   Neck:      Musculoskeletal: Normal range of motion and neck supple.      Thyroid: No thyromegaly.      Trachea: No tracheal deviation.   Cardiovascular:      Rate and Rhythm: Normal rate and regular rhythm.      Pulses:           Radial pulses are 2+ on the right side and 2+ on the left side.      Heart sounds: Normal heart sounds, S1 normal and S2  normal. No murmur.   Pulmonary:      Effort: Pulmonary effort is normal. No respiratory distress.      Breath sounds: Normal breath sounds. No wheezing, rhonchi or rales.   Abdominal:      General: Bowel sounds are normal. There is no distension.      Palpations: Abdomen is soft. Abdomen is not rigid. There is no mass.      Tenderness: There is no abdominal tenderness. There is no guarding.   Lymphadenopathy:      Cervical: No cervical adenopathy.   Skin:     General: Skin is warm and dry.      Capillary Refill: Capillary refill takes less than 2 seconds.      Findings: No rash.   Neurological:      Mental Status: She is alert and oriented to person, place, and time.      Cranial Nerves: No cranial nerve deficit.      Sensory: No sensory deficit.      Motor: No atrophy or abnormal muscle tone.      Deep Tendon Reflexes:      Reflex Scores:       Patellar reflexes are 2+ on the right side and 2+ on the left side.  Psychiatric:         Behavior: Behavior normal.         Assessment:       1. Routine medical exam    2. Encounter for lipid screening for cardiovascular disease    3. Need for hepatitis C screening test    4. Family history of pancreatic cancer    5. Screening for colorectal cancer    6. Pelvic pain    7. Microcytic anemia    8. Need for vaccination        Plan:       Mary was seen today for annual exam.    Diagnoses and all orders for this visit:    Routine medical exam  -     Comprehensive metabolic panel; Future  -     Lipid Panel; Future  -     Hepatitis C Antibody; Future  Age appropriate recommendations reviewed.  Screening labs ordered.    Encounter for lipid screening for cardiovascular disease  -     Lipid Panel; Future    Need for hepatitis C screening test  -     Hepatitis C Antibody; Future  Screen for HCV as per USPSTF guidelines    Family history of pancreatic cancer  Has seen genetics    Screening for colorectal cancer  -     Case request GI: COLONOSCOPY    Pelvic pain  -     CT Abdomen  Pelvis With Contrast; Future  Check CT pelvis.    Microcytic anemia  -     CBC auto differential; Future  -     Iron and TIBC; Future  -     Ferritin; Future  Check anemia studies.    Need for vaccination  -     Influenza - Quadrivalent *Preferred* (6 months+) (PF)

## 2020-10-01 ENCOUNTER — PATIENT OUTREACH (OUTPATIENT)
Dept: ADMINISTRATIVE | Facility: HOSPITAL | Age: 55
End: 2020-10-01

## 2020-10-04 LAB
FINAL PATHOLOGIC DIAGNOSIS: NORMAL
Lab: NORMAL

## 2020-10-15 ENCOUNTER — INITIAL CONSULT (OUTPATIENT)
Dept: GASTROENTEROLOGY | Facility: CLINIC | Age: 55
End: 2020-10-15
Payer: COMMERCIAL

## 2020-10-15 ENCOUNTER — TELEPHONE (OUTPATIENT)
Dept: GASTROENTEROLOGY | Facility: CLINIC | Age: 55
End: 2020-10-15

## 2020-10-15 VITALS
BODY MASS INDEX: 38.39 KG/M2 | WEIGHT: 224.88 LBS | HEIGHT: 64 IN | HEART RATE: 59 BPM | SYSTOLIC BLOOD PRESSURE: 137 MMHG | DIASTOLIC BLOOD PRESSURE: 81 MMHG

## 2020-10-15 DIAGNOSIS — Z80.0 FAMILY HISTORY OF PANCREATIC CANCER: ICD-10-CM

## 2020-10-15 PROCEDURE — 3008F BODY MASS INDEX DOCD: CPT | Mod: CPTII,S$GLB,, | Performed by: INTERNAL MEDICINE

## 2020-10-15 PROCEDURE — 99203 OFFICE O/P NEW LOW 30 MIN: CPT | Mod: S$GLB,,, | Performed by: INTERNAL MEDICINE

## 2020-10-15 PROCEDURE — 99999 PR PBB SHADOW E&M-EST. PATIENT-LVL IV: CPT | Mod: PBBFAC,,, | Performed by: INTERNAL MEDICINE

## 2020-10-15 PROCEDURE — 99999 PR PBB SHADOW E&M-EST. PATIENT-LVL IV: ICD-10-PCS | Mod: PBBFAC,,, | Performed by: INTERNAL MEDICINE

## 2020-10-15 PROCEDURE — 99203 PR OFFICE/OUTPT VISIT, NEW, LEVL III, 30-44 MIN: ICD-10-PCS | Mod: S$GLB,,, | Performed by: INTERNAL MEDICINE

## 2020-10-15 PROCEDURE — 3008F PR BODY MASS INDEX (BMI) DOCUMENTED: ICD-10-PCS | Mod: CPTII,S$GLB,, | Performed by: INTERNAL MEDICINE

## 2020-10-15 NOTE — LETTER
October 15, 2020      [facility] [address]  [address]  [facility] [address]  Phone: [phone]  Fax: [phone]       Patient: Mary Naranjo   YOB: 1965  Date of Visit: 10/15/2020    To Whom It May Concern:    Sheila Naranjo  was at [facility] on 10/15/2020. She may return to work on 10/16/2020 with no restrictions. If you have any questions or concerns, or if I can be of further assistance, please do not hesitate to contact me.    Sincerely,    Kei Juarez MD.

## 2020-10-15 NOTE — LETTER
October 15, 2020      Kade Owens, LUTHER  [address]  [facility] [address]           [facility] [address]  [address]  [facility] [address]  Phone: [phone]  Fax: [phone]          Patient: Mary Naranjo   MR Number: [id number]   YOB: 1965   Date of Visit: 10/15/2020       Dear Kade Owens:    Thank you for referring Mary Naranjo to me for evaluation. Attached you will find relevant portions of my assessment and plan of care.    If you have questions, please do not hesitate to call me. I look forward to following Mary Naranjo along with you.    Sincerely,    Kei Juarez MD    Enclosure  CC:  No Recipients    If you would like to receive this communication electronically, please contact [email or web address] or [phone] to request more information on [facility] Link access.    For providers and/or their staff who would like to refer a patient to [facility], please contact us through our one-stop-shop provider referral line, [facility] [place], at [phone].    If you feel you have received this communication in error or would no longer like to receive these types of communications, please e-mail [email or web address]

## 2020-10-15 NOTE — PROGRESS NOTES
The patient is here today to discuss screening for pancreatic cancer.  She had a twin sister who was diagnosed with pancreatic cancer in February of this year.  This twin sister recently passed away she has other family history as below:  Her family history includes breast   oldest aunt with ovarian cancer  Father with multiple cancers    This discussion was held with  the patient and the patient's sister over the telephone.  The patient describes significant stress related to her twin sister's diagnosis she feels as if a cover is over her head and she has trouble getting through her day.  She recently had a colonoscopy and mammogram that were all negative.  She would like to have some relief knowing that she currently does not have pancreatic cancer.    We discussed pancreatic cancer screening strategies that include an endoscopic ultrasound yearly.  I described in detail the procedure to the patient and the patient's sister.  Currently the patient has no symptoms of weight loss jaundice abdominal discomfort reflux or other GI issues.    She would like to proceed with pancreatic cancer screening and I have placed orders for an endoscopic ultrasound.  Likely will pursue yearly endoscopic ultrasounds for the purposes of pancreatic cancer screening.  We did discuss that her current family history is not classic for the development of pancreatic cancer however the presence of pancreatic cancer in an identical twin is not discussed in current guidelines regarding screening for pancreatic cancer    I spent 30 minutes with the patient., over 50% of it in coordinating care and counseling the patient

## 2020-10-19 ENCOUNTER — TELEPHONE (OUTPATIENT)
Dept: ENDOSCOPY | Facility: HOSPITAL | Age: 55
End: 2020-10-19

## 2020-10-19 NOTE — TELEPHONE ENCOUNTER
Spoke with patient. EUS scheduled 11/3 at 8a. Reviewed prep instructions. Ms Naranjo verbalized understanding.

## 2020-10-19 NOTE — TELEPHONE ENCOUNTER
----- Message from Shweta Miller MA sent at 10/19/2020  3:12 PM CDT -----  Contact: [phone]  Returning a call to set up an EUS   [phone]

## 2020-10-22 ENCOUNTER — TELEPHONE (OUTPATIENT)
Dept: ENDOSCOPY | Facility: HOSPITAL | Age: 55
End: 2020-10-22

## 2020-10-22 DIAGNOSIS — Z80.0 FAMILY HISTORY OF PANCREATIC CANCER: ICD-10-CM

## 2020-10-22 NOTE — TELEPHONE ENCOUNTER
Spoke with patient about instructions for UEUS scheduled 11/3/20 at 0800 and covid-19 test 10/31/20 at 0930 at [facility].  Instructions emailed.

## 2020-10-31 ENCOUNTER — LAB VISIT (OUTPATIENT)
Dept: URGENT CARE | Facility: CLINIC | Age: 55
End: 2020-10-31
Payer: COMMERCIAL

## 2020-10-31 VITALS — TEMPERATURE: 98 F

## 2020-10-31 DIAGNOSIS — Z80.0 FAMILY HISTORY OF PANCREATIC CANCER: ICD-10-CM

## 2020-10-31 PROCEDURE — U0003 INFECTIOUS AGENT DETECTION BY NUCLEIC ACID (DNA OR RNA); SEVERE ACUTE RESPIRATORY SYNDROME CORONAVIRUS 2 (SARS-COV-2) (CORONAVIRUS DISEASE [COVID-19]), AMPLIFIED PROBE TECHNIQUE, MAKING USE OF HIGH THROUGHPUT TECHNOLOGIES AS DESCRIBED BY CMS-2020-01-R: HCPCS

## 2020-11-01 LAB — SARS-COV-2 RNA RESP QL NAA+PROBE: NOT DETECTED

## 2020-11-03 ENCOUNTER — ANESTHESIA EVENT (OUTPATIENT)
Dept: ENDOSCOPY | Facility: HOSPITAL | Age: 55
End: 2020-11-03
Payer: COMMERCIAL

## 2020-11-03 ENCOUNTER — HOSPITAL ENCOUNTER (OUTPATIENT)
Facility: HOSPITAL | Age: 55
Discharge: HOME | End: 2020-11-03
Attending: INTERNAL MEDICINE | Admitting: INTERNAL MEDICINE
Payer: COMMERCIAL

## 2020-11-03 ENCOUNTER — ANESTHESIA (OUTPATIENT)
Dept: ENDOSCOPY | Facility: HOSPITAL | Age: 55
End: 2020-11-03
Payer: COMMERCIAL

## 2020-11-03 VITALS
RESPIRATION RATE: 18 BRPM | DIASTOLIC BLOOD PRESSURE: 65 MMHG | SYSTOLIC BLOOD PRESSURE: 134 MMHG | HEART RATE: 62 BPM | WEIGHT: 201 LBS | HEIGHT: 64 IN | TEMPERATURE: 98 F | BODY MASS INDEX: 34.31 KG/M2 | OXYGEN SATURATION: 99 %

## 2020-11-03 DIAGNOSIS — Z80.0 FAMILY HISTORY OF PANCREATIC CANCER: Primary | ICD-10-CM

## 2020-11-03 PROCEDURE — 37000009 HC ANESTHESIA EA ADD 15 MINS: Performed by: INTERNAL MEDICINE

## 2020-11-03 PROCEDURE — 25000003 PHARM REV CODE 250: Performed by: INTERNAL MEDICINE

## 2020-11-03 PROCEDURE — 43259 PR ENDOSCOPIC ULTRASOUND EXAM: ICD-10-PCS | Mod: ,,, | Performed by: INTERNAL MEDICINE

## 2020-11-03 PROCEDURE — D9220A PRA ANESTHESIA: Mod: CRNA,,, | Performed by: NURSE ANESTHETIST, CERTIFIED REGISTERED

## 2020-11-03 PROCEDURE — D9220A PRA ANESTHESIA: ICD-10-PCS | Mod: ANES,,, | Performed by: ANESTHESIOLOGY

## 2020-11-03 PROCEDURE — 43259 EGD US EXAM DUODENUM/JEJUNUM: CPT | Mod: ,,, | Performed by: INTERNAL MEDICINE

## 2020-11-03 PROCEDURE — D9220A PRA ANESTHESIA: Mod: ANES,,, | Performed by: ANESTHESIOLOGY

## 2020-11-03 PROCEDURE — 43259 EGD US EXAM DUODENUM/JEJUNUM: CPT | Performed by: INTERNAL MEDICINE

## 2020-11-03 PROCEDURE — D9220A PRA ANESTHESIA: ICD-10-PCS | Mod: CRNA,,, | Performed by: NURSE ANESTHETIST, CERTIFIED REGISTERED

## 2020-11-03 PROCEDURE — 37000008 HC ANESTHESIA 1ST 15 MINUTES: Performed by: INTERNAL MEDICINE

## 2020-11-03 PROCEDURE — 25000003 PHARM REV CODE 250: Performed by: NURSE ANESTHETIST, CERTIFIED REGISTERED

## 2020-11-03 PROCEDURE — 63600175 PHARM REV CODE 636 W HCPCS: Performed by: NURSE ANESTHETIST, CERTIFIED REGISTERED

## 2020-11-03 RX ORDER — SODIUM CHLORIDE 9 MG/ML
INJECTION, SOLUTION INTRAVENOUS CONTINUOUS
Status: DISCONTINUED | OUTPATIENT
Start: 2020-11-03 | End: 2020-11-03 | Stop reason: HOSPADM

## 2020-11-03 RX ORDER — MIDAZOLAM HYDROCHLORIDE 1 MG/ML
INJECTION, SOLUTION INTRAMUSCULAR; INTRAVENOUS
Status: DISCONTINUED | OUTPATIENT
Start: 2020-11-03 | End: 2020-11-03

## 2020-11-03 RX ORDER — PROPOFOL 10 MG/ML
VIAL (ML) INTRAVENOUS CONTINUOUS PRN
Status: DISCONTINUED | OUTPATIENT
Start: 2020-11-03 | End: 2020-11-03

## 2020-11-03 RX ORDER — PROPOFOL 10 MG/ML
VIAL (ML) INTRAVENOUS
Status: DISCONTINUED | OUTPATIENT
Start: 2020-11-03 | End: 2020-11-03

## 2020-11-03 RX ORDER — SODIUM CHLORIDE 0.9 % (FLUSH) 0.9 %
10 SYRINGE (ML) INJECTION
Status: DISCONTINUED | OUTPATIENT
Start: 2020-11-03 | End: 2020-11-03 | Stop reason: HOSPADM

## 2020-11-03 RX ORDER — LIDOCAINE HYDROCHLORIDE 20 MG/ML
INJECTION INTRAVENOUS
Status: DISCONTINUED | OUTPATIENT
Start: 2020-11-03 | End: 2020-11-03

## 2020-11-03 RX ADMIN — PROPOFOL 30 MG: 10 INJECTION, EMULSION INTRAVENOUS at 08:11

## 2020-11-03 RX ADMIN — MIDAZOLAM HYDROCHLORIDE 2 MG: 1 INJECTION, SOLUTION INTRAMUSCULAR; INTRAVENOUS at 07:11

## 2020-11-03 RX ADMIN — PROPOFOL 200 MCG/KG/MIN: 10 INJECTION, EMULSION INTRAVENOUS at 07:11

## 2020-11-03 RX ADMIN — PROPOFOL 20 MG: 10 INJECTION, EMULSION INTRAVENOUS at 07:11

## 2020-11-03 RX ADMIN — PROPOFOL 80 MG: 10 INJECTION, EMULSION INTRAVENOUS at 07:11

## 2020-11-03 RX ADMIN — SODIUM CHLORIDE: 0.9 INJECTION, SOLUTION INTRAVENOUS at 07:11

## 2020-11-03 RX ADMIN — LIDOCAINE HYDROCHLORIDE 100 MG: 20 INJECTION, SOLUTION INTRAVENOUS at 07:11

## 2020-11-03 NOTE — H&P
Short Stay Endoscopy History and Physical    PCP - Ramon Olivares Jr, MD     Procedure - EUS  ASA - per anesthesia  Mallampati - per anesthesia  History of Anesthesia problems - no  Family history Anesthesia problems -  no   Plan of anesthesia - General    HPI:  This is a 55 y.o. female here for evaluation of :     Family history of pancreatic cancer (identical twin) plan for EUS today to evaluate pancreas.  no complaints on review    ROS:  Constitutional: No fevers, chills, No weight loss  CV: No chest pain  Pulm: No cough, No shortness of breath  Ophtho: No vision changes  GI: see HPI  Derm: No rash    Medical History:  has a past medical history of Arthritis, Dental bridge present, Family history of pancreatic cancer, Fibrocystic breast, Migraines, and Obesity.    Surgical History:  has a past surgical history that includes Tubal ligation; fibrocystic breast ; Colonoscopy (N/A, 10/7/2015); Knee arthroscopy w/ meniscal repair; Breast biopsy; Breast cyst aspiration; Dilation and curettage of uterus; and Esophagogastroduodenoscopy (N/A, 7/19/2019).    Family History: family history includes Asthma in her maternal aunt; Breast cancer in her maternal cousin; Breast cancer (age of onset: 42) in her sister; Cancer in her father, maternal cousin, maternal cousin, and other; Cataracts in her mother; Cervical cancer in her other; Glaucoma in her mother; Heart disease in her mother; No Known Problems in her brother, maternal grandfather, maternal grandmother, paternal grandfather, and paternal grandmother; Pancreatic cancer (age of onset: 54) in her sister; Prostate cancer in her maternal cousin.. Otherwise no colon cancer, inflammatory bowel disease, or GI malignancies.    Social History:  reports that she has never smoked. She has never used smokeless tobacco. She reports that she does not drink alcohol or use drugs.    Review of patient's allergies indicates:  No Known Allergies    Medications:   Medications Prior to  Admission   Medication Sig Dispense Refill Last Dose    cholecalciferol, vitamin D3, 125 mcg (5,000 unit) Tab Take 10,000 Units by mouth once daily. Per patient   11/2/2020 at Unknown time    multivit/folic acid/vit K1 (ONE-A-DAY WOMEN'S 50 PLUS ORAL) Take 1 capsule by mouth once daily. Per patient, One-a-Day Women's 50+ Multivitamin Complete   11/2/2020 at Unknown time    UNABLE TO FIND Take 2 capsules by mouth once daily. medication name: Digestive Advantage          Physical Exam:    Vital Signs:   Vitals:    11/03/20 0714   BP: (!) 143/69   Pulse: 72   Resp: 16   Temp: 97.9 °F (36.6 °C)       General Appearance: Well appearing in no acute distress  Eyes:    No scleral icterus  ENT: Neck supple, Lips, mucosa, and tongue normal; teeth and gums normal  Lungs: CTA anteriorly  Heart:  Regular rate, S1, S2 normal, no murmurs heard.  Abdomen: Soft, non tender, non distended with normal bowel sounds. No hepatosplenomegaly, ascites, or mass.  Extremities: No edema  Skin: No rash    Labs:  Lab Results   Component Value Date    WBC 5.02 09/18/2020    HGB 11.4 (L) 09/18/2020    HCT 38.2 09/18/2020     09/18/2020    CHOL 201 (H) 09/18/2020    TRIG 81 09/18/2020    HDL 53 09/18/2020    ALT 22 09/18/2020    AST 21 09/18/2020     (H) 09/18/2020    K 3.9 09/18/2020     09/18/2020    CREATININE 0.7 09/18/2020    BUN 11 09/18/2020    CO2 29 09/18/2020    TSH 1.840 05/10/2019    HGBA1C 5.6 05/10/2019       I have explained the risks and benefits of endoscopy procedures to the patient including but not limited to bleeding, perforation, infection, pancreatitis and death.  The patient was asked if they understand and allowed to ask any further questions to their satisfaction.      Kian Skinner MD

## 2020-11-03 NOTE — ANESTHESIA PREPROCEDURE EVALUATION
11/03/2020    Mary Naranjo is a 55 y.o. female here for EUS for pancreatic CA screening.    Patient Active Problem List   Diagnosis    Prediabetes    Obesity (BMI 30-39.9)    Stiffness of vertebral column    Tear of medial cartilage or meniscus of knee, current    Acute intractable headache    PMB (postmenopausal bleeding)    Family history of pancreatic cancer     Past Surgical History:   Procedure Laterality Date    BREAST BIOPSY      BREAST CYST ASPIRATION      COLONOSCOPY N/A 10/7/2015    Procedure: COLONOSCOPY;  Surgeon: Eagle Stack MD;  Location: [facility];  Service: Endoscopy;  Laterality: N/A;    DILATION AND CURETTAGE OF UTERUS      ESOPHAGOGASTRODUODENOSCOPY N/A 7/19/2019    Procedure: ESOPHAGOGASTRODUODENOSCOPY (EGD);  Surgeon: Cherrie Sheets MD;  Location: [facility];  Service: Endoscopy;  Laterality: N/A;    fibrocystic breast       KNEE ARTHROSCOPY W/ MENISCAL REPAIR      TUBAL LIGATION           Review of patient's allergies indicates:  No Known Allergies    No current facility-administered medications on file prior to encounter.      Current Outpatient Medications on File Prior to Encounter   Medication Sig Dispense Refill    cholecalciferol, vitamin D3, 125 mcg (5,000 unit) Tab Take 10,000 Units by mouth once daily. Per patient      multivit/folic acid/vit K1 (ONE-A-DAY WOMEN'S 50 PLUS ORAL) Take 1 capsule by mouth once daily. Per patient, One-a-Day Women's 50+ Multivitamin Complete      UNABLE TO FIND Take 2 capsules by mouth once daily. medication name: Digestive Advantage             Pre-op Assessment    I have reviewed the Patient Summary Reports.     I have reviewed the Nursing Notes.    I have reviewed the Medications.     Review of Systems  Anesthesia Hx:  No problems with previous Anesthesia Denies Hx of Anesthetic complications  History of prior surgery of  interest to airway management or planning: Denies Family Hx of Anesthesia complications.   Denies Personal Hx of Anesthesia complications.   Social:  Non-Smoker, No Alcohol Use    Hematology/Oncology:     Oncology Normal    -- Anemia:   EENT/Dental:EENT/Dental Normal   Cardiovascular:  Cardiovascular Normal Exercise tolerance: good  ECG has been reviewed.    Pulmonary:  Pulmonary Normal    Renal/:  Renal/ Normal     Hepatic/GI:  Hepatic/GI Normal    Musculoskeletal:  Musculoskeletal Normal    OB/GYN/PEDS:  Postmenopausal bleeding   Neurological:   Headaches    Endocrine:  Metabolic Disorders, Obesity / BMI > 30  Psych:  Psychiatric Normal           Physical Exam  General:  Obesity    Airway/Jaw/Neck:  Airway Findings: Mouth Opening: Normal Tongue: Normal  General Airway Assessment: Adult  Oropharynx Findings: Normal Mallampati: II  TM Distance: Normal, at least 6 cm  Jaw/Neck Findings:  Neck ROM: Normal ROM  Neck Findings: Normal    Eyes/Ears/Nose:  EYES/EARS/NOSE FINDINGS: Normal   Dental:  Dental Findings: In tact, upper partial dentures, Upper partial dentures   Chest/Lungs:  Chest/Lungs Findings: Normal Respiratory Rate     Heart/Vascular:  Heart Findings: Rate: Normal        Mental Status:  Mental Status Findings:  Cooperative, Alert and Oriented         Anesthesia Plan  Type of Anesthesia, risks & benefits discussed:  Anesthesia Type:  general  Patient's Preference:   Intra-op Monitoring Plan: standard ASA monitors  Intra-op Monitoring Plan Comments:   Post Op Pain Control Plan:   Post Op Pain Control Plan Comments:   Induction:   IV  Beta Blocker:  Patient is not currently on a Beta-Blocker (No further documentation required).       Informed Consent: Patient understands risks and agrees with Anesthesia plan.  Questions answered. Anesthesia consent signed with patient.  ASA Score: 2     Day of Surgery Review of History & Physical: I have interviewed and examined the patient. I have reviewed the patient's  H&P dated:  There are no significant changes.  H&P update referred to the surgeon.         Ready For Surgery From Anesthesia Perspective.

## 2020-11-03 NOTE — PLAN OF CARE
Patient states they are ready to be discharged. Instructions and report given to patient; verbalizes understanding. Patient tolerating po liquids with no difficulty. Patient denies pain. Anesthesia consent and surgical consent in chart upon patient's discharge from [facility].

## 2020-11-03 NOTE — [employer] PATIENT INSTRUCTIONS
Discharge Summary/Instructions after an Endoscopic Procedure  Patient Name: Mary Naranjo  Patient MRN: [id number]  Patient YOB: 1965  Tuesday, November 3, 2020  Kei Juarez MD  RESTRICTIONS:  During your procedure today, you received medications for sedation.  These   medications may affect your judgment, balance and coordination.  Therefore,   for 24 hours, you have the following restrictions:   - DO NOT drive a car, operate machinery, make legal/financial decisions,   sign important papers or drink alcohol.    ACTIVITY:  Today: no heavy lifting, straining or running due to procedural   sedation/anesthesia.  The following day: return to full activity including work.  DIET:  Eat and drink normally unless instructed otherwise.     TREATMENT FOR COMMON SIDE EFFECTS:  - Mild abdominal pain, nausea, belching, bloating or excessive gas:  rest,   eat lightly and use a heating pad.  - Sore Throat: treat with throat lozenges and/or gargle with warm salt   water.  - Because air was used during the procedure, expelling large amounts of air   from your rectum or belching is normal.  - If a bowel prep was taken, you may not have a bowel movement for 1-3 days.    This is normal.  SYMPTOMS TO WATCH FOR AND REPORT TO YOUR PHYSICIAN:  1. Abdominal pain or bloating, other than gas cramps.  2. Chest pain.  3. Back pain.  4. Signs of infection such as: chills or fever occurring within 24 hours   after the procedure.  5. Rectal bleeding, which would show as bright red, maroon, or black stools.   (A tablespoon of blood from the rectum is not serious, especially if   hemorrhoids are present.)  6. Vomiting.  7. Weakness or dizziness.  GO DIRECTLY TO THE NEAREST EMERGENCY ROOM IF YOU HAVE ANY OF THE FOLLOWING:      Difficulty breathing              Chills and/or fever over 101 F   Persistent vomiting and/or vomiting blood   Severe abdominal pain   Severe chest pain   Black, tarry stools   Bleeding- more than one  tablespoon   Any other symptom or condition that you feel may need urgent attention  Your doctor recommends these additional instructions:  If any biopsies were taken, your doctors clinic will contact you in 1 to 2   weeks with any results.  - Discharge patient to home.   - Resume previous diet.   - Continue present medications.   - Repeat the upper endoscopic ultrasound in 1 year for surveillance.  For questions, problems or results please call your physician - Kei Juarez MD at Work:  [phone].  [facility], EMERGENCY ROOM PHONE NUMBER: [phone]  IF A COMPLICATION OR EMERGENCY SITUATION ARISES AND YOU ARE UNABLE TO REACH   YOUR PHYSICIAN - GO DIRECTLY TO THE EMERGENCY ROOM.  Kei Juarez MD  11/3/2020 8:31:25 AM  This report has been verified and signed electronically.  [employer]

## 2020-11-03 NOTE — ANESTHESIA POSTPROCEDURE EVALUATION
Anesthesia Post Evaluation    Patient: Mary Naranjo    Procedure(s) Performed: Procedure(s) (LRB):  ULTRASOUND, UPPER GI TRACT, ENDOSCOPIC (N/A)    Final Anesthesia Type: general    Patient location during evaluation: [facility]  Patient participation: Yes- Able to Participate  Level of consciousness: awake and alert  Post-procedure vital signs: reviewed and stable  Pain management: adequate  Airway patency: patent    PONV status at discharge: No PONV  Anesthetic complications: no      Cardiovascular status: hemodynamically stable  Respiratory status: unassisted, spontaneous ventilation and room air  Hydration status: euvolemic  Follow-up not needed.          Vitals Value Taken Time   /65 11/03/20 0903   Temp 36.8 °C (98.2 °F) 11/03/20 0900   Pulse 58 11/03/20 0904   Resp 21 11/03/20 0904   SpO2 100 % 11/03/20 0904   Vitals shown include unvalidated device data.      No case tracking events are documented in the log.      Pain/Ming Score: Ming Score: 9 (11/3/2020  8:20 AM)

## 2020-11-03 NOTE — TRANSFER OF CARE
"Anesthesia Transfer of Care Note    Patient: Mary Naranjo    Procedure(s) Performed: Procedure(s) (LRB):  ULTRASOUND, UPPER GI TRACT, ENDOSCOPIC (N/A)    Patient location: [facility]    Anesthesia Type: general    Transport from OR: Transported from OR on 2-3 L/min O2 by NC with adequate spontaneous ventilation    Post pain: adequate analgesia    Post assessment: no apparent anesthetic complications and tolerated procedure well    Post vital signs: stable    Level of consciousness: awake    Nausea/Vomiting: no nausea/vomiting    Complications: none    Transfer of care protocol was followed      Last vitals:   Visit Vitals  BP (!) 143/69 (BP Location: Left arm, Patient Position: Lying)   Pulse 72   Temp 36.6 °C (97.9 °F) (Temporal)   Resp 16   Ht 5' 4" (1.626 m)   Wt 91.2 kg (201 lb)   LMP 01/15/2018   SpO2 100%   Breastfeeding No   BMI 34.50 kg/m²     "

## 2020-11-03 NOTE — DISCHARGE INSTRUCTIONS
Endoscopic Ultrasound (EUS)    An endoscopic ultrasound (EUS) is a test to look at the inside of your gastrointestinal (GI) tract. It's commonly used to look for cancers or growths in the esophagus, stomach, pancreas, liver, and rectum. It can help to stage cancer (see how advanced a cancer is). EUS may also be used to help diagnose certain diseases or to drain cysts or abscesses.  What is EUS?  EUS shows both ultrasound images and live video of the GI tract. During the test, a flexible tube called an endoscope (scope) is used. At the end of the scope is a tiny video camera and light. The video camera sends live images to a monitor. The scope also contains a very small ultrasound device. This uses sound waves to create images and send them to a monitor.  A needle is passed through the scope. The needle can be used take a small sample of tissue for testing. This is called a biopsy. The needle can be used to take a sample of fluid. This is called fine-needle aspiration (FNA).  Risks and possible complications of EUS  Risks and possible complications include the following:  · Bleeding  · Infection  · A perforation (hole) in the digestive tract   · Risks of sedation or anesthesia   Before the test  Be prepared prior to the test:  · Tell your healthcare provider what medicine you take. This includes vitamins, herbs, and over-the-counter medicine. It also includes any blood thinners, such as warfarin, clopidogrel, ibuprofen, or daily aspirin. Ask your healthcare provider if you need to stop taking some or all of them before the test.  · You may be prescribed antibiotics to take before or after the test. This depends on the area being studied and what is done during the test. These medicines help prevent infection.  · Carefully follow the instructions for preparing for the test to make sure results are accurate. Instructions may include:  ¨ If youre having an EUS of the upper GI tract (esophagus, stomach, duodenum,  pancreas, liver):  § Do not eat or drink for 6 hours before the test.  ¨ If youre having an EUS of the lower GI tract (rectum):  § Before the test, do bowel prep as instructed to clean your rectum of stool. This may involve a clear liquid diet and using a laxative (liquid or pills) the night before the test. Or it may mean doing one or more enemas the morning of the test.  § Do not eat or drink for 6 hours before the test.  · Be sure to arrive on time at the facility. Bring your identification and health insurance card. Leave valuables at home. If you have them, bring X-rays or other test results with you.  Let the healthcare provider know  For your safety, tell the healthcare provider if you:  · Take insulin. Your dose may need to be changed on the day of your test.  · Are allergic to latex.  · Have any other allergies.  · Are taking blood thinners.   During the test  An endoscopic ultrasound usually takes place in a hospital. The procedure itself may take 1 to 2 hours. You will likely go home soon afterward. During the test:  · You lie on your left side on an exam table.  · An intravenous (IV) line will be put into a vein in your arm or hand. This line supplies fluids and medicines. To keep you comfortable during the test, you will be given a sedative medicine. This medicine prevents discomfort and will make you sleepy.  · If you are having an EUS of the upper GI tract, local anesthetic may be sprayed in your throat. This will help you be more comfortable as the healthcare provider inserts the scope. The healthcare provider then gently puts the flexible scope into your mouth or nose and down your throat.  · If youre having an EUS of the lower GI tract, the healthcare provider gently puts the flexible scope into your anus.  · During the test, the scope sends live video and ultrasound images from inside your body to nearby monitors. These are used to examine your GI tract. Specialized procedures, such as drainage,  are done as needed.  · The healthcare provider may discuss the results with you soon after the test. Biopsy results take several  days.  · In most cases, you can go home within a few hours of the test. When you leave the facility, have an adult family member or friend drive you, even if you don't feel that sleepy.  After the test  Here is what to expect after the test:  · You may feel tired from the sedative. This should wear off by the end of the day.  · If you had an upper digestive endoscopy, your throat may feel sore for a day or two. Over-the-counter sore throat lozenges and spray should help.  · You can eat and drink normally as soon as the test is done.  When to call the healthcare provider  Call your healthcare provider if you notice any of the following:  · Fever of 100.4°F (38.0°C) or higher, or as advised by your healthcare provider  · Shortness of breath  · Vomiting blood, blood in stool, or black stools  · Coughing or hoarse voice that wont go away   Date Last Reviewed: 7/1/2016  © 4318-4646 [employer]. [address] [place] [address]. All rights reserved. This information is not intended as a substitute for professional medical care. Always follow your healthcare professional's instructions.

## 2020-11-03 NOTE — ANESTHESIA RELEASE NOTE
"Anesthesia Release from PACU Note    Patient: Mary Naranjo    Procedure(s) Performed: Procedure(s) (LRB):  ULTRASOUND, UPPER GI TRACT, ENDOSCOPIC (N/A)    Anesthesia type: general    Post pain: Adequate analgesia    Post assessment: no apparent anesthetic complications and tolerated procedure well    Last Vitals:   Visit Vitals  /65   Pulse 62   Temp 36.8 °C (98.2 °F) (Skin)   Resp 18   Ht 5' 4" (1.626 m)   Wt 91.2 kg (201 lb)   LMP 01/15/2018   SpO2 99%   Breastfeeding No   BMI 34.50 kg/m²       Post vital signs: stable    Level of consciousness: awake and alert     Nausea/Vomiting: no nausea/no vomiting    Complications: none    Airway Patency: patent    Respiratory: unassisted, spontaneous ventilation, room air    Cardiovascular: stable and blood pressure at baseline    Hydration: euvolemic  "

## 2021-02-10 ENCOUNTER — TELEPHONE (OUTPATIENT)
Dept: FAMILY MEDICINE | Facility: CLINIC | Age: 56
End: 2021-02-10

## 2021-02-10 DIAGNOSIS — Z11.1 SCREENING-PULMONARY TB: Primary | ICD-10-CM

## 2021-02-17 ENCOUNTER — CLINICAL SUPPORT (OUTPATIENT)
Dept: FAMILY MEDICINE | Facility: CLINIC | Age: 56
End: 2021-02-17
Payer: COMMERCIAL

## 2021-02-17 DIAGNOSIS — Z11.1 SCREENING-PULMONARY TB: ICD-10-CM

## 2021-02-17 PROCEDURE — 86580 TB INTRADERMAL TEST: CPT | Mod: S$GLB,,, | Performed by: FAMILY MEDICINE

## 2021-02-17 PROCEDURE — 99499 NO LOS: ICD-10-PCS | Mod: S$GLB,,, | Performed by: FAMILY MEDICINE

## 2021-02-17 PROCEDURE — 99499 UNLISTED E&M SERVICE: CPT | Mod: S$GLB,,, | Performed by: FAMILY MEDICINE

## 2021-02-17 PROCEDURE — 99999 PR PBB SHADOW E&M-EST. PATIENT-LVL I: CPT | Mod: PBBFAC,,,

## 2021-02-17 PROCEDURE — 86580 POCT TB SKIN TEST: ICD-10-PCS | Mod: S$GLB,,, | Performed by: FAMILY MEDICINE

## 2021-02-17 PROCEDURE — 99999 PR PBB SHADOW E&M-EST. PATIENT-LVL I: ICD-10-PCS | Mod: PBBFAC,,,

## 2021-02-19 ENCOUNTER — CLINICAL SUPPORT (OUTPATIENT)
Dept: FAMILY MEDICINE | Facility: CLINIC | Age: 56
End: 2021-02-19
Payer: COMMERCIAL

## 2021-02-19 ENCOUNTER — TELEPHONE (OUTPATIENT)
Dept: FAMILY MEDICINE | Facility: CLINIC | Age: 56
End: 2021-02-19

## 2021-02-19 DIAGNOSIS — Z11.1 ENCOUNTER FOR PPD SKIN TEST READING: Primary | ICD-10-CM

## 2021-02-19 LAB
TB INDURATION - 48 HR READ: 0 MM
TB INDURATION - 72 HR READ: 0 MM
TB SKIN TEST - 48 HR READ: NEGATIVE
TB SKIN TEST - 72 HR READ: NEGATIVE

## 2021-02-19 PROCEDURE — 99999 PR PBB SHADOW E&M-EST. PATIENT-LVL I: ICD-10-PCS | Mod: PBBFAC,,,

## 2021-02-19 PROCEDURE — 99999 PR PBB SHADOW E&M-EST. PATIENT-LVL I: CPT | Mod: PBBFAC,,,

## 2021-02-19 PROCEDURE — 99499 UNLISTED E&M SERVICE: CPT | Mod: S$GLB,,, | Performed by: FAMILY MEDICINE

## 2021-02-19 PROCEDURE — 99499 NO LOS: ICD-10-PCS | Mod: S$GLB,,, | Performed by: FAMILY MEDICINE

## 2021-03-05 ENCOUNTER — IMMUNIZATION (OUTPATIENT)
Dept: PRIMARY CARE CLINIC | Facility: CLINIC | Age: 56
End: 2021-03-05
Payer: COMMERCIAL

## 2021-03-05 DIAGNOSIS — Z23 NEED FOR VACCINATION: Primary | ICD-10-CM

## 2021-03-05 PROCEDURE — 91303 PR SARSCOV2 VAC AD26 .5ML IM: CPT | Mod: S$GLB,,, | Performed by: INTERNAL MEDICINE

## 2021-03-05 PROCEDURE — 91303 PR SARSCOV2 VAC AD26 .5ML IM: ICD-10-PCS | Mod: S$GLB,,, | Performed by: INTERNAL MEDICINE

## 2021-03-05 PROCEDURE — 0031A PR IMMUNIZ ADMIN, SARS-COV-2 COVID-19 VACC, 5X10VP/0.5ML: ICD-10-PCS | Mod: CV19,S$GLB,, | Performed by: INTERNAL MEDICINE

## 2021-03-05 PROCEDURE — 0031A PR IMMUNIZ ADMIN, SARS-COV-2 COVID-19 VACC, 5X10VP/0.5ML: CPT | Mod: CV19,S$GLB,, | Performed by: INTERNAL MEDICINE

## 2021-04-07 ENCOUNTER — OFFICE VISIT (OUTPATIENT)
Dept: FAMILY MEDICINE | Facility: CLINIC | Age: 56
End: 2021-04-07
Payer: COMMERCIAL

## 2021-04-07 VITALS
WEIGHT: 210.56 LBS | HEART RATE: 61 BPM | OXYGEN SATURATION: 97 % | SYSTOLIC BLOOD PRESSURE: 154 MMHG | DIASTOLIC BLOOD PRESSURE: 82 MMHG | RESPIRATION RATE: 18 BRPM | TEMPERATURE: 98 F | BODY MASS INDEX: 35.95 KG/M2 | HEIGHT: 64 IN

## 2021-04-07 DIAGNOSIS — R19.5 LOOSE STOOLS: ICD-10-CM

## 2021-04-07 DIAGNOSIS — K86.2 PANCREATIC CYST: ICD-10-CM

## 2021-04-07 DIAGNOSIS — R15.9 INCONTINENCE OF FECES, UNSPECIFIED FECAL INCONTINENCE TYPE: Primary | ICD-10-CM

## 2021-04-07 DIAGNOSIS — R10.30 LOWER ABDOMINAL PAIN: ICD-10-CM

## 2021-04-07 DIAGNOSIS — R10.9 ABDOMINAL PAIN, UNSPECIFIED ABDOMINAL LOCATION: ICD-10-CM

## 2021-04-07 PROCEDURE — 1126F PR PAIN SEVERITY QUANTIFIED, NO PAIN PRESENT: ICD-10-PCS | Mod: S$GLB,,, | Performed by: NURSE PRACTITIONER

## 2021-04-07 PROCEDURE — 99214 OFFICE O/P EST MOD 30 MIN: CPT | Mod: S$GLB,,, | Performed by: NURSE PRACTITIONER

## 2021-04-07 PROCEDURE — 3008F PR BODY MASS INDEX (BMI) DOCUMENTED: ICD-10-PCS | Mod: CPTII,S$GLB,, | Performed by: NURSE PRACTITIONER

## 2021-04-07 PROCEDURE — 99214 PR OFFICE/OUTPT VISIT, EST, LEVL IV, 30-39 MIN: ICD-10-PCS | Mod: S$GLB,,, | Performed by: NURSE PRACTITIONER

## 2021-04-07 PROCEDURE — 3008F BODY MASS INDEX DOCD: CPT | Mod: CPTII,S$GLB,, | Performed by: NURSE PRACTITIONER

## 2021-04-07 PROCEDURE — 1126F AMNT PAIN NOTED NONE PRSNT: CPT | Mod: S$GLB,,, | Performed by: NURSE PRACTITIONER

## 2021-04-07 PROCEDURE — 99999 PR PBB SHADOW E&M-EST. PATIENT-LVL IV: ICD-10-PCS | Mod: PBBFAC,,, | Performed by: NURSE PRACTITIONER

## 2021-04-07 PROCEDURE — 99999 PR PBB SHADOW E&M-EST. PATIENT-LVL IV: CPT | Mod: PBBFAC,,, | Performed by: NURSE PRACTITIONER

## 2021-04-08 ENCOUNTER — HOSPITAL ENCOUNTER (OUTPATIENT)
Dept: RADIOLOGY | Facility: HOSPITAL | Age: 56
Discharge: HOME | End: 2021-04-08
Attending: NURSE PRACTITIONER
Payer: COMMERCIAL

## 2021-04-08 DIAGNOSIS — R10.9 ABDOMINAL PAIN, UNSPECIFIED ABDOMINAL LOCATION: ICD-10-CM

## 2021-04-08 DIAGNOSIS — R10.30 LOWER ABDOMINAL PAIN: ICD-10-CM

## 2021-04-08 PROCEDURE — 25500020 PHARM REV CODE 255: Performed by: NURSE PRACTITIONER

## 2021-04-08 PROCEDURE — 74177 CT ABD & PELVIS W/CONTRAST: CPT | Mod: 26,,, | Performed by: RADIOLOGY

## 2021-04-08 PROCEDURE — 74177 CT ABD & PELVIS W/CONTRAST: CPT | Mod: TC

## 2021-04-08 PROCEDURE — 74177 CT ABDOMEN PELVIS WITH CONTRAST: ICD-10-PCS | Mod: 26,,, | Performed by: RADIOLOGY

## 2021-04-08 RX ADMIN — IOHEXOL 100 ML: 350 INJECTION, SOLUTION INTRAVENOUS at 12:04

## 2021-04-09 ENCOUNTER — TELEPHONE (OUTPATIENT)
Dept: FAMILY MEDICINE | Facility: CLINIC | Age: 56
End: 2021-04-09

## 2021-04-12 ENCOUNTER — TELEPHONE (OUTPATIENT)
Dept: FAMILY MEDICINE | Facility: CLINIC | Age: 56
End: 2021-04-12

## 2021-08-04 ENCOUNTER — HOSPITAL ENCOUNTER (OUTPATIENT)
Facility: HOSPITAL | Age: 56
Discharge: HOME | End: 2021-08-05
Attending: EMERGENCY MEDICINE | Admitting: HOSPITALIST
Payer: COMMERCIAL

## 2021-08-04 ENCOUNTER — TELEPHONE (OUTPATIENT)
Dept: FAMILY MEDICINE | Facility: CLINIC | Age: 56
End: 2021-08-04

## 2021-08-04 ENCOUNTER — NURSE TRIAGE (OUTPATIENT)
Dept: ADMINISTRATIVE | Facility: CLINIC | Age: 56
End: 2021-08-04

## 2021-08-04 DIAGNOSIS — R73.03 PREDIABETES: ICD-10-CM

## 2021-08-04 DIAGNOSIS — R07.9 CHEST PAIN, UNSPECIFIED TYPE: Primary | ICD-10-CM

## 2021-08-04 DIAGNOSIS — R07.9 CHEST PAIN: ICD-10-CM

## 2021-08-04 DIAGNOSIS — R07.89 OTHER CHEST PAIN: ICD-10-CM

## 2021-08-04 LAB
ALBUMIN SERPL-MCNC: 3.7 G/DL (ref 3.3–5.5)
ALP SERPL-CCNC: 89 U/L (ref 42–141)
BILIRUB SERPL-MCNC: 0.7 MG/DL (ref 0.2–1.6)
BILIRUBIN, POC UA: NEGATIVE
BLOOD, POC UA: NEGATIVE
BUN SERPL-MCNC: 11 MG/DL (ref 7–22)
CALCIUM SERPL-MCNC: 9.7 MG/DL (ref 8–10.3)
CHLORIDE SERPL-SCNC: 110 MMOL/L (ref 98–108)
CLARITY, POC UA: CLEAR
COLOR, POC UA: YELLOW
CREAT SERPL-MCNC: 0.8 MG/DL (ref 0.6–1.2)
CTP QC/QA: YES
GLUCOSE SERPL-MCNC: 94 MG/DL (ref 73–118)
GLUCOSE, POC UA: NEGATIVE
KETONES, POC UA: NEGATIVE
LEUKOCYTE EST, POC UA: NEGATIVE
LIPASE SERPL-CCNC: 34 U/L (ref 4–60)
NITRITE, POC UA: NEGATIVE
PH UR STRIP: 7.5 [PH]
POC ALT (SGPT): 28 U/L (ref 10–47)
POC AST (SGOT): 32 U/L (ref 11–38)
POC CARDIAC TROPONIN I: 0 NG/ML
POC PTINR: 1.1 (ref 0.9–1.2)
POC PTWBT: 13.5 SEC (ref 9.7–14.3)
POC TCO2: 27 MMOL/L (ref 18–33)
POCT GLUCOSE: 118 MG/DL (ref 70–110)
POTASSIUM BLD-SCNC: 3.7 MMOL/L (ref 3.6–5.1)
PROTEIN, POC UA: NEGATIVE
PROTEIN, POC: 7.3 G/DL (ref 6.4–8.1)
SAMPLE: NORMAL
SAMPLE: NORMAL
SARS-COV-2 RDRP RESP QL NAA+PROBE: NEGATIVE
SODIUM BLD-SCNC: 145 MMOL/L (ref 128–145)
SPECIFIC GRAVITY, POC UA: 1.02
TROPONIN I SERPL DL<=0.01 NG/ML-MCNC: 0.01 NG/ML (ref 0–0.03)
TROPONIN I SERPL DL<=0.01 NG/ML-MCNC: <0.006 NG/ML (ref 0–0.03)
UROBILINOGEN, POC UA: 0.2 E.U./DL

## 2021-08-04 PROCEDURE — U0002 COVID-19 LAB TEST NON-CDC: HCPCS | Mod: ER | Performed by: EMERGENCY MEDICINE

## 2021-08-04 PROCEDURE — G0378 HOSPITAL OBSERVATION PER HR: HCPCS

## 2021-08-04 PROCEDURE — 25000003 PHARM REV CODE 250: Mod: ER | Performed by: EMERGENCY MEDICINE

## 2021-08-04 PROCEDURE — 83036 HEMOGLOBIN GLYCOSYLATED A1C: CPT | Performed by: PHYSICIAN ASSISTANT

## 2021-08-04 PROCEDURE — 99291 CRITICAL CARE FIRST HOUR: CPT | Mod: 25,ER

## 2021-08-04 PROCEDURE — 93010 ELECTROCARDIOGRAM REPORT: CPT | Mod: ,,, | Performed by: INTERNAL MEDICINE

## 2021-08-04 PROCEDURE — 84484 ASSAY OF TROPONIN QUANT: CPT | Performed by: PHYSICIAN ASSISTANT

## 2021-08-04 PROCEDURE — 25000242 PHARM REV CODE 250 ALT 637 W/ HCPCS: Mod: ER | Performed by: EMERGENCY MEDICINE

## 2021-08-04 PROCEDURE — 85025 COMPLETE CBC W/AUTO DIFF WBC: CPT | Mod: ER

## 2021-08-04 PROCEDURE — 83690 ASSAY OF LIPASE: CPT | Performed by: PHYSICIAN ASSISTANT

## 2021-08-04 PROCEDURE — 25000003 PHARM REV CODE 250: Performed by: PHYSICIAN ASSISTANT

## 2021-08-04 PROCEDURE — 93005 ELECTROCARDIOGRAM TRACING: CPT | Mod: ER

## 2021-08-04 PROCEDURE — 85610 PROTHROMBIN TIME: CPT | Mod: ER

## 2021-08-04 PROCEDURE — 81003 URINALYSIS AUTO W/O SCOPE: CPT | Mod: ER

## 2021-08-04 PROCEDURE — 80053 COMPREHEN METABOLIC PANEL: CPT | Mod: ER

## 2021-08-04 PROCEDURE — 93010 EKG 12-LEAD: ICD-10-PCS | Mod: ,,, | Performed by: INTERNAL MEDICINE

## 2021-08-04 PROCEDURE — 36415 COLL VENOUS BLD VENIPUNCTURE: CPT | Performed by: PHYSICIAN ASSISTANT

## 2021-08-04 PROCEDURE — 84484 ASSAY OF TROPONIN QUANT: CPT | Mod: ER

## 2021-08-04 RX ORDER — IBUPROFEN 200 MG
24 TABLET ORAL
Status: DISCONTINUED | OUTPATIENT
Start: 2021-08-04 | End: 2021-08-05 | Stop reason: HOSPADM

## 2021-08-04 RX ORDER — AMOXICILLIN 250 MG
1 CAPSULE ORAL 2 TIMES DAILY
Status: DISCONTINUED | OUTPATIENT
Start: 2021-08-04 | End: 2021-08-04

## 2021-08-04 RX ORDER — ACETAMINOPHEN 325 MG/1
650 TABLET ORAL EVERY 4 HOURS PRN
Status: DISCONTINUED | OUTPATIENT
Start: 2021-08-04 | End: 2021-08-05 | Stop reason: HOSPADM

## 2021-08-04 RX ORDER — TALC
6 POWDER (GRAM) TOPICAL NIGHTLY PRN
Status: DISCONTINUED | OUTPATIENT
Start: 2021-08-04 | End: 2021-08-05 | Stop reason: HOSPADM

## 2021-08-04 RX ORDER — SODIUM CHLORIDE 0.9 % (FLUSH) 0.9 %
10 SYRINGE (ML) INJECTION EVERY 8 HOURS
Status: DISCONTINUED | OUTPATIENT
Start: 2021-08-04 | End: 2021-08-04

## 2021-08-04 RX ORDER — LANOLIN ALCOHOL/MO/W.PET/CERES
800 CREAM (GRAM) TOPICAL
Status: DISCONTINUED | OUTPATIENT
Start: 2021-08-04 | End: 2021-08-05 | Stop reason: HOSPADM

## 2021-08-04 RX ORDER — SODIUM CHLORIDE 0.9 % (FLUSH) 0.9 %
10 SYRINGE (ML) INJECTION
Status: DISCONTINUED | OUTPATIENT
Start: 2021-08-04 | End: 2021-08-05 | Stop reason: HOSPADM

## 2021-08-04 RX ORDER — AMOXICILLIN 250 MG
1 CAPSULE ORAL DAILY PRN
Status: DISCONTINUED | OUTPATIENT
Start: 2021-08-04 | End: 2021-08-05 | Stop reason: HOSPADM

## 2021-08-04 RX ORDER — IBUPROFEN 200 MG
16 TABLET ORAL
Status: DISCONTINUED | OUTPATIENT
Start: 2021-08-04 | End: 2021-08-05 | Stop reason: HOSPADM

## 2021-08-04 RX ORDER — GLUCAGON 1 MG
1 KIT INJECTION
Status: DISCONTINUED | OUTPATIENT
Start: 2021-08-04 | End: 2021-08-05 | Stop reason: HOSPADM

## 2021-08-04 RX ORDER — ONDANSETRON 2 MG/ML
4 INJECTION INTRAMUSCULAR; INTRAVENOUS EVERY 8 HOURS PRN
Status: DISCONTINUED | OUTPATIENT
Start: 2021-08-04 | End: 2021-08-05 | Stop reason: HOSPADM

## 2021-08-04 RX ORDER — NAPROXEN SODIUM 220 MG/1
81 TABLET, FILM COATED ORAL DAILY
Status: DISCONTINUED | OUTPATIENT
Start: 2021-08-05 | End: 2021-08-05 | Stop reason: HOSPADM

## 2021-08-04 RX ORDER — ACETAMINOPHEN 325 MG/1
650 TABLET ORAL
Status: COMPLETED | OUTPATIENT
Start: 2021-08-04 | End: 2021-08-04

## 2021-08-04 RX ORDER — NITROGLYCERIN 0.4 MG/1
0.4 TABLET SUBLINGUAL
Status: COMPLETED | OUTPATIENT
Start: 2021-08-04 | End: 2021-08-04

## 2021-08-04 RX ORDER — ASPIRIN 325 MG
325 TABLET ORAL
Status: COMPLETED | OUTPATIENT
Start: 2021-08-04 | End: 2021-08-04

## 2021-08-04 RX ADMIN — NITROGLYCERIN 0.4 MG: 0.4 TABLET, ORALLY DISINTEGRATING SUBLINGUAL at 09:08

## 2021-08-04 RX ADMIN — ASPIRIN 325 MG ORAL TABLET 325 MG: 325 PILL ORAL at 09:08

## 2021-08-04 RX ADMIN — ACETAMINOPHEN 650 MG: 325 TABLET ORAL at 08:08

## 2021-08-04 RX ADMIN — ACETAMINOPHEN 650 MG: 325 TABLET ORAL at 12:08

## 2021-08-05 VITALS
BODY MASS INDEX: 35.68 KG/M2 | TEMPERATURE: 97 F | DIASTOLIC BLOOD PRESSURE: 67 MMHG | SYSTOLIC BLOOD PRESSURE: 141 MMHG | RESPIRATION RATE: 18 BRPM | HEIGHT: 64 IN | WEIGHT: 209 LBS | HEART RATE: 51 BPM | OXYGEN SATURATION: 100 %

## 2021-08-05 LAB
ALBUMIN SERPL BCP-MCNC: 3.7 G/DL (ref 3.5–5.2)
ALP SERPL-CCNC: 84 U/L (ref 55–135)
ALT SERPL W/O P-5'-P-CCNC: 23 U/L (ref 10–44)
ANION GAP SERPL CALC-SCNC: 10 MMOL/L (ref 8–16)
AORTIC ROOT ANNULUS: 3.03 CM
AORTIC VALVE CUSP SEPERATION: 2.08 CM
ASCENDING AORTA: 2.84 CM
AST SERPL-CCNC: 19 U/L (ref 10–40)
AV INDEX (PROSTH): 0.67
AV MEAN GRADIENT: 5 MMHG
AV PEAK GRADIENT: 10 MMHG
AV VALVE AREA: 2.82 CM2
AV VELOCITY RATIO: 0.68
BASOPHILS # BLD AUTO: 0.03 K/UL (ref 0–0.2)
BASOPHILS NFR BLD: 0.6 % (ref 0–1.9)
BILIRUB SERPL-MCNC: 0.9 MG/DL (ref 0.1–1)
BSA FOR ECHO PROCEDURE: 2.07 M2
BUN SERPL-MCNC: 10 MG/DL (ref 6–20)
CALCIUM SERPL-MCNC: 9.4 MG/DL (ref 8.7–10.5)
CHLORIDE SERPL-SCNC: 108 MMOL/L (ref 95–110)
CHOLEST SERPL-MCNC: 177 MG/DL (ref 120–199)
CHOLEST/HDLC SERPL: 3.2 {RATIO} (ref 2–5)
CO2 SERPL-SCNC: 24 MMOL/L (ref 23–29)
CREAT SERPL-MCNC: 0.8 MG/DL (ref 0.5–1.4)
CV ECHO LV RWT: 0.68 CM
CV STRESS BASE HR: 54 BPM
D DIMER PPP IA.FEU-MCNC: 0.26 MG/L FEU
DIASTOLIC BLOOD PRESSURE: 72 MMHG
DIFFERENTIAL METHOD: ABNORMAL
DOP CALC AO PEAK VEL: 1.58 M/S
DOP CALC AO VTI: 40.53 CM
DOP CALC LVOT AREA: 4.2 CM2
DOP CALC LVOT DIAMETER: 2.32 CM
DOP CALC LVOT PEAK VEL: 1.07 M/S
DOP CALC LVOT STROKE VOLUME: 114.42 CM3
DOP CALCLVOT PEAK VEL VTI: 27.08 CM
E WAVE DECELERATION TIME: 213.43 MSEC
E/A RATIO: 1.13
E/E' RATIO: 9.33 M/S
ECHO LV POSTERIOR WALL: 1.31 CM (ref 0.6–1.1)
EJECTION FRACTION: 60 %
EOSINOPHIL # BLD AUTO: 0.2 K/UL (ref 0–0.5)
EOSINOPHIL NFR BLD: 4.1 % (ref 0–8)
ERYTHROCYTE [DISTWIDTH] IN BLOOD BY AUTOMATED COUNT: 14.6 % (ref 11.5–14.5)
EST. GFR  (AFRICAN AMERICAN): >60 ML/MIN/1.73 M^2
EST. GFR  (NON AFRICAN AMERICAN): >60 ML/MIN/1.73 M^2
ESTIMATED AVG GLUCOSE: 117 MG/DL (ref 68–131)
FRACTIONAL SHORTENING: 35 % (ref 28–44)
GLUCOSE SERPL-MCNC: 83 MG/DL (ref 70–110)
HBA1C MFR BLD: 5.7 % (ref 4–5.6)
HCT VFR BLD AUTO: 39.1 % (ref 37–48.5)
HDLC SERPL-MCNC: 55 MG/DL (ref 40–75)
HDLC SERPL: 31.1 % (ref 20–50)
HGB BLD-MCNC: 12.2 G/DL (ref 12–16)
IMM GRANULOCYTES # BLD AUTO: 0.01 K/UL (ref 0–0.04)
IMM GRANULOCYTES NFR BLD AUTO: 0.2 % (ref 0–0.5)
INR PPP: 1 (ref 0.8–1.2)
INTERVENTRICULAR SEPTUM: 1.28 CM (ref 0.6–1.1)
IVRT: 144.62 MSEC
LA MAJOR: 5.9 CM
LA MINOR: 5.82 CM
LA WIDTH: 3.94 CM
LDLC SERPL CALC-MCNC: 108.8 MG/DL (ref 63–159)
LEFT ATRIUM SIZE: 4.31 CM
LEFT ATRIUM VOLUME INDEX: 42.5 ML/M2
LEFT ATRIUM VOLUME: 84.58 CM3
LEFT INTERNAL DIMENSION IN SYSTOLE: 2.51 CM (ref 2.1–4)
LEFT VENTRICLE DIASTOLIC VOLUME INDEX: 32.3 ML/M2
LEFT VENTRICLE DIASTOLIC VOLUME: 64.27 ML
LEFT VENTRICLE MASS INDEX: 88 G/M2
LEFT VENTRICLE SYSTOLIC VOLUME INDEX: 11.3 ML/M2
LEFT VENTRICLE SYSTOLIC VOLUME: 22.51 ML
LEFT VENTRICULAR INTERNAL DIMENSION IN DIASTOLE: 3.86 CM (ref 3.5–6)
LEFT VENTRICULAR MASS: 176.01 G
LV LATERAL E/E' RATIO: 7 M/S
LV SEPTAL E/E' RATIO: 14 M/S
LYMPHOCYTES # BLD AUTO: 3 K/UL (ref 1–4.8)
LYMPHOCYTES NFR BLD: 59.3 % (ref 18–48)
MAGNESIUM SERPL-MCNC: 1.8 MG/DL (ref 1.6–2.6)
MCH RBC QN AUTO: 25.3 PG (ref 27–31)
MCHC RBC AUTO-ENTMCNC: 31.2 G/DL (ref 32–36)
MCV RBC AUTO: 81 FL (ref 82–98)
MONOCYTES # BLD AUTO: 0.5 K/UL (ref 0.3–1)
MONOCYTES NFR BLD: 8.8 % (ref 4–15)
MV PEAK A VEL: 0.62 M/S
MV PEAK E VEL: 0.7 M/S
MV STENOSIS PRESSURE HALF TIME: 61.89 MS
MV VALVE AREA P 1/2 METHOD: 3.55 CM2
NEUTROPHILS # BLD AUTO: 1.4 K/UL (ref 1.8–7.7)
NEUTROPHILS NFR BLD: 27 % (ref 38–73)
NONHDLC SERPL-MCNC: 122 MG/DL
NRBC BLD-RTO: 0 /100 WBC
NUC STRESS DIASTOLIC VOLUME INDEX: 83
NUC STRESS EJECTION FRACTION: 68 %
NUC STRESS SYSTOLIC VOLUME INDEX: 27
OHS CV CPX 85 PERCENT MAX PREDICTED HEART RATE MALE: 134
OHS CV CPX MAX PREDICTED HEART RATE: 158
OHS CV CPX PATIENT IS FEMALE: 1
OHS CV CPX PATIENT IS MALE: 0
OHS CV CPX PEAK DIASTOLIC BLOOD PRESSURE: 62 MMHG
OHS CV CPX PEAK HEAR RATE: 97 BPM
OHS CV CPX PEAK RATE PRESSURE PRODUCT: NORMAL
OHS CV CPX PEAK SYSTOLIC BLOOD PRESSURE: 113 MMHG
OHS CV CPX PERCENT MAX PREDICTED HEART RATE ACHIEVED: 62
OHS CV CPX RATE PRESSURE PRODUCT PRESENTING: 6858
PHOSPHATE SERPL-MCNC: 4.2 MG/DL (ref 2.7–4.5)
PISA TR MAX VEL: 2.23 M/S
PLATELET # BLD AUTO: 256 K/UL (ref 150–450)
PMV BLD AUTO: 11.5 FL (ref 9.2–12.9)
POCT GLUCOSE: 92 MG/DL (ref 70–110)
POTASSIUM SERPL-SCNC: 4 MMOL/L (ref 3.5–5.1)
PROT SERPL-MCNC: 7.4 G/DL (ref 6–8.4)
PROTHROMBIN TIME: 11.1 SEC (ref 9–12.5)
PULM VEIN S/D RATIO: 1.4
PV PEAK D VEL: 0.45 M/S
PV PEAK S VEL: 0.63 M/S
PV PEAK VELOCITY: 0.92 CM/S
RA MAJOR: 5.63 CM
RA PRESSURE: 3 MMHG
RA WIDTH: 4.36 CM
RBC # BLD AUTO: 4.83 M/UL (ref 4–5.4)
RIGHT VENTRICULAR END-DIASTOLIC DIMENSION: 3.52 CM
RV TISSUE DOPPLER FREE WALL SYSTOLIC VELOCITY 1 (APICAL 4 CHAMBER VIEW): 11.44 CM/S
SINUS: 2.7 CM
SODIUM SERPL-SCNC: 142 MMOL/L (ref 136–145)
STJ: 2.32 CM
SYSTOLIC BLOOD PRESSURE: 127 MMHG
TDI LATERAL: 0.1 M/S
TDI SEPTAL: 0.05 M/S
TDI: 0.08 M/S
TR MAX PG: 20 MMHG
TRICUSPID ANNULAR PLANE SYSTOLIC EXCURSION: 2.29 CM
TRIGL SERPL-MCNC: 66 MG/DL (ref 30–150)
TSH SERPL DL<=0.005 MIU/L-ACNC: 2.72 UIU/ML (ref 0.4–4)
TV REST PULMONARY ARTERY PRESSURE: 23 MMHG
WBC # BLD AUTO: 5.13 K/UL (ref 3.9–12.7)

## 2021-08-05 PROCEDURE — 84100 ASSAY OF PHOSPHORUS: CPT | Performed by: PHYSICIAN ASSISTANT

## 2021-08-05 PROCEDURE — 85379 FIBRIN DEGRADATION QUANT: CPT | Performed by: PHYSICIAN ASSISTANT

## 2021-08-05 PROCEDURE — 36415 COLL VENOUS BLD VENIPUNCTURE: CPT | Performed by: PHYSICIAN ASSISTANT

## 2021-08-05 PROCEDURE — 25000003 PHARM REV CODE 250: Performed by: PHYSICIAN ASSISTANT

## 2021-08-05 PROCEDURE — 84443 ASSAY THYROID STIM HORMONE: CPT | Performed by: PHYSICIAN ASSISTANT

## 2021-08-05 PROCEDURE — 99219 PR INITIAL OBSERVATION CARE,LEVL II: ICD-10-PCS | Mod: 25,,, | Performed by: INTERNAL MEDICINE

## 2021-08-05 PROCEDURE — 99219 PR INITIAL OBSERVATION CARE,LEVL II: CPT | Mod: 25,,, | Performed by: INTERNAL MEDICINE

## 2021-08-05 PROCEDURE — 85610 PROTHROMBIN TIME: CPT | Performed by: PHYSICIAN ASSISTANT

## 2021-08-05 PROCEDURE — 80061 LIPID PANEL: CPT | Performed by: PHYSICIAN ASSISTANT

## 2021-08-05 PROCEDURE — 85025 COMPLETE CBC W/AUTO DIFF WBC: CPT | Performed by: PHYSICIAN ASSISTANT

## 2021-08-05 PROCEDURE — 80053 COMPREHEN METABOLIC PANEL: CPT | Performed by: PHYSICIAN ASSISTANT

## 2021-08-05 PROCEDURE — 83735 ASSAY OF MAGNESIUM: CPT | Performed by: PHYSICIAN ASSISTANT

## 2021-08-05 PROCEDURE — G0378 HOSPITAL OBSERVATION PER HR: HCPCS

## 2021-08-05 RX ORDER — REGADENOSON 0.08 MG/ML
0.4 INJECTION, SOLUTION INTRAVENOUS ONCE
Status: DISCONTINUED | OUTPATIENT
Start: 2021-08-05 | End: 2021-08-05 | Stop reason: HOSPADM

## 2021-08-05 RX ORDER — SIMETHICONE 80 MG
1 TABLET,CHEWABLE ORAL 3 TIMES DAILY PRN
Status: DISCONTINUED | OUTPATIENT
Start: 2021-08-05 | End: 2021-08-05 | Stop reason: HOSPADM

## 2021-08-05 RX ADMIN — ASPIRIN 81 MG: 81 TABLET, CHEWABLE ORAL at 08:08

## 2021-08-05 RX ADMIN — SIMETHICONE 80 MG: 80 TABLET, CHEWABLE ORAL at 08:08

## 2021-08-20 ENCOUNTER — OFFICE VISIT (OUTPATIENT)
Dept: CARDIOLOGY | Facility: CLINIC | Age: 56
End: 2021-08-20
Payer: COMMERCIAL

## 2021-08-20 VITALS
HEIGHT: 64 IN | DIASTOLIC BLOOD PRESSURE: 66 MMHG | BODY MASS INDEX: 34.62 KG/M2 | HEART RATE: 70 BPM | SYSTOLIC BLOOD PRESSURE: 144 MMHG | WEIGHT: 202.81 LBS | OXYGEN SATURATION: 98 %

## 2021-08-20 DIAGNOSIS — R73.03 PREDIABETES: ICD-10-CM

## 2021-08-20 DIAGNOSIS — R03.0 ELEVATED BLOOD PRESSURE READING IN OFFICE WITHOUT DIAGNOSIS OF HYPERTENSION: ICD-10-CM

## 2021-08-20 DIAGNOSIS — R07.89 OTHER CHEST PAIN: Primary | ICD-10-CM

## 2021-08-20 DIAGNOSIS — I51.7 LVH (LEFT VENTRICULAR HYPERTROPHY): ICD-10-CM

## 2021-08-20 DIAGNOSIS — E66.09 CLASS 1 OBESITY DUE TO EXCESS CALORIES WITHOUT SERIOUS COMORBIDITY WITH BODY MASS INDEX (BMI) OF 34.0 TO 34.9 IN ADULT: ICD-10-CM

## 2021-08-20 PROCEDURE — 99999 PR PBB SHADOW E&M-EST. PATIENT-LVL III: ICD-10-PCS | Mod: PBBFAC,,, | Performed by: INTERNAL MEDICINE

## 2021-08-20 PROCEDURE — 1126F PR PAIN SEVERITY QUANTIFIED, NO PAIN PRESENT: ICD-10-PCS | Mod: CPTII,S$GLB,, | Performed by: INTERNAL MEDICINE

## 2021-08-20 PROCEDURE — 3044F PR MOST RECENT HEMOGLOBIN A1C LEVEL <7.0%: ICD-10-PCS | Mod: CPTII,S$GLB,, | Performed by: INTERNAL MEDICINE

## 2021-08-20 PROCEDURE — 3078F DIAST BP <80 MM HG: CPT | Mod: CPTII,S$GLB,, | Performed by: INTERNAL MEDICINE

## 2021-08-20 PROCEDURE — 99214 OFFICE O/P EST MOD 30 MIN: CPT | Mod: S$GLB,,, | Performed by: INTERNAL MEDICINE

## 2021-08-20 PROCEDURE — 99999 PR PBB SHADOW E&M-EST. PATIENT-LVL III: CPT | Mod: PBBFAC,,, | Performed by: INTERNAL MEDICINE

## 2021-08-20 PROCEDURE — 3077F PR MOST RECENT SYSTOLIC BLOOD PRESSURE >= 140 MM HG: ICD-10-PCS | Mod: CPTII,S$GLB,, | Performed by: INTERNAL MEDICINE

## 2021-08-20 PROCEDURE — 1159F PR MEDICATION LIST DOCUMENTED IN MEDICAL RECORD: ICD-10-PCS | Mod: CPTII,S$GLB,, | Performed by: INTERNAL MEDICINE

## 2021-08-20 PROCEDURE — 1160F PR REVIEW ALL MEDS BY PRESCRIBER/CLIN PHARMACIST DOCUMENTED: ICD-10-PCS | Mod: CPTII,S$GLB,, | Performed by: INTERNAL MEDICINE

## 2021-08-20 PROCEDURE — 1159F MED LIST DOCD IN RCRD: CPT | Mod: CPTII,S$GLB,, | Performed by: INTERNAL MEDICINE

## 2021-08-20 PROCEDURE — 3008F PR BODY MASS INDEX (BMI) DOCUMENTED: ICD-10-PCS | Mod: CPTII,S$GLB,, | Performed by: INTERNAL MEDICINE

## 2021-08-20 PROCEDURE — 3077F SYST BP >= 140 MM HG: CPT | Mod: CPTII,S$GLB,, | Performed by: INTERNAL MEDICINE

## 2021-08-20 PROCEDURE — 1126F AMNT PAIN NOTED NONE PRSNT: CPT | Mod: CPTII,S$GLB,, | Performed by: INTERNAL MEDICINE

## 2021-08-20 PROCEDURE — 1160F RVW MEDS BY RX/DR IN RCRD: CPT | Mod: CPTII,S$GLB,, | Performed by: INTERNAL MEDICINE

## 2021-08-20 PROCEDURE — 3044F HG A1C LEVEL LT 7.0%: CPT | Mod: CPTII,S$GLB,, | Performed by: INTERNAL MEDICINE

## 2021-08-20 PROCEDURE — 3078F PR MOST RECENT DIASTOLIC BLOOD PRESSURE < 80 MM HG: ICD-10-PCS | Mod: CPTII,S$GLB,, | Performed by: INTERNAL MEDICINE

## 2021-08-20 PROCEDURE — 99214 PR OFFICE/OUTPT VISIT, EST, LEVL IV, 30-39 MIN: ICD-10-PCS | Mod: S$GLB,,, | Performed by: INTERNAL MEDICINE

## 2021-08-20 PROCEDURE — 3008F BODY MASS INDEX DOCD: CPT | Mod: CPTII,S$GLB,, | Performed by: INTERNAL MEDICINE

## 2021-09-28 ENCOUNTER — OFFICE VISIT (OUTPATIENT)
Dept: FAMILY MEDICINE | Facility: CLINIC | Age: 56
End: 2021-09-28
Payer: COMMERCIAL

## 2021-09-28 ENCOUNTER — TELEPHONE (OUTPATIENT)
Dept: NEUROSURGERY | Facility: CLINIC | Age: 56
End: 2021-09-28

## 2021-09-28 VITALS
SYSTOLIC BLOOD PRESSURE: 134 MMHG | TEMPERATURE: 98 F | RESPIRATION RATE: 18 BRPM | BODY MASS INDEX: 35.9 KG/M2 | DIASTOLIC BLOOD PRESSURE: 82 MMHG | OXYGEN SATURATION: 99 % | HEIGHT: 64 IN | WEIGHT: 210.31 LBS | HEART RATE: 75 BPM

## 2021-09-28 DIAGNOSIS — Z12.31 VISIT FOR SCREENING MAMMOGRAM: ICD-10-CM

## 2021-09-28 DIAGNOSIS — Z00.00 ANNUAL PHYSICAL EXAM: Primary | ICD-10-CM

## 2021-09-28 DIAGNOSIS — Z80.0 FAMILY HISTORY OF PANCREATIC CANCER: ICD-10-CM

## 2021-09-28 DIAGNOSIS — M25.512 ACUTE PAIN OF LEFT SHOULDER: ICD-10-CM

## 2021-09-28 DIAGNOSIS — M54.12 CERVICAL RADICULOPATHY: ICD-10-CM

## 2021-09-28 DIAGNOSIS — Z23 NEED FOR VACCINATION: ICD-10-CM

## 2021-09-28 PROCEDURE — 90715 TDAP VACCINE 7 YRS/> IM: CPT | Mod: S$GLB,,, | Performed by: FAMILY MEDICINE

## 2021-09-28 PROCEDURE — 3044F HG A1C LEVEL LT 7.0%: CPT | Mod: CPTII,S$GLB,, | Performed by: FAMILY MEDICINE

## 2021-09-28 PROCEDURE — 3079F PR MOST RECENT DIASTOLIC BLOOD PRESSURE 80-89 MM HG: ICD-10-PCS | Mod: CPTII,S$GLB,, | Performed by: FAMILY MEDICINE

## 2021-09-28 PROCEDURE — 3008F BODY MASS INDEX DOCD: CPT | Mod: CPTII,S$GLB,, | Performed by: FAMILY MEDICINE

## 2021-09-28 PROCEDURE — 99213 OFFICE O/P EST LOW 20 MIN: CPT | Mod: 25,S$GLB,, | Performed by: FAMILY MEDICINE

## 2021-09-28 PROCEDURE — 3075F PR MOST RECENT SYSTOLIC BLOOD PRESS GE 130-139MM HG: ICD-10-PCS | Mod: CPTII,S$GLB,, | Performed by: FAMILY MEDICINE

## 2021-09-28 PROCEDURE — 99999 PR PBB SHADOW E&M-EST. PATIENT-LVL V: CPT | Mod: PBBFAC,,, | Performed by: FAMILY MEDICINE

## 2021-09-28 PROCEDURE — 3008F PR BODY MASS INDEX (BMI) DOCUMENTED: ICD-10-PCS | Mod: CPTII,S$GLB,, | Performed by: FAMILY MEDICINE

## 2021-09-28 PROCEDURE — 3079F DIAST BP 80-89 MM HG: CPT | Mod: CPTII,S$GLB,, | Performed by: FAMILY MEDICINE

## 2021-09-28 PROCEDURE — 99396 PR PREVENTIVE VISIT,EST,40-64: ICD-10-PCS | Mod: 25,S$GLB,, | Performed by: FAMILY MEDICINE

## 2021-09-28 PROCEDURE — 99999 PR PBB SHADOW E&M-EST. PATIENT-LVL V: ICD-10-PCS | Mod: PBBFAC,,, | Performed by: FAMILY MEDICINE

## 2021-09-28 PROCEDURE — 90471 TDAP VACCINE GREATER THAN OR EQUAL TO 7YO IM: ICD-10-PCS | Mod: S$GLB,,, | Performed by: FAMILY MEDICINE

## 2021-09-28 PROCEDURE — 3075F SYST BP GE 130 - 139MM HG: CPT | Mod: CPTII,S$GLB,, | Performed by: FAMILY MEDICINE

## 2021-09-28 PROCEDURE — 90715 TDAP VACCINE GREATER THAN OR EQUAL TO 7YO IM: ICD-10-PCS | Mod: S$GLB,,, | Performed by: FAMILY MEDICINE

## 2021-09-28 PROCEDURE — 99213 PR OFFICE/OUTPT VISIT, EST, LEVL III, 20-29 MIN: ICD-10-PCS | Mod: 25,S$GLB,, | Performed by: FAMILY MEDICINE

## 2021-09-28 PROCEDURE — 90471 IMMUNIZATION ADMIN: CPT | Mod: S$GLB,,, | Performed by: FAMILY MEDICINE

## 2021-09-28 PROCEDURE — 99396 PREV VISIT EST AGE 40-64: CPT | Mod: 25,S$GLB,, | Performed by: FAMILY MEDICINE

## 2021-09-28 PROCEDURE — 3044F PR MOST RECENT HEMOGLOBIN A1C LEVEL <7.0%: ICD-10-PCS | Mod: CPTII,S$GLB,, | Performed by: FAMILY MEDICINE

## 2021-09-28 RX ORDER — ZOSTER VACCINE RECOMBINANT, ADJUVANTED 50 MCG/0.5
0.5 KIT INTRAMUSCULAR ONCE
Qty: 1 EACH | Refills: 1 | Status: SHIPPED | OUTPATIENT
Start: 2021-09-28 | End: 2021-09-28

## 2021-09-29 ENCOUNTER — TELEPHONE (OUTPATIENT)
Dept: NEUROSURGERY | Facility: CLINIC | Age: 56
End: 2021-09-29

## 2021-09-29 ENCOUNTER — TELEPHONE (OUTPATIENT)
Dept: ENDOSCOPY | Facility: HOSPITAL | Age: 56
End: 2021-09-29

## 2021-09-29 DIAGNOSIS — K86.2 PANCREAS CYST: Primary | ICD-10-CM

## 2021-09-30 ENCOUNTER — OFFICE VISIT (OUTPATIENT)
Dept: NEUROSURGERY | Facility: CLINIC | Age: 56
End: 2021-09-30
Payer: COMMERCIAL

## 2021-09-30 VITALS
DIASTOLIC BLOOD PRESSURE: 72 MMHG | BODY MASS INDEX: 36.55 KG/M2 | OXYGEN SATURATION: 100 % | HEIGHT: 64 IN | HEART RATE: 56 BPM | WEIGHT: 214.06 LBS | SYSTOLIC BLOOD PRESSURE: 133 MMHG

## 2021-09-30 DIAGNOSIS — M54.12 CERVICAL RADICULOPATHY: ICD-10-CM

## 2021-09-30 PROCEDURE — 1160F PR REVIEW ALL MEDS BY PRESCRIBER/CLIN PHARMACIST DOCUMENTED: ICD-10-PCS | Mod: CPTII,S$GLB,, | Performed by: PHYSICIAN ASSISTANT

## 2021-09-30 PROCEDURE — 3078F DIAST BP <80 MM HG: CPT | Mod: CPTII,S$GLB,, | Performed by: PHYSICIAN ASSISTANT

## 2021-09-30 PROCEDURE — 3008F BODY MASS INDEX DOCD: CPT | Mod: CPTII,S$GLB,, | Performed by: PHYSICIAN ASSISTANT

## 2021-09-30 PROCEDURE — 99203 OFFICE O/P NEW LOW 30 MIN: CPT | Mod: S$GLB,,, | Performed by: PHYSICIAN ASSISTANT

## 2021-09-30 PROCEDURE — 3075F PR MOST RECENT SYSTOLIC BLOOD PRESS GE 130-139MM HG: ICD-10-PCS | Mod: CPTII,S$GLB,, | Performed by: PHYSICIAN ASSISTANT

## 2021-09-30 PROCEDURE — 3075F SYST BP GE 130 - 139MM HG: CPT | Mod: CPTII,S$GLB,, | Performed by: PHYSICIAN ASSISTANT

## 2021-09-30 PROCEDURE — 1159F PR MEDICATION LIST DOCUMENTED IN MEDICAL RECORD: ICD-10-PCS | Mod: CPTII,S$GLB,, | Performed by: PHYSICIAN ASSISTANT

## 2021-09-30 PROCEDURE — 99999 PR PBB SHADOW E&M-EST. PATIENT-LVL IV: ICD-10-PCS | Mod: PBBFAC,,, | Performed by: PHYSICIAN ASSISTANT

## 2021-09-30 PROCEDURE — 3078F PR MOST RECENT DIASTOLIC BLOOD PRESSURE < 80 MM HG: ICD-10-PCS | Mod: CPTII,S$GLB,, | Performed by: PHYSICIAN ASSISTANT

## 2021-09-30 PROCEDURE — 3044F HG A1C LEVEL LT 7.0%: CPT | Mod: CPTII,S$GLB,, | Performed by: PHYSICIAN ASSISTANT

## 2021-09-30 PROCEDURE — 3044F PR MOST RECENT HEMOGLOBIN A1C LEVEL <7.0%: ICD-10-PCS | Mod: CPTII,S$GLB,, | Performed by: PHYSICIAN ASSISTANT

## 2021-09-30 PROCEDURE — 99999 PR PBB SHADOW E&M-EST. PATIENT-LVL IV: CPT | Mod: PBBFAC,,, | Performed by: PHYSICIAN ASSISTANT

## 2021-09-30 PROCEDURE — 1160F RVW MEDS BY RX/DR IN RCRD: CPT | Mod: CPTII,S$GLB,, | Performed by: PHYSICIAN ASSISTANT

## 2021-09-30 PROCEDURE — 99203 PR OFFICE/OUTPT VISIT, NEW, LEVL III, 30-44 MIN: ICD-10-PCS | Mod: S$GLB,,, | Performed by: PHYSICIAN ASSISTANT

## 2021-09-30 PROCEDURE — 3008F PR BODY MASS INDEX (BMI) DOCUMENTED: ICD-10-PCS | Mod: CPTII,S$GLB,, | Performed by: PHYSICIAN ASSISTANT

## 2021-09-30 PROCEDURE — 1159F MED LIST DOCD IN RCRD: CPT | Mod: CPTII,S$GLB,, | Performed by: PHYSICIAN ASSISTANT

## 2021-09-30 RX ORDER — TIZANIDINE 4 MG/1
4 TABLET ORAL NIGHTLY PRN
Qty: 30 TABLET | Refills: 0 | Status: SHIPPED | OUTPATIENT
Start: 2021-09-30 | End: 2021-09-30

## 2021-09-30 RX ORDER — MELOXICAM 15 MG/1
15 TABLET ORAL DAILY
Qty: 30 TABLET | Refills: 1 | Status: SHIPPED | OUTPATIENT
Start: 2021-09-30 | End: 2021-09-30

## 2021-09-30 RX ORDER — METHYLPREDNISOLONE 4 MG/1
TABLET ORAL
Qty: 1 PACKAGE | Refills: 0 | Status: SHIPPED | OUTPATIENT
Start: 2021-09-30 | End: 2021-10-01 | Stop reason: SDUPTHER

## 2021-09-30 RX ORDER — MELOXICAM 15 MG/1
15 TABLET ORAL DAILY
Qty: 30 TABLET | Refills: 1 | Status: SHIPPED | OUTPATIENT
Start: 2021-09-30 | End: 2021-10-01 | Stop reason: SDUPTHER

## 2021-09-30 RX ORDER — TIZANIDINE 4 MG/1
4 TABLET ORAL NIGHTLY PRN
Qty: 30 TABLET | Refills: 0 | Status: SHIPPED | OUTPATIENT
Start: 2021-09-30 | End: 2021-10-01 | Stop reason: SDUPTHER

## 2021-09-30 RX ORDER — METHYLPREDNISOLONE 4 MG/1
TABLET ORAL
Qty: 1 PACKAGE | Refills: 0 | Status: SHIPPED | OUTPATIENT
Start: 2021-09-30 | End: 2021-09-30

## 2021-10-01 RX ORDER — TIZANIDINE 4 MG/1
4 TABLET ORAL NIGHTLY PRN
Qty: 30 TABLET | Refills: 0 | Status: SHIPPED | OUTPATIENT
Start: 2021-10-01 | End: 2021-10-26

## 2021-10-01 RX ORDER — METHYLPREDNISOLONE 4 MG/1
TABLET ORAL
Qty: 1 PACKAGE | Refills: 0 | OUTPATIENT
Start: 2021-10-01

## 2021-10-01 RX ORDER — METHYLPREDNISOLONE 4 MG/1
TABLET ORAL
Qty: 1 PACKAGE | Refills: 0 | Status: SHIPPED | OUTPATIENT
Start: 2021-10-01 | End: 2021-11-08

## 2021-10-01 RX ORDER — MELOXICAM 15 MG/1
15 TABLET ORAL DAILY
Qty: 30 TABLET | Refills: 1 | Status: SHIPPED | OUTPATIENT
Start: 2021-10-01 | End: 2022-02-21

## 2021-10-04 ENCOUNTER — PATIENT MESSAGE (OUTPATIENT)
Dept: ADMINISTRATIVE | Facility: HOSPITAL | Age: 56
End: 2021-10-04

## 2021-10-04 ENCOUNTER — TELEPHONE (OUTPATIENT)
Dept: NEUROSURGERY | Facility: CLINIC | Age: 56
End: 2021-10-04

## 2021-10-04 ENCOUNTER — OFFICE VISIT (OUTPATIENT)
Dept: ORTHOPEDICS | Facility: CLINIC | Age: 56
End: 2021-10-04
Payer: COMMERCIAL

## 2021-10-04 VITALS
WEIGHT: 214.94 LBS | OXYGEN SATURATION: 99 % | BODY MASS INDEX: 36.7 KG/M2 | HEART RATE: 51 BPM | RESPIRATION RATE: 18 BRPM | SYSTOLIC BLOOD PRESSURE: 130 MMHG | HEIGHT: 64 IN | DIASTOLIC BLOOD PRESSURE: 80 MMHG

## 2021-10-04 DIAGNOSIS — M25.512 ACUTE PAIN OF LEFT SHOULDER: Primary | ICD-10-CM

## 2021-10-04 PROCEDURE — 1160F PR REVIEW ALL MEDS BY PRESCRIBER/CLIN PHARMACIST DOCUMENTED: ICD-10-PCS | Mod: CPTII,S$GLB,, | Performed by: ORTHOPAEDIC SURGERY

## 2021-10-04 PROCEDURE — 3079F PR MOST RECENT DIASTOLIC BLOOD PRESSURE 80-89 MM HG: ICD-10-PCS | Mod: CPTII,S$GLB,, | Performed by: ORTHOPAEDIC SURGERY

## 2021-10-04 PROCEDURE — 3079F DIAST BP 80-89 MM HG: CPT | Mod: CPTII,S$GLB,, | Performed by: ORTHOPAEDIC SURGERY

## 2021-10-04 PROCEDURE — 3075F PR MOST RECENT SYSTOLIC BLOOD PRESS GE 130-139MM HG: ICD-10-PCS | Mod: CPTII,S$GLB,, | Performed by: ORTHOPAEDIC SURGERY

## 2021-10-04 PROCEDURE — 99999 PR PBB SHADOW E&M-EST. PATIENT-LVL IV: CPT | Mod: PBBFAC,,, | Performed by: ORTHOPAEDIC SURGERY

## 2021-10-04 PROCEDURE — 3075F SYST BP GE 130 - 139MM HG: CPT | Mod: CPTII,S$GLB,, | Performed by: ORTHOPAEDIC SURGERY

## 2021-10-04 PROCEDURE — 3008F BODY MASS INDEX DOCD: CPT | Mod: CPTII,S$GLB,, | Performed by: ORTHOPAEDIC SURGERY

## 2021-10-04 PROCEDURE — 99999 PR PBB SHADOW E&M-EST. PATIENT-LVL IV: ICD-10-PCS | Mod: PBBFAC,,, | Performed by: ORTHOPAEDIC SURGERY

## 2021-10-04 PROCEDURE — 99204 PR OFFICE/OUTPT VISIT, NEW, LEVL IV, 45-59 MIN: ICD-10-PCS | Mod: S$GLB,,, | Performed by: ORTHOPAEDIC SURGERY

## 2021-10-04 PROCEDURE — 3044F PR MOST RECENT HEMOGLOBIN A1C LEVEL <7.0%: ICD-10-PCS | Mod: CPTII,S$GLB,, | Performed by: ORTHOPAEDIC SURGERY

## 2021-10-04 PROCEDURE — 1159F MED LIST DOCD IN RCRD: CPT | Mod: CPTII,S$GLB,, | Performed by: ORTHOPAEDIC SURGERY

## 2021-10-04 PROCEDURE — 3008F PR BODY MASS INDEX (BMI) DOCUMENTED: ICD-10-PCS | Mod: CPTII,S$GLB,, | Performed by: ORTHOPAEDIC SURGERY

## 2021-10-04 PROCEDURE — 1160F RVW MEDS BY RX/DR IN RCRD: CPT | Mod: CPTII,S$GLB,, | Performed by: ORTHOPAEDIC SURGERY

## 2021-10-04 PROCEDURE — 3044F HG A1C LEVEL LT 7.0%: CPT | Mod: CPTII,S$GLB,, | Performed by: ORTHOPAEDIC SURGERY

## 2021-10-04 PROCEDURE — 1159F PR MEDICATION LIST DOCUMENTED IN MEDICAL RECORD: ICD-10-PCS | Mod: CPTII,S$GLB,, | Performed by: ORTHOPAEDIC SURGERY

## 2021-10-04 PROCEDURE — 99204 OFFICE O/P NEW MOD 45 MIN: CPT | Mod: S$GLB,,, | Performed by: ORTHOPAEDIC SURGERY

## 2021-10-04 RX ORDER — MELOXICAM 15 MG/1
15 TABLET ORAL DAILY
Qty: 30 TABLET | Refills: 1 | Status: CANCELLED | OUTPATIENT
Start: 2021-10-04

## 2021-10-04 RX ORDER — TIZANIDINE 4 MG/1
4 TABLET ORAL NIGHTLY PRN
Qty: 30 TABLET | Refills: 0 | Status: CANCELLED | OUTPATIENT
Start: 2021-10-04

## 2021-10-08 ENCOUNTER — HOSPITAL ENCOUNTER (OUTPATIENT)
Dept: RADIOLOGY | Facility: HOSPITAL | Age: 56
Discharge: HOME | End: 2021-10-08
Attending: FAMILY MEDICINE
Payer: COMMERCIAL

## 2021-10-08 ENCOUNTER — HOSPITAL ENCOUNTER (OUTPATIENT)
Dept: RADIOLOGY | Facility: HOSPITAL | Age: 56
Discharge: HOME | End: 2021-10-08
Attending: PHYSICIAN ASSISTANT
Payer: COMMERCIAL

## 2021-10-08 DIAGNOSIS — M54.12 CERVICAL RADICULOPATHY: ICD-10-CM

## 2021-10-08 DIAGNOSIS — Z12.31 VISIT FOR SCREENING MAMMOGRAM: ICD-10-CM

## 2021-10-08 PROCEDURE — 77063 BREAST TOMOSYNTHESIS BI: CPT | Mod: 26,,, | Performed by: RADIOLOGY

## 2021-10-08 PROCEDURE — 77067 SCR MAMMO BI INCL CAD: CPT | Mod: 26,,, | Performed by: RADIOLOGY

## 2021-10-08 PROCEDURE — 77063 MAMMO DIGITAL SCREENING BILAT WITH TOMO: ICD-10-PCS | Mod: 26,,, | Performed by: RADIOLOGY

## 2021-10-08 PROCEDURE — 72040 XR CERVICAL SPINE FLEXION  AND EXTENSION ONLY: ICD-10-PCS | Mod: 26,,, | Performed by: RADIOLOGY

## 2021-10-08 PROCEDURE — 72040 X-RAY EXAM NECK SPINE 2-3 VW: CPT | Mod: 26,,, | Performed by: RADIOLOGY

## 2021-10-08 PROCEDURE — 77067 MAMMO DIGITAL SCREENING BILAT WITH TOMO: ICD-10-PCS | Mod: 26,,, | Performed by: RADIOLOGY

## 2021-10-08 PROCEDURE — 72040 X-RAY EXAM NECK SPINE 2-3 VW: CPT | Mod: TC,FY,PO

## 2021-10-08 PROCEDURE — 77067 SCR MAMMO BI INCL CAD: CPT | Mod: TC,PO

## 2021-10-11 ENCOUNTER — TELEPHONE (OUTPATIENT)
Dept: ENDOSCOPY | Facility: HOSPITAL | Age: 56
End: 2021-10-11

## 2021-10-26 ENCOUNTER — TELEPHONE (OUTPATIENT)
Dept: NEUROSURGERY | Facility: CLINIC | Age: 56
End: 2021-10-26
Payer: COMMERCIAL

## 2021-10-26 ENCOUNTER — PATIENT MESSAGE (OUTPATIENT)
Dept: NEUROSURGERY | Facility: CLINIC | Age: 56
End: 2021-10-26
Payer: COMMERCIAL

## 2021-10-26 RX ORDER — TIZANIDINE 4 MG/1
TABLET ORAL
Qty: 30 TABLET | Refills: 0 | Status: SHIPPED | OUTPATIENT
Start: 2021-10-26 | End: 2022-08-22

## 2021-10-26 RX ORDER — MELOXICAM 15 MG/1
TABLET ORAL
Qty: 30 TABLET | Refills: 1 | OUTPATIENT
Start: 2021-10-26

## 2021-10-29 ENCOUNTER — OFFICE VISIT (OUTPATIENT)
Dept: OBSTETRICS AND GYNECOLOGY | Facility: CLINIC | Age: 56
End: 2021-10-29
Payer: COMMERCIAL

## 2021-10-29 VITALS
DIASTOLIC BLOOD PRESSURE: 88 MMHG | HEIGHT: 64 IN | WEIGHT: 216.25 LBS | SYSTOLIC BLOOD PRESSURE: 126 MMHG | BODY MASS INDEX: 36.92 KG/M2

## 2021-10-29 DIAGNOSIS — Z01.419 WELL WOMAN EXAM WITH ROUTINE GYNECOLOGICAL EXAM: Primary | ICD-10-CM

## 2021-10-29 PROCEDURE — 1159F PR MEDICATION LIST DOCUMENTED IN MEDICAL RECORD: ICD-10-PCS | Mod: CPTII,S$GLB,, | Performed by: OBSTETRICS & GYNECOLOGY

## 2021-10-29 PROCEDURE — 3044F PR MOST RECENT HEMOGLOBIN A1C LEVEL <7.0%: ICD-10-PCS | Mod: CPTII,S$GLB,, | Performed by: OBSTETRICS & GYNECOLOGY

## 2021-10-29 PROCEDURE — 3008F BODY MASS INDEX DOCD: CPT | Mod: CPTII,S$GLB,, | Performed by: OBSTETRICS & GYNECOLOGY

## 2021-10-29 PROCEDURE — 3044F HG A1C LEVEL LT 7.0%: CPT | Mod: CPTII,S$GLB,, | Performed by: OBSTETRICS & GYNECOLOGY

## 2021-10-29 PROCEDURE — 3008F PR BODY MASS INDEX (BMI) DOCUMENTED: ICD-10-PCS | Mod: CPTII,S$GLB,, | Performed by: OBSTETRICS & GYNECOLOGY

## 2021-10-29 PROCEDURE — 1159F MED LIST DOCD IN RCRD: CPT | Mod: CPTII,S$GLB,, | Performed by: OBSTETRICS & GYNECOLOGY

## 2021-10-29 PROCEDURE — 99999 PR PBB SHADOW E&M-EST. PATIENT-LVL III: ICD-10-PCS | Mod: PBBFAC,,, | Performed by: OBSTETRICS & GYNECOLOGY

## 2021-10-29 PROCEDURE — 3079F PR MOST RECENT DIASTOLIC BLOOD PRESSURE 80-89 MM HG: ICD-10-PCS | Mod: CPTII,S$GLB,, | Performed by: OBSTETRICS & GYNECOLOGY

## 2021-10-29 PROCEDURE — 3074F SYST BP LT 130 MM HG: CPT | Mod: CPTII,S$GLB,, | Performed by: OBSTETRICS & GYNECOLOGY

## 2021-10-29 PROCEDURE — 99396 PREV VISIT EST AGE 40-64: CPT | Mod: S$GLB,,, | Performed by: OBSTETRICS & GYNECOLOGY

## 2021-10-29 PROCEDURE — 1160F RVW MEDS BY RX/DR IN RCRD: CPT | Mod: CPTII,S$GLB,, | Performed by: OBSTETRICS & GYNECOLOGY

## 2021-10-29 PROCEDURE — 99999 PR PBB SHADOW E&M-EST. PATIENT-LVL III: CPT | Mod: PBBFAC,,, | Performed by: OBSTETRICS & GYNECOLOGY

## 2021-10-29 PROCEDURE — 3079F DIAST BP 80-89 MM HG: CPT | Mod: CPTII,S$GLB,, | Performed by: OBSTETRICS & GYNECOLOGY

## 2021-10-29 PROCEDURE — 3074F PR MOST RECENT SYSTOLIC BLOOD PRESSURE < 130 MM HG: ICD-10-PCS | Mod: CPTII,S$GLB,, | Performed by: OBSTETRICS & GYNECOLOGY

## 2021-10-29 PROCEDURE — 1160F PR REVIEW ALL MEDS BY PRESCRIBER/CLIN PHARMACIST DOCUMENTED: ICD-10-PCS | Mod: CPTII,S$GLB,, | Performed by: OBSTETRICS & GYNECOLOGY

## 2021-10-29 PROCEDURE — 99396 PR PREVENTIVE VISIT,EST,40-64: ICD-10-PCS | Mod: S$GLB,,, | Performed by: OBSTETRICS & GYNECOLOGY

## 2021-11-08 ENCOUNTER — OFFICE VISIT (OUTPATIENT)
Dept: FAMILY MEDICINE | Facility: CLINIC | Age: 56
End: 2021-11-08
Payer: COMMERCIAL

## 2021-11-08 VITALS
WEIGHT: 219.13 LBS | RESPIRATION RATE: 18 BRPM | BODY MASS INDEX: 37.61 KG/M2 | HEART RATE: 74 BPM | DIASTOLIC BLOOD PRESSURE: 76 MMHG | SYSTOLIC BLOOD PRESSURE: 134 MMHG | OXYGEN SATURATION: 98 %

## 2021-11-08 DIAGNOSIS — Z01.818 PREOP EXAMINATION: Primary | ICD-10-CM

## 2021-11-08 DIAGNOSIS — H26.9 CATARACT OF BOTH EYES, UNSPECIFIED CATARACT TYPE: ICD-10-CM

## 2021-11-08 PROCEDURE — 99214 PR OFFICE/OUTPT VISIT, EST, LEVL IV, 30-39 MIN: ICD-10-PCS | Mod: S$GLB,,, | Performed by: INTERNAL MEDICINE

## 2021-11-08 PROCEDURE — 3008F PR BODY MASS INDEX (BMI) DOCUMENTED: ICD-10-PCS | Mod: CPTII,S$GLB,, | Performed by: INTERNAL MEDICINE

## 2021-11-08 PROCEDURE — 3044F PR MOST RECENT HEMOGLOBIN A1C LEVEL <7.0%: ICD-10-PCS | Mod: CPTII,S$GLB,, | Performed by: INTERNAL MEDICINE

## 2021-11-08 PROCEDURE — 3008F BODY MASS INDEX DOCD: CPT | Mod: CPTII,S$GLB,, | Performed by: INTERNAL MEDICINE

## 2021-11-08 PROCEDURE — 99999 PR PBB SHADOW E&M-EST. PATIENT-LVL III: ICD-10-PCS | Mod: PBBFAC,,, | Performed by: INTERNAL MEDICINE

## 2021-11-08 PROCEDURE — 3078F PR MOST RECENT DIASTOLIC BLOOD PRESSURE < 80 MM HG: ICD-10-PCS | Mod: CPTII,S$GLB,, | Performed by: INTERNAL MEDICINE

## 2021-11-08 PROCEDURE — 3075F SYST BP GE 130 - 139MM HG: CPT | Mod: CPTII,S$GLB,, | Performed by: INTERNAL MEDICINE

## 2021-11-08 PROCEDURE — 3075F PR MOST RECENT SYSTOLIC BLOOD PRESS GE 130-139MM HG: ICD-10-PCS | Mod: CPTII,S$GLB,, | Performed by: INTERNAL MEDICINE

## 2021-11-08 PROCEDURE — 99999 PR PBB SHADOW E&M-EST. PATIENT-LVL III: CPT | Mod: PBBFAC,,, | Performed by: INTERNAL MEDICINE

## 2021-11-08 PROCEDURE — 3078F DIAST BP <80 MM HG: CPT | Mod: CPTII,S$GLB,, | Performed by: INTERNAL MEDICINE

## 2021-11-08 PROCEDURE — 1160F PR REVIEW ALL MEDS BY PRESCRIBER/CLIN PHARMACIST DOCUMENTED: ICD-10-PCS | Mod: CPTII,S$GLB,, | Performed by: INTERNAL MEDICINE

## 2021-11-08 PROCEDURE — 99214 OFFICE O/P EST MOD 30 MIN: CPT | Mod: S$GLB,,, | Performed by: INTERNAL MEDICINE

## 2021-11-08 PROCEDURE — 3044F HG A1C LEVEL LT 7.0%: CPT | Mod: CPTII,S$GLB,, | Performed by: INTERNAL MEDICINE

## 2021-11-08 PROCEDURE — 1159F MED LIST DOCD IN RCRD: CPT | Mod: CPTII,S$GLB,, | Performed by: INTERNAL MEDICINE

## 2021-11-08 PROCEDURE — 1160F RVW MEDS BY RX/DR IN RCRD: CPT | Mod: CPTII,S$GLB,, | Performed by: INTERNAL MEDICINE

## 2021-11-08 PROCEDURE — 1159F PR MEDICATION LIST DOCUMENTED IN MEDICAL RECORD: ICD-10-PCS | Mod: CPTII,S$GLB,, | Performed by: INTERNAL MEDICINE

## 2021-11-30 ENCOUNTER — IMMUNIZATION (OUTPATIENT)
Dept: PRIMARY CARE CLINIC | Facility: CLINIC | Age: 56
End: 2021-11-30
Payer: COMMERCIAL

## 2021-11-30 DIAGNOSIS — Z23 NEED FOR VACCINATION: Primary | ICD-10-CM

## 2021-11-30 PROCEDURE — 0034A COVID-19,VECTOR-NR,RS-AD26,PF,0.5 ML DOSE VACCINE (JANSSEN): CPT | Mod: CV19,PBBFAC | Performed by: INTERNAL MEDICINE

## 2021-11-30 PROCEDURE — 91303 COVID-19,VECTOR-NR,RS-AD26,PF,0.5 ML DOSE VACCINE (JANSSEN): CPT | Mod: PBBFAC | Performed by: INTERNAL MEDICINE

## 2021-12-06 ENCOUNTER — TELEPHONE (OUTPATIENT)
Dept: ENDOSCOPY | Facility: HOSPITAL | Age: 56
End: 2021-12-06
Payer: COMMERCIAL

## 2021-12-06 DIAGNOSIS — K86.2 PANCREATIC CYST: Primary | ICD-10-CM

## 2021-12-22 ENCOUNTER — OFFICE VISIT (OUTPATIENT)
Dept: CARDIOLOGY | Facility: CLINIC | Age: 56
End: 2021-12-22
Payer: COMMERCIAL

## 2021-12-22 VITALS
BODY MASS INDEX: 37.39 KG/M2 | SYSTOLIC BLOOD PRESSURE: 147 MMHG | HEART RATE: 76 BPM | DIASTOLIC BLOOD PRESSURE: 76 MMHG | HEIGHT: 64 IN | WEIGHT: 219 LBS

## 2021-12-22 DIAGNOSIS — R73.03 PREDIABETES: ICD-10-CM

## 2021-12-22 DIAGNOSIS — I10 PRIMARY HYPERTENSION: Primary | ICD-10-CM

## 2021-12-22 DIAGNOSIS — I51.7 LVH (LEFT VENTRICULAR HYPERTROPHY): ICD-10-CM

## 2021-12-22 DIAGNOSIS — E66.01 CLASS 2 SEVERE OBESITY DUE TO EXCESS CALORIES WITH SERIOUS COMORBIDITY AND BODY MASS INDEX (BMI) OF 37.0 TO 37.9 IN ADULT: ICD-10-CM

## 2021-12-22 PROCEDURE — 1160F RVW MEDS BY RX/DR IN RCRD: CPT | Mod: CPTII,S$GLB,, | Performed by: INTERNAL MEDICINE

## 2021-12-22 PROCEDURE — 93000 ELECTROCARDIOGRAM COMPLETE: CPT | Mod: S$GLB,,, | Performed by: INTERNAL MEDICINE

## 2021-12-22 PROCEDURE — 99214 OFFICE O/P EST MOD 30 MIN: CPT | Mod: 25,S$GLB,, | Performed by: INTERNAL MEDICINE

## 2021-12-22 PROCEDURE — 3008F PR BODY MASS INDEX (BMI) DOCUMENTED: ICD-10-PCS | Mod: CPTII,S$GLB,, | Performed by: INTERNAL MEDICINE

## 2021-12-22 PROCEDURE — 3008F BODY MASS INDEX DOCD: CPT | Mod: CPTII,S$GLB,, | Performed by: INTERNAL MEDICINE

## 2021-12-22 PROCEDURE — 3078F DIAST BP <80 MM HG: CPT | Mod: CPTII,S$GLB,, | Performed by: INTERNAL MEDICINE

## 2021-12-22 PROCEDURE — 1160F PR REVIEW ALL MEDS BY PRESCRIBER/CLIN PHARMACIST DOCUMENTED: ICD-10-PCS | Mod: CPTII,S$GLB,, | Performed by: INTERNAL MEDICINE

## 2021-12-22 PROCEDURE — 3044F HG A1C LEVEL LT 7.0%: CPT | Mod: CPTII,S$GLB,, | Performed by: INTERNAL MEDICINE

## 2021-12-22 PROCEDURE — 99214 PR OFFICE/OUTPT VISIT, EST, LEVL IV, 30-39 MIN: ICD-10-PCS | Mod: 25,S$GLB,, | Performed by: INTERNAL MEDICINE

## 2021-12-22 PROCEDURE — 3078F PR MOST RECENT DIASTOLIC BLOOD PRESSURE < 80 MM HG: ICD-10-PCS | Mod: CPTII,S$GLB,, | Performed by: INTERNAL MEDICINE

## 2021-12-22 PROCEDURE — 93000 EKG 12-LEAD: ICD-10-PCS | Mod: S$GLB,,, | Performed by: INTERNAL MEDICINE

## 2021-12-22 PROCEDURE — 99999 PR PBB SHADOW E&M-EST. PATIENT-LVL III: CPT | Mod: PBBFAC,,, | Performed by: INTERNAL MEDICINE

## 2021-12-22 PROCEDURE — 3077F PR MOST RECENT SYSTOLIC BLOOD PRESSURE >= 140 MM HG: ICD-10-PCS | Mod: CPTII,S$GLB,, | Performed by: INTERNAL MEDICINE

## 2021-12-22 PROCEDURE — 3044F PR MOST RECENT HEMOGLOBIN A1C LEVEL <7.0%: ICD-10-PCS | Mod: CPTII,S$GLB,, | Performed by: INTERNAL MEDICINE

## 2021-12-22 PROCEDURE — 99999 PR PBB SHADOW E&M-EST. PATIENT-LVL III: ICD-10-PCS | Mod: PBBFAC,,, | Performed by: INTERNAL MEDICINE

## 2021-12-22 PROCEDURE — 3077F SYST BP >= 140 MM HG: CPT | Mod: CPTII,S$GLB,, | Performed by: INTERNAL MEDICINE

## 2021-12-22 PROCEDURE — 1159F PR MEDICATION LIST DOCUMENTED IN MEDICAL RECORD: ICD-10-PCS | Mod: CPTII,S$GLB,, | Performed by: INTERNAL MEDICINE

## 2021-12-22 PROCEDURE — 1159F MED LIST DOCD IN RCRD: CPT | Mod: CPTII,S$GLB,, | Performed by: INTERNAL MEDICINE

## 2021-12-22 RX ORDER — HYDROCHLOROTHIAZIDE 25 MG/1
12.5 TABLET ORAL DAILY
Qty: 15 TABLET | Refills: 11 | Status: SHIPPED | OUTPATIENT
Start: 2021-12-22 | End: 2021-12-22 | Stop reason: SDUPTHER

## 2021-12-22 RX ORDER — HYDROCHLOROTHIAZIDE 25 MG/1
12.5 TABLET ORAL DAILY
Qty: 45 TABLET | Refills: 3 | Status: SHIPPED | OUTPATIENT
Start: 2021-12-22 | End: 2022-02-02

## 2022-01-19 ENCOUNTER — TELEPHONE (OUTPATIENT)
Dept: ENDOSCOPY | Facility: HOSPITAL | Age: 57
End: 2022-01-19
Payer: COMMERCIAL

## 2022-01-19 NOTE — TELEPHONE ENCOUNTER
Spoke with patient in regards to scheduling endoscopic ultrasound.  She stated that she had procedure done at [place].

## 2022-01-26 ENCOUNTER — LAB VISIT (OUTPATIENT)
Dept: LAB | Facility: HOSPITAL | Age: 57
End: 2022-01-26
Attending: INTERNAL MEDICINE
Payer: COMMERCIAL

## 2022-01-26 DIAGNOSIS — I10 PRIMARY HYPERTENSION: ICD-10-CM

## 2022-01-26 LAB
ANION GAP SERPL CALC-SCNC: 9 MMOL/L (ref 8–16)
BUN SERPL-MCNC: 14 MG/DL (ref 6–20)
CALCIUM SERPL-MCNC: 9.7 MG/DL (ref 8.7–10.5)
CHLORIDE SERPL-SCNC: 104 MMOL/L (ref 95–110)
CO2 SERPL-SCNC: 28 MMOL/L (ref 23–29)
CREAT SERPL-MCNC: 0.7 MG/DL (ref 0.5–1.4)
EST. GFR  (AFRICAN AMERICAN): >60 ML/MIN/1.73 M^2
EST. GFR  (NON AFRICAN AMERICAN): >60 ML/MIN/1.73 M^2
GLUCOSE SERPL-MCNC: 135 MG/DL (ref 70–110)
POTASSIUM SERPL-SCNC: 4 MMOL/L (ref 3.5–5.1)
SODIUM SERPL-SCNC: 141 MMOL/L (ref 136–145)

## 2022-01-26 PROCEDURE — 80048 BASIC METABOLIC PNL TOTAL CA: CPT | Performed by: INTERNAL MEDICINE

## 2022-01-26 PROCEDURE — 36415 COLL VENOUS BLD VENIPUNCTURE: CPT | Performed by: INTERNAL MEDICINE

## 2022-01-29 ENCOUNTER — TELEPHONE (OUTPATIENT)
Dept: FAMILY MEDICINE | Facility: CLINIC | Age: 57
End: 2022-01-29
Payer: COMMERCIAL

## 2022-01-31 ENCOUNTER — TELEPHONE (OUTPATIENT)
Dept: FAMILY MEDICINE | Facility: CLINIC | Age: 57
End: 2022-01-31
Payer: COMMERCIAL

## 2022-02-01 ENCOUNTER — CLINICAL SUPPORT (OUTPATIENT)
Dept: FAMILY MEDICINE | Facility: CLINIC | Age: 57
End: 2022-02-01
Payer: COMMERCIAL

## 2022-02-01 VITALS — SYSTOLIC BLOOD PRESSURE: 110 MMHG | DIASTOLIC BLOOD PRESSURE: 72 MMHG

## 2022-02-01 PROCEDURE — 99999 PR PBB SHADOW E&M-EST. PATIENT-LVL I: CPT | Mod: PBBFAC,,,

## 2022-02-01 PROCEDURE — 99499 NO LOS: ICD-10-PCS | Mod: S$GLB,,, | Performed by: FAMILY MEDICINE

## 2022-02-01 PROCEDURE — 99499 UNLISTED E&M SERVICE: CPT | Mod: S$GLB,,, | Performed by: FAMILY MEDICINE

## 2022-02-01 PROCEDURE — 99999 PR PBB SHADOW E&M-EST. PATIENT-LVL I: ICD-10-PCS | Mod: PBBFAC,,,

## 2022-02-01 NOTE — LETTER
February 1, 2022      [facility]  [address], [address]  [place] [address]  Phone: [phone]       Patient: Mary Naranjo   YOB: 1965  Date of Visit: 02/01/2022    To Whom It May Concern:    Sheila Naranjo  was at [facility] on 02/01/2022. The patient may return to work/school on 2/1/2022 no restrictions. If you have any questions or concerns, or if I can be of further assistance, please do not hesitate to contact me.    Sincerely,    Juancho Salazar LPN

## 2022-02-01 NOTE — PROGRESS NOTES
.  Mary Naranjo 56 y.o. female is here today for Blood Pressure check.   History of HTN yes.    Review of patient's allergies indicates:   Allergen Reactions    Dilaudid [hydromorphone] Other (See Comments)     Creatinine   Date Value Ref Range Status   01/26/2022 0.7 0.5 - 1.4 mg/dL Final     Sodium   Date Value Ref Range Status   01/26/2022 141 136 - 145 mmol/L Final     Potassium   Date Value Ref Range Status   01/26/2022 4.0 3.5 - 5.1 mmol/L Final   ]  Patient verifies taking blood pressure medications on a regular basis at the same time of the day.     Current Outpatient Medications:     cholecalciferol, vitamin D3, 125 mcg (5,000 unit) Tab, Take 10,000 Units by mouth once daily. Per patient, Disp: , Rfl:     hydroCHLOROthiazide (HYDRODIURIL) 25 MG tablet, Take 0.5 tablets (12.5 mg total) by mouth once daily., Disp: 45 tablet, Rfl: 3    meloxicam (MOBIC) 15 MG tablet, Take 1 tablet (15 mg total) by mouth once daily., Disp: 30 tablet, Rfl: 1    multivit/folic acid/vit K1 (ONE-A-DAY WOMEN'S 50 PLUS ORAL), Take 1 capsule by mouth once daily. Per patient, One-a-Day Women's 50+ Multivitamin Complete, Disp: , Rfl:     tiZANidine (ZANAFLEX) 4 MG tablet, TAKE 1 TABLET BY MOUTH NIGHTLY AS NEEDED FOR MUSCLE SPASMS (Patient not taking: Reported on 11/8/2021), Disp: 30 tablet, Rfl: 0    UNABLE TO FIND, Take 2 capsules by mouth once daily. medication name: Digestive Advantage, Disp: , Rfl:   Does patient have record of home blood pressure readings yes. Readings have been averaging 150/70.   Last dose of blood pressure medication was taken at 2/1/2022.  Patient is asymptomatic.   Complains of n/a    Patient is interested in started the Digital monitoring.  .    BP: 110/72 ,   .    Dr. Olivares notified.

## 2022-02-02 ENCOUNTER — OFFICE VISIT (OUTPATIENT)
Dept: CARDIOLOGY | Facility: CLINIC | Age: 57
End: 2022-02-02
Payer: COMMERCIAL

## 2022-02-02 VITALS
SYSTOLIC BLOOD PRESSURE: 130 MMHG | HEIGHT: 64 IN | OXYGEN SATURATION: 97 % | HEART RATE: 99 BPM | WEIGHT: 219 LBS | DIASTOLIC BLOOD PRESSURE: 82 MMHG | BODY MASS INDEX: 37.39 KG/M2

## 2022-02-02 DIAGNOSIS — I10 PRIMARY HYPERTENSION: Primary | ICD-10-CM

## 2022-02-02 DIAGNOSIS — I51.7 LVH (LEFT VENTRICULAR HYPERTROPHY): ICD-10-CM

## 2022-02-02 DIAGNOSIS — R00.2 PALPITATIONS: ICD-10-CM

## 2022-02-02 DIAGNOSIS — E66.01 CLASS 2 SEVERE OBESITY DUE TO EXCESS CALORIES WITH SERIOUS COMORBIDITY AND BODY MASS INDEX (BMI) OF 37.0 TO 37.9 IN ADULT: ICD-10-CM

## 2022-02-02 PROBLEM — E66.812 CLASS 2 SEVERE OBESITY DUE TO EXCESS CALORIES WITH SERIOUS COMORBIDITY AND BODY MASS INDEX (BMI) OF 37.0 TO 37.9 IN ADULT: Status: ACTIVE | Noted: 2022-02-02

## 2022-02-02 PROCEDURE — 3075F SYST BP GE 130 - 139MM HG: CPT | Mod: CPTII,S$GLB,, | Performed by: INTERNAL MEDICINE

## 2022-02-02 PROCEDURE — 1160F PR REVIEW ALL MEDS BY PRESCRIBER/CLIN PHARMACIST DOCUMENTED: ICD-10-PCS | Mod: CPTII,S$GLB,, | Performed by: INTERNAL MEDICINE

## 2022-02-02 PROCEDURE — 1159F MED LIST DOCD IN RCRD: CPT | Mod: CPTII,S$GLB,, | Performed by: INTERNAL MEDICINE

## 2022-02-02 PROCEDURE — 99214 OFFICE O/P EST MOD 30 MIN: CPT | Mod: S$GLB,,, | Performed by: INTERNAL MEDICINE

## 2022-02-02 PROCEDURE — 1159F PR MEDICATION LIST DOCUMENTED IN MEDICAL RECORD: ICD-10-PCS | Mod: CPTII,S$GLB,, | Performed by: INTERNAL MEDICINE

## 2022-02-02 PROCEDURE — 99999 PR PBB SHADOW E&M-EST. PATIENT-LVL IV: ICD-10-PCS | Mod: PBBFAC,,, | Performed by: INTERNAL MEDICINE

## 2022-02-02 PROCEDURE — 3079F DIAST BP 80-89 MM HG: CPT | Mod: CPTII,S$GLB,, | Performed by: INTERNAL MEDICINE

## 2022-02-02 PROCEDURE — 93000 ELECTROCARDIOGRAM COMPLETE: CPT | Mod: S$GLB,,, | Performed by: INTERNAL MEDICINE

## 2022-02-02 PROCEDURE — 3075F PR MOST RECENT SYSTOLIC BLOOD PRESS GE 130-139MM HG: ICD-10-PCS | Mod: CPTII,S$GLB,, | Performed by: INTERNAL MEDICINE

## 2022-02-02 PROCEDURE — 1160F RVW MEDS BY RX/DR IN RCRD: CPT | Mod: CPTII,S$GLB,, | Performed by: INTERNAL MEDICINE

## 2022-02-02 PROCEDURE — 3008F PR BODY MASS INDEX (BMI) DOCUMENTED: ICD-10-PCS | Mod: CPTII,S$GLB,, | Performed by: INTERNAL MEDICINE

## 2022-02-02 PROCEDURE — 3008F BODY MASS INDEX DOCD: CPT | Mod: CPTII,S$GLB,, | Performed by: INTERNAL MEDICINE

## 2022-02-02 PROCEDURE — 93000 EKG 12-LEAD: ICD-10-PCS | Mod: S$GLB,,, | Performed by: INTERNAL MEDICINE

## 2022-02-02 PROCEDURE — 99214 PR OFFICE/OUTPT VISIT, EST, LEVL IV, 30-39 MIN: ICD-10-PCS | Mod: S$GLB,,, | Performed by: INTERNAL MEDICINE

## 2022-02-02 PROCEDURE — 99999 PR PBB SHADOW E&M-EST. PATIENT-LVL IV: CPT | Mod: PBBFAC,,, | Performed by: INTERNAL MEDICINE

## 2022-02-02 PROCEDURE — 3079F PR MOST RECENT DIASTOLIC BLOOD PRESSURE 80-89 MM HG: ICD-10-PCS | Mod: CPTII,S$GLB,, | Performed by: INTERNAL MEDICINE

## 2022-02-02 RX ORDER — LINACLOTIDE 290 UG/1
CAPSULE, GELATIN COATED ORAL
COMMUNITY
Start: 2021-12-26 | End: 2022-08-22 | Stop reason: SDUPTHER

## 2022-02-02 RX ORDER — HYDROCHLOROTHIAZIDE 25 MG/1
25 TABLET ORAL DAILY
Qty: 90 TABLET | Refills: 3 | Status: SHIPPED | OUTPATIENT
Start: 2022-02-02 | End: 2022-02-21 | Stop reason: SDUPTHER

## 2022-02-02 NOTE — PROGRESS NOTES
CARDIOLOGY CLINIC VISIT        HISTORY OF PRESENT ILLNESS:     Mary Naranjo presents for continued care. Last seen 12/22/2021.  Seen as a consultation on 08/05/2021.  Complaints of chest discomfort.  Echocardiogram showed normal left ventricular systolic function with an estimated ejection fraction of 60%.  No pulmonary hypertension.  Nuclear stress was negative for ischemia or infarction.  She is monitoring her blood pressure closely.  Last visit initiated hydrochlorothiazide.  Still with some elevated values.  She states that she has had some elevated heart rates as of late.  Sudden onset.  Last a few seconds to minutes.    CARDIOVASCULAR HISTORY:     None    PAST MEDICAL HISTORY:     Past Medical History:   Diagnosis Date    Arthritis     Dental bridge present     upper removable partial    Family history of pancreatic cancer     Fibrocystic breast     Migraines     Obesity     Pancreas cyst     Primary hypertension 2/2/2022       PAST SURGICAL HISTORY:     Past Surgical History:   Procedure Laterality Date    BREAST BIOPSY      BREAST CYST ASPIRATION      COLONOSCOPY N/A 10/7/2015    Procedure: COLONOSCOPY;  Surgeon: Eagle Stack MD;  Location: [facility];  Service: Endoscopy;  Laterality: N/A;    DILATION AND CURETTAGE OF UTERUS      ENDOSCOPIC ULTRASOUND OF UPPER GASTROINTESTINAL TRACT N/A 11/3/2020    Procedure: ULTRASOUND, UPPER GI TRACT, ENDOSCOPIC;  Surgeon: Kei Juarez MD;  Location: [facility] ([address] [place] [address]);  Service: Endoscopy;  Laterality: N/A;  Covid-19 test 10/31/20 at  [place] - pg    ESOPHAGOGASTRODUODENOSCOPY N/A 7/19/2019    Procedure: ESOPHAGOGASTRODUODENOSCOPY (EGD);  Surgeon: Cherrie Sheets MD;  Location: [facility];  Service: Endoscopy;  Laterality: N/A;    fibrocystic breast       KNEE ARTHROSCOPY W/ MENISCAL REPAIR      TUBAL LIGATION         ALLERGIES AND MEDICATION:     Review of patient's allergies indicates:   Allergen Reactions    Dilaudid [hydromorphone] Other  (See Comments)        Medication List          Accurate as of February 2, 2022  9:19 AM. If you have any questions, ask your nurse or doctor.            CHANGE how you take these medications    hydroCHLOROthiazide 25 MG tablet  Commonly known as: HYDRODIURIL  Take 1 tablet (25 mg total) by mouth once daily.  What changed: how much to take  Changed by: Kamran Hui MD        CONTINUE taking these medications    cholecalciferol (vitamin D3) 125 mcg (5,000 unit) Tab     LINZESS 290 mcg Cap capsule  Generic drug: linaCLOtide     meloxicam 15 MG tablet  Commonly known as: MOBIC  Take 1 tablet (15 mg total) by mouth once daily.     ONE-A-DAY WOMEN'S 50 PLUS ORAL     tiZANidine 4 MG tablet  Commonly known as: ZANAFLEX  TAKE 1 TABLET BY MOUTH NIGHTLY AS NEEDED FOR MUSCLE SPASMS     UNABLE TO FIND           Where to Get Your Medications      These medications were sent to [facility]/pharmacy #[id number] - [place] - [address]  [address] [place] [address]    Phone: [phone]   · hydroCHLOROthiazide 25 MG tablet         SOCIAL HISTORY:     Social History     Socioeconomic History    Marital status:    Occupational History    Occupation: administrative   Tobacco Use    Smoking status: Never Smoker    Smokeless tobacco: Never Used   Substance and Sexual Activity    Alcohol use: No    Drug use: No    Sexual activity: Yes       FAMILY HISTORY:     Family History   Problem Relation Age of Onset    Heart disease Mother     Cataracts Mother     Glaucoma Mother     Cancer Father         colon at ~60; lung, gallbladder, skin, prostate, brain- unsure which are primary vs secondary    No Known Problems Brother     Asthma Maternal Aunt     No Known Problems Maternal Grandmother     No Known Problems Maternal Grandfather     No Known Problems Paternal Grandmother     No Known Problems Paternal Grandfather     Pancreatic cancer Sister 54    Breast cancer Sister 42        unilateral    Cancer  "Maternal Cousin         unknown origin    Cervical cancer Other     Cancer Other         possible ovarian cancer    Breast cancer Maternal Cousin     Cancer Maternal Cousin         prostate?    Prostate cancer Maternal Cousin     COPD Neg Hx     Amblyopia Neg Hx     Blindness Neg Hx     Diabetes Neg Hx     Hypertension Neg Hx     Macular degeneration Neg Hx     Retinal detachment Neg Hx     Strabismus Neg Hx     Stroke Neg Hx     Thyroid disease Neg Hx        REVIEW OF SYSTEMS:   Review of Systems   Respiratory: Negative for sputum production, shortness of breath and wheezing.    Cardiovascular: Positive for palpitations. Negative for chest pain, orthopnea, claudication, leg swelling and PND.   Gastrointestinal: Negative for abdominal pain, heartburn, nausea and vomiting.   Neurological: Negative for dizziness, speech change, focal weakness, loss of consciousness, weakness and headaches.       PHYSICAL EXAM:     Vitals:    02/02/22 0827   BP: 130/82   Pulse: 99    Body mass index is 37.59 kg/m².  Weight: 99.3 kg (219 lb)   Height: 5' 4" (162.6 cm)     Physical Exam  Vitals reviewed.   Constitutional:       General: She is not in acute distress.     Appearance: She is well-developed and overweight. She is not diaphoretic.   HENT:      Head: Normocephalic.   Neck:      Vascular: No carotid bruit or JVD.   Cardiovascular:      Rate and Rhythm: Normal rate and regular rhythm.      Pulses: Normal pulses.      Heart sounds: Normal heart sounds.   Pulmonary:      Effort: Pulmonary effort is normal.      Breath sounds: Normal breath sounds.   Abdominal:      General: Bowel sounds are normal.      Palpations: Abdomen is soft.      Tenderness: There is no abdominal tenderness.   Skin:     General: Skin is warm and dry.   Neurological:      Mental Status: She is alert and oriented to person, place, and time.   Psychiatric:         Speech: Speech normal.         Behavior: Behavior normal.         Thought Content: " Thought content normal.         DATA:     EKG (personally reviewed):  12/22/2021-normal sinus rhythm, left ventricular hypertrophy    Laboratory:  CBC:  Recent Labs   Lab 06/28/20  0600 09/18/20  1025 08/05/21  0450   WBC 5.38 5.02 5.13   Hemoglobin 11.4 L 11.4 L 12.2   Hematocrit 37.9 38.2 39.1   Platelets 261 253 256       CHEMISTRIES:  Recent Labs   Lab 04/08/21  1049 08/05/21  0450 01/26/22  1432   Glucose 92 83 135 H   Sodium 146 H 142 141   Potassium 4.2 4.0 4.0   BUN 9 10 14   Creatinine 0.8 0.8 0.7   eGFR if  >60 >60 >60   eGFR if non African American >60 >60 >60   Calcium 9.7 9.4 9.7   Magnesium  --  1.8  --        CARDIAC BIOMARKERS:  Recent Labs   Lab 06/28/20  0600 08/04/21  1644 08/04/21  2243   Troponin I <0.006 0.007 <0.006       COAGS:  Recent Labs   Lab 08/05/21  0450   INR 1.0       LIPIDS/LFTS:  Recent Labs   Lab 05/10/19  1450 11/22/19  1846 06/28/20  0600 09/18/20  1025 08/05/21  0450   Cholesterol 206 H  --   --  201 H 177   Triglycerides 99  --   --  81 66   HDL 53  --   --  53 55   LDL Cholesterol 133.2  --   --  131.8 108.8   Non-HDL Cholesterol 153  --   --  148 122   AST 24   < > 22 21 19   ALT 24   < > 30 22 23    < > = values in this interval not displayed.       Cardiovascular Testing:    Nuclear stress 08/05/2021:      Normal myocardial perfusion scan. There is no evidence of myocardial ischemia or infarction.    There is a mild intensity defect in the anterior wall of the left ventricle, secondary to breast attenuation.    The gated perfusion images showed an ejection fraction of 68% post stress.    There is normal wall motion post stress.    The EKG portion of this study is negative for ischemia.    The patient reported no chest pain during the stress test.    There were no arrhythmias during stress.    Echocardiogram 08/05/2021:    · The left ventricle is normal in size with concentric remodeling and normal systolic function.  · The estimated ejection fraction  is 60%.  · Indeterminate left ventricular diastolic function.  · Moderate left atrial enlargement.  · Normal right ventricular size with normal right ventricular systolic function.  · Mild right atrial enlargement.  · Normal central venous pressure (3 mmHg).  · The estimated PA systolic pressure is 23 mmHg.  · There is no pulmonary hypertension.      ASSESSMENT:     1. Palpitations  2. Hypertension:   3. Biatrial enlargement  4. Obesity       PLAN:     1. Palpitations:  Holter.  2. Hypertension:  Increase hydrochlorothiazide to 25 mg p.o. q.d..  3. Return to clinic 1 month.           Kamran Hui MD, MPH, FACC, [facility]

## 2022-02-08 ENCOUNTER — HOSPITAL ENCOUNTER (OUTPATIENT)
Dept: CARDIOLOGY | Facility: HOSPITAL | Age: 57
Discharge: HOME | End: 2022-02-08
Attending: INTERNAL MEDICINE
Payer: COMMERCIAL

## 2022-02-08 DIAGNOSIS — R00.2 PALPITATIONS: ICD-10-CM

## 2022-02-08 DIAGNOSIS — I10 PRIMARY HYPERTENSION: ICD-10-CM

## 2022-02-08 PROCEDURE — 93227 XTRNL ECG REC<48 HR R&I: CPT | Mod: ,,, | Performed by: INTERNAL MEDICINE

## 2022-02-08 PROCEDURE — 93226 XTRNL ECG REC<48 HR SCAN A/R: CPT

## 2022-02-08 PROCEDURE — 93227 HOLTER MONITOR - 24 HOUR (CUPID ONLY): ICD-10-PCS | Mod: ,,, | Performed by: INTERNAL MEDICINE

## 2022-02-09 LAB
OHS CV EVENT MONITOR DAY: 0
OHS CV HOLTER LENGTH DECIMAL HOURS: 23.98
OHS CV HOLTER LENGTH HOURS: 23
OHS CV HOLTER LENGTH MINUTES: 59
OHS CV HOLTER SINUS AVERAGE HR: 73
OHS CV HOLTER SINUS MAX HR: 111
OHS CV HOLTER SINUS MIN HR: 52

## 2022-02-21 ENCOUNTER — APPOINTMENT (OUTPATIENT)
Dept: RADIOLOGY | Facility: HOSPITAL | Age: 57
End: 2022-02-21
Attending: FAMILY MEDICINE
Payer: COMMERCIAL

## 2022-02-21 ENCOUNTER — OFFICE VISIT (OUTPATIENT)
Dept: FAMILY MEDICINE | Facility: CLINIC | Age: 57
End: 2022-02-21
Payer: COMMERCIAL

## 2022-02-21 VITALS
OXYGEN SATURATION: 99 % | DIASTOLIC BLOOD PRESSURE: 72 MMHG | BODY MASS INDEX: 38.5 KG/M2 | HEIGHT: 64 IN | SYSTOLIC BLOOD PRESSURE: 114 MMHG | TEMPERATURE: 99 F | HEART RATE: 86 BPM | RESPIRATION RATE: 18 BRPM | WEIGHT: 225.5 LBS

## 2022-02-21 DIAGNOSIS — M25.562 ACUTE PAIN OF LEFT KNEE: ICD-10-CM

## 2022-02-21 DIAGNOSIS — I10 PRIMARY HYPERTENSION: ICD-10-CM

## 2022-02-21 DIAGNOSIS — E66.01 CLASS 2 SEVERE OBESITY DUE TO EXCESS CALORIES WITH SERIOUS COMORBIDITY AND BODY MASS INDEX (BMI) OF 38.0 TO 38.9 IN ADULT: ICD-10-CM

## 2022-02-21 DIAGNOSIS — M25.562 ACUTE PAIN OF LEFT KNEE: Primary | ICD-10-CM

## 2022-02-21 PROCEDURE — 3074F PR MOST RECENT SYSTOLIC BLOOD PRESSURE < 130 MM HG: ICD-10-PCS | Mod: CPTII,S$GLB,, | Performed by: FAMILY MEDICINE

## 2022-02-21 PROCEDURE — 3078F PR MOST RECENT DIASTOLIC BLOOD PRESSURE < 80 MM HG: ICD-10-PCS | Mod: CPTII,S$GLB,, | Performed by: FAMILY MEDICINE

## 2022-02-21 PROCEDURE — 1160F RVW MEDS BY RX/DR IN RCRD: CPT | Mod: CPTII,S$GLB,, | Performed by: FAMILY MEDICINE

## 2022-02-21 PROCEDURE — 3074F SYST BP LT 130 MM HG: CPT | Mod: CPTII,S$GLB,, | Performed by: FAMILY MEDICINE

## 2022-02-21 PROCEDURE — 99999 PR PBB SHADOW E&M-EST. PATIENT-LVL V: CPT | Mod: PBBFAC,,, | Performed by: FAMILY MEDICINE

## 2022-02-21 PROCEDURE — 96372 THER/PROPH/DIAG INJ SC/IM: CPT | Mod: S$GLB,,, | Performed by: FAMILY MEDICINE

## 2022-02-21 PROCEDURE — 99214 PR OFFICE/OUTPT VISIT, EST, LEVL IV, 30-39 MIN: ICD-10-PCS | Mod: 25,S$GLB,, | Performed by: FAMILY MEDICINE

## 2022-02-21 PROCEDURE — 3008F BODY MASS INDEX DOCD: CPT | Mod: CPTII,S$GLB,, | Performed by: FAMILY MEDICINE

## 2022-02-21 PROCEDURE — 99214 OFFICE O/P EST MOD 30 MIN: CPT | Mod: 25,S$GLB,, | Performed by: FAMILY MEDICINE

## 2022-02-21 PROCEDURE — 73562 X-RAY EXAM OF KNEE 3: CPT | Mod: 26,LT,, | Performed by: RADIOLOGY

## 2022-02-21 PROCEDURE — 3078F DIAST BP <80 MM HG: CPT | Mod: CPTII,S$GLB,, | Performed by: FAMILY MEDICINE

## 2022-02-21 PROCEDURE — 3008F PR BODY MASS INDEX (BMI) DOCUMENTED: ICD-10-PCS | Mod: CPTII,S$GLB,, | Performed by: FAMILY MEDICINE

## 2022-02-21 PROCEDURE — 1160F PR REVIEW ALL MEDS BY PRESCRIBER/CLIN PHARMACIST DOCUMENTED: ICD-10-PCS | Mod: CPTII,S$GLB,, | Performed by: FAMILY MEDICINE

## 2022-02-21 PROCEDURE — 99999 PR PBB SHADOW E&M-EST. PATIENT-LVL V: ICD-10-PCS | Mod: PBBFAC,,, | Performed by: FAMILY MEDICINE

## 2022-02-21 PROCEDURE — 1159F MED LIST DOCD IN RCRD: CPT | Mod: CPTII,S$GLB,, | Performed by: FAMILY MEDICINE

## 2022-02-21 PROCEDURE — 73562 X-RAY EXAM OF KNEE 3: CPT | Mod: TC,FY,PN,LT

## 2022-02-21 PROCEDURE — 96372 PR INJECTION,THERAP/PROPH/DIAG2ST, IM OR SUBCUT: ICD-10-PCS | Mod: S$GLB,,, | Performed by: FAMILY MEDICINE

## 2022-02-21 PROCEDURE — 1159F PR MEDICATION LIST DOCUMENTED IN MEDICAL RECORD: ICD-10-PCS | Mod: CPTII,S$GLB,, | Performed by: FAMILY MEDICINE

## 2022-02-21 PROCEDURE — 73562 XR KNEE 3 VIEW LEFT: ICD-10-PCS | Mod: 26,LT,, | Performed by: RADIOLOGY

## 2022-02-21 RX ORDER — OFLOXACIN 3 MG/ML
SOLUTION/ DROPS OPHTHALMIC
COMMUNITY
Start: 2021-12-27 | End: 2022-02-21

## 2022-02-21 RX ORDER — HYDROCHLOROTHIAZIDE 25 MG/1
25 TABLET ORAL DAILY
Qty: 90 TABLET | Refills: 3 | Status: SHIPPED | OUTPATIENT
Start: 2022-02-21 | End: 2023-03-29

## 2022-02-21 RX ORDER — KETOROLAC TROMETHAMINE 5 MG/ML
SOLUTION OPHTHALMIC
COMMUNITY
Start: 2021-12-24 | End: 2022-02-21

## 2022-02-21 RX ORDER — PREDNISOLONE ACETATE 10 MG/ML
SUSPENSION/ DROPS OPHTHALMIC
COMMUNITY
Start: 2022-01-25 | End: 2022-02-21

## 2022-02-21 RX ORDER — KETOROLAC TROMETHAMINE 30 MG/ML
60 INJECTION, SOLUTION INTRAMUSCULAR; INTRAVENOUS
Status: COMPLETED | OUTPATIENT
Start: 2022-02-21 | End: 2022-02-21

## 2022-02-21 RX ORDER — AMOXICILLIN 500 MG
1 CAPSULE ORAL DAILY
COMMUNITY

## 2022-02-21 RX ORDER — DICLOFENAC SODIUM 50 MG/1
50 TABLET, DELAYED RELEASE ORAL 2 TIMES DAILY PRN
Qty: 60 TABLET | Refills: 0 | Status: SHIPPED | OUTPATIENT
Start: 2022-02-21 | End: 2022-03-15

## 2022-02-21 RX ORDER — LANOLIN ALCOHOL/MO/W.PET/CERES
100 CREAM (GRAM) TOPICAL DAILY
COMMUNITY
End: 2023-02-13

## 2022-02-21 RX ADMIN — KETOROLAC TROMETHAMINE 60 MG: 30 INJECTION, SOLUTION INTRAMUSCULAR; INTRAVENOUS at 02:02

## 2022-02-21 NOTE — PROGRESS NOTES
02/21/22 1345   Depression Patient Health Questionnaire (PHQ-2)   Over the last two weeks how often have you been bothered by little interest or pleasure in doing things 0   Over the last two weeks how often have you been bothered by feeling down, depressed or hopeless 0   PHQ-2 Total Score 0

## 2022-02-21 NOTE — LETTER
February 21, 2022      [facility]  [address], [address]  [place] [address]  Phone: [phone]       Patient: Mary Naranjo   YOB: 1965  Date of Visit: 02/21/2022    To Whom It May Concern:    Sheila Naranjo  was at [facility] on 02/21/2022. The patient may return to work/school on 02/22/2022 with no restrictions. If you have any questions or concerns, or if I can be of further assistance, please do not hesitate to contact me.    Sincerely,    Ramon Olivares Jr., MD

## 2022-02-21 NOTE — PROGRESS NOTES
Subjective:       Patient ID: Mary Naranjo is a 56 y.o. female.    Chief Complaint: Knee Pain    Knee Pain   Incident onset: since this past Friday. There was no injury mechanism. The pain is present in the left knee. The quality of the pain is described as stabbing. The pain is at a severity of 8/10. The pain has been worsening since onset. She reports no foreign bodies present. The symptoms are aggravated by weight bearing. She has tried ice (Mobic, Voltaren gel) for the symptoms. The treatment provided no relief.     Review of Systems   Constitutional: Negative for unexpected weight change.   Respiratory: Negative for shortness of breath and wheezing.    Cardiovascular: Negative for chest pain and palpitations.   Gastrointestinal: Negative for abdominal pain.   Genitourinary: Negative for dysuria and hematuria.   Musculoskeletal: Positive for arthralgias.         Objective:      Physical Exam  Vitals reviewed.   Constitutional:       Appearance: She is well-developed.   HENT:      Head: Normocephalic and atraumatic.      Right Ear: External ear normal.      Left Ear: External ear normal.      Nose: Nose normal.      Mouth/Throat:      Pharynx: No oropharyngeal exudate.   Eyes:      General:         Right eye: No discharge.         Left eye: No discharge.      Conjunctiva/sclera: Conjunctivae normal.      Pupils: Pupils are equal, round, and reactive to light.   Neck:      Trachea: No tracheal deviation.   Cardiovascular:      Rate and Rhythm: Normal rate and regular rhythm.      Heart sounds: Normal heart sounds. No murmur heard.  Pulmonary:      Effort: Pulmonary effort is normal.      Breath sounds: Normal breath sounds. No wheezing or rales.   Abdominal:      General: Bowel sounds are normal.      Palpations: Abdomen is soft. Abdomen is not rigid. There is no mass.      Tenderness: There is no abdominal tenderness. There is no guarding.   Musculoskeletal:      Cervical back: Normal range of motion and neck  supple.      Left knee: Crepitus present. No erythema or bony tenderness. Normal range of motion. Normal alignment and normal meniscus.   Lymphadenopathy:      Cervical: No cervical adenopathy.   Neurological:      Mental Status: She is oriented to person, place, and time.      Sensory: No sensory deficit.      Motor: No atrophy.      Gait: Gait normal.      Deep Tendon Reflexes:      Reflex Scores:       Patellar reflexes are 2+ on the right side and 2+ on the left side.        Assessment:       Problem List Items Addressed This Visit        Cardiac/Vascular    Primary hypertension    Relevant Medications    hydroCHLOROthiazide (HYDRODIURIL) 25 MG tablet      Other Visit Diagnoses     Acute pain of left knee    -  Primary    Relevant Medications    diclofenac (VOLTAREN) 50 MG EC tablet    ketorolac injection 60 mg (Completed)    Other Relevant Orders    Ambulatory referral/consult to Orthopedics    X-Ray Knee 3 View Left (Completed)    Class 2 severe obesity due to excess calories with serious comorbidity and body mass index (BMI) of 38.0 to 38.9 in adult              Plan:       Mary was seen today for knee pain.    Diagnoses and all orders for this visit:    Acute pain of left knee  -     diclofenac (VOLTAREN) 50 MG EC tablet; Take 1 tablet (50 mg total) by mouth 2 (two) times daily as needed (knee pain). Take with a meal  -     Ambulatory referral/consult to Orthopedics; Future  -     X-Ray Knee 3 View Left; Future  -     ketorolac injection 60 mg  Check xray  Discussed RICE- Resting, icing, compression, and elevation  Course of NSAID as prescribed  Patient requested seeing ortho, and referral was placed.    Primary hypertension  -     hydroCHLOROthiazide (HYDRODIURIL) 25 MG tablet; Take 1 tablet (25 mg total) by mouth once daily.  Current therapy effective, continue     Class 2 severe obesity due to excess calories with serious comorbidity and body mass index (BMI) of 38.0 to 38.9 in adult  The patient's BMI  has been recorded in the chart. The patient has been provided educational materials regarding the benefits of attaining and maintaining a normal weight. We will continue to address and follow this issue during follow up visits.

## 2022-03-08 ENCOUNTER — OFFICE VISIT (OUTPATIENT)
Dept: ORTHOPEDICS | Facility: CLINIC | Age: 57
End: 2022-03-08
Payer: COMMERCIAL

## 2022-03-08 VITALS
OXYGEN SATURATION: 99 % | WEIGHT: 228.63 LBS | BODY MASS INDEX: 39.24 KG/M2 | HEART RATE: 75 BPM | RESPIRATION RATE: 18 BRPM | SYSTOLIC BLOOD PRESSURE: 138 MMHG | DIASTOLIC BLOOD PRESSURE: 68 MMHG

## 2022-03-08 DIAGNOSIS — M17.12 PRIMARY OSTEOARTHRITIS OF LEFT KNEE: Primary | ICD-10-CM

## 2022-03-08 DIAGNOSIS — M25.562 ACUTE PAIN OF LEFT KNEE: ICD-10-CM

## 2022-03-08 PROCEDURE — 1159F PR MEDICATION LIST DOCUMENTED IN MEDICAL RECORD: ICD-10-PCS | Mod: CPTII,S$GLB,, | Performed by: PHYSICIAN ASSISTANT

## 2022-03-08 PROCEDURE — 99999 PR PBB SHADOW E&M-EST. PATIENT-LVL IV: CPT | Mod: PBBFAC,,, | Performed by: PHYSICIAN ASSISTANT

## 2022-03-08 PROCEDURE — 99214 PR OFFICE/OUTPT VISIT, EST, LEVL IV, 30-39 MIN: ICD-10-PCS | Mod: 25,S$GLB,, | Performed by: PHYSICIAN ASSISTANT

## 2022-03-08 PROCEDURE — 20610 LARGE JOINT ASPIRATION/INJECTION: L KNEE: ICD-10-PCS | Mod: LT,S$GLB,, | Performed by: PHYSICIAN ASSISTANT

## 2022-03-08 PROCEDURE — 3078F DIAST BP <80 MM HG: CPT | Mod: CPTII,S$GLB,, | Performed by: PHYSICIAN ASSISTANT

## 2022-03-08 PROCEDURE — 3078F PR MOST RECENT DIASTOLIC BLOOD PRESSURE < 80 MM HG: ICD-10-PCS | Mod: CPTII,S$GLB,, | Performed by: PHYSICIAN ASSISTANT

## 2022-03-08 PROCEDURE — 1160F RVW MEDS BY RX/DR IN RCRD: CPT | Mod: CPTII,S$GLB,, | Performed by: PHYSICIAN ASSISTANT

## 2022-03-08 PROCEDURE — 99214 OFFICE O/P EST MOD 30 MIN: CPT | Mod: 25,S$GLB,, | Performed by: PHYSICIAN ASSISTANT

## 2022-03-08 PROCEDURE — 3075F PR MOST RECENT SYSTOLIC BLOOD PRESS GE 130-139MM HG: ICD-10-PCS | Mod: CPTII,S$GLB,, | Performed by: PHYSICIAN ASSISTANT

## 2022-03-08 PROCEDURE — 3075F SYST BP GE 130 - 139MM HG: CPT | Mod: CPTII,S$GLB,, | Performed by: PHYSICIAN ASSISTANT

## 2022-03-08 PROCEDURE — 20610 DRAIN/INJ JOINT/BURSA W/O US: CPT | Mod: LT,S$GLB,, | Performed by: PHYSICIAN ASSISTANT

## 2022-03-08 PROCEDURE — 99999 PR PBB SHADOW E&M-EST. PATIENT-LVL IV: ICD-10-PCS | Mod: PBBFAC,,, | Performed by: PHYSICIAN ASSISTANT

## 2022-03-08 PROCEDURE — 1160F PR REVIEW ALL MEDS BY PRESCRIBER/CLIN PHARMACIST DOCUMENTED: ICD-10-PCS | Mod: CPTII,S$GLB,, | Performed by: PHYSICIAN ASSISTANT

## 2022-03-08 PROCEDURE — 3008F PR BODY MASS INDEX (BMI) DOCUMENTED: ICD-10-PCS | Mod: CPTII,S$GLB,, | Performed by: PHYSICIAN ASSISTANT

## 2022-03-08 PROCEDURE — 1159F MED LIST DOCD IN RCRD: CPT | Mod: CPTII,S$GLB,, | Performed by: PHYSICIAN ASSISTANT

## 2022-03-08 PROCEDURE — 3008F BODY MASS INDEX DOCD: CPT | Mod: CPTII,S$GLB,, | Performed by: PHYSICIAN ASSISTANT

## 2022-03-08 RX ORDER — DICLOFENAC SODIUM 10 MG/G
2 GEL TOPICAL 4 TIMES DAILY
Qty: 1 EACH | Refills: 1 | Status: SHIPPED | OUTPATIENT
Start: 2022-03-08 | End: 2023-08-08

## 2022-03-08 RX ORDER — TRIAMCINOLONE ACETONIDE 40 MG/ML
40 INJECTION, SUSPENSION INTRA-ARTICULAR; INTRAMUSCULAR
Status: DISCONTINUED | OUTPATIENT
Start: 2022-03-08 | End: 2022-03-08 | Stop reason: HOSPADM

## 2022-03-08 RX ADMIN — TRIAMCINOLONE ACETONIDE 40 MG: 40 INJECTION, SUSPENSION INTRA-ARTICULAR; INTRAMUSCULAR at 01:03

## 2022-03-08 NOTE — PROGRESS NOTES
SUBJECTIVE:     Chief Complaint   Patient presents with    Left Knee - Pain       History of Present Illness:  Mary Naranjo is a 56 y.o. year old female here with complaints of constant left knee pain which started several months ago.  There is not a history of trauma.  The pain is located in the medial aspect of the knee.  The pain is described as achy.  There is not catching or locking.  Aggravating factors include standing and walking.  Associated symptoms include swelling.  Previous treatments include rest, topical creams, diclofenac which have provided minimal relief.  There is not a history of previous injury or surgery to the knee. She did have right knee arthroscopy in 2015. The patient does not use an assistive device.    Review of patient's allergies indicates:   Allergen Reactions    Dilaudid [hydromorphone] Other (See Comments)         Current Outpatient Medications   Medication Sig Dispense Refill    cholecalciferol, vitamin D3, 50 mcg (2,000 unit) Chew Take 2,000 Units by mouth once daily. Per patient      cyanocobalamin (VITAMIN B-12) 1000 MCG tablet Take 100 mcg by mouth once daily.      diclofenac (VOLTAREN) 50 MG EC tablet Take 1 tablet (50 mg total) by mouth 2 (two) times daily as needed (knee pain). Take with a meal 60 tablet 0    hydroCHLOROthiazide (HYDRODIURIL) 25 MG tablet Take 1 tablet (25 mg total) by mouth once daily. 90 tablet 3    LINZESS 290 mcg Cap capsule       multivit/folic acid/vit K1 (ONE-A-DAY WOMEN'S 50 PLUS ORAL) Take 1 capsule by mouth once daily. Per patient, One-a-Day Women's 50+ Multivitamin Complete      omega-3 fatty acids/fish oil (FISH OIL-OMEGA-3 FATTY ACIDS) 300-1,000 mg capsule Take 1 capsule by mouth once daily.      tiZANidine (ZANAFLEX) 4 MG tablet TAKE 1 TABLET BY MOUTH NIGHTLY AS NEEDED FOR MUSCLE SPASMS 30 tablet 0    UNABLE TO FIND Take 2 capsules by mouth once daily. medication name: Digestive Advantage       No current facility-administered  medications for this visit.       Past Medical History:   Diagnosis Date    Arthritis     Dental bridge present     upper removable partial    Family history of pancreatic cancer     Fibrocystic breast     Migraines     Obesity     Pancreas cyst     Primary hypertension 2/2/2022       Past Surgical History:   Procedure Laterality Date    BREAST BIOPSY      BREAST CYST ASPIRATION      COLONOSCOPY N/A 10/7/2015    Procedure: COLONOSCOPY;  Surgeon: Eagle Stack MD;  Location: [facility];  Service: Endoscopy;  Laterality: N/A;    DILATION AND CURETTAGE OF UTERUS      ENDOSCOPIC ULTRASOUND OF UPPER GASTROINTESTINAL TRACT N/A 11/3/2020    Procedure: ULTRASOUND, UPPER GI TRACT, ENDOSCOPIC;  Surgeon: Kei Juarez MD;  Location: [facility] (2ND FLR);  Service: Endoscopy;  Laterality: N/A;  Covid-19 test 10/31/20 at  [place] - pg    ESOPHAGOGASTRODUODENOSCOPY N/A 7/19/2019    Procedure: ESOPHAGOGASTRODUODENOSCOPY (EGD);  Surgeon: Cherrie Sheets MD;  Location: [facility];  Service: Endoscopy;  Laterality: N/A;    fibrocystic breast       KNEE ARTHROSCOPY W/ MENISCAL REPAIR      TUBAL LIGATION         Vital Signs (Most Recent)  Vitals:    03/08/22 1329   BP: 138/68   Pulse: 75   Resp: 18         Review of Systems:  ROS:  Constitutional: no fever or chills  Eyes: no visual changes  ENT: no nasal congestion or sore throat  Respiratory: no cough or shortness of breath  Musculoskeletal: no arthralgias or myalgias  Neurological: no seizures or tremors  Behavioral/Psych: no auditory or visual hallucinations      OBJECTIVE:     PHYSICAL EXAM:    Weight: 103.7 kg (228 lb 9.9 oz)   General: Well developed, well nourished, in no acute distress.  Neurological: Mood & affect are normal.  HEENT: NCAT, sclera nonicteric   Lungs: Respirations are equal and unlabored.   CV: 2+ bilateral upper and lower extremity pulses.   Skin: Intact throughout with no rashes, erythema, or lesions  Extremities: No LE edema, no erythema or  warmth of the skin in either lower extremity.    left Knee Exam:  Knee Range of Motion: 0-120   Effusion: none  Condition of skin: intact  Location of tenderness: Medial joint line and Pes anserinus   Strength: 5 of 5 quadriceps strength and 5 of 5 hamstring strength  Stability:  stable to testing  Varus /Valgus stress:  normal  Left Hip Examination:  painless passive ROM    IMAGING:    X-rays of the left knee, personally reviewed by me, demonstrate mild DJD with joint space narrowing, osteophyte formation and subchondral sclerosis.  No fracture or dislocation.    ASSESSMENT/PLAN:   56 y.o. year old female with left knee osteoarthritis    Plan: We discussed with the patient at length all the different treatment options available for the knee including NSAIDS, acetaminophen, rest, ice, knee strengthening exercise, steroid injections for temporary relief, Viscosupplementation, and knee arthroplasty.     - Left knee CSI today- see procedure note  - Rest, ice, activity modification  - She may try voltaren gel  - Follow up if symptoms worsen or fail to improve

## 2022-03-08 NOTE — PROCEDURES
Large Joint Aspiration/Injection: L knee    Date/Time: 3/8/2022 1:00 PM  Performed by: Tania Lopes PA-C  Authorized by: Tania Lopes PA-C     Consent Done?:  Yes (Verbal)  Indications:  Pain  Prep: patient was prepped and draped in usual sterile fashion    Local anesthetic:  Topical anesthetic    Details:  Needle Size:  22 G  Approach:  Anterolateral  Location:  Knee  Site:  L knee  Medications:  40 mg triamcinolone acetonide 40 mg/mL  Patient tolerance:  Patient tolerated the procedure well with no immediate complications

## 2022-03-15 DIAGNOSIS — M25.562 ACUTE PAIN OF LEFT KNEE: ICD-10-CM

## 2022-03-15 RX ORDER — DICLOFENAC SODIUM 50 MG/1
50 TABLET, DELAYED RELEASE ORAL 2 TIMES DAILY PRN
Qty: 60 TABLET | Refills: 0 | Status: SHIPPED | OUTPATIENT
Start: 2022-03-15 | End: 2023-08-08

## 2022-03-15 NOTE — TELEPHONE ENCOUNTER
This Rx Request does not qualify for assessment with the [facility]   Please review protocol details and the Care Due Message for extra clinical information    Reasons Rx Request may be deferred:  1. Labs/Vitals overdue  2. Labs/Vitals abnormal  3. Patient has been seen in the ED/Hospital since the last PCP visit  4. Medication is non-delegated  5. Medication associated diagnosis code is outside of scope  6. Provider is a non-participating provider  7. Visit criteria not met (Patient needs to be seen at least every 15 months by PCP)    Note composed:2:55 PM 03/15/2022

## 2022-06-10 ENCOUNTER — HOSPITAL ENCOUNTER (EMERGENCY)
Facility: HOSPITAL | Age: 57
Discharge: HOME | End: 2022-06-10
Attending: EMERGENCY MEDICINE
Payer: COMMERCIAL

## 2022-06-10 VITALS
TEMPERATURE: 98 F | HEIGHT: 65 IN | BODY MASS INDEX: 37.49 KG/M2 | RESPIRATION RATE: 17 BRPM | WEIGHT: 225 LBS | HEART RATE: 68 BPM | OXYGEN SATURATION: 99 % | DIASTOLIC BLOOD PRESSURE: 78 MMHG | SYSTOLIC BLOOD PRESSURE: 159 MMHG

## 2022-06-10 DIAGNOSIS — K59.04 CHRONIC IDIOPATHIC CONSTIPATION: Primary | ICD-10-CM

## 2022-06-10 PROCEDURE — 99283 EMERGENCY DEPT VISIT LOW MDM: CPT

## 2022-06-10 PROCEDURE — 25000003 PHARM REV CODE 250

## 2022-06-10 RX ORDER — ONDANSETRON 4 MG/1
4 TABLET, ORALLY DISINTEGRATING ORAL
Status: COMPLETED | OUTPATIENT
Start: 2022-06-10 | End: 2022-06-10

## 2022-06-10 RX ORDER — MAG HYDROX/ALUMINUM HYD/SIMETH 200-200-20
30 SUSPENSION, ORAL (FINAL DOSE FORM) ORAL ONCE
Status: COMPLETED | OUTPATIENT
Start: 2022-06-10 | End: 2022-06-10

## 2022-06-10 RX ORDER — LIDOCAINE HYDROCHLORIDE 20 MG/ML
10 SOLUTION OROPHARYNGEAL ONCE
Status: COMPLETED | OUTPATIENT
Start: 2022-06-10 | End: 2022-06-10

## 2022-06-10 RX ORDER — GLYCERIN 1 G/1
1 SUPPOSITORY RECTAL
Qty: 12 SUPPOSITORY | Refills: 0 | Status: SHIPPED | OUTPATIENT
Start: 2022-06-10 | End: 2022-08-22

## 2022-06-10 RX ADMIN — ALUMINUM HYDROXIDE, MAGNESIUM HYDROXIDE, AND SIMETHICONE 30 ML: 200; 200; 20 SUSPENSION ORAL at 05:06

## 2022-06-10 RX ADMIN — LIDOCAINE HYDROCHLORIDE 10 ML: 20 SOLUTION ORAL at 05:06

## 2022-06-10 RX ADMIN — ONDANSETRON 4 MG: 4 TABLET, ORALLY DISINTEGRATING ORAL at 05:06

## 2022-06-10 NOTE — ED PROVIDER NOTES
"Encounter Date: 6/10/2022       History     Chief Complaint   Patient presents with    Constipation     Patient c/o constipation, reports constipation x 1 week,patient reports taking several medications for constipation with no relief. States abd pain 1/10, pt reports fleet enema this morning with no relief.   Hx HTN     56 y/o female with with chronic constipation and GERD presents to the ED c/o x1 wk hx of constipation. Patient also reports nausea and "burning" epigastric abdominal pain, which she rates a 1/10. Patient attempted miralax, magnesium citrate, and stool softeners with no relief. Patient is being evaluated by GI, Dr. Meredith, for her chronic constipation and was prescribed Linzess and encouraged to eat more fiber. Patient states her diet is not great, and she doesn't eat enough fiber. Patient is still passing flatus. Patient denies emesis, diarrhea, sore throat, fever, chills, chest pain, SOB, dysuria, or hematuria. No other symptoms reported.    The history is provided by the patient and the spouse. No  was used.     Review of patient's allergies indicates:   Allergen Reactions    Dilaudid [hydromorphone] Other (See Comments)     Past Medical History:   Diagnosis Date    Arthritis     Dental bridge present     upper removable partial    Family history of pancreatic cancer     Fibrocystic breast     Migraines     Obesity     Pancreas cyst     Primary hypertension 2/2/2022     Past Surgical History:   Procedure Laterality Date    BREAST BIOPSY      BREAST CYST ASPIRATION      COLONOSCOPY N/A 10/7/2015    Procedure: COLONOSCOPY;  Surgeon: Eagle Stack MD;  Location: [facility];  Service: Endoscopy;  Laterality: N/A;    DILATION AND CURETTAGE OF UTERUS      ENDOSCOPIC ULTRASOUND OF UPPER GASTROINTESTINAL TRACT N/A 11/3/2020    Procedure: ULTRASOUND, UPPER GI TRACT, ENDOSCOPIC;  Surgeon: Kei Juarez MD;  Location: [facility] ([address] [place] [address]);  Service: Endoscopy;  " Laterality: N/A;  Covid-19 test 10/31/20 at  [place] - pg    ESOPHAGOGASTRODUODENOSCOPY N/A 7/19/2019    Procedure: ESOPHAGOGASTRODUODENOSCOPY (EGD);  Surgeon: Cherrie Sheets MD;  Location: [facility];  Service: Endoscopy;  Laterality: N/A;    fibrocystic breast       KNEE ARTHROSCOPY W/ MENISCAL REPAIR      TUBAL LIGATION       Family History   Problem Relation Age of Onset    Heart disease Mother     Cataracts Mother     Glaucoma Mother     Cancer Father         colon at ~60; lung, gallbladder, skin, prostate, brain- unsure which are primary vs secondary    No Known Problems Brother     Asthma Maternal Aunt     No Known Problems Maternal Grandmother     No Known Problems Maternal Grandfather     No Known Problems Paternal Grandmother     No Known Problems Paternal Grandfather     Pancreatic cancer Sister 54    Breast cancer Sister 42        unilateral    Cancer Maternal Cousin         unknown origin    Cervical cancer Other     Cancer Other         possible ovarian cancer    Breast cancer Maternal Cousin     Cancer Maternal Cousin         prostate?    Prostate cancer Maternal Cousin     COPD Neg Hx     Amblyopia Neg Hx     Blindness Neg Hx     Diabetes Neg Hx     Hypertension Neg Hx     Macular degeneration Neg Hx     Retinal detachment Neg Hx     Strabismus Neg Hx     Stroke Neg Hx     Thyroid disease Neg Hx      Social History     Tobacco Use    Smoking status: Never Smoker    Smokeless tobacco: Never Used   Substance Use Topics    Alcohol use: No    Drug use: No     Review of Systems   Constitutional: Negative for chills and fever.   HENT: Negative for congestion, ear pain, rhinorrhea and sore throat.    Eyes: Negative for redness.   Respiratory: Negative for cough and shortness of breath.    Cardiovascular: Negative for chest pain.   Gastrointestinal: Positive for abdominal pain, constipation and nausea. Negative for diarrhea and vomiting.   Genitourinary: Negative for  "decreased urine volume, difficulty urinating, dysuria, frequency, hematuria and urgency.   Musculoskeletal: Negative for back pain and neck pain.   Skin: Negative for rash.   Neurological: Negative for headaches.   Psychiatric/Behavioral: Negative for confusion.       Physical Exam     Initial Vitals [06/10/22 1620]   BP Pulse Resp Temp SpO2   (!) 159/78 68 17 98.2 °F (36.8 °C) 99 %      MAP       --         Physical Exam    Nursing note and vitals reviewed.  Constitutional: She appears well-developed and well-nourished.  Non-toxic appearance. She does not appear ill.   HENT:   Head: Normocephalic and atraumatic.   Mouth/Throat: Mucous membranes are normal.   Eyes: EOM are normal.   Neck: Neck supple.   Cardiovascular: Normal rate and regular rhythm.   Pulmonary/Chest: Effort normal and breath sounds normal. No respiratory distress.   Abdominal: Abdomen is soft. Bowel sounds are normal. She exhibits no distension. There is no abdominal tenderness. There is no rebound, no guarding, no tenderness at McBurney's point and negative Herrera's sign. negative Rovsing's sign  Musculoskeletal:         General: Normal range of motion.      Cervical back: Neck supple.     Neurological: She is alert.   Skin: Skin is warm and dry.   Psychiatric: She has a normal mood and affect.         ED Course   Procedures  Labs Reviewed - No data to display       Imaging Results    None          Medications   ondansetron disintegrating tablet 4 mg (4 mg Oral Given 6/10/22 1701)   aluminum-magnesium hydroxide-simethicone 200-200-20 mg/5 mL suspension 30 mL (30 mLs Oral Given 6/10/22 1701)     And   LIDOcaine HCl 2% oral solution 10 mL (10 mLs Oral Given 6/10/22 1701)     Medical Decision Making:   ED Management:  This is a 56 y/o female with with chronic constipation and GERD presents to the ED c/o x1 wk hx of constipation. Patient also reports nausea and "burning" epigastric abdominal pain, which she rates a 1/10. On PE, she is well appearing " and in no acute distress. Non toxic appearing. No TTP to abdomen. Ordered GI cocktail and zofran for patient symptoms. Reassessed patient and she reports relief to abdominal pain and nausea. Doubt acute bowel obstruction because patient is still passing flatus with unremarkable abdominal exam. Urged prompt f/u with Dr. Meredith for further evaluation of patient's symptoms. Also urged prompt f/u with PCP for further evaluation. Will order glycerin suppository upon discharge for patient's symptoms. Also encouraged increased fiber intake.    I discussed with the patient/family the diagnosis, treatment plan, indications for return to the emergency department, and for expected follow-up. The patient/family verbalized an understanding. The patient/family is asked if there are any questions or concerns. We discuss the case, until all issues are addressed to the patient/familys satisfaction. Patient/family understands and is agreeable to the plan. Patient is stable and ready for discharge.                         Clinical Impression:   Final diagnoses:  [K59.04] Chronic idiopathic constipation (Primary)          ED Disposition Condition    Discharge Stable        ED Prescriptions     Medication Sig Dispense Start Date End Date Auth. Provider    glycerin adult suppository Place 1 suppository rectally as needed for Constipation. 12 suppository 6/10/2022  Wilfrido Osman PA-C        Follow-up Information     Follow up With Specialties Details Why Contact Info    Ramon Olivares Jr., MD Family Medicine In 2 days As needed, If symptoms worsen [address] [facility] [address]  [phone]      [facility] Emergency Dept Emergency Medicine In 2 days If symptoms worsen [address] [facility] [id number] [phone]           Wilfrido Osman PA-C  06/10/22 2043

## 2022-06-10 NOTE — DISCHARGE INSTRUCTIONS

## 2022-06-23 ENCOUNTER — TELEPHONE (OUTPATIENT)
Dept: FAMILY MEDICINE | Facility: CLINIC | Age: 57
End: 2022-06-23
Payer: COMMERCIAL

## 2022-06-23 DIAGNOSIS — R05.9 COUGH: Primary | ICD-10-CM

## 2022-06-23 RX ORDER — BENZONATATE 200 MG/1
200 CAPSULE ORAL 3 TIMES DAILY PRN
Qty: 30 CAPSULE | Refills: 2 | Status: SHIPPED | OUTPATIENT
Start: 2022-06-23 | End: 2022-07-03

## 2022-06-23 NOTE — TELEPHONE ENCOUNTER
Medication sent to pharmacy to address cough.  Okay to take zyrtec for sinus issues.  As long as she does not have a fever a chest xray is not indicated.  Tylenol for any body aches.

## 2022-06-23 NOTE — TELEPHONE ENCOUNTER
Covid testing at [facility] 6/22/22. Patient is currently waiting for results. symptom  listed below in previous message. Patient notified provider is out of office.  Please advise

## 2022-06-23 NOTE — TELEPHONE ENCOUNTER
----- Message from Marta Tobar sent at 6/23/2022 11:29 AM CDT -----   Name of Who is Calling:     What is the request in detail:  patient request call back in reference to feeling fatigued / weak   coughing   /cough making chest hurt    / patient has been sick since Saturday and want to know if can send medication to pharmacy   / patient took Covid test on yesterday and has not received results      / patient want to know if can get chest x-ray Please contact to further discuss and advise      Can the clinic reply by [facility]:     What Number to Call Back [facility]  [phone]

## 2022-06-24 ENCOUNTER — TELEPHONE (OUTPATIENT)
Dept: FAMILY MEDICINE | Facility: CLINIC | Age: 57
End: 2022-06-24
Payer: COMMERCIAL

## 2022-06-24 NOTE — TELEPHONE ENCOUNTER
Good morning,  Please let patient know that she doesn't meet criteria for the antiviral as her riskscore is only 2 and you need to be 3 or higher.  Also, please let her know that the antiviral can make her feel worse as well as there can be relapse of symptoms after taking the medication.  Patient should quarantine for 7 days from when symptoms presented and if no symptoms then 5-7 days from when she tested positive.    Thank you,  Dr. Jiménez

## 2022-06-24 NOTE — TELEPHONE ENCOUNTER
----- Message from Yany Mcdermott sent at 6/24/2022  9:28 AM CDT -----  Type: Patient Call Back    Who called: self     What is the request in detail: pt was told to call back if her test was positive and she would like something antiviral called in for her. She also would like to know how many days to quarantine and when should she retest to return to work     Can the clinic reply by [facility]?    Would the patient rather a call back or a response via [facility]? Call     Best call back number: [phone] (home)       Additional Information:       [id number] - [place] - [address]  [address] [place] [address]  Phone: [phone] Fax: [phone]

## 2022-06-27 ENCOUNTER — TELEPHONE (OUTPATIENT)
Dept: FAMILY MEDICINE | Facility: CLINIC | Age: 57
End: 2022-06-27
Payer: COMMERCIAL

## 2022-06-27 NOTE — TELEPHONE ENCOUNTER
----- Message from Alejandra Gee sent at 6/27/2022  7:57 AM CDT -----  Type: Patient Call Back    Who called: self     What is the request in detail: patient would like to have a return to work letter. Please call    Can the clinic reply by [facility]? no    Would the patient rather a call back or a response via [facility]? call    Best call back number:.[phone]

## 2022-06-27 NOTE — TELEPHONE ENCOUNTER
Spoke is requesting a return to work note. Patient tested postive for Covid  6/22/22 at [facility]. Patient would like to return to work 6/28/22. notified pcp is out of the office until 6/28/22. verbalized understanding please advise.

## 2022-08-22 ENCOUNTER — OFFICE VISIT (OUTPATIENT)
Dept: FAMILY MEDICINE | Facility: CLINIC | Age: 57
End: 2022-08-22
Payer: COMMERCIAL

## 2022-08-22 VITALS
OXYGEN SATURATION: 98 % | SYSTOLIC BLOOD PRESSURE: 152 MMHG | BODY MASS INDEX: 37.87 KG/M2 | DIASTOLIC BLOOD PRESSURE: 76 MMHG | WEIGHT: 227.31 LBS | HEART RATE: 71 BPM | HEIGHT: 65 IN | TEMPERATURE: 99 F

## 2022-08-22 DIAGNOSIS — M54.9 UPPER BACK PAIN, CHRONIC: ICD-10-CM

## 2022-08-22 DIAGNOSIS — K59.04 CHRONIC IDIOPATHIC CONSTIPATION: ICD-10-CM

## 2022-08-22 DIAGNOSIS — G89.29 UPPER BACK PAIN, CHRONIC: ICD-10-CM

## 2022-08-22 DIAGNOSIS — Z12.39 ENCOUNTER FOR SCREENING FOR MALIGNANT NEOPLASM OF BREAST, UNSPECIFIED SCREENING MODALITY: ICD-10-CM

## 2022-08-22 DIAGNOSIS — I10 PRIMARY HYPERTENSION: Primary | ICD-10-CM

## 2022-08-22 DIAGNOSIS — R73.03 PREDIABETES: ICD-10-CM

## 2022-08-22 PROCEDURE — 4010F ACE/ARB THERAPY RXD/TAKEN: CPT | Mod: CPTII,S$GLB,, | Performed by: FAMILY MEDICINE

## 2022-08-22 PROCEDURE — 3008F BODY MASS INDEX DOCD: CPT | Mod: CPTII,S$GLB,, | Performed by: FAMILY MEDICINE

## 2022-08-22 PROCEDURE — 3078F DIAST BP <80 MM HG: CPT | Mod: CPTII,S$GLB,, | Performed by: FAMILY MEDICINE

## 2022-08-22 PROCEDURE — 3077F SYST BP >= 140 MM HG: CPT | Mod: CPTII,S$GLB,, | Performed by: FAMILY MEDICINE

## 2022-08-22 PROCEDURE — 1159F MED LIST DOCD IN RCRD: CPT | Mod: CPTII,S$GLB,, | Performed by: FAMILY MEDICINE

## 2022-08-22 PROCEDURE — 99999 PR PBB SHADOW E&M-EST. PATIENT-LVL V: ICD-10-PCS | Mod: PBBFAC,,, | Performed by: FAMILY MEDICINE

## 2022-08-22 PROCEDURE — 1159F PR MEDICATION LIST DOCUMENTED IN MEDICAL RECORD: ICD-10-PCS | Mod: CPTII,S$GLB,, | Performed by: FAMILY MEDICINE

## 2022-08-22 PROCEDURE — 1160F PR REVIEW ALL MEDS BY PRESCRIBER/CLIN PHARMACIST DOCUMENTED: ICD-10-PCS | Mod: CPTII,S$GLB,, | Performed by: FAMILY MEDICINE

## 2022-08-22 PROCEDURE — 3078F PR MOST RECENT DIASTOLIC BLOOD PRESSURE < 80 MM HG: ICD-10-PCS | Mod: CPTII,S$GLB,, | Performed by: FAMILY MEDICINE

## 2022-08-22 PROCEDURE — 1160F RVW MEDS BY RX/DR IN RCRD: CPT | Mod: CPTII,S$GLB,, | Performed by: FAMILY MEDICINE

## 2022-08-22 PROCEDURE — 3008F PR BODY MASS INDEX (BMI) DOCUMENTED: ICD-10-PCS | Mod: CPTII,S$GLB,, | Performed by: FAMILY MEDICINE

## 2022-08-22 PROCEDURE — 4010F PR ACE/ARB THEARPY RXD/TAKEN: ICD-10-PCS | Mod: CPTII,S$GLB,, | Performed by: FAMILY MEDICINE

## 2022-08-22 PROCEDURE — 99214 OFFICE O/P EST MOD 30 MIN: CPT | Mod: S$GLB,,, | Performed by: FAMILY MEDICINE

## 2022-08-22 PROCEDURE — 99999 PR PBB SHADOW E&M-EST. PATIENT-LVL V: CPT | Mod: PBBFAC,,, | Performed by: FAMILY MEDICINE

## 2022-08-22 PROCEDURE — 3077F PR MOST RECENT SYSTOLIC BLOOD PRESSURE >= 140 MM HG: ICD-10-PCS | Mod: CPTII,S$GLB,, | Performed by: FAMILY MEDICINE

## 2022-08-22 PROCEDURE — 99214 PR OFFICE/OUTPT VISIT, EST, LEVL IV, 30-39 MIN: ICD-10-PCS | Mod: S$GLB,,, | Performed by: FAMILY MEDICINE

## 2022-08-22 RX ORDER — LINACLOTIDE 290 UG/1
290 CAPSULE, GELATIN COATED ORAL
Qty: 90 CAPSULE | Refills: 3 | Status: SHIPPED | OUTPATIENT
Start: 2022-08-22 | End: 2022-12-20 | Stop reason: SDUPTHER

## 2022-08-22 RX ORDER — METHOCARBAMOL 750 MG/1
750 TABLET, FILM COATED ORAL 3 TIMES DAILY PRN
Qty: 30 TABLET | Refills: 2 | Status: SHIPPED | OUTPATIENT
Start: 2022-08-22 | End: 2022-09-01

## 2022-08-22 RX ORDER — VALSARTAN 160 MG/1
160 TABLET ORAL DAILY
Qty: 30 TABLET | Refills: 0 | Status: SHIPPED | OUTPATIENT
Start: 2022-08-22 | End: 2022-09-13

## 2022-08-22 NOTE — LETTER
August 22, 2022      [facility]  [address], [address]  [place] [address]  Phone: [phone]       Patient: Mary Naranjo   YOB: 1965  Date of Visit: 08/22/2022    To Whom It May Concern:    Sheila Naranjo  was at [facility] on 08/22/2022. The patient may return to work on 08/23/2022 with no restrictions. If you have any questions or concerns, or if I can be of further assistance, please do not hesitate to contact me.    Sincerely,    Ramon Olivares Jr., MD

## 2022-08-22 NOTE — PATIENT INSTRUCTIONS
Goal blood pressure is top number (systolic) always under 130 and bottom number (diastolic) always under 80.    Please call [phone] to schedule imaging tests requested

## 2022-08-23 ENCOUNTER — APPOINTMENT (OUTPATIENT)
Dept: RADIOLOGY | Facility: HOSPITAL | Age: 57
End: 2022-08-23
Attending: FAMILY MEDICINE
Payer: COMMERCIAL

## 2022-08-23 DIAGNOSIS — M54.9 UPPER BACK PAIN, CHRONIC: ICD-10-CM

## 2022-08-23 DIAGNOSIS — G89.29 UPPER BACK PAIN, CHRONIC: ICD-10-CM

## 2022-08-23 PROCEDURE — 72070 XR THORACIC SPINE AP LATERAL: ICD-10-PCS | Mod: 26,,, | Performed by: RADIOLOGY

## 2022-08-23 PROCEDURE — 72070 X-RAY EXAM THORAC SPINE 2VWS: CPT | Mod: 26,,, | Performed by: RADIOLOGY

## 2022-08-23 PROCEDURE — 72070 X-RAY EXAM THORAC SPINE 2VWS: CPT | Mod: TC,FY,PN

## 2022-08-23 NOTE — PROGRESS NOTES
Subjective:       Patient ID: Mary Naranjo is a 57 y.o. female.    Chief Complaint: Hypertension and Back Pain    Hypertension  This is a chronic problem. The problem is uncontrolled. Pertinent negatives include no anxiety, blurred vision, chest pain, palpitations, peripheral edema or shortness of breath. Risk factors for coronary artery disease include obesity. Past treatments include diuretics. The current treatment provides moderate improvement. There are no compliance problems.  There is no history of angina, kidney disease, CAD/MI or CVA.   Back Pain  This is a new problem. Episode onset: several months. The problem occurs intermittently. The problem has been waxing and waning since onset. The pain is present in the thoracic spine. The pain does not radiate. The pain is moderate. The symptoms are aggravated by position. Pertinent negatives include no abdominal pain, chest pain or dysuria. Treatments tried: OTC medication. The treatment provided mild relief.     Review of Systems   Constitutional: Negative for unexpected weight change.   HENT: Negative for ear pain and sore throat.    Eyes: Negative for blurred vision and visual disturbance.   Respiratory: Negative for shortness of breath.    Cardiovascular: Negative for chest pain and palpitations.   Gastrointestinal: Negative for abdominal pain and blood in stool.   Genitourinary: Negative for dysuria and frequency.   Musculoskeletal: Positive for back pain.   Integumentary:  Negative for rash.   Hematological: Negative for adenopathy.   Psychiatric/Behavioral: Negative for suicidal ideas.         Objective:      Physical Exam  Constitutional:       Appearance: She is obese.   HENT:      Head: Normocephalic and atraumatic.      Right Ear: External ear normal.      Left Ear: External ear normal.      Nose: Nose normal.   Eyes:      Extraocular Movements: Extraocular movements intact.      Pupils: Pupils are equal, round, and reactive to light.    Cardiovascular:      Rate and Rhythm: Normal rate and regular rhythm.      Pulses: Normal pulses.   Pulmonary:      Effort: Pulmonary effort is normal. No respiratory distress.      Breath sounds: No wheezing or rales.   Abdominal:      General: Abdomen is flat. Bowel sounds are normal. There is no distension.      Tenderness: There is no abdominal tenderness. There is no guarding.   Musculoskeletal:      Right lower leg: No edema.      Left lower leg: No edema.   Neurological:      Mental Status: She is alert.         Assessment:       Problem List Items Addressed This Visit        Cardiac/Vascular    Primary hypertension - Primary    Relevant Medications    valsartan (DIOVAN) 160 MG tablet    Other Relevant Orders    Comprehensive Metabolic Panel    Hemoglobin A1C    Lipid Panel       Endocrine    Prediabetes      Other Visit Diagnoses     Upper back pain, chronic        Relevant Medications    methocarbamoL (ROBAXIN) 750 MG Tab    Other Relevant Orders    X-Ray Thoracic Spine AP Lateral    Encounter for screening for malignant neoplasm of breast, unspecified screening modality        Relevant Orders    Mammo Digital Screening Bilat w/ Celestino    Chronic idiopathic constipation        Relevant Medications    LINZESS 290 mcg Cap capsule          Plan:       Mary was seen today for hypertension and back pain.    Diagnoses and all orders for this visit:    Primary hypertension  -     valsartan (DIOVAN) 160 MG tablet; Take 1 tablet (160 mg total) by mouth once daily.  -     Comprehensive Metabolic Panel; Future  -     Hemoglobin A1C; Future  -     Lipid Panel; Future  BP elevated  Continue HCTZ  Start Valsartan and receck BP in 2-4 weeks, and will do lab tests at that time    Upper back pain, chronic  -     methocarbamoL (ROBAXIN) 750 MG Tab; Take 1 tablet (750 mg total) by mouth 3 (three) times daily as needed (back pain).  -     X-Ray Thoracic Spine AP Lateral; Future  Tizanidine not working  Will do trial of  Robaxin  Check Xray    Encounter for screening for malignant neoplasm of breast, unspecified screening modality  -     Mammo Digital Screening Bilat w/ Celestino; Future  Breast cancer screening ordered    Prediabetes  Check A1c    Chronic idiopathic constipation  -     LINZESS 290 mcg Cap capsule; Take 1 capsule (290 mcg total) by mouth before breakfast.  Doing well with Linzess prescribed by GI  Continue regimen.

## 2022-09-07 ENCOUNTER — CLINICAL SUPPORT (OUTPATIENT)
Dept: FAMILY MEDICINE | Facility: CLINIC | Age: 57
End: 2022-09-07
Payer: COMMERCIAL

## 2022-09-07 ENCOUNTER — LAB VISIT (OUTPATIENT)
Dept: LAB | Facility: HOSPITAL | Age: 57
End: 2022-09-07
Attending: FAMILY MEDICINE
Payer: COMMERCIAL

## 2022-09-07 VITALS — HEART RATE: 70 BPM | SYSTOLIC BLOOD PRESSURE: 112 MMHG | DIASTOLIC BLOOD PRESSURE: 72 MMHG

## 2022-09-07 DIAGNOSIS — I10 PRIMARY HYPERTENSION: ICD-10-CM

## 2022-09-07 DIAGNOSIS — I10 PRIMARY HYPERTENSION: Primary | ICD-10-CM

## 2022-09-07 LAB
ALBUMIN SERPL BCP-MCNC: 3.8 G/DL (ref 3.5–5.2)
ALP SERPL-CCNC: 87 U/L (ref 55–135)
ALT SERPL W/O P-5'-P-CCNC: 22 U/L (ref 10–44)
ANION GAP SERPL CALC-SCNC: 10 MMOL/L (ref 8–16)
AST SERPL-CCNC: 17 U/L (ref 10–40)
BILIRUB SERPL-MCNC: 0.7 MG/DL (ref 0.1–1)
BUN SERPL-MCNC: 15 MG/DL (ref 6–20)
CALCIUM SERPL-MCNC: 9.6 MG/DL (ref 8.7–10.5)
CHLORIDE SERPL-SCNC: 104 MMOL/L (ref 95–110)
CHOLEST SERPL-MCNC: 197 MG/DL (ref 120–199)
CHOLEST/HDLC SERPL: 3.9 {RATIO} (ref 2–5)
CO2 SERPL-SCNC: 28 MMOL/L (ref 23–29)
CREAT SERPL-MCNC: 0.7 MG/DL (ref 0.5–1.4)
EST. GFR  (NO RACE VARIABLE): >60 ML/MIN/1.73 M^2
ESTIMATED AVG GLUCOSE: 114 MG/DL (ref 68–131)
GLUCOSE SERPL-MCNC: 90 MG/DL (ref 70–110)
HBA1C MFR BLD: 5.6 % (ref 4–5.6)
HDLC SERPL-MCNC: 50 MG/DL (ref 40–75)
HDLC SERPL: 25.4 % (ref 20–50)
LDLC SERPL CALC-MCNC: 130.6 MG/DL (ref 63–159)
NONHDLC SERPL-MCNC: 147 MG/DL
POTASSIUM SERPL-SCNC: 3.9 MMOL/L (ref 3.5–5.1)
PROT SERPL-MCNC: 7.7 G/DL (ref 6–8.4)
SODIUM SERPL-SCNC: 142 MMOL/L (ref 136–145)
TRIGL SERPL-MCNC: 82 MG/DL (ref 30–150)

## 2022-09-07 PROCEDURE — 80061 LIPID PANEL: CPT | Performed by: FAMILY MEDICINE

## 2022-09-07 PROCEDURE — 99999 PR PBB SHADOW E&M-EST. PATIENT-LVL II: CPT | Mod: PBBFAC,,,

## 2022-09-07 PROCEDURE — 80053 COMPREHEN METABOLIC PANEL: CPT | Performed by: FAMILY MEDICINE

## 2022-09-07 PROCEDURE — 83036 HEMOGLOBIN GLYCOSYLATED A1C: CPT | Performed by: FAMILY MEDICINE

## 2022-09-07 PROCEDURE — 36415 COLL VENOUS BLD VENIPUNCTURE: CPT | Mod: PN | Performed by: FAMILY MEDICINE

## 2022-09-07 PROCEDURE — 99999 PR PBB SHADOW E&M-EST. PATIENT-LVL II: ICD-10-PCS | Mod: PBBFAC,,,

## 2022-09-07 PROCEDURE — 99499 UNLISTED E&M SERVICE: CPT | Mod: S$GLB,,, | Performed by: FAMILY MEDICINE

## 2022-09-07 PROCEDURE — 99499 NO LOS: ICD-10-PCS | Mod: S$GLB,,, | Performed by: FAMILY MEDICINE

## 2022-09-07 NOTE — PROGRESS NOTES
.Mary Naranjo 57 y.o. female is here today for Blood Pressure check.   History of HTN yes.    Review of patient's allergies indicates:   Allergen Reactions    Dilaudid [hydromorphone] Other (See Comments)     Creatinine   Date Value Ref Range Status   01/26/2022 0.7 0.5 - 1.4 mg/dL Final     Sodium   Date Value Ref Range Status   01/26/2022 141 136 - 145 mmol/L Final     Potassium   Date Value Ref Range Status   01/26/2022 4.0 3.5 - 5.1 mmol/L Final   ]  Patient verifies taking blood pressure medications on a regular basis at the same time of the day.     Current Outpatient Medications:     cholecalciferol, vitamin D3, 50 mcg (2,000 unit) Chew, Take 2,000 Units by mouth once daily. Per patient, Disp: , Rfl:     cyanocobalamin (VITAMIN B-12) 1000 MCG tablet, Take 100 mcg by mouth once daily., Disp: , Rfl:     diclofenac (VOLTAREN) 50 MG EC tablet, TAKE 1 TABLET (50 MG TOTAL) BY MOUTH 2 (TWO) TIMES DAILY AS NEEDED (KNEE PAIN). TAKE WITH A MEAL, Disp: 60 tablet, Rfl: 0    diclofenac sodium (VOLTAREN) 1 % Gel, Apply 2 g topically 4 (four) times daily., Disp: 1 each, Rfl: 1    hydroCHLOROthiazide (HYDRODIURIL) 25 MG tablet, Take 1 tablet (25 mg total) by mouth once daily., Disp: 90 tablet, Rfl: 3    LINZESS 290 mcg Cap capsule, Take 1 capsule (290 mcg total) by mouth before breakfast., Disp: 90 capsule, Rfl: 3    multivit/folic acid/vit K1 (ONE-A-DAY WOMEN'S 50 PLUS ORAL), Take 1 capsule by mouth once daily. Per patient, One-a-Day Women's 50+ Multivitamin Complete, Disp: , Rfl:     omega-3 fatty acids/fish oil (FISH OIL-OMEGA-3 FATTY ACIDS) 300-1,000 mg capsule, Take 1 capsule by mouth once daily., Disp: , Rfl:     UNABLE TO FIND, Take 2 capsules by mouth once daily. medication name: Digestive Advantage, Disp: , Rfl:     valsartan (DIOVAN) 160 MG tablet, Take 1 tablet (160 mg total) by mouth once daily., Disp: 30 tablet, Rfl: 0  Does patient have record of home blood pressure readings no. Readings have been averaging  130s systolic/70s diastolic.   Last dose of blood pressure medication was taken at 09/06/2022 @ 0800.  Patient is asymptomatic.   Complains of n/a.    BP: 112/72 , Pulse: 70 .      Dr. Olivares notified.

## 2022-09-08 DIAGNOSIS — I10 PRIMARY HYPERTENSION: Primary | ICD-10-CM

## 2022-09-08 DIAGNOSIS — R73.03 PREDIABETES: ICD-10-CM

## 2022-09-08 NOTE — PROGRESS NOTES
BP better. Please schedule Annual exam one year from last OV  Patient should message us 2 week prior to that visit for lab test scheduling

## 2022-09-09 ENCOUNTER — TELEPHONE (OUTPATIENT)
Dept: FAMILY MEDICINE | Facility: CLINIC | Age: 57
End: 2022-09-09
Payer: COMMERCIAL

## 2022-10-31 ENCOUNTER — OFFICE VISIT (OUTPATIENT)
Dept: OBSTETRICS AND GYNECOLOGY | Facility: CLINIC | Age: 57
End: 2022-10-31
Payer: COMMERCIAL

## 2022-10-31 ENCOUNTER — TELEPHONE (OUTPATIENT)
Dept: OBSTETRICS AND GYNECOLOGY | Facility: CLINIC | Age: 57
End: 2022-10-31
Payer: COMMERCIAL

## 2022-10-31 VITALS
BODY MASS INDEX: 37.33 KG/M2 | DIASTOLIC BLOOD PRESSURE: 70 MMHG | WEIGHT: 224.31 LBS | SYSTOLIC BLOOD PRESSURE: 124 MMHG

## 2022-10-31 DIAGNOSIS — Z01.419 WELL WOMAN EXAM WITH ROUTINE GYNECOLOGICAL EXAM: Primary | ICD-10-CM

## 2022-10-31 PROCEDURE — 3078F PR MOST RECENT DIASTOLIC BLOOD PRESSURE < 80 MM HG: ICD-10-PCS | Mod: CPTII,S$GLB,, | Performed by: OBSTETRICS & GYNECOLOGY

## 2022-10-31 PROCEDURE — 3044F PR MOST RECENT HEMOGLOBIN A1C LEVEL <7.0%: ICD-10-PCS | Mod: CPTII,S$GLB,, | Performed by: OBSTETRICS & GYNECOLOGY

## 2022-10-31 PROCEDURE — 3078F DIAST BP <80 MM HG: CPT | Mod: CPTII,S$GLB,, | Performed by: OBSTETRICS & GYNECOLOGY

## 2022-10-31 PROCEDURE — 1160F PR REVIEW ALL MEDS BY PRESCRIBER/CLIN PHARMACIST DOCUMENTED: ICD-10-PCS | Mod: CPTII,S$GLB,, | Performed by: OBSTETRICS & GYNECOLOGY

## 2022-10-31 PROCEDURE — 99396 PR PREVENTIVE VISIT,EST,40-64: ICD-10-PCS | Mod: S$GLB,,, | Performed by: OBSTETRICS & GYNECOLOGY

## 2022-10-31 PROCEDURE — 3044F HG A1C LEVEL LT 7.0%: CPT | Mod: CPTII,S$GLB,, | Performed by: OBSTETRICS & GYNECOLOGY

## 2022-10-31 PROCEDURE — 4010F PR ACE/ARB THEARPY RXD/TAKEN: ICD-10-PCS | Mod: CPTII,S$GLB,, | Performed by: OBSTETRICS & GYNECOLOGY

## 2022-10-31 PROCEDURE — 1159F MED LIST DOCD IN RCRD: CPT | Mod: CPTII,S$GLB,, | Performed by: OBSTETRICS & GYNECOLOGY

## 2022-10-31 PROCEDURE — 99999 PR PBB SHADOW E&M-EST. PATIENT-LVL III: ICD-10-PCS | Mod: PBBFAC,,, | Performed by: OBSTETRICS & GYNECOLOGY

## 2022-10-31 PROCEDURE — 1160F RVW MEDS BY RX/DR IN RCRD: CPT | Mod: CPTII,S$GLB,, | Performed by: OBSTETRICS & GYNECOLOGY

## 2022-10-31 PROCEDURE — 99999 PR PBB SHADOW E&M-EST. PATIENT-LVL III: CPT | Mod: PBBFAC,,, | Performed by: OBSTETRICS & GYNECOLOGY

## 2022-10-31 PROCEDURE — 99396 PREV VISIT EST AGE 40-64: CPT | Mod: S$GLB,,, | Performed by: OBSTETRICS & GYNECOLOGY

## 2022-10-31 PROCEDURE — 3074F SYST BP LT 130 MM HG: CPT | Mod: CPTII,S$GLB,, | Performed by: OBSTETRICS & GYNECOLOGY

## 2022-10-31 PROCEDURE — 3074F PR MOST RECENT SYSTOLIC BLOOD PRESSURE < 130 MM HG: ICD-10-PCS | Mod: CPTII,S$GLB,, | Performed by: OBSTETRICS & GYNECOLOGY

## 2022-10-31 PROCEDURE — 1159F PR MEDICATION LIST DOCUMENTED IN MEDICAL RECORD: ICD-10-PCS | Mod: CPTII,S$GLB,, | Performed by: OBSTETRICS & GYNECOLOGY

## 2022-10-31 PROCEDURE — 4010F ACE/ARB THERAPY RXD/TAKEN: CPT | Mod: CPTII,S$GLB,, | Performed by: OBSTETRICS & GYNECOLOGY

## 2022-10-31 NOTE — PROGRESS NOTES
History & Physical  Gynecology      SUBJECTIVE:     Chief Complaint: Annual Exam       History of Present Illness:  Annual Exam-Postmenopausal  Ms. Naranjo is a 56 y/o female who presents for annual exam. The patient has no complaints today. The patient is sexually active. GYN screening history: last pap: approximate date  and was normal and last mammogram: approximate date  and was normal. The patient has never been taking hormone replacement therapy. Patient denies post-menopausal vaginal bleeding. The patient wears seatbelts: yes. The patient participates in regular exercise: no. Has the patient ever been transfused or tattooed?: not asked. The patient reports that there is not domestic violence in her life.      Review of patient's allergies indicates:   Allergen Reactions    Dilaudid [hydromorphone] Other (See Comments)       Past Medical History:   Diagnosis Date    Arthritis     Dental bridge present     upper removable partial    Family history of pancreatic cancer     Fibrocystic breast     Migraines     Obesity     Pancreas cyst     Primary hypertension 2022     Past Surgical History:   Procedure Laterality Date    BREAST BIOPSY      BREAST CYST ASPIRATION      COLONOSCOPY N/A 10/7/2015    Procedure: COLONOSCOPY;  Surgeon: Eagle Stack MD;  Location: [facility];  Service: Endoscopy;  Laterality: N/A;    DILATION AND CURETTAGE OF UTERUS      ENDOSCOPIC ULTRASOUND OF UPPER GASTROINTESTINAL TRACT N/A 11/3/2020    Procedure: ULTRASOUND, UPPER GI TRACT, ENDOSCOPIC;  Surgeon: Kei Juarez MD;  Location: [facility] ([address] [place] [address]);  Service: Endoscopy;  Laterality: N/A;  Covid-19 test 10/31/20 at  [place] - pg    ESOPHAGOGASTRODUODENOSCOPY N/A 2019    Procedure: ESOPHAGOGASTRODUODENOSCOPY (EGD);  Surgeon: Cherrie Sheets MD;  Location: [facility];  Service: Endoscopy;  Laterality: N/A;    fibrocystic breast       KNEE ARTHROSCOPY W/ MENISCAL REPAIR      TUBAL LIGATION       OB History           2    Para   2    Term   2            AB        Living   2         SAB        IAB        Ectopic        Multiple        Live Births               Obstetric Comments   Menopause @ age 45  MMG 2017 NEG  H/o abnormal pap, no treatment needed.  Pap neg 2017  c-scope in                Family History   Problem Relation Age of Onset    Heart disease Mother     Cataracts Mother     Glaucoma Mother     Cancer Father         colon at ~60; lung, gallbladder, skin, prostate, brain- unsure which are primary vs secondary    No Known Problems Brother     Asthma Maternal Aunt     No Known Problems Maternal Grandmother     No Known Problems Maternal Grandfather     No Known Problems Paternal Grandmother     No Known Problems Paternal Grandfather     Pancreatic cancer Sister 54    Breast cancer Sister 42        unilateral    Cancer Maternal Cousin         unknown origin    Cervical cancer Other     Cancer Other         possible ovarian cancer    Breast cancer Maternal Cousin     Cancer Maternal Cousin         prostate?    Prostate cancer Maternal Cousin     COPD Neg Hx     Amblyopia Neg Hx     Blindness Neg Hx     Diabetes Neg Hx     Hypertension Neg Hx     Macular degeneration Neg Hx     Retinal detachment Neg Hx     Strabismus Neg Hx     Stroke Neg Hx     Thyroid disease Neg Hx      Social History     Tobacco Use    Smoking status: Never    Smokeless tobacco: Never   Substance Use Topics    Alcohol use: No    Drug use: No       Current Outpatient Medications   Medication Sig    cholecalciferol, vitamin D3, 50 mcg (2,000 unit) Chew Take 2,000 Units by mouth once daily. Per patient    cyanocobalamin (VITAMIN B-12) 1000 MCG tablet Take 100 mcg by mouth once daily.    diclofenac (VOLTAREN) 50 MG EC tablet TAKE 1 TABLET (50 MG TOTAL) BY MOUTH 2 (TWO) TIMES DAILY AS NEEDED (KNEE PAIN). TAKE WITH A MEAL    diclofenac sodium (VOLTAREN) 1 % Gel Apply 2 g topically 4 (four) times daily.    hydroCHLOROthiazide (HYDRODIURIL) 25  MG tablet Take 1 tablet (25 mg total) by mouth once daily.    LINZESS 290 mcg Cap capsule Take 1 capsule (290 mcg total) by mouth before breakfast.    multivit/folic acid/vit K1 (ONE-A-DAY WOMEN'S 50 PLUS ORAL) Take 1 capsule by mouth once daily. Per patient, One-a-Day Women's 50+ Multivitamin Complete    omega-3 fatty acids/fish oil (FISH OIL-OMEGA-3 FATTY ACIDS) 300-1,000 mg capsule Take 1 capsule by mouth once daily.    UNABLE TO FIND Take 2 capsules by mouth once daily. medication name: Digestive Advantage    valsartan (DIOVAN) 160 MG tablet Take 1 tablet (160 mg total) by mouth once daily.     No current facility-administered medications for this visit.         Review of Systems:  Review of Systems   Constitutional:  Negative for chills and fever.   Eyes:  Negative for visual disturbance.   Respiratory:  Negative for cough and wheezing.    Cardiovascular:  Negative for chest pain and palpitations.   Gastrointestinal:  Negative for abdominal pain, nausea and vomiting.   Genitourinary:  Negative for dysuria, frequency, hematuria, pelvic pain, vaginal bleeding, vaginal discharge and vaginal pain.   Neurological:  Negative for headaches.   Psychiatric/Behavioral:  Negative for depression.       OBJECTIVE:     Physical Exam:  Physical Exam  Vitals and nursing note reviewed. Exam conducted with a chaperone present.   Constitutional:       Appearance: She is well-developed.   Cardiovascular:      Rate and Rhythm: Normal rate.   Pulmonary:      Effort: Pulmonary effort is normal. No respiratory distress.   Chest:   Breasts:     Breasts are symmetrical.   Abdominal:      General: There is no distension.      Palpations: Abdomen is soft.      Tenderness: There is no abdominal tenderness.   Skin:     General: Skin is warm and dry.   Neurological:      Mental Status: She is alert and oriented to person, place, and time.     ASSESSMENT:       ICD-10-CM ICD-9-CM    1. Well woman exam with routine gynecological exam  Z01.419  V72.31          Plan:      Mary was seen today for annual exam.    Diagnoses and all orders for this visit:    Well woman exam with routine gynecological exam  - Pap smear normal 2020  - Colonosocpy normal 2020; repeat in 2025  - Mammogram scheduled for tomorrow      No orders of the defined types were placed in this encounter.      Follow up in about 1 year (around 10/31/2023).    Counseling time: 15 minutes    Araceli Asencio

## 2022-10-31 NOTE — TELEPHONE ENCOUNTER
Pt's appt on morning of 11/1 was rescheduled due to provider being out of the office. Pt requesting appt this afternoon. Appt scheduled.       ----- Message from Jenny Cao sent at 10/31/2022  8:02 AM CDT -----  Regarding: self .[phone]  .Type: Patient Call Back    Who called: self     What is the request in detail: PT stated that she received a message from the nurse in regards to rs her appt on 11-01 due to Dr Alberto being in sx and would like to know if it can be moved to today     Can the clinic reply by [facility]? Call back     Would the patient rather a call back or a response via [facility]?  Call back     Best call back number: .[phone]

## 2022-11-01 ENCOUNTER — HOSPITAL ENCOUNTER (OUTPATIENT)
Dept: RADIOLOGY | Facility: HOSPITAL | Age: 57
Discharge: HOME | End: 2022-11-01
Attending: FAMILY MEDICINE
Payer: COMMERCIAL

## 2022-11-01 DIAGNOSIS — Z12.39 ENCOUNTER FOR SCREENING FOR MALIGNANT NEOPLASM OF BREAST, UNSPECIFIED SCREENING MODALITY: ICD-10-CM

## 2022-11-01 PROCEDURE — 77067 MAMMO DIGITAL SCREENING BILAT WITH TOMO: ICD-10-PCS | Mod: 26,,, | Performed by: RADIOLOGY

## 2022-11-01 PROCEDURE — 77063 BREAST TOMOSYNTHESIS BI: CPT | Mod: TC

## 2022-11-01 PROCEDURE — 77067 SCR MAMMO BI INCL CAD: CPT | Mod: TC

## 2022-11-01 PROCEDURE — 77067 SCR MAMMO BI INCL CAD: CPT | Mod: 26,,, | Performed by: RADIOLOGY

## 2022-11-01 PROCEDURE — 77063 BREAST TOMOSYNTHESIS BI: CPT | Mod: 26,,, | Performed by: RADIOLOGY

## 2022-11-01 PROCEDURE — 77063 MAMMO DIGITAL SCREENING BILAT WITH TOMO: ICD-10-PCS | Mod: 26,,, | Performed by: RADIOLOGY

## 2022-12-01 ENCOUNTER — NURSE TRIAGE (OUTPATIENT)
Dept: ADMINISTRATIVE | Facility: CLINIC | Age: 57
End: 2022-12-01
Payer: COMMERCIAL

## 2022-12-01 ENCOUNTER — HOSPITAL ENCOUNTER (EMERGENCY)
Facility: HOSPITAL | Age: 57
Discharge: HOME | End: 2022-12-01
Attending: EMERGENCY MEDICINE
Payer: COMMERCIAL

## 2022-12-01 VITALS
WEIGHT: 216 LBS | OXYGEN SATURATION: 100 % | RESPIRATION RATE: 20 BRPM | BODY MASS INDEX: 36.88 KG/M2 | DIASTOLIC BLOOD PRESSURE: 58 MMHG | TEMPERATURE: 98 F | HEART RATE: 60 BPM | SYSTOLIC BLOOD PRESSURE: 122 MMHG | HEIGHT: 64 IN

## 2022-12-01 DIAGNOSIS — I63.9 STROKE: ICD-10-CM

## 2022-12-01 DIAGNOSIS — M54.2 NECK PAIN: ICD-10-CM

## 2022-12-01 DIAGNOSIS — M25.519 SHOULDER PAIN: ICD-10-CM

## 2022-12-01 PROBLEM — R53.1 LEFT-SIDED WEAKNESS: Status: ACTIVE | Noted: 2022-12-01

## 2022-12-01 LAB
ALBUMIN SERPL BCP-MCNC: 4.4 G/DL (ref 3.5–5.2)
ALBUMIN SERPL BCP-MCNC: 4.4 G/DL (ref 3.5–5.2)
ALP SERPL-CCNC: 81 U/L (ref 55–135)
ALP SERPL-CCNC: 81 U/L (ref 55–135)
ALT SERPL W/O P-5'-P-CCNC: 21 U/L (ref 10–44)
ALT SERPL W/O P-5'-P-CCNC: 21 U/L (ref 10–44)
ANION GAP SERPL CALC-SCNC: 10 MMOL/L (ref 8–16)
ANION GAP SERPL CALC-SCNC: 10 MMOL/L (ref 8–16)
ANION GAP SERPL CALC-SCNC: 16 MMOL/L (ref 8–16)
AST SERPL-CCNC: 20 U/L (ref 10–40)
AST SERPL-CCNC: 20 U/L (ref 10–40)
BASOPHILS # BLD AUTO: 0.05 K/UL (ref 0–0.2)
BASOPHILS # BLD AUTO: 0.05 K/UL (ref 0–0.2)
BASOPHILS NFR BLD: 0.8 % (ref 0–1.9)
BASOPHILS NFR BLD: 0.8 % (ref 0–1.9)
BILIRUB SERPL-MCNC: 0.6 MG/DL (ref 0.1–1)
BILIRUB SERPL-MCNC: 0.6 MG/DL (ref 0.1–1)
BUN SERPL-MCNC: 13 MG/DL (ref 6–30)
BUN SERPL-MCNC: 14 MG/DL (ref 6–20)
BUN SERPL-MCNC: 14 MG/DL (ref 6–20)
CALCIUM SERPL-MCNC: 10.2 MG/DL (ref 8.7–10.5)
CALCIUM SERPL-MCNC: 10.2 MG/DL (ref 8.7–10.5)
CHLORIDE SERPL-SCNC: 104 MMOL/L (ref 95–110)
CHLORIDE SERPL-SCNC: 105 MMOL/L (ref 95–110)
CHLORIDE SERPL-SCNC: 105 MMOL/L (ref 95–110)
CHOLEST SERPL-MCNC: 229 MG/DL (ref 120–199)
CHOLEST/HDLC SERPL: 4.2 {RATIO} (ref 2–5)
CO2 SERPL-SCNC: 24 MMOL/L (ref 23–29)
CO2 SERPL-SCNC: 24 MMOL/L (ref 23–29)
CREAT SERPL-MCNC: 0.7 MG/DL (ref 0.5–1.4)
CREAT SERPL-MCNC: 0.8 MG/DL (ref 0.5–1.4)
CREAT SERPL-MCNC: 0.8 MG/DL (ref 0.5–1.4)
DIFFERENTIAL METHOD: ABNORMAL
DIFFERENTIAL METHOD: ABNORMAL
EOSINOPHIL # BLD AUTO: 0.2 K/UL (ref 0–0.5)
EOSINOPHIL # BLD AUTO: 0.2 K/UL (ref 0–0.5)
EOSINOPHIL NFR BLD: 3 % (ref 0–8)
EOSINOPHIL NFR BLD: 3 % (ref 0–8)
ERYTHROCYTE [DISTWIDTH] IN BLOOD BY AUTOMATED COUNT: 15.3 % (ref 11.5–14.5)
ERYTHROCYTE [DISTWIDTH] IN BLOOD BY AUTOMATED COUNT: 15.3 % (ref 11.5–14.5)
EST. GFR  (NO RACE VARIABLE): >60 ML/MIN/1.73 M^2
EST. GFR  (NO RACE VARIABLE): >60 ML/MIN/1.73 M^2
GLUCOSE SERPL-MCNC: 81 MG/DL (ref 70–110)
GLUCOSE SERPL-MCNC: 81 MG/DL (ref 70–110)
GLUCOSE SERPL-MCNC: 82 MG/DL (ref 70–110)
HCT VFR BLD AUTO: 37.5 % (ref 37–48.5)
HCT VFR BLD AUTO: 37.5 % (ref 37–48.5)
HCT VFR BLD CALC: 41 %PCV (ref 36–54)
HDLC SERPL-MCNC: 54 MG/DL (ref 40–75)
HDLC SERPL: 23.6 % (ref 20–50)
HGB BLD-MCNC: 12.3 G/DL (ref 12–16)
HGB BLD-MCNC: 12.3 G/DL (ref 12–16)
IMM GRANULOCYTES # BLD AUTO: 0.01 K/UL (ref 0–0.04)
IMM GRANULOCYTES # BLD AUTO: 0.01 K/UL (ref 0–0.04)
IMM GRANULOCYTES NFR BLD AUTO: 0.2 % (ref 0–0.5)
IMM GRANULOCYTES NFR BLD AUTO: 0.2 % (ref 0–0.5)
INR PPP: 1 (ref 0.8–1.2)
LDLC SERPL CALC-MCNC: 157.8 MG/DL (ref 63–159)
LYMPHOCYTES # BLD AUTO: 3.2 K/UL (ref 1–4.8)
LYMPHOCYTES # BLD AUTO: 3.2 K/UL (ref 1–4.8)
LYMPHOCYTES NFR BLD: 50 % (ref 18–48)
LYMPHOCYTES NFR BLD: 50 % (ref 18–48)
MCH RBC QN AUTO: 25.9 PG (ref 27–31)
MCH RBC QN AUTO: 25.9 PG (ref 27–31)
MCHC RBC AUTO-ENTMCNC: 32.8 G/DL (ref 32–36)
MCHC RBC AUTO-ENTMCNC: 32.8 G/DL (ref 32–36)
MCV RBC AUTO: 79 FL (ref 82–98)
MCV RBC AUTO: 79 FL (ref 82–98)
MONOCYTES # BLD AUTO: 0.5 K/UL (ref 0.3–1)
MONOCYTES # BLD AUTO: 0.5 K/UL (ref 0.3–1)
MONOCYTES NFR BLD: 8 % (ref 4–15)
MONOCYTES NFR BLD: 8 % (ref 4–15)
NEUTROPHILS # BLD AUTO: 2.4 K/UL (ref 1.8–7.7)
NEUTROPHILS # BLD AUTO: 2.4 K/UL (ref 1.8–7.7)
NEUTROPHILS NFR BLD: 38 % (ref 38–73)
NEUTROPHILS NFR BLD: 38 % (ref 38–73)
NONHDLC SERPL-MCNC: 175 MG/DL
NRBC BLD-RTO: 0 /100 WBC
NRBC BLD-RTO: 0 /100 WBC
PLATELET # BLD AUTO: 285 K/UL (ref 150–450)
PLATELET # BLD AUTO: 285 K/UL (ref 150–450)
PMV BLD AUTO: 9.7 FL (ref 9.2–12.9)
PMV BLD AUTO: 9.7 FL (ref 9.2–12.9)
POC IONIZED CALCIUM: 1.25 MMOL/L (ref 1.06–1.42)
POC TCO2 (MEASURED): 24 MMOL/L (ref 23–29)
POCT GLUCOSE: 121 MG/DL (ref 70–110)
POCT GLUCOSE: 64 MG/DL (ref 70–110)
POTASSIUM BLD-SCNC: 3.1 MMOL/L (ref 3.5–5.1)
POTASSIUM SERPL-SCNC: 3.3 MMOL/L (ref 3.5–5.1)
POTASSIUM SERPL-SCNC: 3.3 MMOL/L (ref 3.5–5.1)
PROT SERPL-MCNC: 8.5 G/DL (ref 6–8.4)
PROT SERPL-MCNC: 8.5 G/DL (ref 6–8.4)
PROTHROMBIN TIME: 10.9 SEC (ref 9–12.5)
RBC # BLD AUTO: 4.75 M/UL (ref 4–5.4)
RBC # BLD AUTO: 4.75 M/UL (ref 4–5.4)
SAMPLE: ABNORMAL
SODIUM BLD-SCNC: 140 MMOL/L (ref 136–145)
SODIUM SERPL-SCNC: 139 MMOL/L (ref 136–145)
SODIUM SERPL-SCNC: 139 MMOL/L (ref 136–145)
TRIGL SERPL-MCNC: 86 MG/DL (ref 30–150)
TROPONIN I SERPL DL<=0.01 NG/ML-MCNC: 0.01 NG/ML (ref 0–0.03)
TSH SERPL DL<=0.005 MIU/L-ACNC: 1.29 UIU/ML (ref 0.4–4)
WBC # BLD AUTO: 6.34 K/UL (ref 3.9–12.7)
WBC # BLD AUTO: 6.34 K/UL (ref 3.9–12.7)

## 2022-12-01 PROCEDURE — 96374 THER/PROPH/DIAG INJ IV PUSH: CPT | Mod: 59

## 2022-12-01 PROCEDURE — 82962 GLUCOSE BLOOD TEST: CPT

## 2022-12-01 PROCEDURE — 84484 ASSAY OF TROPONIN QUANT: CPT | Performed by: PHYSICIAN ASSISTANT

## 2022-12-01 PROCEDURE — 63600175 PHARM REV CODE 636 W HCPCS: Performed by: EMERGENCY MEDICINE

## 2022-12-01 PROCEDURE — 93005 ELECTROCARDIOGRAM TRACING: CPT

## 2022-12-01 PROCEDURE — 82565 ASSAY OF CREATININE: CPT | Mod: 59

## 2022-12-01 PROCEDURE — G0425 INPT/ED TELECONSULT30: HCPCS | Mod: GT,,, | Performed by: STUDENT IN AN ORGANIZED HEALTH CARE EDUCATION/TRAINING PROGRAM

## 2022-12-01 PROCEDURE — 84132 ASSAY OF SERUM POTASSIUM: CPT | Mod: 59

## 2022-12-01 PROCEDURE — 93010 ELECTROCARDIOGRAM REPORT: CPT | Mod: ,,, | Performed by: INTERNAL MEDICINE

## 2022-12-01 PROCEDURE — 25500020 PHARM REV CODE 255: Performed by: PHYSICIAN ASSISTANT

## 2022-12-01 PROCEDURE — 25000003 PHARM REV CODE 250: Performed by: EMERGENCY MEDICINE

## 2022-12-01 PROCEDURE — 93010 EKG 12-LEAD: ICD-10-PCS | Mod: ,,, | Performed by: INTERNAL MEDICINE

## 2022-12-01 PROCEDURE — 84295 ASSAY OF SERUM SODIUM: CPT

## 2022-12-01 PROCEDURE — 80047 BASIC METABLC PNL IONIZED CA: CPT

## 2022-12-01 PROCEDURE — 82330 ASSAY OF CALCIUM: CPT | Mod: 59

## 2022-12-01 PROCEDURE — 85025 COMPLETE CBC W/AUTO DIFF WBC: CPT | Performed by: PHYSICIAN ASSISTANT

## 2022-12-01 PROCEDURE — 99285 EMERGENCY DEPT VISIT HI MDM: CPT | Mod: 25

## 2022-12-01 PROCEDURE — 80061 LIPID PANEL: CPT | Performed by: PHYSICIAN ASSISTANT

## 2022-12-01 PROCEDURE — G0425 PR INPT TELEHEALTH CONSULT 30M: ICD-10-PCS | Mod: GT,,, | Performed by: STUDENT IN AN ORGANIZED HEALTH CARE EDUCATION/TRAINING PROGRAM

## 2022-12-01 PROCEDURE — 84443 ASSAY THYROID STIM HORMONE: CPT | Performed by: PHYSICIAN ASSISTANT

## 2022-12-01 PROCEDURE — 85014 HEMATOCRIT: CPT

## 2022-12-01 PROCEDURE — 96361 HYDRATE IV INFUSION ADD-ON: CPT

## 2022-12-01 PROCEDURE — 85610 PROTHROMBIN TIME: CPT | Performed by: PHYSICIAN ASSISTANT

## 2022-12-01 PROCEDURE — 25000003 PHARM REV CODE 250: Performed by: PHYSICIAN ASSISTANT

## 2022-12-01 PROCEDURE — 80053 COMPREHEN METABOLIC PANEL: CPT | Performed by: PHYSICIAN ASSISTANT

## 2022-12-01 PROCEDURE — 99900035 HC TECH TIME PER 15 MIN (STAT)

## 2022-12-01 RX ORDER — LIDOCAINE 50 MG/G
1 PATCH TOPICAL
Status: DISCONTINUED | OUTPATIENT
Start: 2022-12-01 | End: 2022-12-01 | Stop reason: HOSPADM

## 2022-12-01 RX ORDER — KETOROLAC TROMETHAMINE 30 MG/ML
10 INJECTION, SOLUTION INTRAMUSCULAR; INTRAVENOUS
Status: COMPLETED | OUTPATIENT
Start: 2022-12-01 | End: 2022-12-01

## 2022-12-01 RX ORDER — CYCLOBENZAPRINE HCL 10 MG
10 TABLET ORAL
Status: COMPLETED | OUTPATIENT
Start: 2022-12-01 | End: 2022-12-01

## 2022-12-01 RX ORDER — ACETAMINOPHEN 500 MG
500 TABLET ORAL EVERY 4 HOURS PRN
Qty: 20 TABLET | Refills: 0 | Status: SHIPPED | OUTPATIENT
Start: 2022-12-01 | End: 2022-12-06

## 2022-12-01 RX ORDER — IBUPROFEN 600 MG/1
600 TABLET ORAL EVERY 6 HOURS PRN
Qty: 20 TABLET | Refills: 0 | Status: SHIPPED | OUTPATIENT
Start: 2022-12-01 | End: 2022-12-06

## 2022-12-01 RX ORDER — CYCLOBENZAPRINE HCL 10 MG
10 TABLET ORAL 3 TIMES DAILY PRN
Qty: 20 TABLET | Refills: 0 | Status: SHIPPED | OUTPATIENT
Start: 2022-12-01 | End: 2022-12-08

## 2022-12-01 RX ORDER — LIDOCAINE 50 MG/G
1 PATCH TOPICAL DAILY
Qty: 15 PATCH | Refills: 0 | Status: SHIPPED | OUTPATIENT
Start: 2022-12-01 | End: 2022-12-16

## 2022-12-01 RX ADMIN — DEXTROSE 250 ML: 10 SOLUTION INTRAVENOUS at 02:12

## 2022-12-01 RX ADMIN — CYCLOBENZAPRINE 10 MG: 10 TABLET, FILM COATED ORAL at 05:12

## 2022-12-01 RX ADMIN — LIDOCAINE 1 PATCH: 50 PATCH CUTANEOUS at 05:12

## 2022-12-01 RX ADMIN — KETOROLAC TROMETHAMINE 10 MG: 30 INJECTION, SOLUTION INTRAMUSCULAR at 05:12

## 2022-12-01 RX ADMIN — IOHEXOL 100 ML: 350 INJECTION, SOLUTION INTRAVENOUS at 02:12

## 2022-12-01 NOTE — Clinical Note
"Mary Carlisle"Marcella was seen and treated in our emergency department on 12/1/2022.  She may return to work on 12/04/2022.       If you have any questions or concerns, please don't hesitate to call.      Imelda Hunter PA-C"

## 2022-12-01 NOTE — SUBJECTIVE & OBJECTIVE
"  Woke up with symptoms?: no    Recent bleeding noted: no     Does the patient take any Blood Thinners? no     Medications: No relevant medications    Past Medical History: hypertension and diabetes    Past Surgical History: no relevant surgical history    Family History: no relevant history    Social History: no smoking, no drinking, no drugs    Allergies: Dilaudid [Hydromorphone]     Review of Systems   The following systems were reviewed with pertinent positives and negatives documented in the HPI: Constitutional, Eyes, CV, Respiratory, GI, , Musculoskeletal, Skin, Neurological, Psychiatric      Objective:   Vitals: Blood pressure (!) 142/62, pulse 63, temperature 98 °F (36.7 °C), temperature source Oral, resp. rate 18, height 5' 4" (1.626 m), weight 98 kg (216 lb), last menstrual period 01/15/2018, SpO2 100 %, not currently breastfeeding.    CT READ: Yes  No hemmorhage. No mass effect. No early infarct signs.     Physical Exam  Vitals reviewed.   Constitutional:       Appearance: Normal appearance.   HENT:      Head: Normocephalic.      Nose: Nose normal.   Eyes:      Extraocular Movements: Extraocular movements intact.   Cardiovascular:      Rate and Rhythm: Normal rate.   Pulmonary:      Effort: Pulmonary effort is normal.   Abdominal:      General: Abdomen is flat.   Musculoskeletal:         General: Normal range of motion.      Cervical back: Normal range of motion.   Neurological:      Mental Status: She is alert and oriented to person, place, and time.      Sensory: No sensory deficit.      Motor: Weakness present.   Psychiatric:         Mood and Affect: Mood normal.         Behavior: Behavior normal.           " · Transition to nasal cannula this morning and doing relatively well  · Wean supplemental oxygen as tolerated.  Titrate nasal cannula to maintain SpO2 greater than 92%.  · Observation with continued diuresis.  · Ideally, pleural disease would be addressed, but clinical/radiographic improvement with diuresis and absent evidence worsening infection suggest against a pleural space infection.  · Additionally, the technical difficulty and increased risk of bleeding in this situation make the risks outweigh the benefits of drainage of pleural effusions at the moment.

## 2022-12-01 NOTE — CONSULTS
[facility]  Vascular Neurology  Comprehensive Stroke Center  TeleVascular Neurology Acute Consultation Note      Consults    Consulting Provider: AMAN YEH  Current Providers  No providers found    Patient Location:  Health system EMERGENCY DEPARTMENT Emergency Department  Spoke hospital nurse at bedside with patient assisting consultant.     Patient information was obtained from patient.         Assessment/Plan:       Diagnoses:   Left-sided weakness  57F with a past medical history significant for migraine headaches, HTN, and Bell's palsy who presents with left-sided weakness. Initially presented with LUE pain and reported weakness for 4 days prior to presentation. Now with new left leg weakness and blurry vision. Later states the arm started feeling heavy around the time she presented to the ED. NIHSS 2 for LLE weakness.     NCCTH reviewed with no acute intracranial hemorrhage, no evidence of large territory ischemia. CTA head/neck without large vessel occlusion. Given unclear last known well, recommend stat MRI brain without contrast to evaluate for acute infarction.     If above studies are abnormal, please load images to teleneurology imaging system and contact us to review.     Please contact us with any further questions or concerns or if patient has any acute neurological changes (new symptoms, worsening deficits).              STROKE DOCUMENTATION     Acute Stroke Times:   Acute Stroke Times   Last Known Normal Date: 12/01/22  Last Known Normal Time: 1300  Symptom Onset Date: 12/01/22  Symptom Onset Time: 1300  Stroke Team Called Date: 12/01/22  Stroke Team Called Time: 1419  Stroke Team Arrival Date: 12/01/22  Stroke Team Arrival Time: 1423    NIH Scale:  Interval: baseline  1a. Level of Consciousness: 0-->Alert, keenly responsive  1b. LOC Questions: 0-->Answers both questions correctly  1c. LOC Commands: 0-->Performs both tasks correctly  2. Best Gaze:  "0-->Normal  3. Visual: 0-->No visual loss  4. Facial Palsy: 0-->Normal symmetrical movements  5a. Motor Arm, Left: 0-->No drift, limb holds 90 (or 45) degrees for full 10 secs  5b. Motor Arm, Right: 0-->No drift, limb holds 90 (or 45) degrees for full 10 secs  6a. Motor Leg, Left: 2-->Some effort against gravity, leg falls to bed by 5 secs, but has some effort against gravity  6b. Motor Leg, Right: 0-->No drift, leg holds 30 degree position for full 5 secs  7. Limb Ataxia: 0-->Absent  8. Sensory: 0-->Normal, no sensory loss  9. Best Language: 0-->No aphasia, normal  10. Dysarthria: 0-->Normal  11. Extinction and Inattention (formerly Neglect): 0-->No abnormality  Total (NIH Stroke Scale): 2     Modified Ingham Score: 0  Bradford Coma Scale:    ABCD2 Score:    [id number] Score:   HAS -BLED Score:   ICH Score:   Hunt & Mallory Classification:       Blood pressure (!) 142/62, pulse 69, temperature 98 °F (36.7 °C), temperature source Oral, resp. rate 18, height 5' 4" (1.626 m), weight 98 kg (216 lb), last menstrual period 01/15/2018, SpO2 100 %, not currently breastfeeding.    Eligible for thrombolytic therapy?: No  Thrombolytic therapy recomended: Thrombolytic therapy not recommended due to Suspected stroke mimic  and Unknown/unclear onset   Possible Interventional Revascularization Candidate? Awaiting CTA results from [facility] for determination     Disposition Recommendation: pending further studies    Subjective:     History of Present Illness:  57F with a past medical history significant for migraine headaches, HTN, and Bell's palsy who presents with LLE weakness and left facial weakness. Had arm pain and difficulty moving the arm for the past 4 days. Patient reports weakness in the arm prior to today. Developed blurry vision. Did not initially report LLE weakness, but was unable to lift the leg on examination.     No history of stroke or TIA. History of Bell's palsy on the right. Denies AP/AC mediations.       Woke up " "with symptoms?: no    Recent bleeding noted: no     Does the patient take any Blood Thinners? no     Medications: No relevant medications    Past Medical History: hypertension and diabetes    Past Surgical History: no relevant surgical history    Family History: no relevant history    Social History: no smoking, no drinking, no drugs    Allergies: Dilaudid [Hydromorphone]     Review of Systems   The following systems were reviewed with pertinent positives and negatives documented in the HPI: Constitutional, Eyes, CV, Respiratory, GI, , Musculoskeletal, Skin, Neurological, Psychiatric      Objective:   Vitals: Blood pressure (!) 142/62, pulse 63, temperature 98 °F (36.7 °C), temperature source Oral, resp. rate 18, height 5' 4" (1.626 m), weight 98 kg (216 lb), last menstrual period 01/15/2018, SpO2 100 %, not currently breastfeeding.    CT READ: Yes  No hemmorhage. No mass effect. No early infarct signs.     Physical Exam  Vitals reviewed.   Constitutional:       Appearance: Normal appearance.   HENT:      Head: Normocephalic.      Nose: Nose normal.   Eyes:      Extraocular Movements: Extraocular movements intact.   Cardiovascular:      Rate and Rhythm: Normal rate.   Pulmonary:      Effort: Pulmonary effort is normal.   Abdominal:      General: Abdomen is flat.   Musculoskeletal:         General: Normal range of motion.      Cervical back: Normal range of motion.   Neurological:      Mental Status: She is alert and oriented to person, place, and time.      Sensory: No sensory deficit.      Motor: Weakness present.   Psychiatric:         Mood and Affect: Mood normal.         Behavior: Behavior normal.         Recommended the emergency room physician to have a brief discussion with the patient and/or family if available regarding the  risks and benefits of treatment, and to briefly document the occurrence of that discussion in his clinical encounter note.     The attending portion of this evaluation, treatment, and " documentation was performed per Vanna Corado MD via audiovisual.    Billing code:  (non-intervention mild to moderate stroke, TIA, some mimics)      Care was coordinated with other physicians involved in the patient's care.  Radiologic studies and laboratory data were reviewed and interpreted, and plan of care was re-assessed based on the results.  Diagnosis, treatment options and prognosis may have been discussed with the patient and/or family members or caregiver.    In your opinion, this was a: Tier 1 Van Negative    Consult End Time: 3:13 PM     Vanna Corado MD, PhD  Comprehensive Stroke Center  Vascular Neurology   [facility]

## 2022-12-01 NOTE — ED NOTES
Resumed care of pt, pt currently talking with tele stroke on monitor at bedside. Pt c/o L shoulder pain and intermittent numbness x Monday. Pt reports upon exam in room at approx 1pm began to have bilateraly blurry vision and LLE weakness. Pt connected to cardiac monitor, bp cuff, and pulse ox. Pt aaox4, nad.

## 2022-12-01 NOTE — ED PROVIDER NOTES
Encounter Date: 12/1/2022       History     Chief Complaint   Patient presents with    Shoulder Pain     Patient reports left shoulder pain that is radiating down left arm x 1 week but the symptoms worsened today. She also reports numbness/tingling to left upper extremity. Denies recent falls, trauma, heavy lifting, or mvc's. Denies taking any medication for symptoms at home.     Numbness     Cc:  Shoulder pain    HPI:    57-year-old female history of hypertension Bell's palsy, cataract surgery attending for evaluation of multiple complaints.  Patient developed generalized headache, bilateral blurry vision and left-sided weakness while in the emergency department after being roomed at approximately 1:00 p.m..  She reports she began feeling lightheaded at this time which is worse with sitting up.  Denies worsening with movement of head.  She reports that she ate eggs and trail mix this morning.  Denies history of similar symptoms.      Patient was presenting to this emergency department for 4 day history of gradual onset of left shoulder pain.  Denies any falls or trauma.  She reports intermittent tingling down the left upper extremity.  She states that she developed left-sided neck pain and headache and pain became more severe today.  She denies any associated chest pain, shortness breath, dizziness, nausea, vomiting or other associated symptoms      The history is provided by the patient.   Review of patient's allergies indicates:   Allergen Reactions    Dilaudid [hydromorphone] Other (See Comments)     Past Medical History:   Diagnosis Date    Arthritis     Dental bridge present     upper removable partial    Family history of pancreatic cancer     Fibrocystic breast     Migraines     Obesity     Pancreas cyst     Primary hypertension 2/2/2022     Past Surgical History:   Procedure Laterality Date    BREAST BIOPSY      BREAST CYST ASPIRATION      COLONOSCOPY N/A 10/7/2015    Procedure: COLONOSCOPY;  Surgeon: Eagle  CHERIE Stack MD;  Location: [facility] ENDO;  Service: Endoscopy;  Laterality: N/A;    DILATION AND CURETTAGE OF UTERUS      ENDOSCOPIC ULTRASOUND OF UPPER GASTROINTESTINAL TRACT N/A 11/3/2020    Procedure: ULTRASOUND, UPPER GI TRACT, ENDOSCOPIC;  Surgeon: Kei Juarez MD;  Location: [facility] ENDO (2ND FLR);  Service: Endoscopy;  Laterality: N/A;  Covid-19 test 10/31/20 at  [place] - pg    ESOPHAGOGASTRODUODENOSCOPY N/A 7/19/2019    Procedure: ESOPHAGOGASTRODUODENOSCOPY (EGD);  Surgeon: Cherrie Sheets MD;  Location: [facility] ENDO;  Service: Endoscopy;  Laterality: N/A;    fibrocystic breast       KNEE ARTHROSCOPY W/ MENISCAL REPAIR      TUBAL LIGATION       Family History   Problem Relation Age of Onset    Heart disease Mother     Cataracts Mother     Glaucoma Mother     Cancer Father         colon at ~60; lung, gallbladder, skin, prostate, brain- unsure which are primary vs secondary    No Known Problems Brother     Asthma Maternal Aunt     No Known Problems Maternal Grandmother     No Known Problems Maternal Grandfather     No Known Problems Paternal Grandmother     No Known Problems Paternal Grandfather     Pancreatic cancer Sister 54    Breast cancer Sister 42        unilateral    Cancer Maternal Cousin         unknown origin    Cervical cancer Other     Cancer Other         possible ovarian cancer    Breast cancer Maternal Cousin     Cancer Maternal Cousin         prostate?    Prostate cancer Maternal Cousin     COPD Neg Hx     Amblyopia Neg Hx     Blindness Neg Hx     Diabetes Neg Hx     Hypertension Neg Hx     Macular degeneration Neg Hx     Retinal detachment Neg Hx     Strabismus Neg Hx     Stroke Neg Hx     Thyroid disease Neg Hx      Social History     Tobacco Use    Smoking status: Never    Smokeless tobacco: Never   Substance Use Topics    Alcohol use: No    Drug use: No     Review of Systems   Constitutional:  Negative for chills and fever.   HENT:  Negative for congestion, ear pain, nosebleeds, rhinorrhea, sore  throat and trouble swallowing.    Eyes:  Positive for visual disturbance. Negative for redness.   Respiratory:  Negative for cough, shortness of breath and stridor.    Cardiovascular:  Negative for chest pain.   Gastrointestinal:  Negative for abdominal pain, constipation, diarrhea, nausea and vomiting.   Genitourinary:  Negative for decreased urine volume, dysuria, frequency, hematuria and urgency.   Musculoskeletal:  Positive for neck pain. Negative for back pain.   Skin:  Negative for rash and wound.   Neurological:  Positive for light-headedness. Negative for dizziness, syncope, speech difficulty, weakness, numbness and headaches.        + tingling   Hematological:  Does not bruise/bleed easily.   Psychiatric/Behavioral:  Negative for confusion.      Physical Exam     Initial Vitals [12/01/22 1254]   BP Pulse Resp Temp SpO2   (!) 142/62 63 18 98 °F (36.7 °C) 100 %      MAP       --         Physical Exam    Nursing note and vitals reviewed.  Constitutional: She appears well-developed and well-nourished.   HENT:   Head: Normocephalic.   Right Ear: External ear normal.   Left Ear: External ear normal.   Nose: Nose normal.   Mouth/Throat: Oropharynx is clear and moist.   Eyes: Conjunctivae, EOM and lids are normal. Pupils are equal, round, and reactive to light.   Had to be asked multiple times to follow my finger while testing EOM laterally w both eyes   Cardiovascular:  Normal rate and regular rhythm.     Exam reveals no gallop and no friction rub.       No murmur heard.  Pulmonary/Chest: Breath sounds normal. She has no wheezes. She has no rhonchi. She has no rales.   Abdominal: Abdomen is soft. Bowel sounds are normal. She exhibits no distension. There is no abdominal tenderness. There is no rebound, no guarding, no tenderness at McBurney's point and negative Herrera's sign.   Musculoskeletal:         General: Normal range of motion.      Cervical back: No spinous process tenderness or muscular tenderness.       Comments: Tenderness palpation over the left anterolateral shoulder and over the proximal left upper arm.  Patient is refusing to range the left shoulder.  Decreased  strain due to pain to the left upper extremity.  Decreased strength to the left lower extremity      Lymphadenopathy:     She has no cervical adenopathy.   Neurological: She is alert. She has normal strength. No cranial nerve deficit or sensory deficit. Coordination normal.   Skin: Skin is warm and dry.   Psychiatric: She has a normal mood and affect.       ED Course   Procedures  Labs Reviewed   CBC W/ AUTO DIFFERENTIAL - Abnormal; Notable for the following components:       Result Value    MCV 79 (*)     MCH 25.9 (*)     RDW 15.3 (*)     Lymph % 50.0 (*)     All other components within normal limits   COMPREHENSIVE METABOLIC PANEL - Abnormal; Notable for the following components:    Potassium 3.3 (*)     Total Protein 8.5 (*)     All other components within normal limits   CBC W/ AUTO DIFFERENTIAL - Abnormal; Notable for the following components:    MCV 79 (*)     MCH 25.9 (*)     RDW 15.3 (*)     Lymph % 50.0 (*)     All other components within normal limits   COMPREHENSIVE METABOLIC PANEL - Abnormal; Notable for the following components:    Potassium 3.3 (*)     Total Protein 8.5 (*)     All other components within normal limits   LIPID PANEL - Abnormal; Notable for the following components:    Cholesterol 229 (*)     All other components within normal limits   POCT GLUCOSE - Abnormal; Notable for the following components:    POCT Glucose 64 (*)     All other components within normal limits   ISTAT PROCEDURE - Abnormal; Notable for the following components:    POC Potassium 3.1 (*)     All other components within normal limits   POCT GLUCOSE - Abnormal; Notable for the following components:    POCT Glucose 121 (*)     All other components within normal limits   TROPONIN I   PROTIME-INR   TSH   POCT GLUCOSE, HAND-HELD DEVICE   POCT GLUCOSE  MONITORING CONTINUOUS   ISTAT CHEM8   POCT PROTIME-INR        ECG Results              EKG 12-lead (Final result)  Result time 12/01/22 16:41:33      Final result by Interface, Lab In [facility] (12/01/22 16:41:33)                   Narrative:    Test Reason : M54.2,    Vent. Rate : 063 BPM     Atrial Rate : 063 BPM     P-R Int : 158 ms          QRS Dur : 104 ms      QT Int : 436 ms       P-R-T Axes : 041 001 015 degrees     QTc Int : 446 ms    Normal sinus rhythm  Moderate voltage criteria for LVH, may be normal variant  Borderline Abnormal ECG  When compared with ECG of 02-FEB-2022 10:11,  Significant changes have occurred  Confirmed by Latrell Cortez MD ([id number]) on 12/1/2022 4:41:24 PM    Referred By: [email or web address]   SELF           Confirmed By:Latrell Cortez MD                                  Imaging Results              X-Ray Chest AP Portable (Final result)  Result time 12/01/22 16:32:47      Final result by Percy Ulloa MD (12/01/22 16:32:47)                   Impression:      No acute chest disease identified.      Electronically signed by: Percy Ulloa MD  Date:    12/01/2022  Time:    16:32               Narrative:    EXAMINATION:  XR CHEST AP PORTABLE    CLINICAL HISTORY:  Pain in unspecified shoulder    TECHNIQUE:  Single frontal view of the chest was performed.    COMPARISON:  08/04/2021.    FINDINGS:  The heart is not enlarged.  Superior mediastinal structures are unremarkable.  Pulmonary vasculature is within normal limits.  The lungs are free of focal consolidations.  There is no evidence for pneumothorax or large pleural effusions.  Bony structures are grossly intact.                                       X-Ray Shoulder 2 or More Views Left (Final result)  Result time 12/01/22 16:31:05   Procedure changed from X-Ray Shoulder Trauma Left     Final result by Percy Ulloa MD (12/01/22 16:31:05)                   Impression:      No evidence for acute fracture, bone destruction, or  dislocation.      Electronically signed by: Percy Ulloa MD  Date:    12/01/2022  Time:    16:31               Narrative:    EXAMINATION:  XR SHOULDER COMPLETE 2 OR MORE VIEWS LEFT    CLINICAL HISTORY:  Shoulder pain;Pain in unspecified shoulder    TECHNIQUE:  Two or three views of the left shoulder were performed.    COMPARISON:  09/28/2021.    FINDINGS:  The bones are intact.  There is no evidence for acute fracture or bone destruction.  There is no evidence for dislocation.  No bony erosions are identified.  Soft tissues are unremarkable.                                       MRI Brain Without Contrast (Final result)  Result time 12/01/22 16:54:23      Final result by Goyo Madden MD (12/01/22 16:54:23)                   Impression:      Unremarkable examination.  Specifically, no evidence of acute infarction.      Electronically signed by: Goyo Madden MD  Date:    12/01/2022  Time:    16:54               Narrative:    EXAMINATION:  MRI BRAIN WITHOUT CONTRAST    CLINICAL HISTORY:  Stroke, follow up;    TECHNIQUE:  Multiplanar multisequence MR imaging of the brain was performed without contrast.    COMPARISON:  MRI brain dated 09/28/2016.    FINDINGS:  The craniocervical junction is intact.  The sellar and parasellar are unremarkable.  The midline structures are intact.  The intracranial flow voids are within normal limits.    No diffusion-weighted signal abnormality is present.  There is no focal parenchymal signal abnormality the ventricles and sulci are within normal limits.  There are no extra-axial fluid collections.  There is no evidence of intracranial hemorrhage.  There is no evidence of mass effect.    There are postoperative changes in the globes.  The paranasal sinuses are unremarkable.  The mastoids air cells are clear.  The calvarium is intact.                                       CTA Head and Neck (xpd) (Final result)  Result time 12/01/22 16:46:07      Final result by Goyo Madden MD (12/01/22  16:46:07)                   Impression:      No large vessel occlusion in the intracranial circulation.    No hemodynamically significant stenosis of the neck vessels.    No CT findings of large territorial infarction.      Electronically signed by: Goyo Madden MD  Date:    12/01/2022  Time:    16:46               Narrative:    EXAMINATION:  CTA HEAD AND NECK (XPD)    CLINICAL HISTORY:  Neuro deficit, acute, stroke suspected;    TECHNIQUE:  Non contrast low dose axial images were obtained through the head.  CT angiogram was performed from the level of the alexis to the top of the head following the IV administration of 100mL of Omnipaque 350.   Sagittal and coronal reconstructions and maximum intensity projection reconstructions were performed. Arterial stenosis percentages are based on NASCET measurement criteria.    COMPARISON:  CT scan of the head dated 09/01/2016.    FINDINGS:  Noncontrast CT head:    The subcutaneous tissues are unremarkable.  The bony calvarium is intact.  The paranasal sinuses are unremarkable.  The mastoid air cells are clear.  There are postoperative changes in the globes.    The craniocervical junction is intact.  The midline structures are unremarkable.  There is no evidence of intracranial hemorrhage.  The ventricles and sulci are within normal limits.  The cisterns are unremarkable.  The gray-white differentiation is maintained.  There is no dense vessel sign.  There is no evidence of mass effect.    CTA neck:    There are no filling defects within the visualized pulmonary tree.    The visualized thoracic aorta is normal in caliber.  There is a normal 3 vessel aortic arch.  The common carotid arteries are within normal limits.  The internal and external carotid arteries are unremarkable.    The vertebral artery origins are unremarkable.  The vertebral arteries are within normal limits.    There is no evidence of a hemodynamically significance of the neck vessels.    CTA head:    The  intracranial segments of the ICAs are within normal limits.  The anterior cerebral arteries and anterior component complex are within normal limits.  The middle cerebral arteries are unremarkable.    The intracranial segments of the vertebral arteries are unremarkable.  The basilar is within normal limits.  The posterior cerebral arteries unremarkable.  The P comms are hypoplastic.    There is no evidence of large vessel occlusion in the intracranial circulation.    The dural venous sinuses are within normal limits.    There is no abnormal intracranial enhancement.    Additional findings:    The parotid and submandibular glands are unremarkable.  The thyroid gland is within normal limits.  The soft tissue the neck are unremarkable.  There is no evidence of lymphadenopathy in the neck.  The trachea is unremarkable.  No pulmonary nodules identified.    The osseous structures are unremarkable.                                       Medications   dextrose 50% injection 25 g (25 g Intravenous Not Given 12/1/22 1445)   dextrose 10% bolus 250 mL (0 mLs Intravenous Stopped 12/1/22 1545)   LIDOcaine 5 % patch 1 patch (1 patch Transdermal Patch Applied 12/1/22 1710)   iohexoL (OMNIPAQUE 350) injection 100 mL (100 mLs Intravenous Given 12/1/22 1432)   ketorolac injection 9.999 mg (9.999 mg Intravenous Given 12/1/22 1709)   cyclobenzaprine tablet 10 mg (10 mg Oral Given 12/1/22 1718)     Medical Decision Making:   Clinical Tests:   Lab Tests: Ordered and Reviewed  Radiological Study: Ordered and Reviewed  Medical Tests: Ordered and Reviewed  ED Management:  57-year-old female with history of hypertension, Bell's palsy cataract surgery who developed generalized headache bilateral blurry vision lightheadedness and left-sided weakness in the emergency department at approximately 1:00 p.m. today.  She reports that she ate trail mix and eggs this morning.   Neurologic exam remarkable for weakness to the left upper extremity and left  lower extremity.  She denies any chest pain, shortness of breath, dizziness.  I went to inform Dr Mcnulty of the pt and had her come evaluate patient face-to-face.  Code stroke was called.  She is van positive due to visual disturbance and left-sided weakness to LUE AND LLE   Her glucose is 64.  D50 given.    CTA head and neck and MRI are negative for stroke.  Her symptoms are resolved after resolution of her hypoglycemia.  Cardiac workup is negative.  Chest x-ray negative for pneumonia pneumothorax acute abnormality.  X-ray of the left shoulder is negative for fracture dislocation or acute abnormality.  Think this may be cervical radiculopathy versus shoulder impingement.  Will refer to Orthopedics.  IV Toradol, Flexeril p.o. given in the emergency department.  Will have patient return to the emergency department worsening symptoms or as needed follow up with primary care.  Discussed patient with Dr Mcnulty who evaluated pt face to face           ED Course as of 12/01/22 1928   Thu Dec 01, 2022   1501 EKG 12-lead  Normal sinus rhythm at 63.  No ST elevation or depression.  T-wave inversions in V1 and 3.  Normal axis.  QTC is 446.  This EKG is similar to her previous from February of 2022. [JT]      ED Course User Index  [JT] Marisela Mcnulty MD                 Clinical Impression:   Final diagnoses:  [M25.519] Shoulder pain  [M54.2] Neck pain  [I63.9] Stroke      ED Disposition Condition    Discharge Stable          ED Prescriptions       Medication Sig Dispense Start Date End Date Auth. Provider    ibuprofen (ADVIL,MOTRIN) 600 MG tablet Take 1 tablet (600 mg total) by mouth every 6 (six) hours as needed for Pain. 20 tablet 12/1/2022 12/6/2022 Imelda Hunter PA-C    acetaminophen (TYLENOL) 500 MG tablet Take 1 tablet (500 mg total) by mouth every 4 (four) hours as needed. 20 tablet 12/1/2022 12/6/2022 Imelda Hunter PA-C    cyclobenzaprine (FLEXERIL) 10 MG tablet Take 1 tablet (10 mg total) by mouth 3  (three) times daily as needed. 20 tablet 12/1/2022 12/8/2022 Imelda Hunter PA-C    LIDOcaine (LIDODERM) 5 % Place 1 patch onto the skin once daily. Remove & Discard patch within 12 hours or as directed by MD. May use 4% over the counter if not covered by insurance for 15 days 15 patch 12/1/2022 12/16/2022 Imelda Hunter PA-C          Follow-up Information       Follow up With Specialties Details Why Contact Info    Ramon Olivares Jr., MD Family Medicine Schedule an appointment as soon as possible for a visit in 2 days for follow up [facility] [address]  [phone]      [facility] Emergency Dept Emergency Medicine Go to  As needed, If symptoms worsen [address] [facility] [address] [phone]             Imelda Hunter PA-C  12/01/22 1928

## 2022-12-01 NOTE — TELEPHONE ENCOUNTER
Called pt , she's still admitted at ED . Asked she contact our office once discharged to get scheduled for a hospital f/u .

## 2022-12-01 NOTE — HPI
57F with a past medical history significant for migraine headaches, HTN, and Bell's palsy who presents with LLE weakness and left facial weakness. LKN 1300 today.     This morning, she had heaviness on her left side. Pain over her chest. Weakness in the arm prior to coming in today. Blurry vision and could not lift left leg.     Left leg new since 1PM.     No history of stroke. Nails's on the right.     No blood thinners.     Left arm feels heavy, can't raise it arm -     Left leg hits the bed    Blurry vision both     Denies AP/AC medications.     Patient reports left shoulder pain that is radiating down left arm x 1 week but the symptoms worsened today. She also reports numbness/tingling to left upper extremity. Denies recent falls, trauma, heavy lifting, or mvc's. Denies taking any medication for symptoms at home.     57-year-old female history of hypertension Bell's palsy, cataract surgery attending for evaluation of multiple complaints.  Patient developed generalized headache, bilateral blurry vision and left-sided weakness while in the emergency department after being roomed at approximately 1:00 p.m..  She reports she began feeling lightheaded at this time which is worse with sitting up.  Denies worsening with movement of head.  She reports that she ate eggs and trail mix this morning.  Denies history of similar symptoms.       Patient was presenting to this emergency department for 4 day history of gradual onset of left shoulder pain.  Denies any falls or trauma.  She reports intermittent tingling down the left upper extremity.  She states that she developed left-sided neck pain and headache and pain became more severe today.  She denies any associated chest pain, shortness breath, dizziness, nausea, vomiting or other associated symptoms

## 2022-12-01 NOTE — TELEPHONE ENCOUNTER
Left arm pain, about a week, under shoulder. Couple of days, but getting worse, is also in chest and back. Cannot lift arm. States still has pain even when not moving arm. Triaged done-  dispo ED. Rationale explained to patient, and advised I would send message to provider. Verb understanding.   Reason for Disposition   Age > 40 and no obvious cause for pain, pain still present even when not moving the arm    Additional Information   Negative: Shock suspected (e.g., cold/pale/clammy skin, too weak to stand, low BP, rapid pulse)   Negative: Similar pain previously and it was from 'heart attack'   Negative: Similar pain previously from 'angina' and not relieved by nitroglycerin   Negative: Sounds like a life-threatening emergency to the triager   Negative: Followed an injury to arm   Negative: Difficulty breathing or unusual sweating (e.g., sweating without exertion)   Negative: Chest pain lasting longer than 5 minutes    Protocols used: Arm Pain-A-[place]

## 2022-12-01 NOTE — ED TRIAGE NOTES
Per patient, left shoulder pain that started Monday. Patient rates pain at 10/10. Patient states that she has decrease ROM to the left shoulder. Patient denies injury/trauma to left arm. Patient states that she does have tingling sensation in the left hand.

## 2022-12-01 NOTE — DISCHARGE INSTRUCTIONS

## 2022-12-08 ENCOUNTER — OFFICE VISIT (OUTPATIENT)
Dept: ORTHOPEDICS | Facility: CLINIC | Age: 57
End: 2022-12-08
Payer: COMMERCIAL

## 2022-12-08 DIAGNOSIS — M75.82 ROTATOR CUFF TENDINITIS, LEFT: ICD-10-CM

## 2022-12-08 DIAGNOSIS — M25.519 SHOULDER PAIN: Primary | ICD-10-CM

## 2022-12-08 PROCEDURE — 3044F HG A1C LEVEL LT 7.0%: CPT | Mod: CPTII,S$GLB,, | Performed by: ORTHOPAEDIC SURGERY

## 2022-12-08 PROCEDURE — 1160F RVW MEDS BY RX/DR IN RCRD: CPT | Mod: CPTII,S$GLB,, | Performed by: ORTHOPAEDIC SURGERY

## 2022-12-08 PROCEDURE — 4010F PR ACE/ARB THEARPY RXD/TAKEN: ICD-10-PCS | Mod: CPTII,S$GLB,, | Performed by: ORTHOPAEDIC SURGERY

## 2022-12-08 PROCEDURE — 1159F MED LIST DOCD IN RCRD: CPT | Mod: CPTII,S$GLB,, | Performed by: ORTHOPAEDIC SURGERY

## 2022-12-08 PROCEDURE — 99213 OFFICE O/P EST LOW 20 MIN: CPT | Mod: 25,S$GLB,, | Performed by: ORTHOPAEDIC SURGERY

## 2022-12-08 PROCEDURE — 3044F PR MOST RECENT HEMOGLOBIN A1C LEVEL <7.0%: ICD-10-PCS | Mod: CPTII,S$GLB,, | Performed by: ORTHOPAEDIC SURGERY

## 2022-12-08 PROCEDURE — 1160F PR REVIEW ALL MEDS BY PRESCRIBER/CLIN PHARMACIST DOCUMENTED: ICD-10-PCS | Mod: CPTII,S$GLB,, | Performed by: ORTHOPAEDIC SURGERY

## 2022-12-08 PROCEDURE — 1159F PR MEDICATION LIST DOCUMENTED IN MEDICAL RECORD: ICD-10-PCS | Mod: CPTII,S$GLB,, | Performed by: ORTHOPAEDIC SURGERY

## 2022-12-08 PROCEDURE — 99999 PR PBB SHADOW E&M-EST. PATIENT-LVL III: ICD-10-PCS | Mod: PBBFAC,,, | Performed by: ORTHOPAEDIC SURGERY

## 2022-12-08 PROCEDURE — 4010F ACE/ARB THERAPY RXD/TAKEN: CPT | Mod: CPTII,S$GLB,, | Performed by: ORTHOPAEDIC SURGERY

## 2022-12-08 PROCEDURE — 99999 PR PBB SHADOW E&M-EST. PATIENT-LVL III: CPT | Mod: PBBFAC,,, | Performed by: ORTHOPAEDIC SURGERY

## 2022-12-08 PROCEDURE — 20610 LARGE JOINT ASPIRATION/INJECTION: L SUBACROMIAL BURSA: ICD-10-PCS | Mod: LT,S$GLB,, | Performed by: ORTHOPAEDIC SURGERY

## 2022-12-08 PROCEDURE — 20610 DRAIN/INJ JOINT/BURSA W/O US: CPT | Mod: LT,S$GLB,, | Performed by: ORTHOPAEDIC SURGERY

## 2022-12-08 PROCEDURE — 99213 PR OFFICE/OUTPT VISIT, EST, LEVL III, 20-29 MIN: ICD-10-PCS | Mod: 25,S$GLB,, | Performed by: ORTHOPAEDIC SURGERY

## 2022-12-08 RX ORDER — TRIAMCINOLONE ACETONIDE 40 MG/ML
80 INJECTION, SUSPENSION INTRA-ARTICULAR; INTRAMUSCULAR
Status: DISCONTINUED | OUTPATIENT
Start: 2022-12-08 | End: 2022-12-08 | Stop reason: HOSPADM

## 2022-12-08 RX ADMIN — TRIAMCINOLONE ACETONIDE 80 MG: 40 INJECTION, SUSPENSION INTRA-ARTICULAR; INTRAMUSCULAR at 11:12

## 2022-12-08 NOTE — PROCEDURES
Large Joint Aspiration/Injection: L subacromial bursa    Date/Time: 12/8/2022 11:15 AM  Performed by: Maite Cruz MD  Authorized by: Maite Cruz MD     Consent Done?:  Yes (Verbal)  Indications:  Pain  Timeout: prior to procedure the correct patient, procedure, and site was verified    Prep: patient was prepped and draped in usual sterile fashion      Local anesthesia used?: Yes    Local anesthetic:  Topical anesthetic    Details:  Needle Size:  22 G  Approach:  Posterior  Location:  Shoulder  Site:  L subacromial bursa  Medications:  80 mg triamcinolone acetonide 40 mg/mL  Patient tolerance:  Patient tolerated the procedure well with no immediate complications

## 2022-12-08 NOTE — LETTER
December 8, 2022      [facility]  [address], [address]  [place] [address]  Phone: [phone]       Patient: Mary Naranjo   YOB: 1965  Date of Visit: 12/08/2022    To Whom It May Concern:    Sheila Naranjo  was at [facility] on 12/08/2022. The patient may return to work/school on 12/8/22 with no restrictions. If you have any questions or concerns, or if I can be of further assistance, please do not hesitate to contact me.    Sincerely,    Maite Cruz MD

## 2022-12-08 NOTE — PROGRESS NOTES
Chief Complaint   Patient presents with    Left Upper Arm - Pain     This patient was seen in consultation at the request of Imelda Hunter     [facility] (10/4/21): Mary Naranjo is a 57 y.o. female who presents today complaining of left shoulder pain  Has pain over the entire arm   Was seen by neurosurgery before me and diagnosed with a cervical radiculopathy  No trauma   Started on medrol dose pack with relief.  Has meloxicam Rx but she was instructed ot wait until she was done w the steroid pack to start this  No n/t   Pain starts in neck and radiates down the entire arm    12/8/22  Went to the ER recently for acute left shoulder/arm pain and heaviness  Work up for stroke was negative   Was given toradol IM and lidocaine patches.   Pain has improved a lot but still has aching nagging pain with rom of the shoulder   Tylenol is helpful     This is the extent of the patient's complaints at this time.     Hand dominance: Right     Review of Systems   All other systems reviewed and are negative.      Review of patient's allergies indicates:   Allergen Reactions    Dilaudid [hydromorphone] Other (See Comments)         Physical Exam:   Vitals:    12/08/22 1114   PainSc:   3   PainLoc: Arm       General: Patient is alert, awake and oriented to time, place and person. Mood and affect are appropriate.  Patient does not appear to be in any distress, denies any constitutional symptoms and appears stated age.   HEENT: Pupils are equal and round, sclera are not injected. External examination of ears and nose reveals no abnormalities. Cranial nerves II-X are grossly intact  Neck: examination demonstrates painless  active range of motion. Spurling's sign is negative  Skin: no rashes, abrasions or open wounds on the affected extremity   Resp: No respiratory distress or audible wheezing   CV: 2+ pulses, all extremities warm and well perfused   Left Shoulder    Shoulder Range of Motion    Right     Left    (Active/Passive)       Forward Elevation     165/165            90/100  External rotation (arm at side)  45/45             45/45   Internal rotation behind the back  L5             L5     Range of motion is painful     Acromioclavicular joint is not tender  Crossbody test: negative    Neer's positive  Hawkin's positive    Keith's positive  Drop arm negative  Belly press negative      Cuff Strength     Right     Left   Supraspinatus        5/5    5-/5  Infraspinatus     5/5    5/5  Subscapularis      5/5    5/5    Deltoid testing            5/5    5/5    Speeds negative  Yergasons     Elbow examination demonstrates no tenderness to palpation and has normal range of motion.     ltsi C5-T1  + epl, io, fds, fdp   2+ RP      Imaging: 3 views of the left shoulder:  negative for degenerative changes of the AC joint. The humeral head is well centered on the AP and axillary views.  There is calcification at the rotator cuff insertion on the greater tuberosity. There is not significant degenerative change of the glenohumeral joint or posterior subluxation of the humeral head. No acute changes or fracture.      I personally reviewed and interpreted the patient's imaging obtained prior to visit      Assessment: 57 y.o. female with left rotator cuff tendinitis    I explained my diagnostic impression and the reasoning behind it in detail, using layman's terms.  Models and/or pictures were used to help in the explanation.  We discussed non operative and operative treatment modalities     Plan:   -patient declined physical therapy and meloxicam  - Injection of the left subacromial space  performed, please see procedure note for more details.  Prior to the injection risks and benefits of corticosteroid injection were discussed with the patient including pain, infection, bleeding, skin color changes, swelling, steroid flare. We discussed that over time injections can result in chondral damage, acceleration of arthritis formation,  damage to tendons and damage to joints.  The patient consented for the procedure.  Post-injection instructions were given to the patient in writing.  - Return to clinic in 12 weeks. Return sooner if symptoms worsen or fail to improve.    All questions were answered in detail. The patient is in full agreement with the treatment plan and will proceed accordingly.    A note notifying Imelda Hunter of my findings was sent via the electronic medical record     This note was created by combination of typed  and M-Modal dictation. Transcription and phonetic errors may be present.  If there are any questions, please contact me.      Current Outpatient Medications:     cholecalciferol, vitamin D3, 50 mcg (2,000 unit) Chew, Take 2,000 Units by mouth once daily. Per patient, Disp: , Rfl:     cyanocobalamin (VITAMIN B-12) 1000 MCG tablet, Take 100 mcg by mouth once daily., Disp: , Rfl:     cyclobenzaprine (FLEXERIL) 10 MG tablet, Take 1 tablet (10 mg total) by mouth 3 (three) times daily as needed., Disp: 20 tablet, Rfl: 0    diclofenac (VOLTAREN) 50 MG EC tablet, TAKE 1 TABLET (50 MG TOTAL) BY MOUTH 2 (TWO) TIMES DAILY AS NEEDED (KNEE PAIN). TAKE WITH A MEAL, Disp: 60 tablet, Rfl: 0    diclofenac sodium (VOLTAREN) 1 % Gel, Apply 2 g topically 4 (four) times daily., Disp: 1 each, Rfl: 1    hydroCHLOROthiazide (HYDRODIURIL) 25 MG tablet, Take 1 tablet (25 mg total) by mouth once daily., Disp: 90 tablet, Rfl: 3    LIDOcaine (LIDODERM) 5 %, Place 1 patch onto the skin once daily. Remove & Discard patch within 12 hours or as directed by MD. May use 4% over the counter if not covered by insurance for 15 days, Disp: 15 patch, Rfl: 0    LINZESS 290 mcg Cap capsule, Take 1 capsule (290 mcg total) by mouth before breakfast., Disp: 90 capsule, Rfl: 3    multivit/folic acid/vit K1 (ONE-A-DAY WOMEN'S 50 PLUS ORAL), Take 1 capsule by mouth once daily. Per patient, One-a-Day Women's 50+ Multivitamin Complete, Disp: , Rfl:      omega-3 fatty acids/fish oil (FISH OIL-OMEGA-3 FATTY ACIDS) 300-1,000 mg capsule, Take 1 capsule by mouth once daily., Disp: , Rfl:     UNABLE TO FIND, Take 2 capsules by mouth once daily. medication name: Digestive Advantage, Disp: , Rfl:     valsartan (DIOVAN) 160 MG tablet, Take 1 tablet (160 mg total) by mouth once daily., Disp: 90 tablet, Rfl: 3    Past Medical History:   Diagnosis Date    Arthritis     Dental bridge present     upper removable partial    Family history of pancreatic cancer     Fibrocystic breast     Migraines     Obesity     Pancreas cyst     Primary hypertension 2/2/2022       Active Problem List with Overview Notes    Diagnosis Date Noted    Left-sided weakness 12/01/2022    Primary hypertension 02/02/2022    Palpitations 02/02/2022    LVH (left ventricular hypertrophy) 02/02/2022    Class 2 severe obesity due to excess calories with serious comorbidity and body mass index (BMI) of 37.0 to 37.9 in adult 02/02/2022    Chest pain 08/04/2021    Family history of pancreatic cancer 10/15/2020    PMB (postmenopausal bleeding) 02/15/2018    Acute intractable headache 09/07/2016    Tear of medial cartilage or meniscus of knee, current 06/23/2015    Stiffness of vertebral column 10/28/2014    Obesity (BMI 30-39.9) 09/22/2014    Prediabetes 08/22/2014       Past Surgical History:   Procedure Laterality Date    BREAST BIOPSY      BREAST CYST ASPIRATION      COLONOSCOPY N/A 10/7/2015    Procedure: COLONOSCOPY;  Surgeon: Eagle Stack MD;  Location: [facility];  Service: Endoscopy;  Laterality: N/A;    DILATION AND CURETTAGE OF UTERUS      ENDOSCOPIC ULTRASOUND OF UPPER GASTROINTESTINAL TRACT N/A 11/3/2020    Procedure: ULTRASOUND, UPPER GI TRACT, ENDOSCOPIC;  Surgeon: Kei Juarez MD;  Location: [facility] ([address] [place] [address]);  Service: Endoscopy;  Laterality: N/A;  Covid-19 test 10/31/20 at  [facility] - pg    ESOPHAGOGASTRODUODENOSCOPY N/A 7/19/2019    Procedure: ESOPHAGOGASTRODUODENOSCOPY (EGD);   Surgeon: Cherrie Sheets MD;  Location[facility];  Service: Endoscopy;  Laterality: N/A;    fibrocystic breast       KNEE ARTHROSCOPY W/ MENISCAL REPAIR      TUBAL LIGATION         Social History     Socioeconomic History    Marital status:    Occupational History    Occupation: administrative   Tobacco Use    Smoking status: Never    Smokeless tobacco: Never   Substance and Sexual Activity    Alcohol use: No    Drug use: No    Sexual activity: Yes

## 2022-12-20 DIAGNOSIS — K59.04 CHRONIC IDIOPATHIC CONSTIPATION: ICD-10-CM

## 2022-12-20 NOTE — TELEPHONE ENCOUNTER
Patient stated that she hadn't picked up the Linzess from when it was ordered on 8/2022 and she was instructed by pharmacy to call us to resign the orders for it to become active in her account again. Last office visit 8/22/22

## 2022-12-20 NOTE — TELEPHONE ENCOUNTER
----- Message from Aneesh Marquez sent at 12/20/2022  9:39 AM CST -----  Type: RX Refill Request    Who Called: Self     Have you contacted your pharmacy:Yes     Refill or New Rx:Refill     RX Name and Strength:LINZESS 290 mcg Cap capsule 90 capsule     Preferred Pharmacy with phone number:[facility]/pharmacy #[id number] - [place] - [address]   Phone: [phone]  Fax:  [phone]    Local or Mail Order: Local     Would the patient rather a call back or a response via [facility]? Call     Best Call Back Number:[phone]     Additional Information: States she was late picking it up so they put it back

## 2022-12-23 RX ORDER — LINACLOTIDE 290 UG/1
290 CAPSULE, GELATIN COATED ORAL
Qty: 90 CAPSULE | Refills: 3 | Status: SHIPPED | OUTPATIENT
Start: 2022-12-23 | End: 2023-08-23 | Stop reason: SDUPTHER

## 2023-02-07 ENCOUNTER — TELEPHONE (OUTPATIENT)
Dept: FAMILY MEDICINE | Facility: CLINIC | Age: 58
End: 2023-02-07
Payer: COMMERCIAL

## 2023-02-07 NOTE — TELEPHONE ENCOUNTER
----- Message from Juan A Malone MA sent at 2/7/2023  3:07 PM CST -----  Type: Patient Call Back    Who called:Self    What is the request in detail: pt. States she has been sick for days and has been unable to work.. she is asking for a nurse to call her to see if she can come in and see about a work note for her ..     Can the clinic reply by [facility]?No    Would the patient rather a call back or a response via [facility]? yes    Best call back number: [phone] (home)

## 2023-02-07 NOTE — TELEPHONE ENCOUNTER
Called pt back , explained a virtual would be needed offered tomorrow at 8 am with DR. Olivares and she accept. Also that she needs to fu with employer on their covid policy

## 2023-02-08 ENCOUNTER — OFFICE VISIT (OUTPATIENT)
Dept: FAMILY MEDICINE | Facility: CLINIC | Age: 58
End: 2023-02-08
Payer: COMMERCIAL

## 2023-02-08 ENCOUNTER — PATIENT MESSAGE (OUTPATIENT)
Dept: FAMILY MEDICINE | Facility: CLINIC | Age: 58
End: 2023-02-08

## 2023-02-08 DIAGNOSIS — Z15.89 MONOALLELIC MUTATION OF RET GENE: Chronic | ICD-10-CM

## 2023-02-08 DIAGNOSIS — Z80.0 FAMILY HISTORY OF PANCREATIC CANCER: Chronic | ICD-10-CM

## 2023-02-08 DIAGNOSIS — Z15.09 MONOALLELIC MUTATION OF RET GENE: Chronic | ICD-10-CM

## 2023-02-08 DIAGNOSIS — I10 PRIMARY HYPERTENSION: Chronic | ICD-10-CM

## 2023-02-08 DIAGNOSIS — Z15.81 MONOALLELIC MUTATION OF RET GENE: Chronic | ICD-10-CM

## 2023-02-08 DIAGNOSIS — U07.1 COVID-19: Primary | ICD-10-CM

## 2023-02-08 DIAGNOSIS — Z12.83 SKIN CANCER SCREENING: ICD-10-CM

## 2023-02-08 PROCEDURE — 99214 OFFICE O/P EST MOD 30 MIN: CPT | Mod: 95,,, | Performed by: FAMILY MEDICINE

## 2023-02-08 PROCEDURE — 1159F MED LIST DOCD IN RCRD: CPT | Mod: CPTII,95,, | Performed by: FAMILY MEDICINE

## 2023-02-08 PROCEDURE — 1159F PR MEDICATION LIST DOCUMENTED IN MEDICAL RECORD: ICD-10-PCS | Mod: CPTII,95,, | Performed by: FAMILY MEDICINE

## 2023-02-08 PROCEDURE — 1160F RVW MEDS BY RX/DR IN RCRD: CPT | Mod: CPTII,95,, | Performed by: FAMILY MEDICINE

## 2023-02-08 PROCEDURE — 99214 PR OFFICE/OUTPT VISIT, EST, LEVL IV, 30-39 MIN: ICD-10-PCS | Mod: 95,,, | Performed by: FAMILY MEDICINE

## 2023-02-08 PROCEDURE — 1160F PR REVIEW ALL MEDS BY PRESCRIBER/CLIN PHARMACIST DOCUMENTED: ICD-10-PCS | Mod: CPTII,95,, | Performed by: FAMILY MEDICINE

## 2023-02-08 RX ORDER — METHYLPREDNISOLONE 4 MG/1
TABLET ORAL
Qty: 21 TABLET | Refills: 0 | Status: SHIPPED | OUTPATIENT
Start: 2023-02-08 | End: 2023-02-13 | Stop reason: ALTCHOICE

## 2023-02-08 RX ORDER — ALBUTEROL SULFATE 90 UG/1
2 AEROSOL, METERED RESPIRATORY (INHALATION) EVERY 4 HOURS PRN
Qty: 8.5 G | Refills: 0 | Status: SHIPPED | OUTPATIENT
Start: 2023-02-08 | End: 2023-08-08

## 2023-02-08 NOTE — PROGRESS NOTES
The patient location is: [place], [place]  The chief complaint leading to consultation is: COVID 19 positive    Visit type: audiovisual    Face to Face time with patient: 15 minutes  20 minutes of total time spent on the encounter, which includes face to face time and non-face to face time preparing to see the patient (eg, review of tests), Obtaining and/or reviewing separately obtained history, Documenting clinical information in the electronic or other health record, Independently interpreting results (not separately reported) and communicating results to the patient/family/caregiver, or Care coordination (not separately reported).         Each patient to whom he or she provides medical services by telemedicine is:  (1) informed of the relationship between the physician and patient and the respective role of any other health care provider with respect to management of the patient; and (2) notified that he or she may decline to receive medical services by telemedicine and may withdraw from such care at any time.    Notes:        Subjective:       Patient ID: Mary Naranjo is a 57 y.o. female.    Chief Complaint: COVID-19 Concerns    Patient reports testing positive for COVID19  recently. Has been out of work since Monday. States symptoms are worsening, despite using symptomatic treatment.    URI   This is a new problem. The current episode started in the past 7 days. The problem has been gradually worsening. Associated symptoms include congestion, coughing, diarrhea, headaches, neck pain, a plugged ear sensation, rhinorrhea, sneezing, a sore throat and wheezing. Pertinent negatives include no chest pain, dysuria, rash or vomiting. Treatments tried: OTC medications. The treatment provided no relief.     Review of Systems   Constitutional:  Positive for activity change. Negative for unexpected weight change.   HENT:  Positive for nasal congestion, rhinorrhea, sneezing and sore throat. Negative for hearing loss and trouble  swallowing.    Eyes:  Positive for discharge and visual disturbance.   Respiratory:  Positive for cough and wheezing. Negative for chest tightness.    Cardiovascular:  Negative for chest pain and palpitations.   Gastrointestinal:  Positive for diarrhea. Negative for blood in stool, constipation and vomiting.   Endocrine: Negative for polydipsia and polyuria.   Genitourinary:  Negative for difficulty urinating, dysuria, hematuria and menstrual problem.   Musculoskeletal:  Positive for neck pain. Negative for arthralgias and joint swelling.   Integumentary:  Negative for rash.   Neurological:  Positive for weakness and headaches.   Psychiatric/Behavioral:  Negative for confusion and dysphoric mood.        Objective:      Physical Exam  Constitutional:       General: She is not in acute distress.     Appearance: She is ill-appearing. She is not toxic-appearing.   HENT:      Head: Normocephalic and atraumatic.   Neurological:      Mental Status: She is alert.   Psychiatric:         Behavior: Behavior normal.         Thought Content: Thought content normal.       Assessment:       1. COVID-19 Temporary   2. Skin cancer screening    3. Primary hypertension    4. Family history of pancreatic cancer    5. Monoallelic mutation of RET gene          Plan:       1. COVID-19  Assessment & Plan:  Advised symptomatic care with plenty of fluids, honey and lemon, rest, and prescribed regimen.  Discussed using Paxlovid as she is still window for treatment and she agrees to this. Number to [facility] pharmacy given.  Albuterol for chest tightness, and shortness of breath.  Messaged patient with video instructions for inhaler use  OTC Ibuprofen or Tylenol for pain.  Discussed course of a viral respiratory infections.  Explained that after respiratory infection is better, may continue to have a dry cough for a few weeks.  Contact office if symptoms worsen or do not improve in a week.      Orders:  -     nirmatrelvir-ritonavir 300 mg (150  mg x 2)-100 mg copackaged tablets (EUA); Take 3 tablets by mouth 2 (two) times daily for 5 days. Each dose contains 2 nirmatrelvir (pink tablets) and 1 ritonavir (white tablet). Take all 3 tablets together  Dispense: 30 tablet; Refill: 0  -     methylPREDNISolone (MEDROL DOSEPACK) 4 mg tablet; use as directed  Dispense: 21 tablet; Refill: 0  -     albuterol (PROVENTIL HFA) 90 mcg/actuation inhaler; Inhale 2 puffs into the lungs every 4 (four) hours as needed for Wheezing or Shortness of Breath. Rescue  Dispense: 8.5 g; Refill: 0    2. Skin cancer screening  Comments:  Order for annual skin check placed  Orders:  -     Ambulatory referral/consult to Dermatology; Future; Expected date: 2023    3. Primary hypertension  Assessment & Plan:  Current therapy effective, will continue.      4. Family history of pancreatic cancer  Overview:  Twin sister  of Pancreatic cancer.  Patient saw genetics- See Monoallelic mutation of RET gene    GI:  10/15/2020:  Likely will pursue yearly endoscopic ultrasounds for the purposes of pancreatic cancer screening.  We did discuss that her current family history is not classic for the development of pancreatic cancer however the presence of pancreatic cancer in an identical twin is not discussed in current guidelines regarding screening for pancreatic cancer.      Endoscopic US 11/3/2020:     Impression:                       - Normal esophagus.                         - Normal stomach.                         - Normal examined duodenum.                         - A cystic lesion was seen in the pancreatic tail.                         - No specimens collected.   Recommendation:       - Discharge patient to home.                         - Resume previous diet.                         - Continue present medications.                         - Repeat the upper endoscopic ultrasound in 1 year                         for surveillance.      Endoscopic US 10/7/2021: Done at [facility]      5.  Monoallelic mutation of RET gene  Overview:  Patient's twin sister  of pancreatic cancer.  Last visit with Genetics on 2020 and genetic testing result reviewed:  Germline Cancer-Genetic Test Results  Test(s) performed:  [employer] Common Hereditary Cancers Panel + RET gene  Number of genes analyzed:  48 genes  Laboratory:  [facility]  Sample collection date:  2020  Results report date:  09/10/2020  Full report to be scanned into Epic under the Labs and/or Media tab(s).  FINDINGS:    Gene Variant Zygosity Variant Classification   RET C.31C>A (p.Lzd91Cro) heterozygous Uncertain significance      Last Derm exam: 2020- No skin cancer signs    Orders:  -     Ambulatory referral/consult to Dermatology; Future; Expected date: 2023

## 2023-02-10 ENCOUNTER — PATIENT MESSAGE (OUTPATIENT)
Dept: FAMILY MEDICINE | Facility: CLINIC | Age: 58
End: 2023-02-10
Payer: COMMERCIAL

## 2023-02-12 ENCOUNTER — PATIENT MESSAGE (OUTPATIENT)
Dept: FAMILY MEDICINE | Facility: CLINIC | Age: 58
End: 2023-02-12
Payer: COMMERCIAL

## 2023-02-12 PROBLEM — Z15.89 MONOALLELIC MUTATION OF RET GENE: Chronic | Status: ACTIVE | Noted: 2020-09-10

## 2023-02-12 PROBLEM — R07.9 CHEST PAIN: Status: RESOLVED | Noted: 2021-08-04 | Resolved: 2023-02-12

## 2023-02-12 PROBLEM — Z15.89 MONOALLELIC MUTATION OF RET GENE: Status: ACTIVE | Noted: 2020-09-10

## 2023-02-12 PROBLEM — Z15.81 MONOALLELIC MUTATION OF RET GENE: Chronic | Status: ACTIVE | Noted: 2020-09-10

## 2023-02-12 PROBLEM — U07.1 COVID-19: Status: ACTIVE | Noted: 2023-02-08

## 2023-02-12 PROBLEM — Z15.81 MONOALLELIC MUTATION OF RET GENE: Status: ACTIVE | Noted: 2023-02-12

## 2023-02-12 PROBLEM — Z15.89 MONOALLELIC MUTATION OF RET GENE: Status: ACTIVE | Noted: 2020-10-15

## 2023-02-12 PROBLEM — Z15.09 MONOALLELIC MUTATION OF RET GENE: Status: ACTIVE | Noted: 2020-10-15

## 2023-02-12 PROBLEM — Z15.89 MONOALLELIC MUTATION OF RET GENE: Status: ACTIVE | Noted: 2023-02-12

## 2023-02-12 PROBLEM — Z15.09 MONOALLELIC MUTATION OF RET GENE: Chronic | Status: ACTIVE | Noted: 2020-09-10

## 2023-02-12 PROBLEM — Z15.09 MONOALLELIC MUTATION OF RET GENE: Status: ACTIVE | Noted: 2023-02-12

## 2023-02-12 PROBLEM — Z80.0 FAMILY HISTORY OF PANCREATIC CANCER: Status: ACTIVE | Noted: 2020-09-10

## 2023-02-12 PROBLEM — U07.1 COVID-19: Status: ACTIVE | Noted: 2023-02-12

## 2023-02-12 PROBLEM — Z15.81 MONOALLELIC MUTATION OF RET GENE: Status: ACTIVE | Noted: 2020-09-10

## 2023-02-12 PROBLEM — Z15.81 MONOALLELIC MUTATION OF RET GENE: Status: ACTIVE | Noted: 2020-10-15

## 2023-02-12 PROBLEM — Z15.09 MONOALLELIC MUTATION OF RET GENE: Status: ACTIVE | Noted: 2020-09-10

## 2023-02-12 PROBLEM — N95.0 PMB (POSTMENOPAUSAL BLEEDING): Status: RESOLVED | Noted: 2018-02-15 | Resolved: 2023-02-12

## 2023-02-12 NOTE — ASSESSMENT & PLAN NOTE
Advised symptomatic care with plenty of fluids, honey and lemon, rest, and prescribed regimen.  Discussed using Paxlovid as she is still window for treatment and she agrees to this. Number to [facility] pharmacy given.  Albuterol for chest tightness, and shortness of breath.  Messaged patient with video instructions for inhaler use  OTC Ibuprofen or Tylenol for pain.  Discussed course of a viral respiratory infections.  Explained that after respiratory infection is better, may continue to have a dry cough for a few weeks.  Contact office if symptoms worsen or do not improve in a week.

## 2023-02-13 ENCOUNTER — TELEPHONE (OUTPATIENT)
Dept: FAMILY MEDICINE | Facility: CLINIC | Age: 58
End: 2023-02-13
Payer: COMMERCIAL

## 2023-02-13 ENCOUNTER — OFFICE VISIT (OUTPATIENT)
Dept: FAMILY MEDICINE | Facility: CLINIC | Age: 58
End: 2023-02-13
Payer: COMMERCIAL

## 2023-02-13 DIAGNOSIS — Z15.89 MONOALLELIC MUTATION OF RET GENE: Chronic | ICD-10-CM

## 2023-02-13 DIAGNOSIS — I10 PRIMARY HYPERTENSION: Chronic | ICD-10-CM

## 2023-02-13 DIAGNOSIS — R71.8 MICROCYTOSIS: ICD-10-CM

## 2023-02-13 DIAGNOSIS — Z15.09 MONOALLELIC MUTATION OF RET GENE: Chronic | ICD-10-CM

## 2023-02-13 DIAGNOSIS — Z15.81 MONOALLELIC MUTATION OF RET GENE: Chronic | ICD-10-CM

## 2023-02-13 DIAGNOSIS — U07.1 COVID-19: Primary | ICD-10-CM

## 2023-02-13 DIAGNOSIS — Z80.0 FAMILY HISTORY OF PANCREATIC CANCER: ICD-10-CM

## 2023-02-13 PROCEDURE — 99214 OFFICE O/P EST MOD 30 MIN: CPT | Mod: 95,,, | Performed by: FAMILY MEDICINE

## 2023-02-13 PROCEDURE — 99214 PR OFFICE/OUTPT VISIT, EST, LEVL IV, 30-39 MIN: ICD-10-PCS | Mod: 95,,, | Performed by: FAMILY MEDICINE

## 2023-02-13 NOTE — PROGRESS NOTES
The patient location is: ***  The chief complaint leading to consultation is: ***    Visit type: {TELE AUDIOVISUAL:06066}    Face to Face time with patient: ***  *** minutes of total time spent on the encounter, which includes face to face time and non-face to face time preparing to see the patient (eg, review of tests), Obtaining and/or reviewing separately obtained history, Documenting clinical information in the electronic or other health record, Independently interpreting results (not separately reported) and communicating results to the patient/family/caregiver, or Care coordination (not separately reported).         Each patient to whom he or she provides medical services by telemedicine is:  (1) informed of the relationship between the physician and patient and the respective role of any other health care provider with respect to management of the patient; and (2) notified that he or she may decline to receive medical services by telemedicine and may withdraw from such care at any time.    Notes:        Answers submitted by the patient for this visit:  Shortness of Breath Questionnaire (Submitted on 2/13/2023)  Chief Complaint: Shortness of breath  Chronicity: recurrent  Onset: in the past 7 days  Frequency: daily  Progression since onset: gradually improving  abdominal pain: No  chest pain: Yes  claudication: No  coryza: No  ear pain: No  fever: No  headaches: No  hemoptysis: No  leg pain: No  leg swelling: No  neck pain: No  orthopnea: No  PND: No  rash: No  rhinorrhea: No  sore throat: No  sputum production: Yes  swollen glands: No  syncope: No  vomiting: No  wheezing: No  Improvement on treatment: moderate  Risk factors for DVT/PE: no known risk factors  asthma: No  allergies: No  COPD: No  pneumonia: No  aspirin allergies: No  CAD: No  DVT: No  heart failure: No  PE: No  recent surgery: No  bronchiolitis: No  chronic lung disease: No

## 2023-02-13 NOTE — LETTER
February 13, 2023      [facility]  [address], [address]  [place] [address]  Phone: [phone]       Patient: Mary Nraanjo   YOB: 1965  Date of Visit: 02/13/2023    To Whom It May Concern:    Sheila Naranjo  was at [facility] on 02/08/2023 and 02/13/2023. The patient may return to work/school on 02/16/2023, please excuse from 02/06/2023-02/15/2023 due to medical reasons. If you have any questions or concerns, or if I can be of further assistance, please do not hesitate to contact me.    Sincerely,    Ramon Olivares Jr., MD

## 2023-02-13 NOTE — PROGRESS NOTES
The patient loc ation is: [place], [place]  The chief complaint leading to consultation is: COVID-19    Visit type: audiovisual    Face to Face time with patient: 15 minutes  25 minutes of total time spent on the encounter, which includes face to face time and non-face to face time preparing to see the patient (eg, review of tests), Obtaining and/or reviewing separately obtained history, Documenting clinical information in the electronic or other health record, Independently interpreting results (not separately reported) and communicating results to the patient/family/caregiver, or Care coordination (not separately reported).         Each patient to whom he or she provides medical services by telemedicine is:  (1) informed of the relationship between the physician and patient and the respective role of any other health care provider with respect to management of the patient; and (2) notified that he or she may decline to receive medical services by telemedicine and may withdraw from such care at any time.    Notes:      Subjective:       Patient ID: Mary Naranjo is a 57 y.o. female.    Chief Complaint: COVID-19 Concerns    Shortness of Breath  This is a recurrent problem. The current episode started in the past 7 days. The problem occurs daily. The problem has been gradually improving. Associated symptoms include chest pain and sputum production. Pertinent negatives include no abdominal pain, claudication, coryza, ear pain, fever, headaches, hemoptysis, leg pain, leg swelling, neck pain, orthopnea, PND, rash, rhinorrhea, sore throat, swollen glands, syncope, vomiting or wheezing. The patient has no known risk factors for DVT/PE. The treatment provided moderate relief. There is no history of allergies, aspirin allergies, asthma, bronchiolitis, CAD, chronic lung disease, COPD, DVT, a heart failure, PE, pneumonia or a recent surgery.       Review of Systems   Constitutional:  Negative for fever.   HENT:  Negative  for ear pain, rhinorrhea and sore throat.    Respiratory:  Positive for sputum production and shortness of breath. Negative for hemoptysis and wheezing.    Cardiovascular:  Positive for chest pain. Negative for orthopnea, claudication, leg swelling, syncope and PND.   Gastrointestinal:  Negative for abdominal pain and vomiting.   Musculoskeletal:  Negative for neck pain.   Skin:  Negative for rash.   Neurological:  Negative for headaches.     Objective:      Physical Exam   Pulmonary/Chest: Effort normal.   Neurological: She is alert.   Psychiatric: Her behavior is normal. Mood normal.     Assessment:       1. COVID-19    2. Primary hypertension    3. Microcytosis    4. Monoallelic mutation of RET gene    5. Family history of pancreatic cancer        Plan:       1. COVID-19  Assessment & Plan:  Patient reports that overall she is doing better. States that she still has a lingering cough.   No fevers.  Would like to return to work on - will prepare work note      2. Primary hypertension  Assessment & Plan:  Reports that pressure has been doing well.  Continue current regimen.    Orders:  -     Comprehensive Metabolic Panel; Future; Expected date: 2023  -     Lipid Panel; Future; Expected date: 2023  -     Hemoglobin A1C; Future; Expected date: 2023  -     Microalbumin/Creatinine Ratio, Urine; Future; Expected date: 2023    3. Microcytosis  -     CBC Auto Differential; Future; Expected date: 2023  -     Iron and TIBC; Future; Expected date: 2023  -     Ferritin; Future; Expected date: 2023    4. Monoallelic mutation of RET gene  Overview:  Patient's twin sister  of pancreatic cancer.  Last visit with Genetics on 2020 and genetic testing result reviewed:  Germline Cancer-Genetic Test Results  Test(s) performed:  [facility] Common Hereditary Cancers Panel + RET gene  Number of genes analyzed:  48 genes  Laboratory:  [facility]  Sample collection date:   2020  Results report date:  09/10/2020  Full report to be scanned into Epic under the Labs and/or Media tab(s).  FINDINGS:    Gene Variant Zygosity Variant Classification   RET C.31C>A ([id number]) heterozygous Uncertain significance      Last Derm exam: 2020- No skin cancer signs    Assessment & Plan:  Last endoscopic ultrasound was 10/2021.   As per previous recommendations from Genetics and GI, will continue annual endoscopic ultrasound because of family history of pancreatic cancer (twin sister  from pancreatic cancer) and gene mutation.    Also discussed continue annual skin check as per previous Genetics and Dermatology recommendations. Referral already in chart.    Orders:  -     Ambulatory referral/consult to Endo Procedure ; Future; Expected date: 2023    5. Family history of pancreatic cancer  Overview:  Twin sister  of Pancreatic cancer.  Patient saw genetics- See Monoallelic mutation of RET gene    GI:  10/15/2020:  Likely will pursue yearly endoscopic ultrasounds for the purposes of pancreatic cancer screening.  We did discuss that her current family history is not classic for the development of pancreatic cancer however the presence of pancreatic cancer in an identical twin is not discussed in current guidelines regarding screening for pancreatic cancer.      Endoscopic US 11/3/2020:     Impression:                       - Normal esophagus.                         - Normal stomach.                         - Normal examined duodenum.                         - A cystic lesion was seen in the pancreatic tail.                         - No specimens collected.   Recommendation:       - Discharge patient to home.                         - Resume previous diet.                         - Continue present medications.                         - Repeat the upper endoscopic ultrasound in 1 year                         for surveillance.      Endoscopic [place] 10/7/2021: [place]  [facility] [place]    Assessment & Plan:  See RET gene mutation    Orders:  -     Ambulatory referral/consult to Endo Procedure ; Future; Expected date: 02/14/2023

## 2023-02-13 NOTE — ASSESSMENT & PLAN NOTE
Patient reports that overall she is doing better. States that she still has a lingering cough.   No fevers.  Would like to return to work on Thursday 2/16/20223- will prepare work note

## 2023-02-13 NOTE — ASSESSMENT & PLAN NOTE
Last endoscopic ultrasound was 10/2021.   As per previous recommendations from Genetics and GI, will continue annual endoscopic ultrasound because of family history of pancreatic cancer (twin sister  from pancreatic cancer) and gene mutation.    Also discussed continue annual skin check as per previous Genetics and Dermatology recommendations. Referral already in chart.

## 2023-02-15 ENCOUNTER — OFFICE VISIT (OUTPATIENT)
Dept: DERMATOLOGY | Facility: CLINIC | Age: 58
End: 2023-02-15
Payer: COMMERCIAL

## 2023-02-15 DIAGNOSIS — Z15.89 MONOALLELIC MUTATION OF RET GENE: Chronic | ICD-10-CM

## 2023-02-15 DIAGNOSIS — Z15.09 MONOALLELIC MUTATION OF RET GENE: Chronic | ICD-10-CM

## 2023-02-15 DIAGNOSIS — Z12.83 SKIN CANCER SCREENING: ICD-10-CM

## 2023-02-15 DIAGNOSIS — D23.9 DERMATOFIBROMA: ICD-10-CM

## 2023-02-15 DIAGNOSIS — D22.9 MULTIPLE BENIGN NEVI: ICD-10-CM

## 2023-02-15 DIAGNOSIS — Z15.81 MONOALLELIC MUTATION OF RET GENE: Chronic | ICD-10-CM

## 2023-02-15 DIAGNOSIS — L82.1 SEBORRHEIC KERATOSES: Primary | ICD-10-CM

## 2023-02-15 PROCEDURE — 1160F RVW MEDS BY RX/DR IN RCRD: CPT | Mod: CPTII,S$GLB,, | Performed by: STUDENT IN AN ORGANIZED HEALTH CARE EDUCATION/TRAINING PROGRAM

## 2023-02-15 PROCEDURE — 1159F PR MEDICATION LIST DOCUMENTED IN MEDICAL RECORD: ICD-10-PCS | Mod: CPTII,S$GLB,, | Performed by: STUDENT IN AN ORGANIZED HEALTH CARE EDUCATION/TRAINING PROGRAM

## 2023-02-15 PROCEDURE — 99213 OFFICE O/P EST LOW 20 MIN: CPT | Mod: S$GLB,,, | Performed by: STUDENT IN AN ORGANIZED HEALTH CARE EDUCATION/TRAINING PROGRAM

## 2023-02-15 PROCEDURE — 1159F MED LIST DOCD IN RCRD: CPT | Mod: CPTII,S$GLB,, | Performed by: STUDENT IN AN ORGANIZED HEALTH CARE EDUCATION/TRAINING PROGRAM

## 2023-02-15 PROCEDURE — 99213 PR OFFICE/OUTPT VISIT, EST, LEVL III, 20-29 MIN: ICD-10-PCS | Mod: S$GLB,,, | Performed by: STUDENT IN AN ORGANIZED HEALTH CARE EDUCATION/TRAINING PROGRAM

## 2023-02-15 PROCEDURE — 99999 PR PBB SHADOW E&M-EST. PATIENT-LVL III: ICD-10-PCS | Mod: PBBFAC,,, | Performed by: STUDENT IN AN ORGANIZED HEALTH CARE EDUCATION/TRAINING PROGRAM

## 2023-02-15 PROCEDURE — 99999 PR PBB SHADOW E&M-EST. PATIENT-LVL III: CPT | Mod: PBBFAC,,, | Performed by: STUDENT IN AN ORGANIZED HEALTH CARE EDUCATION/TRAINING PROGRAM

## 2023-02-15 PROCEDURE — 1160F PR REVIEW ALL MEDS BY PRESCRIBER/CLIN PHARMACIST DOCUMENTED: ICD-10-PCS | Mod: CPTII,S$GLB,, | Performed by: STUDENT IN AN ORGANIZED HEALTH CARE EDUCATION/TRAINING PROGRAM

## 2023-02-15 NOTE — PROGRESS NOTES
Subjective:       Patient ID:  Mary Naranjo is a 57 y.o. female who presents for   Chief Complaint   Patient presents with    Skin Check     TBSE     History of Present Illness: The patient presents for follow up of skin check.    The patient was last seen on: 9/16/2020 for skin check and bx to the right posterior shoulder - compound melanocytic nevus  No h/o nmsc or mm.    Other skin complaints: none        Review of Systems   Skin:  Positive for daily sunscreen use. Negative for activity-related sunscreen use and recent sunburn.   Hematologic/Lymphatic: Bruises/bleeds easily (bruise).      Objective:    Physical Exam   Constitutional: She appears well-developed and well-nourished. No distress.   Neurological: She is alert and oriented to person, place, and time. She is not disoriented.   Psychiatric: She has a normal mood and affect.   Skin:   Areas Examined (abnormalities noted in diagram):   Scalp / Hair Palpated and Inspected  Head / Face Inspection Performed  Neck Inspection Performed  Chest / Axilla Inspection Performed  Abdomen Inspection Performed  Genitals / Buttocks / Groin Inspection Performed  Back Inspection Performed  RUE Inspected  LUE Inspection Performed  RLE Inspected  LLE Inspection Performed  Nails and Digits Inspection Performed                     Diagram Legend     Erythematous scaling macule/papule c/w actinic keratosis       Vascular papule c/w angioma      Pigmented verrucoid papule/plaque c/w seborrheic keratosis      Yellow umbilicated papule c/w sebaceous hyperplasia      Irregularly shaped tan macule c/w lentigo     1-2 mm smooth white papules consistent with Milia      Movable subcutaneous cyst with punctum c/w epidermal inclusion cyst      Subcutaneous movable cyst c/w pilar cyst      Firm pink to brown papule c/w dermatofibroma      Pedunculated fleshy papule(s) c/w skin tag(s)      Evenly pigmented macule c/w junctional nevus     Mildly variegated pigmented, slightly  irregular-bordered macule c/w mildly atypical nevus      Flesh colored to evenly pigmented papule c/w intradermal nevus       Pink pearly papule/plaque c/w basal cell carcinoma      Erythematous hyperkeratotic cursted plaque c/w SCC      Surgical scar with no sign of skin cancer recurrence      Open and closed comedones      Inflammatory papules and pustules      Verrucoid papule consistent consistent with wart     Erythematous eczematous patches and plaques     Dystrophic onycholytic nail with subungual debris c/w onychomycosis     Umbilicated papule    Erythematous-base heme-crusted tan verrucoid plaque consistent with inflamed seborrheic keratosis     Erythematous Silvery Scaling Plaque c/w Psoriasis     See annotation      Assessment / Plan:          Skin cancer screening  Monoallelic mutation of RET gene  Total body skin examination performed today including at least 12 points as noted in physical examination. No lesions suspicious for malignancy noted.    Recommend daily sun protection/avoidance, use of at least SPF 30, broad spectrum sunscreen (OTC drug), skin self examinations, and routine physician surveillance to optimize early detection    C/w annual screening    Seborrheic keratoses  Multiple benign nevi  Dermatofibroma  Reassurance given to patient. No treatment is necessary.   Treatment of benign, asymptomatic lesions may be considered cosmetic.             Follow up in about 1 year (around 2/15/2024).

## 2023-02-22 ENCOUNTER — OFFICE VISIT (OUTPATIENT)
Dept: FAMILY MEDICINE | Facility: CLINIC | Age: 58
End: 2023-02-22
Payer: COMMERCIAL

## 2023-02-22 ENCOUNTER — PATIENT MESSAGE (OUTPATIENT)
Dept: FAMILY MEDICINE | Facility: CLINIC | Age: 58
End: 2023-02-22
Payer: COMMERCIAL

## 2023-02-22 ENCOUNTER — TELEPHONE (OUTPATIENT)
Dept: FAMILY MEDICINE | Facility: CLINIC | Age: 58
End: 2023-02-22
Payer: COMMERCIAL

## 2023-02-22 VITALS
OXYGEN SATURATION: 98 % | WEIGHT: 220 LBS | DIASTOLIC BLOOD PRESSURE: 70 MMHG | HEART RATE: 71 BPM | TEMPERATURE: 98 F | BODY MASS INDEX: 37.76 KG/M2 | SYSTOLIC BLOOD PRESSURE: 130 MMHG

## 2023-02-22 DIAGNOSIS — U07.1 COVID-19: Primary | ICD-10-CM

## 2023-02-22 DIAGNOSIS — R73.03 PREDIABETES: ICD-10-CM

## 2023-02-22 DIAGNOSIS — E66.01 CLASS 2 SEVERE OBESITY DUE TO EXCESS CALORIES WITH SERIOUS COMORBIDITY AND BODY MASS INDEX (BMI) OF 37.0 TO 37.9 IN ADULT: ICD-10-CM

## 2023-02-22 DIAGNOSIS — I10 PRIMARY HYPERTENSION: ICD-10-CM

## 2023-02-22 PROCEDURE — 99214 PR OFFICE/OUTPT VISIT, EST, LEVL IV, 30-39 MIN: ICD-10-PCS | Mod: S$GLB,,, | Performed by: NURSE PRACTITIONER

## 2023-02-22 PROCEDURE — 3075F SYST BP GE 130 - 139MM HG: CPT | Mod: CPTII,S$GLB,, | Performed by: NURSE PRACTITIONER

## 2023-02-22 PROCEDURE — 3008F BODY MASS INDEX DOCD: CPT | Mod: CPTII,S$GLB,, | Performed by: NURSE PRACTITIONER

## 2023-02-22 PROCEDURE — 3078F PR MOST RECENT DIASTOLIC BLOOD PRESSURE < 80 MM HG: ICD-10-PCS | Mod: CPTII,S$GLB,, | Performed by: NURSE PRACTITIONER

## 2023-02-22 PROCEDURE — 99999 PR PBB SHADOW E&M-EST. PATIENT-LVL IV: ICD-10-PCS | Mod: PBBFAC,,, | Performed by: NURSE PRACTITIONER

## 2023-02-22 PROCEDURE — 99214 OFFICE O/P EST MOD 30 MIN: CPT | Mod: S$GLB,,, | Performed by: NURSE PRACTITIONER

## 2023-02-22 PROCEDURE — 3078F DIAST BP <80 MM HG: CPT | Mod: CPTII,S$GLB,, | Performed by: NURSE PRACTITIONER

## 2023-02-22 PROCEDURE — 1159F MED LIST DOCD IN RCRD: CPT | Mod: CPTII,S$GLB,, | Performed by: NURSE PRACTITIONER

## 2023-02-22 PROCEDURE — 3008F PR BODY MASS INDEX (BMI) DOCUMENTED: ICD-10-PCS | Mod: CPTII,S$GLB,, | Performed by: NURSE PRACTITIONER

## 2023-02-22 PROCEDURE — 99999 PR PBB SHADOW E&M-EST. PATIENT-LVL IV: CPT | Mod: PBBFAC,,, | Performed by: NURSE PRACTITIONER

## 2023-02-22 PROCEDURE — 3075F PR MOST RECENT SYSTOLIC BLOOD PRESS GE 130-139MM HG: ICD-10-PCS | Mod: CPTII,S$GLB,, | Performed by: NURSE PRACTITIONER

## 2023-02-22 PROCEDURE — 1159F PR MEDICATION LIST DOCUMENTED IN MEDICAL RECORD: ICD-10-PCS | Mod: CPTII,S$GLB,, | Performed by: NURSE PRACTITIONER

## 2023-02-22 RX ORDER — BENZONATATE 200 MG/1
200 CAPSULE ORAL 3 TIMES DAILY PRN
Qty: 15 CAPSULE | Refills: 0 | Status: SHIPPED | OUTPATIENT
Start: 2023-02-22 | End: 2023-08-08

## 2023-02-22 NOTE — PATIENT INSTRUCTIONS
"//////////PHONE NUMBERS//////////    Bariatrics---[phone]  Breast Surgery---[phone]  Case Management---[phone]  Colonoscopy---[phone]  Gym/Silver Sneakers--[phone]  Imaging, Xray, CT, MRI, Ultrasound---[phone]  Infectious Disease---[phone]  Interventional Radiology---[phone]  Medical Records---[phone]  [facility] On [facility]---[phone]  DME---[phone]  Optometry/Ophthalmology---[phone]  [facility]---[phone]  Physical Therapy---[phone]  Psychiatry---[phone] or [phone]  Plastic Surgery---[phone]  Sleep Study---[phone]  Smoking Cessation---[phone]  Vaccine Hotline---[phone]  Wound Care---[phone]  COVID Vaccine center---[phone]  Referral Desk---[phone]    /////////////////////////////////////////////////    Patient Education       COVID-19 Overview   The Basics   Written by the doctors and editors at [facility]   View in ItalianView in [place] PortugueseView in GermanView in JapaneseView in FrenchView in SpanishView video in Croatian   What is COVID-19?   COVID-19 stands for "coronavirus disease 2019." It is caused by a virus called SARS-CoV-2. The virus first appeared in late 2019 and quickly spread around the world.  What are the symptoms of COVID-19?   Symptoms usually start 4 or 5 days after a person is infected with the virus. But in some people, it can take up to 2 weeks for symptoms to appear. Some people never show symptoms at all.  When symptoms do happen, they can include:  Fever  Cough  Trouble breathing  Feeling tired  Shaking chills  Muscle aches  Headache  Sore throat  Problems with sense of smell or taste  Some people have digestive problems like nausea or diarrhea. There have also been some reports of rashes or other skin symptoms. For example, some people with COVID-19 get reddish-purple spots on their fingers or toes. But it's not clear why or how often this happens.  For most people, symptoms will get " "better within a few weeks. But a small number of people get very sick and stop being able to breathe on their own. In severe cases, their organs stop working, which can lead to death.  Some people with COVID-19 continue to have some symptoms for weeks or months. This seems to be more likely in people who are sick enough to need to stay in the hospital. But this can also happen in people who did not get very sick. Doctors are still learning about the long-term effects of COVID-19.  While children can get COVID-19, they are less likely than adults to have severe symptoms. More information about COVID-19 and children is available separately. (See "Patient education: COVID-19 and children (The Basics)".)  Am I at risk for getting seriously ill?   It depends on your age and health. In some people, COVID-19 leads to serious problems like pneumonia, not getting enough oxygen, heart problems, or even death. This risk gets higher as people get older. It is also higher in people who have other health problems like serious heart disease, chronic kidney disease, type 2 diabetes, chronic obstructive pulmonary disease (COPD), sickle cell disease, or obesity. People who have a weak immune system for other reasons (for example, HIV infection or certain medicines), asthma, cystic fibrosis, type 1 diabetes, or high blood pressure might also be at higher risk for serious problems.  How is COVID-19 spread?   The virus that causes COVID-19 mainly spreads from person to person. This usually happens when an infected person coughs, sneezes, or talks near other people. The virus is passed through tiny particles from the infected person's lungs and airway. These particles can easily travel through the air to other people who are nearby. In some cases, like in indoor spaces where the same air keeps being blown around, virus in the particles might be able to spread to other people who are farther away.  The virus can be passed easily between " "people who live together. But it can also spread at gatherings where people are talking close together, shaking hands, hugging, sharing food, or even singing together. Eating at restaurants raises the risk of infection, since people tend to be close to each other and not covering their faces. Doctors also think it is possible to get infected if you touch a surface that has the virus on it and then touch your mouth, nose, or eyes. However, this is probably not very common.  A person can be infected, and spread the virus to others, even without having any symptoms.  Are there different variants of the virus that causes COVID-19?   Yes. Viruses constantly change or "mutate." When this happens, a new strain or "variant" can form. Most of the time, new variants do not change the way a virus works. But when a variant has changes in important parts of the virus, it can act differently.  Experts have discovered several new variants of the virus that causes COVID-19. Certain variants seem to spread more easily than the original virus. They might also make people sicker.  Experts are studying the different variants. This will help them better understand how far they have spread, whether they affect people differently, and how well different vaccines protect against them.  The more people who get vaccinated against COVID-19, the harder it will be for the virus to form new variants.  Is there a test for the virus that causes COVID-19?   Yes. If your doctor or nurse suspects you have COVID-19, they might take a swab from inside your nose or mouth for testing. In some cases, they might take a sample of your saliva. These tests can help your doctor figure out if you have COVID-19 or another illness.  There are 2 types of tests used to diagnose COVID-19:  Molecular tests - These look for the genetic material from the virus. They are also called "nucleic acid tests." You can get a molecular test at a doctor's office, clinic, or " "pharmacy. There are also places that make these tests available for lots of people, often at drive-through locations. Depending on the lab, it can take up to several days to get test results back.  Molecular tests are the best way to know if a person has COVID-19. That's because they can detect even very low levels of virus in the body.  Antigen tests - These look for proteins from the virus. They can give results faster than most molecular tests. You can do an antigen test at a doctor's office, clinic, pharmacy, or through some organizations that make testing available in other places. You can also do an antigen test at home.  Antigen tests are not as accurate as molecular tests. They are more likely to give "false negative" results. This is when the test comes back negative even though the person actually is infected. But antigen tests can still be useful in some situations, when results are needed quickly or a molecular test is not available. For example, if a person has early symptoms of COVID-19, an antigen test can be accurate enough to detect virus in their body. If a person gets an antigen test and the result is negative, a molecular test might be needed to confirm they do not have the virus in their body. This might be done if the person has symptoms or knows they were exposed the virus.  There is also a blood test that can show if a person has had COVID-19 in the past. This is called an "antibody" test. Antibody tests are generally not used on their own to diagnose COVID-19 or make decisions about care. But experts can use them to learn how many people in a certain area were infected without knowing it.  Can COVID-19 be prevented?   The best way to prevent COVID-19 is to get vaccinated. In the [place], the first vaccines became available in late 2020. People age 12 and older can get a vaccine.  If enough people get the vaccine, the virus will stop spreading so quickly. More information about COVID-19 " "vaccines, including what you can do after being vaccinated, is available separately. (See "Patient education: COVID-19 vaccines (The Basics)".)  Experts believe that vaccines will be one of the most important ways to control the COVID-19 pandemic. People who are fully vaccinated are at much lower risk of getting the virus.  If you are not yet vaccinated, there are other ways to help protect yourself and others:  Practice "social distancing." It's most important to avoid contact with people who are sick. But social distancing also means staying at least 6 feet (about 2 meters) from anyone outside your household. That's because the virus can spread easily through close contact, and it's not always possible to know who is infected.  Wear a face mask when you need to go be in public around other people. This is mostly so that if you are infected, even if you don't have any symptoms, you are less likely to spread the infection to other people. It might also help protect you from others who could be infected. Make sure your mask covers your mouth and nose.  You can buy cloth masks and disposable (non-medical) masks in stores or online. Cloth masks work best if they have several layers of fabric. Your mask should fit snugly over your face with no gaps. You can improve the fit by using a mask with an adjustable nose wire, adjusting or knotting the ear loops to make it tighter, or wearing a cloth mask on top of a disposable mask.  When you take your mask off, make sure you do not touch your eyes, nose, or mouth. And wash your hands after you touch the mask. You can wash cloth masks with the rest of your laundry.  When you are outdoors and not around other people, you might not need to wear a mask. But it's important to know what the rules are in your area. The [place] Centers for Disease Control and Prevention (CDC) has more information about how to wear a face mask: " "www.cdc.gov/coronavirus/2019-ncov/prevent-getting-sick/about-face-coverings.html.  Wash your hands with soap and water often. This is especially important after being out in public or touching surfaces that many other people also touch, like door handles or railings. The risk of getting infected by touching items like this is probably not very high. Still, it's a good idea to wash your hands often. This also helps protect you from other illnesses, like the flu or the common cold.  Make sure to rub your hands with soap for at least 20 seconds, cleaning your wrists, fingernails, and in between your fingers. Then rinse your hands and dry them with a paper towel you can throw away. If you are not near a sink, you can use a hand sanitizing gel to clean your hands. The gels with at least 60 percent alcohol work the best. But it is better to wash with soap and water if you can.  Avoid touching your face, especially your mouth, nose, and eyes.  Avoid or limit traveling if you can. Any form of travel, especially if you spend time in crowded places like airports, increases your risk of getting and spreading infection.  If you do need to travel, be sure to check whether there are any rules about COVID-19 in the area you are visiting. In the [place], some places require people to "self-quarantine" for some length of time if they are visiting (or returning) from another state. This means not going out in public or being around other people. The [place] also requires a negative COVID-19 test for anyone who enters, or returns to, the country. Many other countries have testing requirements for visiting, too. All of these rules are meant to help slow the spread of COVID-19.  Once you are fully vaccinated, you are much less likely to get the virus. "Fully vaccinated" means you have had all doses of the vaccine and it has been at least 2 weeks since the last dose. (If you had a single-dose vaccine, you are fully vaccinated " 2 weeks after you get the shot.)  What should I do if I have symptoms?   If you have a fever, cough, trouble breathing, or other symptoms of COVID-19, call your doctor or nurse. They will ask about your symptoms. They might also ask about any recent travel and whether you have been around anyone who might have been infected. Then they can tell you if you should come in or go somewhere else to be tested.  If your symptoms are not severe, it is best to call before you go in. The staff can tell you what to do and whether you need to be seen in person. Many people with only mild symptoms should stay home and avoid other people until they get better. If you do need to go to the clinic or hospital, be sure to wear a mask. This helps protect other people. The staff might also have you wait someplace away from other people.  If you are severely ill and need to go to the clinic or hospital right away, you should still call ahead if possible. This way the staff can care for you while taking steps to protect others. If you think you are having a medical emergency, call for an ambulance (in the [place] and [place], dial [email or web address]).  What if I feel fine but think I was exposed?   If you think you were in close contact with someone with COVID-19, what to do next depends on whether you have already had COVID-19 or gotten the vaccine:  If you have not had COVID-19 or gotten the vaccine - You should get tested after a possible exposure, even if you don't have any symptoms. Call your doctor or nurse if you aren't sure where to get a test. Then self-quarantine at home and monitor yourself for symptoms. This means staying home as much as possible, and staying at least 6 feet (2 meters) away from other people in your home.  The safest thing to do after a possible exposure is to self-quarantine for 14 days. This can be challenging with work, school, or other responsibilities. Because of this, some public health departments might allow people to  "stop quarantining sooner, especially if they get a negative test. If you're not sure how long to quarantine for, contact your local public health office or ask your doctor or nurse.  If you have had COVID-19 or gotten the vaccine - If you had COVID-19 within the last 3 months, you do not need to self-quarantine. If you had COVID-19 but it was more than 3 months ago, follow the steps above.  If you are fully vaccinated, you do not need to self-quarantine. But you should still get tested 3 to 5 days after you were in contact with the person who had COVID-19. Even though you are much less likely to get the infection after being vaccinated, it is still possible.  If you self-quarantine for less than 14 days, or if you do not need to self-quarantine, you should still monitor yourself for symptoms for the full 14 days. If you start to have any symptoms, call your doctor or nurse right away. You should also be extra careful about wearing a mask and social distancing during this time.  How is COVID-19 treated?   Many people will be able to stay home while they get better. But people with serious symptoms or other health problems might need to go to the hospital.  Mild illness - Mild illness means you might have symptoms like fever and cough, but you do not have trouble breathing. Most people with COVID-19 have mild illness and can rest at home until they get better. This usually takes about 2 weeks, but it's not the same for everyone.  If you are recovering from COVID-19, it's important to stay home and "self-isolate" until your doctor or nurse tells you it's safe to stop. Self-isolation means staying apart from other people, even the people you live with. When you can stop self-isolation will depend on how long it has been since you had symptoms, and in some cases, whether you have had a negative test (showing that the virus is no longer in your body).  Severe illness - If you have more severe illness with trouble " "breathing, you might need to stay in the hospital, possibly in the intensive care unit (also called the "ICU"). While you are there, you will most likely be in a special isolation room. Only medical staff will be allowed in the room, and they will have to wear special gowns, gloves, masks, and eye protection.  The doctors and nurses can monitor and support your breathing and other body functions and make you as comfortable as possible. You might need extra oxygen to help you breathe easily. If you are having a very hard time breathing, you might need a breathing tube. The tube goes down your throat and into your lungs. It is connected to a machine to help you breathe, called a "ventilator."  Doctors are studying several possible treatments for COVID-19. In certain cases, they might recommend treatments called "monoclonal antibodies." These treatments seem to help some people who are at risk of getting severely ill.  Doctors also might recommend being part of a clinical trial. A clinical trial is a scientific study that tests new medicines to see how well they work. Do not try any new medicines or treatments without talking to a doctor.  What should I do if someone in my home has COVID-19?   If someone in your home has COVID-19, there are additional things you can do to protect yourself and others:  Keep the sick person away from others - The sick person should stay in a separate room, and use a different bathroom if possible. They should also eat in their own room.  Experts also recommend that the person stay away from pets in the house until they are better.  Have them wear a mask - The sick person should wear a mask when they are in the same room as other people. If they can't wear a mask, you can help protect yourself by covering your face when you are in the room with them.  Wash hands - Wash your hands with soap and water often.  Clean often - Here are some specific things that can help:  Wear disposable gloves " "when you clean. It's also a good idea to wear gloves when you have to touch the sick person's laundry, dishes, utensils, or trash. Wash your hands after removing your gloves.  Regularly clean things that are touched a lot. This includes counters, bedside tables, doorknobs, computers, phones, and bathroom surfaces.  Clean things in your home with soap and water, but also use disinfectants on appropriate surfaces. Some cleaning products work well to kill bacteria, but not viruses, so it's important to check labels. The [place] Environmental Protection Agency (EPA) has a list of products here: www.epa.gov/pesticide-registration/list-n-disinfectants-use-against-sars-cov-2.  What if I am pregnant?   More information about COVID-19 and pregnancy is available separately. (See "Patient education: COVID-19 and pregnancy (The Basics)".)  If you are pregnant and you have questions about COVID-19, talk to your doctor, nurse, or midwife. They can help.  What can I do to cope with stress and anxiety?   It's normal to feel anxious or worried about COVID-19. It's also normal to feel stressed, lonely, or tired of not being able to do your usual activities. You can take care of yourself by trying to:  Take breaks from the news  Get regular exercise and eat healthy foods  Find activities that you enjoy and can do at home  Stay in touch with your friends and family members  It might help to remember that by doing things like getting vaccinated and following local guidelines, you are helping to protect other people in your community.  Where can I go to learn more?   As we learn more about this virus, expert recommendations will continue to change. Check with your doctor or public health official to get the most updated information about how to protect yourself and others.  For information about COVID-19 in your area, you can call your local public health office. In the [place], this usually means your city or town's Board of " "Health. Many states also have a "hotline" phone number you can call.  You can find more information about COVID-19 at the following websites:  [place] Centers for Disease Control and Prevention (CDC): www.cdc.gov/COVID19  World Health Organization (WHO): www.who.int/emergencies/diseases/novel-coronavirus-2019  All topics are updated as new evidence becomes available and our peer review process is complete.  This topic retrieved from [employer] on: Oct 28, 2021.  Topic 542284 Version 67.0  Release: 29.4.2 - C29.263  © 2021 UpToDate, Inc. and/or its affiliates. All rights reserved.  Consumer Information Use and Disclaimer   This information is not specific medical advice and does not replace information you receive from your health care provider. This is only a brief summary of general information. It does NOT include all information about conditions, illnesses, injuries, tests, procedures, treatments, therapies, discharge instructions or life-style choices that may apply to you. You must talk with your health care provider for complete information about your health and treatment options. This information should not be used to decide whether or not to accept your health care provider's advice, instructions or recommendations. Only your health care provider has the knowledge and training to provide advice that is right for you. The use of this information is governed by the [employer] End User License Agreement, available at [email or web address]/[email or web address].The use of [employer] content is governed by the [employer] Terms of Use. ©2021 UpToDate, Inc. All rights reserved.  Copyright   © 2021 UpToDate, Inc. and/or its affiliates. All rights reserved.  Patient Education       COVID-19 Discharge Instructions   About this topic   Coronavirus disease 2019 is also known as COVID-19. It is a viral illness that infects the lungs. It is caused by a virus called SARS-associated coronavirus " (SARS-CoV-2).  The signs of COVID-19 most often start a few days after you have been infected. In some people, it takes longer to show signs. Others never show signs of the infection. You may have a cough, fever, shaking chills and it may be hard to breathe. You may be very tired, have muscle aches, a headache or sore throat. Some people have an upset stomach or loose stools. Others lose their sense of smell or taste. You may not have these signs all the time and they may come and go while you are sick.  The virus spreads easily through droplets when you talk, sneeze, or cough. You can pass the virus to others when you are talking close together, singing, hugging, sharing food, or shaking hands. Doctors believe the germs also survive on surfaces like tables, door handles, and telephones. However, this is not a common way that COVID-19 spreads. Doctors believe you can also spread the infection even if you dont have any symptoms, but they do not know how that happens. This is why getting vaccinated is one of the best ways to keep you healthy and slow the spread of the virus.  Some people have a mild case of COVID-19 and are able to stay at home and away from others until they feel better. Others may need to be in the hospital if they are very sick. Some people with COVID-19 can have some symptoms for weeks or months. People with COVID-19 must isolate themselves. You can start to be around others when your doctor says it is safe to do so.       What care is needed at home?   Ask your doctor what you need to do when you go home. Make sure you ask questions if you do not understand what the doctor says.  Drink lots of water, juice, or broth to replace fluids lost from a fever.  You may use cool mist humidifiers to help ease congestion and coughing.  Use 2 to 3 pillows to prop yourself up when you lie down to make it easier to breathe and sleep.  Do not smoke and do not drink beer, wine, and mixed drinks (alcohol).  To  lower the chance of passing the infection to others, get a COVID-19 vaccine after your infection has resolved.  If you have not been fully vaccinated:  Wear a mask over your mouth and nose if you are around others who are not sick. Cloth masks work best if they have more than one layer of fabric.  Wash your hands often.  Stay home in a separate room, if possible, away from others. Only go out to get medical care.  Use a separate bathroom if possible.  Do not make food for others.  What follow-up care is needed?   Your doctor may ask you to make visits to the office to check on your progress. Be sure to keep these visits. Make sure you wear a mask at these visits.  If you can, tell the staff you have COVID-19 ahead of time so they can take extra care to stop the disease from spreading.  It may take a few weeks before your health returns to normal.  What drugs may be needed?   The doctor may order drugs to:  Help with breathing  Help with fever  Help with swelling in your airways and lungs  Control coughing  Ease a sore throat  Help a runny or stuffy nose  Will physical activity be limited?   You may have to limit your physical activity. Talk to your doctor about the right amount of activity for you. If you have been very sick with COVID-19, it can take some time to get your strength back.  Will there be any other care needed?   Doctors do not know how long you can pass the virus on to others after you are sick. This is why it is important to stay in a separate room, if possible, when you are sick. For now, doctors are giving general guidelines for you to follow after you have been sick. Before you go around other people, you should:  Be fever free for 24 hours without taking any drugs to lower the fever  Have no symptoms of cough or shortness of breath  Wait at least 10 days after first having symptoms or your first positive test, and you need to be symptom free as above. Some experts suggest waiting 20 days if you  have had a more severe infection.  Talk with your doctor about getting a COVID-19 vaccine.  What problems could happen?   Fluid loss. This is dehydration.  Short-term or long-term lung damage  Heart problems  Death  When do I need to call the doctor?   You are having so much trouble breathing that you can only say one or two words at a time.  You need to sit upright at all times to be able to breathe and/or cannot lie down.  You are very confused or cannot stay awake.  Your lips or skin start to turn blue or grey.  You think you might be having a medical emergency. Some examples of medical emergencies are:  Severe chest pain.  Not able to speak or move normally.  You have trouble breathing when talking or sitting still.  You have new shortness of breath.  You become weak or dizzy.  You have very dark urine or do not pass urine for more than 8 hours.  You have new or worsening COVID-19 symptoms like:  Fever  Cough  Feeling very tired  Shaking chills  Headache  Trouble swallowing  Throwing up  Loose stools  Reddish purple spots on your fingers or toes  Teach Back: Helping You Understand   The Teach Back Method helps you understand the information we are giving you. After you talk with the staff, tell them in your own words what you learned. This helps to make sure the staff has described each thing clearly. It also helps to explain things that may have been confusing. Before going home, make sure you can do these:  I can tell you about my condition.  I can tell you what may help ease my breathing.  I can tell you what I can do to help avoid passing the infection to others.  I can tell you what I will do if I have trouble breathing; feel sleepy or confused; or my fingertips, fingernails, skin, or lips are blue.  Where can I learn more?   Centers for Disease Control and Prevention  https://www.cdc.gov/coronavirus/2019-ncov/about/index.html   Centers for Disease Control and  Prevention  https://www.cdc.gov/coronavirus/2019-ncov/hcp/disposition-in-home-patients.html   World Health Organization  https://www.who.int/news-room/q-a-detail/f-k-vhiwbsnopfqlw   Last Reviewed Date   2021-10-05  Consumer Information Use and Disclaimer   This information is not specific medical advice and does not replace information you receive from your health care provider. This is only a brief summary of general information. It does NOT include all information about conditions, illnesses, injuries, tests, procedures, treatments, therapies, discharge instructions or life-style choices that may apply to you. You must talk with your health care provider for complete information about your health and treatment options. This information should not be used to decide whether or not to accept your health care providers advice, instructions or recommendations. Only your health care provider has the knowledge and training to provide advice that is right for you.  Copyright   Copyright © 2021 [employer], [employer]. and its affiliates and/or licensors. All rights reserved.

## 2023-02-22 NOTE — PROGRESS NOTES
HPI     Chief Complaint:  Chief Complaint   Patient presents with    Cough    Sinusitis    Nasal Congestion    Generalized Body Aches       Mary Naranjo is a 57 y.o. female with multiple medical diagnoses as listed in the medical history and problem list that presents for URI.  Pt is new to me but is known to this clinic with her last appointment being Visit date not found.      URI   This is a recurrent problem. Episode onset: > 2 wks ago. The problem has been waxing and waning. There has been no fever. Associated symptoms include congestion and coughing. Pertinent negatives include no chest pain. Associated symptoms comments: Generalized myalgia  .     She tested + for COVID-19 on 2/11/23.     Virtual visit on 2/13/23 for Dx see note below:    Shortness of Breath  This is a recurrent problem. The current episode started in the past 7 days. The problem occurs daily. The problem has been gradually improving. Associated symptoms include chest pain and sputum production. Pertinent negatives include no abdominal pain, claudication, coryza, ear pain, fever, headaches, hemoptysis, leg pain, leg swelling, neck pain, orthopnea, PND, rash, rhinorrhea, sore throat, swollen glands, syncope, vomiting or wheezing. The patient has no known risk factors for DVT/PE. The treatment provided moderate relief. There is no history of allergies, aspirin allergies, asthma, bronchiolitis, CAD, chronic lung disease, COPD, DVT, a heart failure, PE, pneumonia or a recent surgery.     1. COVID-19  Assessment & Plan:  Patient reports that overall she is doing better. States that she still has a lingering cough.   No fevers.  Would like to return to work on Thursday 2/16/20223- will prepare work note       She was treated with methylprednisolone, paxlovid, and albuterol inhaler. Pt reports mild improvement with these medications.       she is compliant with medications daily without any adverse side effects.    Assessment & Plan      Problem List Items Addressed This Visit          Cardiac/Vascular    Primary hypertension    BP Readings from Last 3 Encounters:   23 130/70   22 (!) 122/58   10/31/22 124/70       -continue current medication regimen  -DASH diet, regular cardiovascular exercises, portion control  - ?weight loss  -f/u with BP logs in 2 weeks if BP is not consistently <140/90         Endocrine    Prediabetes    Patient does have a Hx of prediabtes, which we discussed increased risk for diabetes in the future, therefore, I recommend dietary modification such as lowering carbohydrates in diet (pasta, rice, bread, potatoes, crackers, cookies, candy, soda, etc.) to decrease the risk of progression to diabetes.          Class 2 severe obesity due to excess calories with serious comorbidity and body mass index (BMI) of 37.0 to 37.9 in adult    We discussed weight issues and safe, effective ways of losing pounds, includin) diet:  low carbohydrate, low fat diet, stay away from fast food, fried and processed food, use whole grain, lot of fruits and vegetables, use healthy fat such as avocado, nuts and olive oil in reasonable quantity, stay away from sodas. Regular meals with lean proteins.  2) physical activity: ideally 150 min a week, with cardiovascular and resistance activity.  Patient was encouraged to set realistic attainable goals for weight loss, and we will follow up periodically.       Other Visit Diagnoses       COVID-19    -  Primary    She has already completed course of oral steroids and Paxlovid. She has albuterol inhaler at home.     On exam lung sounds are clear bilaterally. VSS, afebrile, Spo2 98% on RA today. She does appear sickly. Intermittent non productive cough on exam.     Tessalon prn    Tylenol prn    Discussed the importance of rest/hydration.     Obtain cxray    Provided with work excuse    Discussed potential for long haul COVID-19. If symptoms do not resolve after 1 month may consider  PT.    Discussed DDx, condition, and treatment.   Education sent to patient portal/included in after visit summary.  ED precautions given.   Notify provider if symptoms do not resolve or increase in severity.   Patient verbalizes understanding and agrees with plan of care.    Relevant Medications    benzonatate (TESSALON) 200 MG capsule    Other Relevant Orders    X-Ray Chest PA And Lateral          --------------------------------------------      Health Maintenance:  Health Maintenance         Date Due Completion Date    COVID-19 Vaccine (3 - Booster for Viry series) 01/25/2022 11/30/2021    Hemoglobin A1c (Prediabetes) 09/07/2023 9/7/2022    Mammogram 11/01/2023 11/1/2022    Cervical Cancer Screening 09/18/2025 9/18/2020    Colorectal Cancer Screening 10/01/2025 10/1/2020    Lipid Panel 12/01/2027 12/1/2022    TETANUS VACCINE 09/28/2031 9/28/2021            Health maintenance reviewed.      Follow Up:  Follow up in about 2 weeks (around 3/8/2023), or if symptoms worsen or fail to improve.    Exam     Review of Systems:  (as noted above)  Review of Systems   Constitutional:  Negative for fever.   HENT:  Positive for congestion. Negative for trouble swallowing.    Eyes:  Negative for visual disturbance.   Respiratory:  Positive for cough and shortness of breath (mild since symptom onset). Negative for chest tightness.    Cardiovascular:  Negative for chest pain.   Gastrointestinal:  Negative for blood in stool.   Musculoskeletal:  Positive for myalgias (generalized).     Physical Exam:   Physical Exam  Constitutional:       General: She is not in acute distress.     Appearance: She is obese. She is ill-appearing. She is not diaphoretic.   HENT:      Head: Normocephalic and atraumatic.   Cardiovascular:      Rate and Rhythm: Normal rate and regular rhythm.      Heart sounds: No murmur heard.    No friction rub. No gallop.   Pulmonary:      Effort: No respiratory distress.   Chest:      Chest wall: No tenderness.    Musculoskeletal:      Cervical back: No rigidity.   Skin:     Capillary Refill: Capillary refill takes 2 to 3 seconds.   Neurological:      General: No focal deficit present.      Mental Status: She is alert and oriented to person, place, and time.     Vitals:    02/22/23 1725   BP: 130/70   BP Location: Left arm   Patient Position: Sitting   BP Method: Large (Manual)   Pulse: 71   Temp: 98.1 °F (36.7 °C)   TempSrc: Oral   SpO2: 98%   Weight: 99.8 kg (220 lb)      Body mass index is 37.76 kg/m².        History     Past Medical History:  Past Medical History:   Diagnosis Date    Arthritis     COVID-19 2/8/2023    Dental bridge present     upper removable partial    Family history of pancreatic cancer     Fibrocystic breast     Migraines     Obesity     Pancreas cyst     Primary hypertension 2/2/2022       Past Surgical History:  Past Surgical History:   Procedure Laterality Date    BREAST BIOPSY      BREAST CYST ASPIRATION      COLONOSCOPY N/A 10/7/2015    Procedure: COLONOSCOPY;  Surgeon: Eagle Stack MD;  Location: [facility];  Service: Endoscopy;  Laterality: N/A;    DILATION AND CURETTAGE OF UTERUS      ENDOSCOPIC ULTRASOUND OF UPPER GASTROINTESTINAL TRACT N/A 11/3/2020    Procedure: ULTRASOUND, UPPER GI TRACT, ENDOSCOPIC;  Surgeon: Kei Juarez MD;  Location: [facility] ([address] [place] [address]);  Service: Endoscopy;  Laterality: N/A;  Covid-19 test 10/31/20 at [facility] -     ESOPHAGOGASTRODUODENOSCOPY N/A 7/19/2019    Procedure: ESOPHAGOGASTRODUODENOSCOPY (EGD);  Surgeon: Cherrie Sheets MD;  Location: [facility];  Service: Endoscopy;  Laterality: N/A;    fibrocystic breast       KNEE ARTHROSCOPY W/ MENISCAL REPAIR      TUBAL LIGATION         Social History:  Social History     Socioeconomic History    Marital status:    Occupational History    Occupation: administrative   Tobacco Use    Smoking status: Never    Smokeless tobacco: Never   Substance and Sexual Activity    Alcohol use: No    Drug use: No    Sexual  activity: Yes       Family History:  Family History   Problem Relation Age of Onset    Heart disease Mother     Cataracts Mother     Glaucoma Mother     Cancer Father         colon at ~60; lung, gallbladder, skin, prostate, brain- unsure which are primary vs secondary    No Known Problems Brother     Asthma Maternal Aunt     No Known Problems Maternal Grandmother     No Known Problems Maternal Grandfather     No Known Problems Paternal Grandmother     No Known Problems Paternal Grandfather     Pancreatic cancer Sister 54    Breast cancer Sister 42        unilateral    Cancer Maternal Cousin         unknown origin    Cervical cancer Other     Cancer Other         possible ovarian cancer    Breast cancer Maternal Cousin     Cancer Maternal Cousin         prostate?    Prostate cancer Maternal Cousin     COPD Neg Hx     Amblyopia Neg Hx     Blindness Neg Hx     Diabetes Neg Hx     Hypertension Neg Hx     Macular degeneration Neg Hx     Retinal detachment Neg Hx     Strabismus Neg Hx     Stroke Neg Hx     Thyroid disease Neg Hx        Allergies and Medications: (updated and reviewed)  Review of patient's allergies indicates:   Allergen Reactions    Dilaudid [hydromorphone] Other (See Comments)     Current Outpatient Medications   Medication Sig Dispense Refill    albuterol (PROVENTIL HFA) 90 mcg/actuation inhaler Inhale 2 puffs into the lungs every 4 (four) hours as needed for Wheezing or Shortness of Breath. Rescue 8.5 g 0    cholecalciferol, vitamin D3, 50 mcg (2,000 unit) Chew Take 2,000 Units by mouth once daily. Per patient      diclofenac (VOLTAREN) 50 MG EC tablet TAKE 1 TABLET (50 MG TOTAL) BY MOUTH 2 (TWO) TIMES DAILY AS NEEDED (KNEE PAIN). TAKE WITH A MEAL 60 tablet 0    LINZESS 290 mcg Cap capsule Take 1 capsule (290 mcg total) by mouth before breakfast. 90 capsule 3    multivit/folic acid/vit K1 (ONE-A-DAY WOMEN'S 50 PLUS ORAL) Take 1 capsule by mouth once daily. Per patient, One-a-Day Women's 50+  Multivitamin Complete      omega-3 fatty acids/fish oil (FISH OIL-OMEGA-3 FATTY ACIDS) 300-1,000 mg capsule Take 1 capsule by mouth once daily.      valsartan (DIOVAN) 160 MG tablet Take 1 tablet (160 mg total) by mouth once daily. 90 tablet 3    benzonatate (TESSALON) 200 MG capsule Take 1 capsule (200 mg total) by mouth 3 (three) times daily as needed for Cough. 15 capsule 0    diclofenac sodium (VOLTAREN) 1 % Gel Apply 2 g topically 4 (four) times daily. (Patient not taking: Reported on 2/22/2023) 1 each 1    hydroCHLOROthiazide (HYDRODIURIL) 25 MG tablet Take 1 tablet (25 mg total) by mouth once daily. 90 tablet 3    UNABLE TO FIND Take 2 capsules by mouth once daily. medication name: Digestive Advantage       No current facility-administered medications for this visit.       Patient Care Team:  Ramon Olivares Jr., MD as PCP - General (Family Medicine)  Isaiah Garcia MA as Care Coordinator  Kayla Kc MA      The patient expressed understanding and no barriers to adherence were identified.      - The patient indicates understanding of these issues and agrees with the plan. Brief care plan is updated and reviewed with the patient as applicable.      - The patient is given an After Visit Summary that lists all medications with directions, allergies, education, orders placed during this encounter and follow-up instructions.      - I have reviewed the patient's medical information including past medical, family, and social history sections including the medications and allergies.      - We discussed the patient's current medications.     This note was created by combination of typed  and MModal dictation.  Transcription errors may be present.  If there are any questions, please contact me.       Galo Storm NP

## 2023-02-22 NOTE — TELEPHONE ENCOUNTER
----- Message from Juan A Malone MA sent at 2/22/2023  8:43 AM CST -----  Type: Patient Call Back    Who called: Self    What is the request in detail: pt. Is asking to be seen today for pain and sore throat with anyone ..     Can the clinic reply by [facility]?No    Would the patient rather a call back or a response via [facility]? yes    Best call back number: [phone] (home)

## 2023-02-22 NOTE — LETTER
February 22, 2023    Mary Naranjo  [address] [place] [address]         [facility]  [address]  [place] [address]  Phone: [phone]  Fax: [phone] February 22, 2023     Patient: Mary Naranjo   YOB: 1965   Date of Visit: 2/22/2023       To Whom It May Concern:    It is my medical opinion that Mary Naranjo may return to work on 2/27/23 .    If you have any questions or concerns, please don't hesitate to call.    Sincerely,        Galo Storm NP

## 2023-02-24 ENCOUNTER — APPOINTMENT (OUTPATIENT)
Dept: RADIOLOGY | Facility: HOSPITAL | Age: 58
End: 2023-02-24
Attending: NURSE PRACTITIONER
Payer: COMMERCIAL

## 2023-02-24 DIAGNOSIS — U07.1 COVID-19: ICD-10-CM

## 2023-02-24 PROCEDURE — 71046 X-RAY EXAM CHEST 2 VIEWS: CPT | Mod: TC,FY,PN

## 2023-02-24 PROCEDURE — 71046 XR CHEST PA AND LATERAL: ICD-10-PCS | Mod: 26,,, | Performed by: RADIOLOGY

## 2023-02-24 PROCEDURE — 71046 X-RAY EXAM CHEST 2 VIEWS: CPT | Mod: 26,,, | Performed by: RADIOLOGY

## 2023-03-23 ENCOUNTER — IMMUNIZATION (OUTPATIENT)
Dept: PHARMACY | Facility: CLINIC | Age: 58
End: 2023-03-23
Payer: COMMERCIAL

## 2023-03-23 DIAGNOSIS — Z23 NEED FOR VACCINATION: Primary | ICD-10-CM

## 2023-06-14 ENCOUNTER — TELEPHONE (OUTPATIENT)
Dept: ENDOSCOPY | Facility: HOSPITAL | Age: 58
End: 2023-06-14

## 2023-06-14 ENCOUNTER — CLINICAL SUPPORT (OUTPATIENT)
Dept: ENDOSCOPY | Facility: HOSPITAL | Age: 58
End: 2023-06-14
Attending: FAMILY MEDICINE
Payer: COMMERCIAL

## 2023-06-14 ENCOUNTER — TELEPHONE (OUTPATIENT)
Dept: ENDOSCOPY | Facility: HOSPITAL | Age: 58
End: 2023-06-14
Payer: COMMERCIAL

## 2023-06-14 DIAGNOSIS — Z15.81 MONOALLELIC MUTATION OF RET GENE: Chronic | ICD-10-CM

## 2023-06-14 DIAGNOSIS — Z80.0 FAMILY HISTORY OF PANCREATIC CANCER: ICD-10-CM

## 2023-06-14 DIAGNOSIS — Z15.89 MONOALLELIC MUTATION OF RET GENE: Chronic | ICD-10-CM

## 2023-06-14 DIAGNOSIS — Z15.09 MONOALLELIC MUTATION OF RET GENE: Chronic | ICD-10-CM

## 2023-06-14 NOTE — TELEPHONE ENCOUNTER
Pt with referral for EUS to be reviewed by advanced endoscopist, message sent to [facility] for physician review, advanced endoscopy scheduling nurse to contact Pt to schedule. Pt verbalized understanding.

## 2023-06-14 NOTE — TELEPHONE ENCOUNTER
Telephoned pt to schedule EUS with no answer.  Voicemail message left with direct contact number for pt to return call.

## 2023-06-14 NOTE — PLAN OF CARE
Pt with referral for EUS to be reviewed by advanced endoscopist, message sent to [facility] for physician review, advanced endoscopy scheduling nurse to contact Pt to schedule. Pt verbalized understanding.

## 2023-06-15 ENCOUNTER — PATIENT MESSAGE (OUTPATIENT)
Dept: ENDOSCOPY | Facility: HOSPITAL | Age: 58
End: 2023-06-15
Payer: COMMERCIAL

## 2023-06-15 ENCOUNTER — TELEPHONE (OUTPATIENT)
Dept: ENDOSCOPY | Facility: HOSPITAL | Age: 58
End: 2023-06-15
Payer: COMMERCIAL

## 2023-06-15 DIAGNOSIS — Z85.9 HISTORY OF MALIGNANT NEOPLASM: Primary | ICD-10-CM

## 2023-06-15 DIAGNOSIS — Z80.0 FAMILY HISTORY OF PANCREATIC CANCER: ICD-10-CM

## 2023-06-15 NOTE — TELEPHONE ENCOUNTER
Telephoned pt to schedule EUS.  Spoke with pt and procedure scheduled for 6/30/23 at 7:30am.  Reviewed history and medications.  Instructed on procedure and preparation.  Pt verbalized understanding.  Instructions sent via patient portal.  Instructed pt on how to locate prep instructions within the portal.  Pt verbalized understanding.

## 2023-06-15 NOTE — TELEPHONE ENCOUNTER
Received voicemail from pt calling to schedule EUS.  Telephoned pt to schedule EUS with no answer.  Voicemail message left with direct contact number for pt to return call.

## 2023-07-08 ENCOUNTER — TELEPHONE (OUTPATIENT)
Dept: ENDOSCOPY | Facility: HOSPITAL | Age: 58
End: 2023-07-08
Payer: COMMERCIAL

## 2023-07-14 ENCOUNTER — ANESTHESIA EVENT (OUTPATIENT)
Dept: ENDOSCOPY | Facility: HOSPITAL | Age: 58
End: 2023-07-14
Payer: COMMERCIAL

## 2023-07-14 ENCOUNTER — HOSPITAL ENCOUNTER (OUTPATIENT)
Facility: HOSPITAL | Age: 58
Discharge: HOME | End: 2023-07-14
Attending: INTERNAL MEDICINE | Admitting: INTERNAL MEDICINE
Payer: COMMERCIAL

## 2023-07-14 ENCOUNTER — ANESTHESIA (OUTPATIENT)
Dept: ENDOSCOPY | Facility: HOSPITAL | Age: 58
End: 2023-07-14
Payer: COMMERCIAL

## 2023-07-14 VITALS
HEART RATE: 60 BPM | DIASTOLIC BLOOD PRESSURE: 64 MMHG | TEMPERATURE: 98 F | HEIGHT: 64 IN | WEIGHT: 220 LBS | OXYGEN SATURATION: 100 % | BODY MASS INDEX: 37.56 KG/M2 | SYSTOLIC BLOOD PRESSURE: 134 MMHG | RESPIRATION RATE: 18 BRPM

## 2023-07-14 DIAGNOSIS — K86.2 PANCREATIC CYST: Primary | ICD-10-CM

## 2023-07-14 PROCEDURE — D9220A PRA ANESTHESIA: Mod: CRNA,,, | Performed by: NURSE ANESTHETIST, CERTIFIED REGISTERED

## 2023-07-14 PROCEDURE — 43259 EGD US EXAM DUODENUM/JEJUNUM: CPT | Mod: ,,, | Performed by: INTERNAL MEDICINE

## 2023-07-14 PROCEDURE — 63600175 PHARM REV CODE 636 W HCPCS: Performed by: NURSE ANESTHETIST, CERTIFIED REGISTERED

## 2023-07-14 PROCEDURE — 37000009 HC ANESTHESIA EA ADD 15 MINS: Performed by: INTERNAL MEDICINE

## 2023-07-14 PROCEDURE — 25000003 PHARM REV CODE 250: Performed by: NURSE ANESTHETIST, CERTIFIED REGISTERED

## 2023-07-14 PROCEDURE — 25000003 PHARM REV CODE 250: Performed by: INTERNAL MEDICINE

## 2023-07-14 PROCEDURE — D9220A PRA ANESTHESIA: ICD-10-PCS | Mod: CRNA,,, | Performed by: NURSE ANESTHETIST, CERTIFIED REGISTERED

## 2023-07-14 PROCEDURE — D9220A PRA ANESTHESIA: Mod: ANES,,, | Performed by: ANESTHESIOLOGY

## 2023-07-14 PROCEDURE — 37000008 HC ANESTHESIA 1ST 15 MINUTES: Performed by: INTERNAL MEDICINE

## 2023-07-14 PROCEDURE — D9220A PRA ANESTHESIA: ICD-10-PCS | Mod: ANES,,, | Performed by: ANESTHESIOLOGY

## 2023-07-14 PROCEDURE — 63600175 PHARM REV CODE 636 W HCPCS: Performed by: ANESTHESIOLOGY

## 2023-07-14 PROCEDURE — 43259 EGD US EXAM DUODENUM/JEJUNUM: CPT | Performed by: INTERNAL MEDICINE

## 2023-07-14 PROCEDURE — 43259 PR ENDOSCOPIC ULTRASOUND EXAM: ICD-10-PCS | Mod: ,,, | Performed by: INTERNAL MEDICINE

## 2023-07-14 RX ORDER — SODIUM CHLORIDE 0.9 % (FLUSH) 0.9 %
10 SYRINGE (ML) INJECTION
Status: DISCONTINUED | OUTPATIENT
Start: 2023-07-14 | End: 2023-07-14 | Stop reason: HOSPADM

## 2023-07-14 RX ORDER — PROPOFOL 10 MG/ML
VIAL (ML) INTRAVENOUS
Status: DISCONTINUED | OUTPATIENT
Start: 2023-07-14 | End: 2023-07-14

## 2023-07-14 RX ORDER — PROPOFOL 10 MG/ML
VIAL (ML) INTRAVENOUS CONTINUOUS PRN
Status: DISCONTINUED | OUTPATIENT
Start: 2023-07-14 | End: 2023-07-14

## 2023-07-14 RX ORDER — ONDANSETRON 2 MG/ML
4 INJECTION INTRAMUSCULAR; INTRAVENOUS ONCE AS NEEDED
Status: DISCONTINUED | OUTPATIENT
Start: 2023-07-14 | End: 2023-07-14 | Stop reason: HOSPADM

## 2023-07-14 RX ORDER — FENTANYL CITRATE 50 UG/ML
25 INJECTION, SOLUTION INTRAMUSCULAR; INTRAVENOUS EVERY 5 MIN PRN
Status: DISCONTINUED | OUTPATIENT
Start: 2023-07-14 | End: 2023-07-14 | Stop reason: HOSPADM

## 2023-07-14 RX ORDER — SODIUM CHLORIDE 9 MG/ML
INJECTION, SOLUTION INTRAVENOUS CONTINUOUS
Status: DISCONTINUED | OUTPATIENT
Start: 2023-07-14 | End: 2023-07-14 | Stop reason: HOSPADM

## 2023-07-14 RX ORDER — LIDOCAINE HYDROCHLORIDE 20 MG/ML
INJECTION INTRAVENOUS
Status: DISCONTINUED | OUTPATIENT
Start: 2023-07-14 | End: 2023-07-14

## 2023-07-14 RX ADMIN — FENTANYL CITRATE 25 MCG: 50 INJECTION, SOLUTION INTRAMUSCULAR; INTRAVENOUS at 12:07

## 2023-07-14 RX ADMIN — LIDOCAINE HYDROCHLORIDE 100 MG: 20 INJECTION INTRAVENOUS at 11:07

## 2023-07-14 RX ADMIN — SODIUM CHLORIDE: 9 INJECTION, SOLUTION INTRAVENOUS at 10:07

## 2023-07-14 RX ADMIN — PROPOFOL 100 MG: 10 INJECTION, EMULSION INTRAVENOUS at 11:07

## 2023-07-14 RX ADMIN — Medication 200 MCG/KG/MIN: at 11:07

## 2023-07-14 NOTE — [employer] PATIENT INSTRUCTIONS
Discharge Summary/Instructions after an Endoscopic Procedure  Patient Name: Mary Naranjo  Patient MRN: [id number]  Patient YOB: 1965 Friday, July 14, 2023  Burak Garcia MD  Dear patient,  As a result of recent federal legislation (The Federal Cures Act), you may   receive lab or pathology results from your procedure in your [facility]   account before your physician is able to contact you. Your physician or   their representative will relay the results to you with their   recommendations at their soonest availability.  Thank you,  RESTRICTIONS:  During your procedure today, you received medications for sedation.  These   medications may affect your judgment, balance and coordination.  Therefore,   for 24 hours, you have the following restrictions:   - DO NOT drive a car, operate machinery, make legal/financial decisions,   sign important papers or drink alcohol.    ACTIVITY:  Today: no heavy lifting, straining or running due to procedural   sedation/anesthesia.  The following day: return to full activity including work.  DIET:  Eat and drink normally unless instructed otherwise.     TREATMENT FOR COMMON SIDE EFFECTS:  - Mild abdominal pain, nausea, belching, bloating or excessive gas:  rest,   eat lightly and use a heating pad.  - Sore Throat: treat with throat lozenges and/or gargle with warm salt   water.  - Because air was used during the procedure, expelling large amounts of air   from your rectum or belching is normal.  - If a bowel prep was taken, you may not have a bowel movement for 1-3 days.    This is normal.  SYMPTOMS TO WATCH FOR AND REPORT TO YOUR PHYSICIAN:  1. Abdominal pain or bloating, other than gas cramps.  2. Chest pain.  3. Back pain.  4. Signs of infection such as: chills or fever occurring within 24 hours   after the procedure.  5. Rectal bleeding, which would show as bright red, maroon, or black stools.   (A tablespoon of blood from the rectum is not serious, especially if    hemorrhoids are present.)  6. Vomiting.  7. Weakness or dizziness.  GO DIRECTLY TO THE NEAREST EMERGENCY ROOM IF YOU HAVE ANY OF THE FOLLOWING:      Difficulty breathing              Chills and/or fever over 101 F   Persistent vomiting and/or vomiting blood   Severe abdominal pain   Severe chest pain   Black, tarry stools   Bleeding- more than one tablespoon   Any other symptom or condition that you feel may need urgent attention  Your doctor recommends these additional instructions:  If any biopsies were taken, your doctors clinic will contact you in 1 to 2   weeks with any results.  - Discharge patient to home (ambulatory).   - Resume previous diet; Discharge to home (ambulatory); Resume outpatient   medications  - Return to primary care physician as previously scheduled.   - My office will arrange CT or MRI of the abd with IV contrast in 1 year for   surveillance.  For questions, problems or results please call your physician - Burak Garcia MD at Work:  [phone].  [facility] [place], EMERGENCY ROOM PHONE NUMBER: [phone]  IF A COMPLICATION OR EMERGENCY SITUATION ARISES AND YOU ARE UNABLE TO REACH   YOUR PHYSICIAN - GO DIRECTLY TO THE EMERGENCY ROOM.  Burak Garcia MD  7/14/2023 12:02:33 PM  This report has been verified and signed electronically.  Dear patient,  As a result of recent federal legislation (The Federal Cures Act), you may   receive lab or pathology results from your procedure in your [facility]   account before your physician is able to contact you. Your physician or   their representative will relay the results to you with their   recommendations at their soonest availability.  Thank you,  [employer]ATION

## 2023-07-14 NOTE — H&P
Short Stay Endoscopy History and Physical    PCP - Ramon Olivares Jr, MD  Referring Physician - Radha Noel RN  No address on file    Procedure - EUS  ASA - per anesthesia  Mallampati - per anesthesia  History of Anesthesia problems - no  Family history Anesthesia problems -  no   Plan of anesthesia - General    HPI:  This is a 58 y.o. female here for evaluation of: pancreatic cyst / family hx of pancreatic cancer    Reflux - no  Dysphagia - no  Abdominal pain - no  Diarrhea - no    ROS:  Constitutional: No fevers, chills, No weight loss  CV: No chest pain  Pulm: No cough, No shortness of breath  GI: see HPI    Medical History:  has a past medical history of Arthritis, COVID-19 (2/8/2023), Dental bridge present, Family history of pancreatic cancer, Fibrocystic breast, Migraines, Obesity, Pancreas cyst, and Primary hypertension (2/2/2022).    Surgical History:  has a past surgical history that includes Tubal ligation; fibrocystic breast ; Colonoscopy (N/A, 10/7/2015); Knee arthroscopy w/ meniscal repair; Breast biopsy; Breast cyst aspiration; Dilation and curettage of uterus; Esophagogastroduodenoscopy (N/A, 7/19/2019); and Endoscopic ultrasound of upper gastrointestinal tract (N/A, 11/3/2020).    Family History: family history includes Asthma in her maternal aunt; Breast cancer in her maternal cousin; Breast cancer (age of onset: 42) in her sister; Cancer in her father, maternal cousin, maternal cousin, and another family member; Cataracts in her mother; Cervical cancer in an other family member; Glaucoma in her mother; Heart disease in her mother; No Known Problems in her brother, maternal grandfather, maternal grandmother, paternal grandfather, and paternal grandmother; Pancreatic cancer (age of onset: 54) in her sister; Prostate cancer in her maternal cousin..    Social History:  reports that she has never smoked. She has never used smokeless tobacco. She reports that she does not drink alcohol and does not  use drugs.    Review of patient's allergies indicates:   Allergen Reactions    Dilaudid [hydromorphone] Other (See Comments)       Medications:   Medications Prior to Admission   Medication Sig Dispense Refill Last Dose    cholecalciferol, vitamin D3, 50 mcg (2,000 unit) Chew Take 2,000 Units by mouth once daily. Per patient   7/13/2023    diclofenac (VOLTAREN) 50 MG EC tablet TAKE 1 TABLET (50 MG TOTAL) BY MOUTH 2 (TWO) TIMES DAILY AS NEEDED (KNEE PAIN). TAKE WITH A MEAL 60 tablet 0 7/13/2023    hydroCHLOROthiazide (HYDRODIURIL) 25 MG tablet TAKE 1 TABLET BY MOUTH EVERY DAY 90 tablet 1 7/13/2023    LINZESS 290 mcg Cap capsule Take 1 capsule (290 mcg total) by mouth before breakfast. 90 capsule 3 7/13/2023    multivit/folic acid/vit K1 (ONE-A-DAY WOMEN'S 50 PLUS ORAL) Take 1 capsule by mouth once daily. Per patient, One-a-Day Women's 50+ Multivitamin Complete   7/13/2023    omega-3 fatty acids/fish oil (FISH OIL-OMEGA-3 FATTY ACIDS) 300-1,000 mg capsule Take 1 capsule by mouth once daily.   7/13/2023    valsartan (DIOVAN) 160 MG tablet Take 1 tablet (160 mg total) by mouth once daily. 90 tablet 3 7/13/2023    albuterol (PROVENTIL HFA) 90 mcg/actuation inhaler Inhale 2 puffs into the lungs every 4 (four) hours as needed for Wheezing or Shortness of Breath. Rescue 8.5 g 0     benzonatate (TESSALON) 200 MG capsule Take 1 capsule (200 mg total) by mouth 3 (three) times daily as needed for Cough. 15 capsule 0     diclofenac sodium (VOLTAREN) 1 % Gel Apply 2 g topically 4 (four) times daily. (Patient not taking: Reported on 2/22/2023) 1 each 1     UNABLE TO FIND Take 2 capsules by mouth once daily. medication name: Digestive Advantage          Physical Exam:    Vital Signs:   Vitals:    07/14/23 1050   BP: 139/64   Pulse: 67   Resp: 16   Temp: 98 °F (36.7 °C)       General Appearance: Well appearing in no acute distress  Lungs: no labored breathing  CVS:  regular rate  Abdomen: non tender    Labs:  Lab Results   Component  Value Date    WBC 6.34 12/01/2022    WBC 6.34 12/01/2022    HGB 12.3 12/01/2022    HGB 12.3 12/01/2022    HCT 41 12/01/2022     12/01/2022     12/01/2022    CHOL 229 (H) 12/01/2022    TRIG 86 12/01/2022    HDL 54 12/01/2022    ALT 21 12/01/2022    ALT 21 12/01/2022    AST 20 12/01/2022    AST 20 12/01/2022     12/01/2022     12/01/2022    K 3.3 (L) 12/01/2022    K 3.3 (L) 12/01/2022     12/01/2022     12/01/2022    CREATININE 0.8 12/01/2022    CREATININE 0.8 12/01/2022    BUN 14 12/01/2022    BUN 14 12/01/2022    CO2 24 12/01/2022    CO2 24 12/01/2022    TSH 1.287 12/01/2022    INR 1.0 12/01/2022    HGBA1C 5.6 09/07/2022       I have explained the risks and benefits of this endoscopic procedure to the patient including but not limited to bleeding, inflammation, infection, perforation, and death.      Burak Garcia MD

## 2023-07-14 NOTE — TRANSFER OF CARE
"Anesthesia Transfer of Care Note    Patient: Mary Naranjo    Procedure(s) Performed: Procedure(s) (LRB):  ULTRASOUND, UPPER GI TRACT, ENDOSCOPIC (N/A)    Patient location: [facility]    Anesthesia Type: general    Transport from OR: Transported from OR on room air with adequate spontaneous ventilation    Post pain: adequate analgesia    Post assessment: no apparent anesthetic complications and tolerated procedure well    Post vital signs: stable    Level of consciousness: sedated and responds to stimulation    Nausea/Vomiting: no nausea/vomiting    Complications: none    Transfer of care protocol was followed      Last vitals:   Visit Vitals  /64 (Patient Position: Lying)   Pulse 67   Temp 36.7 °C (98 °F) (Temporal)   Resp 16   Ht 5' 4" (1.626 m)   Wt 99.8 kg (220 lb)   LMP 01/15/2018   SpO2 100%   Breastfeeding No   BMI 37.76 kg/m²     "

## 2023-07-14 NOTE — ANESTHESIA PREPROCEDURE EVALUATION
07/14/2023  Mary Naranjo is a 58 y.o., female   Pre-operative evaluation for Procedure(s) (LRB):  ULTRASOUND, UPPER GI TRACT, ENDOSCOPIC (N/A)    Mary Naranjo is a 58 y.o. female     Patient Active Problem List   Diagnosis    Prediabetes    Obesity (BMI 30-39.9)    Stiffness of vertebral column    Tear of medial cartilage or meniscus of knee, current    Family history of pancreatic cancer    Primary hypertension    Palpitations    LVH (left ventricular hypertrophy)    Class 2 severe obesity due to excess calories with serious comorbidity and body mass index (BMI) of 37.0 to 37.9 in adult    Left-sided weakness    Monoallelic mutation of RET gene       Review of patient's allergies indicates:   Allergen Reactions    Dilaudid [hydromorphone] Other (See Comments)       No current facility-administered medications on file prior to encounter.     Current Outpatient Medications on File Prior to Encounter   Medication Sig Dispense Refill    cholecalciferol, vitamin D3, 50 mcg (2,000 unit) Chew Take 2,000 Units by mouth once daily. Per patient      diclofenac (VOLTAREN) 50 MG EC tablet TAKE 1 TABLET (50 MG TOTAL) BY MOUTH 2 (TWO) TIMES DAILY AS NEEDED (KNEE PAIN). TAKE WITH A MEAL 60 tablet 0    hydroCHLOROthiazide (HYDRODIURIL) 25 MG tablet TAKE 1 TABLET BY MOUTH EVERY DAY 90 tablet 1    LINZESS 290 mcg Cap capsule Take 1 capsule (290 mcg total) by mouth before breakfast. 90 capsule 3    multivit/folic acid/vit K1 (ONE-A-DAY WOMEN'S 50 PLUS ORAL) Take 1 capsule by mouth once daily. Per patient, One-a-Day Women's 50+ Multivitamin Complete      omega-3 fatty acids/fish oil (FISH OIL-OMEGA-3 FATTY ACIDS) 300-1,000 mg capsule Take 1 capsule by mouth once daily.      valsartan (DIOVAN) 160 MG tablet Take 1 tablet (160 mg total) by mouth once daily. 90 tablet 3    albuterol (PROVENTIL HFA) 90  mcg/actuation inhaler Inhale 2 puffs into the lungs every 4 (four) hours as needed for Wheezing or Shortness of Breath. Rescue 8.5 g 0    benzonatate (TESSALON) 200 MG capsule Take 1 capsule (200 mg total) by mouth 3 (three) times daily as needed for Cough. 15 capsule 0    diclofenac sodium (VOLTAREN) 1 % Gel Apply 2 g topically 4 (four) times daily. (Patient not taking: Reported on 2/22/2023) 1 each 1    UNABLE TO FIND Take 2 capsules by mouth once daily. medication name: Digestive Advantage         Past Surgical History:   Procedure Laterality Date    BREAST BIOPSY      BREAST CYST ASPIRATION      COLONOSCOPY N/A 10/7/2015    Procedure: COLONOSCOPY;  Surgeon: Eagle Stack MD;  Location: [facility];  Service: Endoscopy;  Laterality: N/A;    DILATION AND CURETTAGE OF UTERUS      ENDOSCOPIC ULTRASOUND OF UPPER GASTROINTESTINAL TRACT N/A 11/3/2020    Procedure: ULTRASOUND, UPPER GI TRACT, ENDOSCOPIC;  Surgeon: Kei Juarez MD;  Location: [facility] ([address] [place] [address]);  Service: Endoscopy;  Laterality: N/A;  Covid-19 test 10/31/20 at  [place] - pg    ESOPHAGOGASTRODUODENOSCOPY N/A 7/19/2019    Procedure: ESOPHAGOGASTRODUODENOSCOPY (EGD);  Surgeon: Cherrie Sheets MD;  Location: [facility];  Service: Endoscopy;  Laterality: N/A;    fibrocystic breast       KNEE ARTHROSCOPY W/ MENISCAL REPAIR      TUBAL LIGATION       Pre-op Assessment    I have reviewed the Patient Summary Reports.     I have reviewed the Nursing Notes. I have reviewed the NPO Status.   I have reviewed the Medications.     Review of Systems  Anesthesia Hx:  No problems with previous Anesthesia  History of prior surgery of interest to airway management or planning: Denies Family Hx of Anesthesia complications.   Denies Personal Hx of Anesthesia complications.   Social:  No Alcohol Use, Non-Smoker    Hematology/Oncology:  Hematology Normal   Oncology Normal     EENT/Dental:EENT/Dental Normal   Cardiovascular:   Exercise tolerance: good Hypertension     Pulmonary:  Pulmonary Normal    Renal/:  Renal/ Normal     Hepatic/GI:  Hepatic/GI Normal    Neurological:   Headaches    Endocrine:  Endocrine Normal  Obesity / BMI > 30  Psych:  Psychiatric Normal           Physical Exam  General: Well nourished, Cooperative, Alert and Oriented    Airway:  Mallampati: III   Mouth Opening: Normal  TM Distance: Normal  Tongue: Normal  Neck ROM: Normal ROM    Dental:  Intact    Chest/Lungs:  Clear to auscultation, Normal Respiratory Rate    Heart:  Rate: Normal  Rhythm: Regular Rhythm        Anesthesia Plan  Type of Anesthesia, risks & benefits discussed:    Anesthesia Type: Gen ETT, Gen Natural Airway  Intra-op Monitoring Plan: Standard ASA Monitors  Post Op Pain Control Plan: multimodal analgesia and IV/PO Opioids PRN  Induction:  IV  Airway Plan: Direct  Informed Consent: Informed consent signed with the Patient and all parties understand the risks and agree with anesthesia plan.  All questions answered. Patient consented to blood products? No  ASA Score: 3  Day of Surgery Review of History & Physical: H&P Update referred to the surgeon/provider.    Ready For Surgery From Anesthesia Perspective.     .

## 2023-07-16 NOTE — ANESTHESIA POSTPROCEDURE EVALUATION
Anesthesia Post Evaluation    Patient: Mary Naranjo    Procedure(s) Performed: Procedure(s) (LRB):  ULTRASOUND, UPPER GI TRACT, ENDOSCOPIC (N/A)    Final Anesthesia Type: general      Patient location during evaluation: [facility]  Patient participation: Yes- Able to Participate  Level of consciousness: awake and alert and oriented  Post-procedure vital signs: reviewed and stable  Pain management: adequate  Airway patency: patent    PONV status at discharge: No PONV  Anesthetic complications: no      Cardiovascular status: blood pressure returned to baseline and hemodynamically stable  Respiratory status: unassisted, spontaneous ventilation and room air  Hydration status: euvolemic  Follow-up not needed.          Vitals Value Taken Time   /64 07/14/23 1242   Temp 36.7 °C (98 °F) 07/14/23 1242   Pulse 60 07/14/23 1242   Resp 18 07/14/23 1242   SpO2 100 % 07/14/23 1242         No case tracking events are documented in the log.      Pain/Ming Score: Pain Rating Prior to Med Admin: 6 (7/14/2023 12:13 PM)  Ming Score: 9 (7/14/2023 12:15 PM)

## 2023-08-04 ENCOUNTER — LAB VISIT (OUTPATIENT)
Dept: LAB | Facility: HOSPITAL | Age: 58
End: 2023-08-04
Attending: FAMILY MEDICINE
Payer: COMMERCIAL

## 2023-08-04 DIAGNOSIS — R71.8 MICROCYTOSIS: ICD-10-CM

## 2023-08-04 DIAGNOSIS — I10 PRIMARY HYPERTENSION: Chronic | ICD-10-CM

## 2023-08-04 LAB
ALBUMIN SERPL BCP-MCNC: 4.1 G/DL (ref 3.5–5.2)
ALP SERPL-CCNC: 83 U/L (ref 55–135)
ALT SERPL W/O P-5'-P-CCNC: 19 U/L (ref 10–44)
ANION GAP SERPL CALC-SCNC: 11 MMOL/L (ref 8–16)
AST SERPL-CCNC: 17 U/L (ref 10–40)
BASOPHILS # BLD AUTO: 0.02 K/UL (ref 0–0.2)
BASOPHILS NFR BLD: 0.4 % (ref 0–1.9)
BILIRUB SERPL-MCNC: 0.6 MG/DL (ref 0.1–1)
BUN SERPL-MCNC: 13 MG/DL (ref 6–20)
CALCIUM SERPL-MCNC: 9.8 MG/DL (ref 8.7–10.5)
CHLORIDE SERPL-SCNC: 106 MMOL/L (ref 95–110)
CHOLEST SERPL-MCNC: 192 MG/DL (ref 120–199)
CHOLEST/HDLC SERPL: 3.9 {RATIO} (ref 2–5)
CO2 SERPL-SCNC: 27 MMOL/L (ref 23–29)
CREAT SERPL-MCNC: 0.8 MG/DL (ref 0.5–1.4)
DIFFERENTIAL METHOD: ABNORMAL
EOSINOPHIL # BLD AUTO: 0.2 K/UL (ref 0–0.5)
EOSINOPHIL NFR BLD: 3 % (ref 0–8)
ERYTHROCYTE [DISTWIDTH] IN BLOOD BY AUTOMATED COUNT: 15.4 % (ref 11.5–14.5)
EST. GFR  (NO RACE VARIABLE): >60 ML/MIN/1.73 M^2
ESTIMATED AVG GLUCOSE: 117 MG/DL (ref 68–131)
FERRITIN SERPL-MCNC: 136 NG/ML (ref 20–300)
GLUCOSE SERPL-MCNC: 95 MG/DL (ref 70–110)
HBA1C MFR BLD: 5.7 % (ref 4–5.6)
HCT VFR BLD AUTO: 35.8 % (ref 37–48.5)
HDLC SERPL-MCNC: 49 MG/DL (ref 40–75)
HDLC SERPL: 25.5 % (ref 20–50)
HGB BLD-MCNC: 11.1 G/DL (ref 12–16)
IMM GRANULOCYTES # BLD AUTO: 0.01 K/UL (ref 0–0.04)
IMM GRANULOCYTES NFR BLD AUTO: 0.2 % (ref 0–0.5)
IRON SERPL-MCNC: 55 UG/DL (ref 30–160)
LDLC SERPL CALC-MCNC: 131.2 MG/DL (ref 63–159)
LYMPHOCYTES # BLD AUTO: 2.2 K/UL (ref 1–4.8)
LYMPHOCYTES NFR BLD: 44.2 % (ref 18–48)
MCH RBC QN AUTO: 24.4 PG (ref 27–31)
MCHC RBC AUTO-ENTMCNC: 31 G/DL (ref 32–36)
MCV RBC AUTO: 79 FL (ref 82–98)
MONOCYTES # BLD AUTO: 0.5 K/UL (ref 0.3–1)
MONOCYTES NFR BLD: 9.1 % (ref 4–15)
NEUTROPHILS # BLD AUTO: 2.2 K/UL (ref 1.8–7.7)
NEUTROPHILS NFR BLD: 43.1 % (ref 38–73)
NONHDLC SERPL-MCNC: 143 MG/DL
NRBC BLD-RTO: 0 /100 WBC
PLATELET # BLD AUTO: 274 K/UL (ref 150–450)
PMV BLD AUTO: 10.8 FL (ref 9.2–12.9)
POTASSIUM SERPL-SCNC: 3.9 MMOL/L (ref 3.5–5.1)
PROT SERPL-MCNC: 7.6 G/DL (ref 6–8.4)
RBC # BLD AUTO: 4.54 M/UL (ref 4–5.4)
SATURATED IRON: 13 % (ref 20–50)
SODIUM SERPL-SCNC: 144 MMOL/L (ref 136–145)
TOTAL IRON BINDING CAPACITY: 423 UG/DL (ref 250–450)
TRANSFERRIN SERPL-MCNC: 286 MG/DL (ref 200–375)
TRIGL SERPL-MCNC: 59 MG/DL (ref 30–150)
WBC # BLD AUTO: 5.07 K/UL (ref 3.9–12.7)

## 2023-08-04 PROCEDURE — 85025 COMPLETE CBC W/AUTO DIFF WBC: CPT | Performed by: FAMILY MEDICINE

## 2023-08-04 PROCEDURE — 82728 ASSAY OF FERRITIN: CPT | Performed by: FAMILY MEDICINE

## 2023-08-04 PROCEDURE — 83540 ASSAY OF IRON: CPT | Performed by: FAMILY MEDICINE

## 2023-08-04 PROCEDURE — 36415 COLL VENOUS BLD VENIPUNCTURE: CPT | Mod: PN | Performed by: FAMILY MEDICINE

## 2023-08-04 PROCEDURE — 84466 ASSAY OF TRANSFERRIN: CPT | Performed by: FAMILY MEDICINE

## 2023-08-04 PROCEDURE — 80061 LIPID PANEL: CPT | Performed by: FAMILY MEDICINE

## 2023-08-04 PROCEDURE — 83036 HEMOGLOBIN GLYCOSYLATED A1C: CPT | Performed by: FAMILY MEDICINE

## 2023-08-04 PROCEDURE — 80053 COMPREHEN METABOLIC PANEL: CPT | Performed by: FAMILY MEDICINE

## 2023-08-08 ENCOUNTER — LAB VISIT (OUTPATIENT)
Dept: LAB | Facility: HOSPITAL | Age: 58
End: 2023-08-08
Attending: FAMILY MEDICINE
Payer: COMMERCIAL

## 2023-08-08 ENCOUNTER — OFFICE VISIT (OUTPATIENT)
Dept: FAMILY MEDICINE | Facility: CLINIC | Age: 58
End: 2023-08-08
Payer: COMMERCIAL

## 2023-08-08 VITALS
BODY MASS INDEX: 37.6 KG/M2 | DIASTOLIC BLOOD PRESSURE: 67 MMHG | HEART RATE: 67 BPM | TEMPERATURE: 99 F | WEIGHT: 220.25 LBS | OXYGEN SATURATION: 97 % | SYSTOLIC BLOOD PRESSURE: 121 MMHG | HEIGHT: 64 IN

## 2023-08-08 DIAGNOSIS — Z12.31 ENCOUNTER FOR SCREENING MAMMOGRAM FOR MALIGNANT NEOPLASM OF BREAST: ICD-10-CM

## 2023-08-08 DIAGNOSIS — R53.83 FATIGUE, UNSPECIFIED TYPE: ICD-10-CM

## 2023-08-08 DIAGNOSIS — I10 PRIMARY HYPERTENSION: Chronic | ICD-10-CM

## 2023-08-08 DIAGNOSIS — E66.01 CLASS 2 SEVERE OBESITY DUE TO EXCESS CALORIES WITH SERIOUS COMORBIDITY AND BODY MASS INDEX (BMI) OF 37.0 TO 37.9 IN ADULT: Chronic | ICD-10-CM

## 2023-08-08 DIAGNOSIS — D50.9 IRON DEFICIENCY ANEMIA, UNSPECIFIED IRON DEFICIENCY ANEMIA TYPE: ICD-10-CM

## 2023-08-08 DIAGNOSIS — Z00.00 HEALTH MAINTENANCE EXAMINATION: Primary | ICD-10-CM

## 2023-08-08 DIAGNOSIS — Z80.0 FAMILY HISTORY OF PANCREATIC CANCER: Chronic | ICD-10-CM

## 2023-08-08 DIAGNOSIS — R73.03 PREDIABETES: Chronic | ICD-10-CM

## 2023-08-08 DIAGNOSIS — Z15.09 MONOALLELIC MUTATION OF RET GENE: Chronic | ICD-10-CM

## 2023-08-08 DIAGNOSIS — E78.49 OTHER HYPERLIPIDEMIA: Chronic | ICD-10-CM

## 2023-08-08 DIAGNOSIS — Z15.89 MONOALLELIC MUTATION OF RET GENE: Chronic | ICD-10-CM

## 2023-08-08 DIAGNOSIS — Z15.81 MONOALLELIC MUTATION OF RET GENE: Chronic | ICD-10-CM

## 2023-08-08 LAB
BASOPHILS # BLD AUTO: 0.03 K/UL (ref 0–0.2)
BASOPHILS NFR BLD: 0.5 % (ref 0–1.9)
DIFFERENTIAL METHOD: ABNORMAL
EOSINOPHIL # BLD AUTO: 0.2 K/UL (ref 0–0.5)
EOSINOPHIL NFR BLD: 2.6 % (ref 0–8)
ERYTHROCYTE [DISTWIDTH] IN BLOOD BY AUTOMATED COUNT: 15.2 % (ref 11.5–14.5)
FERRITIN SERPL-MCNC: 178 NG/ML (ref 20–300)
HCT VFR BLD AUTO: 37.7 % (ref 37–48.5)
HGB BLD-MCNC: 11.5 G/DL (ref 12–16)
IMM GRANULOCYTES # BLD AUTO: 0.02 K/UL (ref 0–0.04)
IMM GRANULOCYTES NFR BLD AUTO: 0.3 % (ref 0–0.5)
IRON SERPL-MCNC: 66 UG/DL (ref 30–160)
LYMPHOCYTES # BLD AUTO: 2.5 K/UL (ref 1–4.8)
LYMPHOCYTES NFR BLD: 43.7 % (ref 18–48)
MCH RBC QN AUTO: 24.5 PG (ref 27–31)
MCHC RBC AUTO-ENTMCNC: 30.5 G/DL (ref 32–36)
MCV RBC AUTO: 80 FL (ref 82–98)
MONOCYTES # BLD AUTO: 0.6 K/UL (ref 0.3–1)
MONOCYTES NFR BLD: 9.8 % (ref 4–15)
NEUTROPHILS # BLD AUTO: 2.5 K/UL (ref 1.8–7.7)
NEUTROPHILS NFR BLD: 43.1 % (ref 38–73)
NRBC BLD-RTO: 0 /100 WBC
PLATELET # BLD AUTO: 331 K/UL (ref 150–450)
PMV BLD AUTO: 10.2 FL (ref 9.2–12.9)
RBC # BLD AUTO: 4.69 M/UL (ref 4–5.4)
RETICS/RBC NFR AUTO: 1 % (ref 0.5–2.5)
SATURATED IRON: 15 % (ref 20–50)
TOTAL IRON BINDING CAPACITY: 445 UG/DL (ref 250–450)
TRANSFERRIN SERPL-MCNC: 301 MG/DL (ref 200–375)
WBC # BLD AUTO: 5.74 K/UL (ref 3.9–12.7)

## 2023-08-08 PROCEDURE — 36415 COLL VENOUS BLD VENIPUNCTURE: CPT | Mod: PN | Performed by: FAMILY MEDICINE

## 2023-08-08 PROCEDURE — 3078F PR MOST RECENT DIASTOLIC BLOOD PRESSURE < 80 MM HG: ICD-10-PCS | Mod: CPTII,S$GLB,, | Performed by: FAMILY MEDICINE

## 2023-08-08 PROCEDURE — 3074F PR MOST RECENT SYSTOLIC BLOOD PRESSURE < 130 MM HG: ICD-10-PCS | Mod: CPTII,S$GLB,, | Performed by: FAMILY MEDICINE

## 2023-08-08 PROCEDURE — 3044F PR MOST RECENT HEMOGLOBIN A1C LEVEL <7.0%: ICD-10-PCS | Mod: CPTII,S$GLB,, | Performed by: FAMILY MEDICINE

## 2023-08-08 PROCEDURE — 1160F RVW MEDS BY RX/DR IN RCRD: CPT | Mod: CPTII,S$GLB,, | Performed by: FAMILY MEDICINE

## 2023-08-08 PROCEDURE — 99396 PREV VISIT EST AGE 40-64: CPT | Mod: S$GLB,,, | Performed by: FAMILY MEDICINE

## 2023-08-08 PROCEDURE — 3074F SYST BP LT 130 MM HG: CPT | Mod: CPTII,S$GLB,, | Performed by: FAMILY MEDICINE

## 2023-08-08 PROCEDURE — 3066F NEPHROPATHY DOC TX: CPT | Mod: CPTII,S$GLB,, | Performed by: FAMILY MEDICINE

## 2023-08-08 PROCEDURE — 4010F PR ACE/ARB THEARPY RXD/TAKEN: ICD-10-PCS | Mod: CPTII,S$GLB,, | Performed by: FAMILY MEDICINE

## 2023-08-08 PROCEDURE — 3044F HG A1C LEVEL LT 7.0%: CPT | Mod: CPTII,S$GLB,, | Performed by: FAMILY MEDICINE

## 2023-08-08 PROCEDURE — 1159F MED LIST DOCD IN RCRD: CPT | Mod: CPTII,S$GLB,, | Performed by: FAMILY MEDICINE

## 2023-08-08 PROCEDURE — 1160F PR REVIEW ALL MEDS BY PRESCRIBER/CLIN PHARMACIST DOCUMENTED: ICD-10-PCS | Mod: CPTII,S$GLB,, | Performed by: FAMILY MEDICINE

## 2023-08-08 PROCEDURE — 3008F PR BODY MASS INDEX (BMI) DOCUMENTED: ICD-10-PCS | Mod: CPTII,S$GLB,, | Performed by: FAMILY MEDICINE

## 2023-08-08 PROCEDURE — 3078F DIAST BP <80 MM HG: CPT | Mod: CPTII,S$GLB,, | Performed by: FAMILY MEDICINE

## 2023-08-08 PROCEDURE — 3066F PR DOCUMENTATION OF TREATMENT FOR NEPHROPATHY: ICD-10-PCS | Mod: CPTII,S$GLB,, | Performed by: FAMILY MEDICINE

## 2023-08-08 PROCEDURE — 84165 PROTEIN E-PHORESIS SERUM: CPT | Performed by: FAMILY MEDICINE

## 2023-08-08 PROCEDURE — 3061F NEG MICROALBUMINURIA REV: CPT | Mod: CPTII,S$GLB,, | Performed by: FAMILY MEDICINE

## 2023-08-08 PROCEDURE — 3061F PR NEG MICROALBUMINURIA RESULT DOCUMENTED/REVIEW: ICD-10-PCS | Mod: CPTII,S$GLB,, | Performed by: FAMILY MEDICINE

## 2023-08-08 PROCEDURE — 4010F ACE/ARB THERAPY RXD/TAKEN: CPT | Mod: CPTII,S$GLB,, | Performed by: FAMILY MEDICINE

## 2023-08-08 PROCEDURE — 99999 PR PBB SHADOW E&M-EST. PATIENT-LVL V: ICD-10-PCS | Mod: PBBFAC,,, | Performed by: FAMILY MEDICINE

## 2023-08-08 PROCEDURE — 1159F PR MEDICATION LIST DOCUMENTED IN MEDICAL RECORD: ICD-10-PCS | Mod: CPTII,S$GLB,, | Performed by: FAMILY MEDICINE

## 2023-08-08 PROCEDURE — 84165 PROTEIN E-PHORESIS SERUM: CPT | Mod: 26,,, | Performed by: PATHOLOGY

## 2023-08-08 PROCEDURE — 84466 ASSAY OF TRANSFERRIN: CPT | Performed by: FAMILY MEDICINE

## 2023-08-08 PROCEDURE — 3008F BODY MASS INDEX DOCD: CPT | Mod: CPTII,S$GLB,, | Performed by: FAMILY MEDICINE

## 2023-08-08 PROCEDURE — 99396 PR PREVENTIVE VISIT,EST,40-64: ICD-10-PCS | Mod: S$GLB,,, | Performed by: FAMILY MEDICINE

## 2023-08-08 PROCEDURE — 84165 PATHOLOGIST INTERPRETATION SPE: ICD-10-PCS | Mod: 26,,, | Performed by: PATHOLOGY

## 2023-08-08 PROCEDURE — 85045 AUTOMATED RETICULOCYTE COUNT: CPT | Performed by: FAMILY MEDICINE

## 2023-08-08 PROCEDURE — 82728 ASSAY OF FERRITIN: CPT | Performed by: FAMILY MEDICINE

## 2023-08-08 PROCEDURE — 99999 PR PBB SHADOW E&M-EST. PATIENT-LVL V: CPT | Mod: PBBFAC,,, | Performed by: FAMILY MEDICINE

## 2023-08-08 PROCEDURE — 83540 ASSAY OF IRON: CPT | Performed by: FAMILY MEDICINE

## 2023-08-08 PROCEDURE — 85025 COMPLETE CBC W/AUTO DIFF WBC: CPT | Performed by: FAMILY MEDICINE

## 2023-08-08 NOTE — PROGRESS NOTES
"  Patient Name: Mary Naranjo    : 1965  MRN: [id number]      Subjective:     Patient ID: Mary is a 58 y.o. female    Chief Complaint:  Annual Exam    58 year old  pleasant female with chronic conditions as per problem list comes in for annual health maintenance exam. Patient reports overall doing well, except it is the 3 year anniversary of her twin sister's death so currently feeling a bit down.       Review of Systems   Constitutional:  Negative for unexpected weight change.   HENT:  Negative for ear pain and sore throat.    Eyes:  Negative for visual disturbance.   Respiratory:  Negative for shortness of breath.    Cardiovascular:  Negative for chest pain and palpitations.   Gastrointestinal:  Negative for abdominal pain and blood in stool.   Genitourinary:  Negative for dysuria and frequency.   Integumentary:  Negative for rash.   Hematological:  Negative for adenopathy.   Psychiatric/Behavioral:  Negative for suicidal ideas.         Objective:   /67 (BP Location: Left arm, Patient Position: Sitting, BP Method: Large (Automatic))   Pulse 67   Temp 98.5 °F (36.9 °C) (Oral)   Ht 5' 4" (1.626 m)   Wt 99.9 kg (220 lb 3.8 oz)   LMP 01/15/2018   SpO2 97%   BMI 37.80 kg/m²     Physical Exam  Constitutional:       General: She is not in acute distress.     Appearance: She is obese. She is not ill-appearing.   HENT:      Head: Normocephalic and atraumatic.      Right Ear: External ear normal.      Left Ear: External ear normal.      Nose: Nose normal.   Eyes:      Extraocular Movements: Extraocular movements intact.      Pupils: Pupils are equal, round, and reactive to light.   Cardiovascular:      Rate and Rhythm: Normal rate and regular rhythm.      Pulses: Normal pulses.   Pulmonary:      Effort: Pulmonary effort is normal. No respiratory distress.      Breath sounds: No wheezing or rales.   Abdominal:      General: Abdomen is flat. Bowel sounds are normal. There is no distension.      " Tenderness: There is no abdominal tenderness. There is no guarding.   Musculoskeletal:      Cervical back: Normal range of motion. No rigidity.      Right lower leg: No edema.      Left lower leg: No edema.   Lymphadenopathy:      Cervical: No cervical adenopathy.   Skin:     Capillary Refill: Capillary refill takes less than 2 seconds.   Neurological:      General: No focal deficit present.      Mental Status: She is alert.   Psychiatric:         Behavior: Behavior normal.         Thought Content: Thought content normal.        Component Date Value Ref Range Status    Microalbumin, Urine 08/04/2023 34.0  ug/mL Final    Creatinine, Urine 08/04/2023 347.0 (H)  15.0 - 325.0 mg/dL Final    Microalb/Creat Ratio 08/04/2023 9.8  0.0 - 30.0 ug/mg Final   Lab Visit on 08/04/2023   Component Date Value Ref Range Status    Sodium 08/04/2023 144  136 - 145 mmol/L Final    Potassium 08/04/2023 3.9  3.5 - 5.1 mmol/L Final    Chloride 08/04/2023 106  95 - 110 mmol/L Final    CO2 08/04/2023 27  23 - 29 mmol/L Final    Glucose 08/04/2023 95  70 - 110 mg/dL Final    BUN 08/04/2023 13  6 - 20 mg/dL Final    Creatinine 08/04/2023 0.8  0.5 - 1.4 mg/dL Final    Calcium 08/04/2023 9.8  8.7 - 10.5 mg/dL Final    Total Protein 08/04/2023 7.6  6.0 - 8.4 g/dL Final    Albumin 08/04/2023 4.1  3.5 - 5.2 g/dL Final    Total Bilirubin 08/04/2023 0.6  0.1 - 1.0 mg/dL Final    Comment: For infants and newborns, interpretation of results should be based  on gestational age, weight and in agreement with clinical  observations.    Premature Infant recommended reference ranges:  Up to 24 hours.............<8.0 mg/dL  Up to 48 hours............<12.0 mg/dL  3-5 days..................<15.0 mg/dL  6-29 days.................<15.0 mg/dL      Alkaline Phosphatase 08/04/2023 83  55 - 135 U/L Final    AST 08/04/2023 17  10 - 40 U/L Final    ALT 08/04/2023 19  10 - 44 U/L Final    eGFR 08/04/2023 >60  >60 mL/min/1.73 m^2 Final    Anion Gap 08/04/2023 11  8 - 16  mmol/L Final    Cholesterol 08/04/2023 192  120 - 199 mg/dL Final    Comment: The National Cholesterol Education Program (NCEP) has set the  following guidelines (reference ranges) for Cholesterol:  Optimal.....................<200 mg/dL  Borderline High.............200-239 mg/dL  High........................> or = 240 mg/dL      Triglycerides 08/04/2023 59  30 - 150 mg/dL Final    Comment: The National Cholesterol Education Program (NCEP) has set the  following guidelines (reference values) for triglycerides:  Normal......................<150 mg/dL  Borderline High.............150-199 mg/dL  High........................200-499 mg/dL      HDL 08/04/2023 49  40 - 75 mg/dL Final    Comment: The National Cholesterol Education Program (NCEP) has set the  following guidelines (reference values) for HDL Cholesterol:  Low...............<40 mg/dL  Optimal...........>60 mg/dL      LDL Cholesterol 08/04/2023 131.2  63.0 - 159.0 mg/dL Final    Comment: The National Cholesterol Education Program (NCEP) has set the  following guidelines (reference values) for LDL Cholesterol:  Optimal.......................<130 mg/dL  Borderline High...............130-159 mg/dL  High..........................160-189 mg/dL  Very High.....................>190 mg/dL      HDL/Cholesterol Ratio 08/04/2023 25.5  20.0 - 50.0 % Final    Total Cholesterol/HDL Ratio 08/04/2023 3.9  2.0 - 5.0 Final    Non-HDL Cholesterol 08/04/2023 143  mg/dL Final    Comment: Risk category and Non-HDL cholesterol goals:  Coronary heart disease (CHD)or equivalent (10-year risk of CHD >20%):  Non-HDL cholesterol goal     <130 mg/dL  Two or more CHD risk factors and 10-year risk of CHD <= 20%:  Non-HDL cholesterol goal     <160 mg/dL  0 to 1 CHD risk factor:  Non-HDL cholesterol goal     <190 mg/dL      Hemoglobin A1C 08/04/2023 5.7 (H)  4.0 - 5.6 % Final    Comment: ADA Screening Guidelines:  5.7-6.4%  Consistent with prediabetes  >or=6.5%  Consistent with diabetes    High  levels of fetal hemoglobin interfere with the HbA1C  assay. Heterozygous hemoglobin variants (HbS, HgC, etc)do  not significantly interfere with this assay.   However, presence of multiple variants may affect accuracy.      Estimated Avg Glucose 08/04/2023 117  68 - 131 mg/dL Final    WBC 08/04/2023 5.07  3.90 - 12.70 K/uL Final    RBC 08/04/2023 4.54  4.00 - 5.40 M/uL Final    Hemoglobin 08/04/2023 11.1 (L)  12.0 - 16.0 g/dL Final    Hematocrit 08/04/2023 35.8 (L)  37.0 - 48.5 % Final    MCV 08/04/2023 79 (L)  82 - 98 fL Final    MCH 08/04/2023 24.4 (L)  27.0 - 31.0 pg Final    MCHC 08/04/2023 31.0 (L)  32.0 - 36.0 g/dL Final    RDW 08/04/2023 15.4 (H)  11.5 - 14.5 % Final    Platelets 08/04/2023 274  150 - 450 K/uL Final    MPV 08/04/2023 10.8  9.2 - 12.9 fL Final    Immature Granulocytes 08/04/2023 0.2  0.0 - 0.5 % Final    Gran # (ANC) 08/04/2023 2.2  1.8 - 7.7 K/uL Final    Immature Grans (Abs) 08/04/2023 0.01  0.00 - 0.04 K/uL Final    Comment: Mild elevation in immature granulocytes is non specific and   can be seen in a variety of conditions including stress response,   acute inflammation, trauma and pregnancy. Correlation with other   laboratory and clinical findings is essential.      Lymph # 08/04/2023 2.2  1.0 - 4.8 K/uL Final    Mono # 08/04/2023 0.5  0.3 - 1.0 K/uL Final    Eos # 08/04/2023 0.2  0.0 - 0.5 K/uL Final    Baso # 08/04/2023 0.02  0.00 - 0.20 K/uL Final    nRBC 08/04/2023 0  0 /100 WBC Final    Gran % 08/04/2023 43.1  38.0 - 73.0 % Final    Lymph % 08/04/2023 44.2  18.0 - 48.0 % Final    Mono % 08/04/2023 9.1  4.0 - 15.0 % Final    Eosinophil % 08/04/2023 3.0  0.0 - 8.0 % Final    Basophil % 08/04/2023 0.4  0.0 - 1.9 % Final    Differential Method 08/04/2023 Automated   Final    Iron 08/04/2023 55  30 - 160 ug/dL Final    Transferrin 08/04/2023 286  200 - 375 mg/dL Final    TIBC 08/04/2023 423  250 - 450 ug/dL Final    Saturated Iron 08/04/2023 13 (L)  20 - 50 % Final    Ferritin 08/04/2023  136  20.0 - 300.0 ng/mL Final       Assessment        ICD-10-CM ICD-9-CM   1. Health maintenance examination  Z00.00 V70.0   2. Encounter for screening mammogram for malignant neoplasm of breast  Z12.31 V76.12   3. Monoallelic mutation of RET gene  Z15.89 V84.89    Z15.81 V84.09    Z15.09 V84.81   4. Family history of pancreatic cancer  Z80.0 V16.0   5. Primary hypertension  I10 401.9   6. Other hyperlipidemia  E78.49 272.4   7. Prediabetes  R73.03 790.29   8. Class 2 severe obesity due to excess calories with serious comorbidity and body mass index (BMI) of 37.0 to 37.9 in adult  E66.01 278.01    Z68.37 V85.37   9. Fatigue, unspecified type  R53.83 780.79   10. Iron deficiency anemia, unspecified iron deficiency anemia type  D50.9 280.9         Plan:     1. Health maintenance examination  Assessment & Plan:  Age appropriate screenings, vaccinations, and recommendations reviewed and discussed.  Appropriate orders placed and previous results reviewed.     Orders:   -     Ambulatory referral/consult to Sleep Disorders; Future; Expected date: 08/15/2023  -     Iron and TIBC; Future; Expected date: 2023  -     Ferritin; Future; Expected date: 2023  -     CBC Auto Differential; Future; Expected date: 2023  -     Reticulocytes; Future; Expected date: 2023  -     Protein Electrophoresis, Serum; Future; Expected date: 2023  -     Mammo Digital Screening Bilat w/ Celestino; Future; Expected date: 2023  -     Comprehensive Metabolic Panel; Future; Expected date: 2024  -     Lipid Panel; Future; Expected date: 2024  -     Hemoglobin A1C; Future; Expected date: 2024    2. Encounter for screening mammogram for malignant neoplasm of breast  Assessment & Plan:  Breast cancer screening ordered for 1 year after last    Orders:   -     Mammo Digital Screening Bilat w/ Celestino; Future; Expected date: 2023      3. Monoallelic mutation of RET gene  Overview:  Patient's twin sister   of pancreatic cancer.  Last visit with Genetics on 2020 and genetic testing result reviewed:  Germline Cancer-Genetic Test Results  Test(s) performed:  [facility] Common Hereditary Cancers Panel + RET gene  Number of genes analyzed:  48 genes  Laboratory:  [facility]  Sample collection date:  2020  Results report date:  09/10/2020  Full report to be scanned into Epic under the Labs and/or Media tab(s).  FINDINGS:    Gene Variant Zygosity Variant Classification   RET C.31C>A (p.Tel69Gkg) heterozygous Uncertain significance      Dermatology exams  2020- No skin cancer signs  02--No lesions suspicious for malignancy noted.    Assessment & Plan:  Up to date on skin and pancreatic cancer screenings, as well as on cervical cancer, colon cancer and breast cancer screenings.       4. Family history of pancreatic cancer  Overview:    Twin sister  of Pancreatic cancer.  Patient saw genetics- See Monoallelic mutation of RET gene    GI:  10/15/2020:  Likely will pursue yearly endoscopic ultrasounds for the purposes of pancreatic cancer screening.  We did discuss that her current family history is not classic for the development of pancreatic cancer however the presence of pancreatic cancer in an identical twin is not discussed in current guidelines regarding screening for pancreatic cancer.     2020  Endoscopic  A cystic lesion was seen in the pancreatic tail.     10- Endoscopic US   Done at [facility]    2023 Endoscopic US  A cystic lesion was seen in the pancreatic tail.   CT or MRI of the abd with  IV contrast in 1 year for surveillance.     Assessment & Plan:  Continue screening recommendations based on GI and genetics recommendations.      5. Primary hypertension  Overview:  BP Readings from Last 3 Encounters:   23 121/67   23 134/64   23 130/70        Assessment & Plan:  ACC/AHA guidelines on blood pressure goals reviewed.  Reinforced correct way of measuring blood  "pressure.  BP ap[pears well controlled, continue current regimen.    Orders:  -     Comprehensive Metabolic Panel; Future; Expected date: 02/08/2024  -     Lipid Panel; Future; Expected date: 02/08/2024  -     Hemoglobin A1C; Future; Expected date: 02/08/2024    6. Other hyperlipidemia  Overview:  Lab Results   Component Value Date    CHOL 192 08/04/2023    CHOL 229 (H) 12/01/2022    CHOL 197 09/07/2022     Lab Results   Component Value Date    HDL 49 08/04/2023    HDL 54 12/01/2022    HDL 50 09/07/2022     Lab Results   Component Value Date    LDLCALC 131.2 08/04/2023    LDLCALC 157.8 12/01/2022    LDLCALC 130.6 09/07/2022     Lab Results   Component Value Date    TRIG 59 08/04/2023    TRIG 86 12/01/2022    TRIG 82 09/07/2022     Lab Results   Component Value Date    CHOLHDL 25.5 08/04/2023    CHOLHDL 23.6 12/01/2022    CHOLHDL 25.4 09/07/2022     ASCVD score 4.9%    Assessment & Plan:  Continue dietary and lifestyle modifications.       7. Prediabetes  Overview:  Lab Results   Component Value Date    HGBA1C 5.7 (H) 08/04/2023    HGBA1C 5.6 09/07/2022    HGBA1C 5.7 (H) 08/04/2021       Assessment & Plan:  Discussed implications on prediabetes and risk of progression to diabetes.  Dietary changes discussed and encouraged- decreased carb intake- sweets, breads, rice, pasta, potato. Also advised to stop soft drinks.  Also encouraged lifestyle changes- increased physical activity.    Orders:  -     Hemoglobin A1C; Future; Expected date: 02/08/2024    8. Class 2 severe obesity due to excess calories with serious comorbidity and body mass index (BMI) of 37.0 to 37.9 in adult  Overview:  Wt Readings from Last 3 Encounters:   08/08/23 0817 99.9 kg (220 lb 3.8 oz)   07/14/23 1050 99.8 kg (220 lb)   02/22/23 1725 99.8 kg (220 lb)        Estimated body mass index is 37.8 kg/m² as calculated from the following:    Height as of this encounter: 5' 4" (1.626 m).    Weight as of this encounter: 99.9 kg (220 lb 3.8 oz).  Ideal body " weight: 54.7 kg (120 lb 9.5 oz)     Assessment & Plan:  The patient's BMI has been recorded in the chart. The patient has been provided educational materials regarding the benefits of attaining and maintaining a normal weight. We will continue to address and follow this issue during follow up visits.      9. Fatigue, unspecified type  Assessment & Plan:  The patient has multiple symptoms of/ risk factors for sleep apnea. The patient was strongly encouraged to make an appointment with sleep medicine, for further evaluation/testing.    Discussed with patient that sleep apnea can cause and is associated with multiple problems such as daytime tiredness, memory/concentration problems, morning headaches, acid reflux, mood swings or feelings of low mood, accidents when driving, sexual dysfunction, difficulty losing weight, elevated blood sugar/insulin resistance, elevated blood pressure/hypertension, increased urination at night, among other problems.  In addition I explained that if left untreated, sleep apnea can result in irregular heartbeat, including atrial fibrillation, heart failure, myocardial infarctions/heart attacks, stroke, and early death.     Orders:  -     Ambulatory referral/consult to Sleep Disorders; Future; Expected date: 08/15/2023  -     CBC Auto Differential; Future; Expected date: 08/08/2023    10. Iron deficiency anemia, unspecified iron deficiency anemia type  Assessment & Plan:  Check anemia studies.    Orders:  -     Iron and TIBC; Future; Expected date: 08/08/2023  -     Ferritin; Future; Expected date: 08/08/2023  -     CBC Auto Differential; Future; Expected date: 08/08/2023  -     Reticulocytes; Future; Expected date: 08/08/2023  -     Protein Electrophoresis, Serum; Future; Expected date: 08/08/2023                  -Ramon Olivares Jr., MD, AAHIVS      This office note has been dictated.  This dictation has been generated using M-Modal Fluency Direct dictation; some phonetic errors may  "occur.         Patient Instructions   Do not check the pressure within 30 minutes of waking up, showering, eating/drinking, smoking, or any strenuous activity.    Change the valsartan and hydrochlorothiazide to night        Future Appointments   Date Time Provider Department Center   9/25/2023 12:00 PM HOME SLEEP STUDIES, [facility] [place] SLEEP [facility] [place]   11/6/2023  8:45 AM Araceli Asencio MD [facility] OBGYN [facility]   11/6/2023 11:30 AM [facility] MAMMO1 [facility] MAMMO [facility] [place]   2/8/2024  8:00 AM LAB, [facility] DRAW STATION [facility] LAB ST [facility]   2/15/2024  9:40 AM Ramon Olivares Jr., MD [facility]              Clinical References      Patient Education       Checking Your Blood Pressure at Home   The Basics   Written by the doctors and editors at [facility]   How is blood pressure measured? -- Blood pressure is usually measured with a device that goes around your upper arm. This is often done in a doctor's office. But some people also check their blood pressure themselves, at home or at work.  Blood pressure is explained with 2 numbers. For instance, your blood pressure might be "140 over 90." The first (top) number is the pressure inside your arteries when your heart is gume. The second (bottom) number is the pressure inside your arteries when your heart is relaxed. The table shows how doctors and nurses define high and normal blood pressure (table 1).  If your blood pressure gets too high, it puts you at risk for heart attack, stroke, and kidney disease. High blood pressure does not usually cause symptoms. But it can be serious.  What is a home blood pressure meter? -- A home blood pressure meter (or "monitor") is a device you can use to check your blood pressure yourself. It has a cuff that goes around your upper arm (figure 1). Some devices have a cuff that goes around your wrist instead. But doctors aren't sure if these work as well. The meter also has a small screen, or dial, that shows " your blood pressure numbers.  There are also special meters you can wear for a day or 2. These are different because they automatically check your blood pressure throughout the day and night, even while you are sleeping. If your doctor thinks you should use one of these devices, they will talk to you about how to wear it.  Why do I need to check my blood pressure at home? -- If your doctor knows or suspects that you have high blood pressure, they might want you to check it at home. There are a few reasons for this. Your doctor might want to look at:  Whether your blood pressure measures the same at home as it did in the doctor's office  How well your blood pressure medicines are working  Changes in your blood pressure, for example, if it goes up and down  People who check their own blood pressure at home usually do better at keeping it low.  How do I choose a home blood pressure meter? -- When choosing a home blood pressure meter, you will probably want to think about:  Cost - Some devices cost more than others. You should also check to see if your insurance will help pay for your device.  Size - It's important to make sure the cuff fits your arm comfortably. Your doctor or nurse can help you with this.  How easy it is to use - You should make sure you understand how to use the device. You also need to be able to read the numbers on the screen.  You do not need a prescription to buy a home blood pressure meter. You can buy them at most pharmacies or over the internet. Your doctor or nurse can help you choose the right device for you.  How do I check my blood pressure at home? -- Once you have a home blood pressure meter, your doctor or nurse should check it to make sure it fits you and works correctly.  When it's time to check your blood pressure:  Go to the bathroom and empty your bladder first. Having a full bladder can temporarily increase your blood pressure, making the results inaccurate.  Sit in a chair with  your feet flat on the ground.  Try to breathe normally and stay calm.  Attach the cuff to your arm. Place the cuff directly on your skin, not over your clothing. The cuff should be tight enough to not slip down, but not uncomfortably tight.  Sit and relax for about 3 to 5 minutes with the cuff on.  Follow the directions that came with your device to start measuring your blood pressure. This might involve squeezing the bulb at the end of the tube to inflate the cuff (fill it with air). With some monitors, you just need to press a button to inflate the cuff. When the cuff fills with air, it feels like someone is squeezing your arm, but it should not hurt. Then you will slowly deflate the cuff (let the air out of it), or it will deflate by itself. The screen or dial will show your blood pressure numbers.  Stay seated and relax for 1 minute, then measure your blood pressure again.  How often should I check my blood pressure? -- It depends. Different people need to follow different schedules. Your doctor or nurse will tell you how often to check your blood pressure, and when. Some people need to check their blood pressure twice a day, in the morning and evening.  Your doctor or nurse will probably tell you to keep track of your blood pressure for at least a few days (table 2). Then they will look at the numbers. The reason for this is that it's normal for your blood pressure to change a bit from day to day. For example, the numbers might change depending on whether you recently had caffeine, just exercised, or feel stressed. Checking your blood pressure over several days - or longer - will give your doctor or nurse a better idea of what is average for you.  How should I keep track of my blood pressure? -- Some blood pressure meters will record your numbers for you, or send them to your computer or smartphone. If yours does not do this, you will need to write them down. Your doctor or nurse can help you figure out the  "best way to keep track of the numbers.  What if my blood pressure is high? -- Your doctor or nurse will tell you what to do if your blood pressure is high when you check it at home. If you get a number that is higher than normal, measure it again to see if it is still high. If it is very high (above a certain number, which your doctor or nurse will tell you to watch out for), you should call your doctor right away.  If your blood pressure is only a little high, your doctor or nurse might tell you to keep checking it for a few more days or weeks, and then call if it does not go back down. Then they can help you decide what to do next.  All topics are updated as new evidence becomes available and our peer review process is complete.  This topic retrieved from [employer] on: Sep 21, 2021.  Topic 866913 Version 4.0  Release: 29.4.2 - C29.263  © 2021 UpToDate, Inc. and/or its affiliates. All rights reserved.  table 1: Definition of normal and high blood pressure  Level  Top number  Bottom number    High 130 or above 80 or above   Elevated 120 to 129 79 or below   Normal 119 or below 79 or below   These definitions are from the American College of Cardiology/American Heart Association. Other expert groups might use slightly different definitions.  "Elevated blood pressure" is a term doctor or nurses use as a warning. It means you do not yet have high blood pressure, but your blood pressure is not as low as it should be for good health.  Graphic 29834 Version 6.0  figure 1: Using a home blood pressure meter     This is an example of a person using a home blood pressure meter.  Graphic 432857 Version 1.0    table 2: 7-day diary for checking blood pressure at home  Day 1  Day 2  Day 3  Day 4  Day 5  Day 6  Day 7    Morning  1st read Morning  1st read Morning  1st read Morning  1st read Morning  1st read Morning  1st read Morning  1st read   Systolic: __________ Systolic: __________ Systolic: __________ Systolic: __________ " Systolic: __________ Systolic: __________ Systolic: __________   Diastolic: __________ Diastolic: __________ Diastolic: __________ Diastolic: __________ Diastolic: __________ Diastolic: __________ Diastolic: __________   Pulse: __________ Pulse: __________ Pulse: __________ Pulse: __________ Pulse: __________ Pulse: __________ Pulse: __________   Morning  2nd read Morning  2nd read Morning  2nd read Morning  2nd read Morning  2nd read Morning  2nd read Morning  2nd read   Systolic: __________ Systolic: __________ Systolic: __________ Systolic: __________ Systolic: __________ Systolic: __________ Systolic: __________   Diastolic: __________ Diastolic: __________ Diastolic: __________ Diastolic: __________ Diastolic: __________ Diastolic: __________ Diastolic: __________   Pulse: __________ Pulse: __________ Pulse: __________ Pulse: __________ Pulse: __________ Pulse: __________ Pulse: __________   Evening  1st read Evening  1st read Evening  1st read Evening  1st read Evening  1st read Evening  1st read Evening  1st read   Systolic: __________ Systolic: __________ Systolic: __________ Systolic: __________ Systolic: __________ Systolic: __________ Systolic: __________   Diastolic: __________ Diastolic: __________ Diastolic: __________ Diastolic: __________ Diastolic: __________ Diastolic: __________ Diastolic: __________   Pulse: __________ Pulse: __________ Pulse: __________ Pulse: __________ Pulse: __________ Pulse: __________ Pulse: __________   Evening  2nd read Evening  2nd read Evening  2nd read Evening  2nd read Evening  2nd read Evening  2nd read Evening  2nd read   Systolic: __________ Systolic: __________ Systolic: __________ Systolic: __________ Systolic: __________ Systolic: __________ Systolic: __________   Diastolic: __________ Diastolic: __________ Diastolic: __________ Diastolic: __________ Diastolic: __________ Diastolic: __________ Diastolic: __________   Pulse: __________ Pulse: __________ Pulse:  __________ Pulse: __________ Pulse: __________ Pulse: __________ Pulse: __________   Notes    Notes    Notes    Notes    Notes    Notes    Notes      ____________________ ____________________ ____________________ ____________________ ____________________ ____________________ ____________________   ____________________ ____________________ ____________________ ____________________ ____________________ ____________________ ____________________   ____________________ ____________________ ____________________ ____________________ ____________________ ____________________ ____________________   Patient name: ______________________________     Patient ID: ________________________________    Primary care provider: _______________________    Average BP: _______________________________    Graphic [id number] Version 1.0  Consumer Information Use and Disclaimer   This information is not specific medical advice and does not replace information you receive from your health care provider. This is only a brief summary of general information. It does NOT include all information about conditions, illnesses, injuries, tests, procedures, treatments, therapies, discharge instructions or life-style choices that may apply to you. You must talk with your health care provider for complete information about your health and treatment options. This information should not be used to decide whether or not to accept your health care provider's advice, instructions or recommendations. Only your health care provider has the knowledge and training to provide advice that is right for you. The use of this information is governed by the [employer] End User License Agreement, available at [email or web address]://[email or web address]/solutions/[employer]/about/junaid.The use of Let content is governed by the Let Terms of Use. ©2021 UpToDate, Inc. All rights reserved.  Copyright   © 2021 UpToDate, Inc. and/or its affiliates. All rights reserved.

## 2023-08-08 NOTE — PATIENT INSTRUCTIONS
Do not check the pressure within 30 minutes of waking up, showering, eating/drinking, smoking, or any strenuous activity.    Change the valsartan and hydrochlorothiazide to night

## 2023-08-09 ENCOUNTER — OFFICE VISIT (OUTPATIENT)
Dept: SLEEP MEDICINE | Facility: CLINIC | Age: 58
End: 2023-08-09
Payer: COMMERCIAL

## 2023-08-09 DIAGNOSIS — R06.83 SNORING: ICD-10-CM

## 2023-08-09 DIAGNOSIS — F51.09 OTHER INSOMNIA NOT DUE TO A SUBSTANCE OR KNOWN PHYSIOLOGICAL CONDITION: ICD-10-CM

## 2023-08-09 DIAGNOSIS — R06.81 WITNESSED EPISODE OF APNEA: ICD-10-CM

## 2023-08-09 DIAGNOSIS — R53.83 FATIGUE, UNSPECIFIED TYPE: ICD-10-CM

## 2023-08-09 DIAGNOSIS — G47.10 HYPERSOMNOLENCE: Primary | ICD-10-CM

## 2023-08-09 LAB
ALBUMIN SERPL ELPH-MCNC: 4.36 G/DL (ref 3.35–5.55)
ALPHA1 GLOB SERPL ELPH-MCNC: 0.28 G/DL (ref 0.17–0.41)
ALPHA2 GLOB SERPL ELPH-MCNC: 0.83 G/DL (ref 0.43–0.99)
B-GLOBULIN SERPL ELPH-MCNC: 0.94 G/DL (ref 0.5–1.1)
GAMMA GLOB SERPL ELPH-MCNC: 1.19 G/DL (ref 0.67–1.58)
PATHOLOGIST INTERPRETATION SPE: NORMAL
PROT SERPL-MCNC: 7.6 G/DL (ref 6–8.4)

## 2023-08-09 PROCEDURE — 3044F HG A1C LEVEL LT 7.0%: CPT | Mod: CPTII,95,, | Performed by: PHYSICIAN ASSISTANT

## 2023-08-09 PROCEDURE — 4010F ACE/ARB THERAPY RXD/TAKEN: CPT | Mod: CPTII,95,, | Performed by: PHYSICIAN ASSISTANT

## 2023-08-09 PROCEDURE — 1159F PR MEDICATION LIST DOCUMENTED IN MEDICAL RECORD: ICD-10-PCS | Mod: CPTII,95,, | Performed by: PHYSICIAN ASSISTANT

## 2023-08-09 PROCEDURE — 99204 PR OFFICE/OUTPT VISIT, NEW, LEVL IV, 45-59 MIN: ICD-10-PCS | Mod: 95,,, | Performed by: PHYSICIAN ASSISTANT

## 2023-08-09 PROCEDURE — 1159F MED LIST DOCD IN RCRD: CPT | Mod: CPTII,95,, | Performed by: PHYSICIAN ASSISTANT

## 2023-08-09 PROCEDURE — 99204 OFFICE O/P NEW MOD 45 MIN: CPT | Mod: 95,,, | Performed by: PHYSICIAN ASSISTANT

## 2023-08-09 PROCEDURE — 3044F PR MOST RECENT HEMOGLOBIN A1C LEVEL <7.0%: ICD-10-PCS | Mod: CPTII,95,, | Performed by: PHYSICIAN ASSISTANT

## 2023-08-09 PROCEDURE — 3066F NEPHROPATHY DOC TX: CPT | Mod: CPTII,95,, | Performed by: PHYSICIAN ASSISTANT

## 2023-08-09 PROCEDURE — 1160F PR REVIEW ALL MEDS BY PRESCRIBER/CLIN PHARMACIST DOCUMENTED: ICD-10-PCS | Mod: CPTII,95,, | Performed by: PHYSICIAN ASSISTANT

## 2023-08-09 PROCEDURE — 1160F RVW MEDS BY RX/DR IN RCRD: CPT | Mod: CPTII,95,, | Performed by: PHYSICIAN ASSISTANT

## 2023-08-09 PROCEDURE — 3066F PR DOCUMENTATION OF TREATMENT FOR NEPHROPATHY: ICD-10-PCS | Mod: CPTII,95,, | Performed by: PHYSICIAN ASSISTANT

## 2023-08-09 PROCEDURE — 4010F PR ACE/ARB THEARPY RXD/TAKEN: ICD-10-PCS | Mod: CPTII,95,, | Performed by: PHYSICIAN ASSISTANT

## 2023-08-09 PROCEDURE — 3061F NEG MICROALBUMINURIA REV: CPT | Mod: CPTII,95,, | Performed by: PHYSICIAN ASSISTANT

## 2023-08-09 PROCEDURE — 3061F PR NEG MICROALBUMINURIA RESULT DOCUMENTED/REVIEW: ICD-10-PCS | Mod: CPTII,95,, | Performed by: PHYSICIAN ASSISTANT

## 2023-08-09 NOTE — PROGRESS NOTES
"The chief complaint leading to consultation is: sleep problems    Visit type: audiovisual     The patient location is: [place]    12 minutes of total time spent on the encounter, which includes face to face time and non-face to face time preparing to see the patient (eg, review of tests), Obtaining and/or reviewing separately obtained history, Documenting clinical information in the electronic or other health record, Independently interpreting results (not separately reported) and communicating results to the patient/family/caregiver, or Care coordination (not separately reported).     Each patient to whom he or she provides medical services by telemedicine is:  (1) informed of the relationship between the physician and patient and the respective role of any other health care provider with respect to management of the patient; and (2) notified that he or she may decline to receive medical services by telemedicine and may withdraw from such care at any time.          Referred by Ramon Olivares Jr., MD     NEW PATIENT VISIT    Mary Naranjo  is a pleasant 58 y.o. female  with PMH significant for HTN, preDM, migraines, DALTON, BMI 37+ who presents for evaluation of fatigue following .      C/o excessive daytime sleepiness, can "fall asleep at the drop of a hat." Sleeps 8 hours a night, but does not feel sleep is restful, waking still tired, waking throughout the night. Reports  does say she snores and has noted she stops breathing in her sleep She denies gasping/choking arousals.    SLEEP SCHEDULE   Environment    Bed Time 8:30-9PM   Sleep Latency Not long at all   Arousals 2-3   Nocturia 0-1 (rarely)   Back to sleep Not long at all   Wake time 4:30AM   Naps No planned naps   Work        Past Medical History:   Diagnosis Date    Arthritis     COVID-19 2/8/2023    Dental bridge present     upper removable partial    Family history of pancreatic cancer     Fibrocystic breast     Migraines     Obesity     Pancreas cyst "     Primary hypertension 2/2/2022     Patient Active Problem List   Diagnosis    Prediabetes    Obesity (BMI 30-39.9)    Stiffness of vertebral column    Tear of medial cartilage or meniscus of knee, current    Family history of pancreatic cancer    Primary hypertension    Palpitations    LVH (left ventricular hypertrophy)    Class 2 severe obesity due to excess calories with serious comorbidity and body mass index (BMI) of 37.0 to 37.9 in adult    Left-sided weakness    Monoallelic mutation of RET gene       Current Outpatient Medications:     cholecalciferol, vitamin D3, 50 mcg (2,000 unit) Chew, Take 2,000 Units by mouth once daily. Per patient, Disp: , Rfl:     hydroCHLOROthiazide (HYDRODIURIL) 25 MG tablet, TAKE 1 TABLET BY MOUTH EVERY DAY, Disp: 90 tablet, Rfl: 1    LINZESS 290 mcg Cap capsule, Take 1 capsule (290 mcg total) by mouth before breakfast., Disp: 90 capsule, Rfl: 3    multivit/folic acid/vit K1 (ONE-A-DAY WOMEN'S 50 PLUS ORAL), Take 1 capsule by mouth once daily. Per patient, One-a-Day Women's 50+ Multivitamin Complete, Disp: , Rfl:     omega-3 fatty acids/fish oil (FISH OIL-OMEGA-3 FATTY ACIDS) 300-1,000 mg capsule, Take 1 capsule by mouth once daily., Disp: , Rfl:     UNABLE TO FIND, Take 2 capsules by mouth once daily. medication name: Digestive Advantage, Disp: , Rfl:     valsartan (DIOVAN) 160 MG tablet, Take 1 tablet (160 mg total) by mouth once daily., Disp: 90 tablet, Rfl: 3     There were no vitals filed for this visit.  Physical Exam:    GEN:   Well-appearing  Psych:  Appropriate affect, demonstrates insight  SKIN:  No rash on the face or bridge of the nose      LABS:   Lab Results   Component Value Date    HGB 11.5 (L) 08/08/2023    CO2 27 08/04/2023       RECORDS REVIEWED PREVIOUSLY:    No prior sleep testing.    ASSESSMENT    [place] Sleepiness Scale:  Sitting and reading:   3  Watching TV:    3  Passenger in a car x 1 hr:  3  Sitting quietly after lunch:  3  Lying down to rest in PM:   3  Sitting, inactive in public:  3  Sitting+ talking to someone:  2  Stopped in traffic:   1  Total    21/24    PROBLEM DESCRIPTION/ Sx on Presentation  STATUS   sx JULIO C   + snoring, no arousals, + witnessed apneas ( is witness to sleep)    New   Daytime Sx   + sleepiness when inactive   ESS 21/24 on intake  New   Insomnia   Trouble falling asleep: no  Maintenance:         wakes frequently, not difficult at all to return to sleep  Prior hypnotics:        Current hypnotics:     New   Other issues:     PLAN     -recommend sleep testing   -HST ordered  -discussed trial therapy if JULIO C present and the patient is open to a trial of CPAP therapy  -discussed JULIO C and CPAP with patient in detail, including possible complications of untreated JULIO C like heart attack/stroke  -advised on strict driving precautions; advised never to drive drowsy    Advised on plan of care. Answered all patient questions. Patient verbalized understanding and voiced agreement with plan of care.     RTC if dx of JULIO C made and CPAP ordered, will need follow up 31-90 days after receiving machine for compliance        The patient was given open opportunity to ask questions and/or express concerns about treatment plan. All questions/concerns were discussed.     Two patient identifiers used prior to evaluation.

## 2023-08-12 PROBLEM — D50.9 IRON DEFICIENCY ANEMIA: Status: ACTIVE | Noted: 2023-08-12

## 2023-08-12 PROBLEM — E78.49 OTHER HYPERLIPIDEMIA: Chronic | Status: ACTIVE | Noted: 2023-08-08

## 2023-08-12 PROBLEM — Z80.0 FAMILY HISTORY OF PANCREATIC CANCER: Chronic | Status: ACTIVE | Noted: 2020-09-10

## 2023-08-12 PROBLEM — R53.1 LEFT-SIDED WEAKNESS: Status: RESOLVED | Noted: 2022-12-01 | Resolved: 2023-08-12

## 2023-08-12 PROBLEM — E66.812 CLASS 2 SEVERE OBESITY DUE TO EXCESS CALORIES WITH SERIOUS COMORBIDITY AND BODY MASS INDEX (BMI) OF 37.0 TO 37.9 IN ADULT: Chronic | Status: ACTIVE | Noted: 2022-02-02

## 2023-08-12 PROBLEM — R00.2 PALPITATIONS: Status: RESOLVED | Noted: 2022-02-02 | Resolved: 2023-08-12

## 2023-08-12 PROBLEM — E66.01 CLASS 2 SEVERE OBESITY DUE TO EXCESS CALORIES WITH SERIOUS COMORBIDITY AND BODY MASS INDEX (BMI) OF 37.0 TO 37.9 IN ADULT: Chronic | Status: ACTIVE | Noted: 2022-02-02

## 2023-08-12 PROBLEM — R53.83 FATIGUE: Status: ACTIVE | Noted: 2023-08-08

## 2023-08-12 PROBLEM — I10 PRIMARY HYPERTENSION: Chronic | Status: ACTIVE | Noted: 2022-02-02

## 2023-08-12 PROBLEM — D50.9 IRON DEFICIENCY ANEMIA: Status: ACTIVE | Noted: 2023-08-08

## 2023-08-12 PROBLEM — I51.7 LVH (LEFT VENTRICULAR HYPERTROPHY): Status: RESOLVED | Noted: 2022-02-02 | Resolved: 2023-08-12

## 2023-08-12 PROBLEM — R53.83 FATIGUE: Status: ACTIVE | Noted: 2023-08-12

## 2023-08-12 NOTE — ASSESSMENT & PLAN NOTE
Up to date on skin and pancreatic cancer screenings, as well as on cervical cancer, colon cancer and breast cancer screenings.

## 2023-08-12 NOTE — ASSESSMENT & PLAN NOTE
ACC/AHA guidelines on blood pressure goals reviewed.  Reinforced correct way of measuring blood pressure.  BP ap[pears well controlled, continue current regimen.

## 2023-08-12 NOTE — ASSESSMENT & PLAN NOTE
The patient has multiple symptoms of/ risk factors for sleep apnea. The patient was strongly encouraged to make an appointment with sleep medicine, for further evaluation/testing.    Discussed with patient that sleep apnea can cause and is associated with multiple problems such as daytime tiredness, memory/concentration problems, morning headaches, acid reflux, mood swings or feelings of low mood, accidents when driving, sexual dysfunction, difficulty losing weight, elevated blood sugar/insulin resistance, elevated blood pressure/hypertension, increased urination at night, among other problems.  In addition I explained that if left untreated, sleep apnea can result in irregular heartbeat, including atrial fibrillation, heart failure, myocardial infarctions/heart attacks, stroke, and early death.

## 2023-08-12 NOTE — ASSESSMENT & PLAN NOTE
Discussed implications on prediabetes and risk of progression to diabetes.  Dietary changes discussed and encouraged- decreased carb intake- sweets, breads, rice, pasta, potato. Also advised to stop soft drinks.  Also encouraged lifestyle changes- increased physical activity.

## 2023-08-22 ENCOUNTER — TELEPHONE (OUTPATIENT)
Dept: GASTROENTEROLOGY | Facility: CLINIC | Age: 58
End: 2023-08-22
Payer: COMMERCIAL

## 2023-08-22 NOTE — TELEPHONE ENCOUNTER
MA spoke with patient. GI appointment scheduled for tomorrow.     Patient is calling with complaints of blood in her stool and abdominal pain.

## 2023-08-22 NOTE — TELEPHONE ENCOUNTER
----- Message from Olga Weber sent at 8/22/2023  1:40 PM CDT -----  Regarding: Self/ [phone]  Type: Patient Call Back    Who called:  Patient    What is the request in detail:  Patient would like a call from staff regarding an appt.  Thank you    Would the patient rather a call back or a response via [facility]?   Call back    Best call back number:  [phone]      Thank you

## 2023-08-23 ENCOUNTER — OFFICE VISIT (OUTPATIENT)
Dept: GASTROENTEROLOGY | Facility: CLINIC | Age: 58
End: 2023-08-23
Payer: COMMERCIAL

## 2023-08-23 VITALS
BODY MASS INDEX: 37.56 KG/M2 | WEIGHT: 220 LBS | SYSTOLIC BLOOD PRESSURE: 120 MMHG | DIASTOLIC BLOOD PRESSURE: 75 MMHG | HEART RATE: 75 BPM | HEIGHT: 64 IN

## 2023-08-23 DIAGNOSIS — E66.01 CLASS 2 SEVERE OBESITY DUE TO EXCESS CALORIES WITH SERIOUS COMORBIDITY AND BODY MASS INDEX (BMI) OF 37.0 TO 37.9 IN ADULT: Chronic | ICD-10-CM

## 2023-08-23 DIAGNOSIS — K59.04 CHRONIC IDIOPATHIC CONSTIPATION: ICD-10-CM

## 2023-08-23 DIAGNOSIS — K58.1 IRRITABLE BOWEL SYNDROME WITH CONSTIPATION: Primary | ICD-10-CM

## 2023-08-23 PROCEDURE — 99214 OFFICE O/P EST MOD 30 MIN: CPT | Mod: S$GLB,,, | Performed by: INTERNAL MEDICINE

## 2023-08-23 PROCEDURE — 3078F DIAST BP <80 MM HG: CPT | Mod: CPTII,S$GLB,, | Performed by: INTERNAL MEDICINE

## 2023-08-23 PROCEDURE — 3008F BODY MASS INDEX DOCD: CPT | Mod: CPTII,S$GLB,, | Performed by: INTERNAL MEDICINE

## 2023-08-23 PROCEDURE — 3066F NEPHROPATHY DOC TX: CPT | Mod: CPTII,S$GLB,, | Performed by: INTERNAL MEDICINE

## 2023-08-23 PROCEDURE — 3078F PR MOST RECENT DIASTOLIC BLOOD PRESSURE < 80 MM HG: ICD-10-PCS | Mod: CPTII,S$GLB,, | Performed by: INTERNAL MEDICINE

## 2023-08-23 PROCEDURE — 99214 PR OFFICE/OUTPT VISIT, EST, LEVL IV, 30-39 MIN: ICD-10-PCS | Mod: S$GLB,,, | Performed by: INTERNAL MEDICINE

## 2023-08-23 PROCEDURE — 4010F PR ACE/ARB THEARPY RXD/TAKEN: ICD-10-PCS | Mod: CPTII,S$GLB,, | Performed by: INTERNAL MEDICINE

## 2023-08-23 PROCEDURE — 99999 PR PBB SHADOW E&M-EST. PATIENT-LVL III: ICD-10-PCS | Mod: PBBFAC,,, | Performed by: INTERNAL MEDICINE

## 2023-08-23 PROCEDURE — 3008F PR BODY MASS INDEX (BMI) DOCUMENTED: ICD-10-PCS | Mod: CPTII,S$GLB,, | Performed by: INTERNAL MEDICINE

## 2023-08-23 PROCEDURE — 1159F MED LIST DOCD IN RCRD: CPT | Mod: CPTII,S$GLB,, | Performed by: INTERNAL MEDICINE

## 2023-08-23 PROCEDURE — 3044F HG A1C LEVEL LT 7.0%: CPT | Mod: CPTII,S$GLB,, | Performed by: INTERNAL MEDICINE

## 2023-08-23 PROCEDURE — 3074F PR MOST RECENT SYSTOLIC BLOOD PRESSURE < 130 MM HG: ICD-10-PCS | Mod: CPTII,S$GLB,, | Performed by: INTERNAL MEDICINE

## 2023-08-23 PROCEDURE — 3061F PR NEG MICROALBUMINURIA RESULT DOCUMENTED/REVIEW: ICD-10-PCS | Mod: CPTII,S$GLB,, | Performed by: INTERNAL MEDICINE

## 2023-08-23 PROCEDURE — 99999 PR PBB SHADOW E&M-EST. PATIENT-LVL III: CPT | Mod: PBBFAC,,, | Performed by: INTERNAL MEDICINE

## 2023-08-23 PROCEDURE — 1159F PR MEDICATION LIST DOCUMENTED IN MEDICAL RECORD: ICD-10-PCS | Mod: CPTII,S$GLB,, | Performed by: INTERNAL MEDICINE

## 2023-08-23 PROCEDURE — 3066F PR DOCUMENTATION OF TREATMENT FOR NEPHROPATHY: ICD-10-PCS | Mod: CPTII,S$GLB,, | Performed by: INTERNAL MEDICINE

## 2023-08-23 PROCEDURE — 3044F PR MOST RECENT HEMOGLOBIN A1C LEVEL <7.0%: ICD-10-PCS | Mod: CPTII,S$GLB,, | Performed by: INTERNAL MEDICINE

## 2023-08-23 PROCEDURE — 3074F SYST BP LT 130 MM HG: CPT | Mod: CPTII,S$GLB,, | Performed by: INTERNAL MEDICINE

## 2023-08-23 PROCEDURE — 4010F ACE/ARB THERAPY RXD/TAKEN: CPT | Mod: CPTII,S$GLB,, | Performed by: INTERNAL MEDICINE

## 2023-08-23 PROCEDURE — 3061F NEG MICROALBUMINURIA REV: CPT | Mod: CPTII,S$GLB,, | Performed by: INTERNAL MEDICINE

## 2023-08-23 RX ORDER — LINACLOTIDE 290 UG/1
290 CAPSULE, GELATIN COATED ORAL
Qty: 90 CAPSULE | Refills: 3 | Status: SHIPPED | OUTPATIENT
Start: 2023-08-23

## 2023-08-23 NOTE — PROGRESS NOTES
[facility]   Clinic Note              Patient Name: Mary Naranjo  Age: 58 y.o.  Sex: female  MRN: [id number]    TODAY'S DATE:  8/23/2023  REFERRING PROVIDER:  No ref. provider found     Diagnosis:   1. Irritable bowel syndrome with constipation    2. Class 2 severe obesity due to excess calories with serious comorbidity and body mass index (BMI) of 37.0 to 37.9 in adult    3. Chronic idiopathic constipation        HPI:  Mary Naranjo is a 58 y.o. female with HTN, class II obesity, IBS-C on linzess who was referred for evaluation and treatment of abdominal pain.    For review, patient was seen by Dr. Juarez 3 years ago to discuss pancreatic cancer screening.   She has since had follow-up EUS with Dr. Garcia last month and is self-referred today.    Today patient notes abdominal pain for the last week.  She does note prior diagnosis of IBS-C for which she was on linzess and ran out 2-3 weeks ago.  Also noted blood in stool and on TP this past week.  Denies constipation, typically has 1-2 Bms/day.       PRIOR ENDOSCOPY:  -Colonoscopy: 2020 at [facility].  5 year followup.  -EUS with Jose      ROS: Negative other than above      Outpatient Medications Marked as Taking for the 8/23/23 encounter (Office Visit) with Dennis Zamudio MD   Medication Sig Dispense Refill    cholecalciferol, vitamin D3, 50 mcg (2,000 unit) Chew Take 2,000 Units by mouth once daily. Per patient      hydroCHLOROthiazide (HYDRODIURIL) 25 MG tablet TAKE 1 TABLET BY MOUTH EVERY DAY 90 tablet 1    multivit/folic acid/vit K1 (ONE-A-DAY WOMEN'S 50 PLUS ORAL) Take 1 capsule by mouth once daily. Per patient, One-a-Day Women's 50+ Multivitamin Complete      omega-3 fatty acids/fish oil (FISH OIL-OMEGA-3 FATTY ACIDS) 300-1,000 mg capsule Take 1 capsule by mouth once daily.      valsartan (DIOVAN) 160 MG tablet Take 1 tablet (160 mg total) by mouth once daily. 90 tablet 3       Past Medical History:   Diagnosis Date    Arthritis     COVID-19  "02/08/2023    Dental bridge present     upper removable partial    Family history of pancreatic cancer     Fibrocystic breast     Migraines     Obesity     Pancreas cyst     Primary hypertension 02/02/2022           Vital Signs:  /75   Pulse 75   Ht 5' 4" (1.626 m)   Wt 99.8 kg (220 lb 0.3 oz)   LMP 01/15/2018   BMI 37.77 kg/m²      General: Awoke and orientedx3, in no acute distress  HEENT: Normocephalic, atruamatic, Moist mucous membranes  CV: Regular rate and rhythm, no JVD  Pulm: Normal inspiratory effort, no audible wheezing  Abdomen: nondistended abdomen   Extremities: No clubbing, cyanosis or edema  Psych: Normal affect. Good eye contact     Labs:   Lab Results   Component Value Date    CRP 10.0 (H) 06/18/2019       Lab Results   Component Value Date    YFSEVDIQ48PY 34 07/18/2016     Lab Results   Component Value Date    WBC 5.74 08/08/2023    HGB 11.5 (L) 08/08/2023    HCT 37.7 08/08/2023    MCV 80 (L) 08/08/2023     08/08/2023     Lab Results   Component Value Date    CREATININE 0.8 08/04/2023    ALBUMIN 4.1 08/04/2023    BILITOT 0.6 08/04/2023    ALKPHOS 83 08/04/2023    AST 17 08/04/2023    ALT 19 08/04/2023       Assessment/Plan:  Mary Naranjo is a 58 y.o. female with HTN, class II obesity, IBS-C on linzess who was referred for evaluation and treatment of abdominal pain.    # Irritable bowel syndrome with constipation [K58.1]    Crampy abdominal pain for the past week as well as some blood in her stool, which sounds to have started after she ran out of Linzess.  I offered that it is possible her mild constipation has resulted in both crampiness and straining causing hemorrhoidal bleeding, and that resuming Linzess may resolve both of these.    -- Resume Linzess 290 mcg daily; prescription sent    RTC 4 mo    Thank you for involving us in the care of this patient.        Dennis Zamudio MD  Department of Gastroenterology & Hepatology    "

## 2023-08-24 ENCOUNTER — DOCUMENTATION ONLY (OUTPATIENT)
Dept: PHARMACY | Facility: CLINIC | Age: 58
End: 2023-08-24
Payer: COMMERCIAL

## 2023-09-23 DIAGNOSIS — D50.9 IRON DEFICIENCY ANEMIA, UNSPECIFIED IRON DEFICIENCY ANEMIA TYPE: Primary | ICD-10-CM

## 2023-09-23 DIAGNOSIS — I10 PRIMARY HYPERTENSION: ICD-10-CM

## 2023-09-23 NOTE — TELEPHONE ENCOUNTER
No care due was identified.  [facility] Embedded Care Due Messages. Reference number: [id number].   9/23/2023 7:21:57 AM CDT

## 2023-09-24 RX ORDER — VALSARTAN 160 MG/1
160 TABLET ORAL
Qty: 90 TABLET | Refills: 3 | Status: SHIPPED | OUTPATIENT
Start: 2023-09-24 | End: 2024-02-15

## 2023-09-24 NOTE — TELEPHONE ENCOUNTER
Refill Decision Note   Mary Naranjo  is requesting a refill authorization.  Brief Assessment and Rationale for Refill:  Approve     Medication Therapy Plan:         Comments:     Note composed:1:47 AM 09/24/2023

## 2023-09-25 ENCOUNTER — HOSPITAL ENCOUNTER (OUTPATIENT)
Dept: SLEEP MEDICINE | Facility: HOSPITAL | Age: 58
Discharge: HOME | End: 2023-09-25
Attending: PHYSICIAN ASSISTANT
Payer: COMMERCIAL

## 2023-09-25 DIAGNOSIS — G47.10 HYPERSOMNOLENCE: ICD-10-CM

## 2023-09-25 DIAGNOSIS — R06.81 WITNESSED EPISODE OF APNEA: ICD-10-CM

## 2023-09-25 DIAGNOSIS — F51.09 OTHER INSOMNIA NOT DUE TO A SUBSTANCE OR KNOWN PHYSIOLOGICAL CONDITION: ICD-10-CM

## 2023-09-25 DIAGNOSIS — R06.83 SNORING: ICD-10-CM

## 2023-09-25 DIAGNOSIS — R53.83 FATIGUE, UNSPECIFIED TYPE: ICD-10-CM

## 2023-09-25 PROCEDURE — 95800 SLP STDY UNATTENDED: CPT

## 2023-09-27 PROCEDURE — 95806 SLEEP STUDY UNATT&RESP EFFT: CPT | Mod: 26,,, | Performed by: INTERNAL MEDICINE

## 2023-09-27 PROCEDURE — 95806 PR SLEEP STUDY, UNATTENDED, SIMUL RECORD HR/O2 SAT/RESP FLOW/RESP EFFT: ICD-10-PCS | Mod: 26,,, | Performed by: INTERNAL MEDICINE

## 2023-10-02 ENCOUNTER — PATIENT MESSAGE (OUTPATIENT)
Dept: SLEEP MEDICINE | Facility: CLINIC | Age: 58
End: 2023-10-02
Payer: COMMERCIAL

## 2023-10-02 DIAGNOSIS — G47.33 OSA (OBSTRUCTIVE SLEEP APNEA): Primary | ICD-10-CM

## 2023-10-08 DIAGNOSIS — I10 PRIMARY HYPERTENSION: ICD-10-CM

## 2023-10-08 RX ORDER — HYDROCHLOROTHIAZIDE 25 MG/1
TABLET ORAL
Qty: 90 TABLET | Refills: 3 | Status: SHIPPED | OUTPATIENT
Start: 2023-10-08 | End: 2024-02-15

## 2023-10-08 NOTE — TELEPHONE ENCOUNTER
No care due was identified.  [facility] Embedded Care Due Messages. Reference number: [id number].   10/08/2023 7:16:41 AM CDT

## 2023-10-08 NOTE — TELEPHONE ENCOUNTER
Refill Decision Note   Mary Naranjo  is requesting a refill authorization.  Brief Assessment and Rationale for Refill:  Approve     Medication Therapy Plan:         Comments:     Note composed:1:39 PM 10/08/2023             Appointments     Last Visit   8/8/2023 Ramon Olivares Jr., MD   Next Visit   2/15/2024 Ramon Olivares Jr., MD

## 2023-10-24 ENCOUNTER — OFFICE VISIT (OUTPATIENT)
Dept: ORTHOPEDICS | Facility: CLINIC | Age: 58
End: 2023-10-24
Payer: COMMERCIAL

## 2023-10-24 ENCOUNTER — HOSPITAL ENCOUNTER (OUTPATIENT)
Dept: RADIOLOGY | Facility: HOSPITAL | Age: 58
Discharge: HOME | End: 2023-10-24
Attending: PHYSICIAN ASSISTANT
Payer: COMMERCIAL

## 2023-10-24 VITALS — WEIGHT: 225.19 LBS | BODY MASS INDEX: 38.44 KG/M2 | HEIGHT: 64 IN

## 2023-10-24 DIAGNOSIS — M17.11 PRIMARY OSTEOARTHRITIS OF RIGHT KNEE: Primary | ICD-10-CM

## 2023-10-24 DIAGNOSIS — M75.31 CALCIFIC TENDONITIS OF RIGHT SHOULDER: ICD-10-CM

## 2023-10-24 DIAGNOSIS — M25.511 ACUTE PAIN OF RIGHT SHOULDER: ICD-10-CM

## 2023-10-24 DIAGNOSIS — M17.12 PRIMARY OSTEOARTHRITIS OF LEFT KNEE: ICD-10-CM

## 2023-10-24 DIAGNOSIS — M25.561 ACUTE PAIN OF RIGHT KNEE: ICD-10-CM

## 2023-10-24 PROCEDURE — 99214 OFFICE O/P EST MOD 30 MIN: CPT | Mod: 25,S$GLB,, | Performed by: PHYSICIAN ASSISTANT

## 2023-10-24 PROCEDURE — 73560 XR KNEE ORTHO RIGHT: ICD-10-PCS | Mod: 26,LT,, | Performed by: RADIOLOGY

## 2023-10-24 PROCEDURE — 3044F HG A1C LEVEL LT 7.0%: CPT | Mod: CPTII,S$GLB,, | Performed by: PHYSICIAN ASSISTANT

## 2023-10-24 PROCEDURE — 20610 LARGE JOINT ASPIRATION/INJECTION: R SUBACROMIAL BURSA: ICD-10-PCS | Mod: 50,S$GLB,, | Performed by: PHYSICIAN ASSISTANT

## 2023-10-24 PROCEDURE — 1160F PR REVIEW ALL MEDS BY PRESCRIBER/CLIN PHARMACIST DOCUMENTED: ICD-10-PCS | Mod: CPTII,S$GLB,, | Performed by: PHYSICIAN ASSISTANT

## 2023-10-24 PROCEDURE — 1159F MED LIST DOCD IN RCRD: CPT | Mod: CPTII,S$GLB,, | Performed by: PHYSICIAN ASSISTANT

## 2023-10-24 PROCEDURE — 73560 X-RAY EXAM OF KNEE 1 OR 2: CPT | Mod: 26,LT,, | Performed by: RADIOLOGY

## 2023-10-24 PROCEDURE — 3008F PR BODY MASS INDEX (BMI) DOCUMENTED: ICD-10-PCS | Mod: CPTII,S$GLB,, | Performed by: PHYSICIAN ASSISTANT

## 2023-10-24 PROCEDURE — 3066F PR DOCUMENTATION OF TREATMENT FOR NEPHROPATHY: ICD-10-PCS | Mod: CPTII,S$GLB,, | Performed by: PHYSICIAN ASSISTANT

## 2023-10-24 PROCEDURE — 99999 PR PBB SHADOW E&M-EST. PATIENT-LVL III: ICD-10-PCS | Mod: PBBFAC,,, | Performed by: PHYSICIAN ASSISTANT

## 2023-10-24 PROCEDURE — 73030 XR SHOULDER TRAUMA 3 VIEW RIGHT: ICD-10-PCS | Mod: 26,RT,, | Performed by: RADIOLOGY

## 2023-10-24 PROCEDURE — 73560 X-RAY EXAM OF KNEE 1 OR 2: CPT | Mod: TC,LT

## 2023-10-24 PROCEDURE — 1160F RVW MEDS BY RX/DR IN RCRD: CPT | Mod: CPTII,S$GLB,, | Performed by: PHYSICIAN ASSISTANT

## 2023-10-24 PROCEDURE — 3061F PR NEG MICROALBUMINURIA RESULT DOCUMENTED/REVIEW: ICD-10-PCS | Mod: CPTII,S$GLB,, | Performed by: PHYSICIAN ASSISTANT

## 2023-10-24 PROCEDURE — 1159F PR MEDICATION LIST DOCUMENTED IN MEDICAL RECORD: ICD-10-PCS | Mod: CPTII,S$GLB,, | Performed by: PHYSICIAN ASSISTANT

## 2023-10-24 PROCEDURE — 3061F NEG MICROALBUMINURIA REV: CPT | Mod: CPTII,S$GLB,, | Performed by: PHYSICIAN ASSISTANT

## 2023-10-24 PROCEDURE — 3008F BODY MASS INDEX DOCD: CPT | Mod: CPTII,S$GLB,, | Performed by: PHYSICIAN ASSISTANT

## 2023-10-24 PROCEDURE — 99214 PR OFFICE/OUTPT VISIT, EST, LEVL IV, 30-39 MIN: ICD-10-PCS | Mod: 25,S$GLB,, | Performed by: PHYSICIAN ASSISTANT

## 2023-10-24 PROCEDURE — 3066F NEPHROPATHY DOC TX: CPT | Mod: CPTII,S$GLB,, | Performed by: PHYSICIAN ASSISTANT

## 2023-10-24 PROCEDURE — 4010F PR ACE/ARB THEARPY RXD/TAKEN: ICD-10-PCS | Mod: CPTII,S$GLB,, | Performed by: PHYSICIAN ASSISTANT

## 2023-10-24 PROCEDURE — 99999 PR PBB SHADOW E&M-EST. PATIENT-LVL III: CPT | Mod: PBBFAC,,, | Performed by: PHYSICIAN ASSISTANT

## 2023-10-24 PROCEDURE — 20610 DRAIN/INJ JOINT/BURSA W/O US: CPT | Mod: 50,S$GLB,, | Performed by: PHYSICIAN ASSISTANT

## 2023-10-24 PROCEDURE — 73030 X-RAY EXAM OF SHOULDER: CPT | Mod: 26,RT,, | Performed by: RADIOLOGY

## 2023-10-24 PROCEDURE — 3044F PR MOST RECENT HEMOGLOBIN A1C LEVEL <7.0%: ICD-10-PCS | Mod: CPTII,S$GLB,, | Performed by: PHYSICIAN ASSISTANT

## 2023-10-24 PROCEDURE — 73562 XR KNEE ORTHO RIGHT: ICD-10-PCS | Mod: 26,RT,, | Performed by: RADIOLOGY

## 2023-10-24 PROCEDURE — 73562 X-RAY EXAM OF KNEE 3: CPT | Mod: 26,RT,, | Performed by: RADIOLOGY

## 2023-10-24 PROCEDURE — 4010F ACE/ARB THERAPY RXD/TAKEN: CPT | Mod: CPTII,S$GLB,, | Performed by: PHYSICIAN ASSISTANT

## 2023-10-24 PROCEDURE — 73030 X-RAY EXAM OF SHOULDER: CPT | Mod: TC,RT

## 2023-10-24 RX ORDER — LIDOCAINE HYDROCHLORIDE 10 MG/ML
2 INJECTION INFILTRATION; PERINEURAL
Status: DISCONTINUED | OUTPATIENT
Start: 2023-10-24 | End: 2023-10-24 | Stop reason: HOSPADM

## 2023-10-24 RX ORDER — TRIAMCINOLONE ACETONIDE 40 MG/ML
40 INJECTION, SUSPENSION INTRA-ARTICULAR; INTRAMUSCULAR
Status: DISCONTINUED | OUTPATIENT
Start: 2023-10-24 | End: 2023-10-24 | Stop reason: HOSPADM

## 2023-10-24 RX ADMIN — LIDOCAINE HYDROCHLORIDE 2 ML: 10 INJECTION INFILTRATION; PERINEURAL at 08:10

## 2023-10-24 RX ADMIN — TRIAMCINOLONE ACETONIDE 40 MG: 40 INJECTION, SUSPENSION INTRA-ARTICULAR; INTRAMUSCULAR at 08:10

## 2023-10-24 NOTE — PROCEDURES
Large Joint Aspiration/Injection: L knee    Date/Time: 10/24/2023 8:00 AM    Performed by: Tania Lopes PA-C  Authorized by: Tania Lopes PA-C    Consent Done?:  Yes (Verbal)  Indications:  Pain  Timeout: prior to procedure the correct patient, procedure, and site was verified    Prep: patient was prepped and draped in usual sterile fashion    Local anesthetic:  Topical anesthetic    Details:  Needle Size:  22 G  Approach:  Anterolateral  Location:  Knee  Site:  L knee  Medications:  40 mg triamcinolone acetonide 40 mg/mL; 2 mL LIDOcaine HCL 10 mg/ml (1%) 10 mg/mL (1 %)  Patient tolerance:  Patient tolerated the procedure well with no immediate complications

## 2023-10-24 NOTE — PROCEDURES
Large Joint Aspiration/Injection: R subacromial bursa    Date/Time: 10/24/2023 8:00 AM    Performed by: Tania Lopes PA-C  Authorized by: Tania Lopes PA-C    Consent Done?:  Yes (Verbal)  Indications:  Pain  Timeout: prior to procedure the correct patient, procedure, and site was verified    Prep: patient was prepped and draped in usual sterile fashion    Local anesthetic:  Topical anesthetic    Details:  Needle Size:  22 G  Approach:  Posterior  Location:  Shoulder  Site:  R subacromial bursa  Medications:  40 mg triamcinolone acetonide 40 mg/mL; 2 mL LIDOcaine HCL 10 mg/ml (1%) 10 mg/mL (1 %)  Patient tolerance:  Patient tolerated the procedure well with no immediate complications

## 2023-10-24 NOTE — PROGRESS NOTES
SUBJECTIVE:     Chief Complaint   Patient presents with    Right Knee - Pain, Injections    Left Knee - Pain, Injections    Right Shoulder - Pain       History of Present Illness:  Mary Naranjo is a 58 y.o. year old female here with complaints of constant right knee pain which started recently.  She had surgery in 2015 with Dr. Swann- right knee arthroscopy.   Her left knee has also started bothering her again. Her left knee is worse than the right. She had some relief with CSI done in 2022.  This lasted until recently. Her knee pain is worse with activity, standing, walking.  She takes tylenol- this does not help much.  She is now complaining of right shoulder pain. She feels like it has bothered her since recent vaccine about one month ago. The pain is in the upper part of her arm. The pain is worse with activity, reaching, lifting.    Review of patient's allergies indicates:   Allergen Reactions    Dilaudid [hydromorphone] Other (See Comments)     Hallucination         Current Outpatient Medications   Medication Sig Dispense Refill    cholecalciferol, vitamin D3, 50 mcg (2,000 unit) Chew Take 2,000 Units by mouth once daily. Per patient      hydroCHLOROthiazide (HYDRODIURIL) 25 MG tablet TAKE 1 TABLET BY MOUTH EVERY DAY (Patient not taking: Reported on 10/24/2023) 90 tablet 3    LINZESS 290 mcg Cap capsule Take 1 capsule (290 mcg total) by mouth before breakfast. (Patient not taking: Reported on 10/24/2023) 90 capsule 3    multivit/folic acid/vit K1 (ONE-A-DAY WOMEN'S 50 PLUS ORAL) Take 1 capsule by mouth once daily. Per patient, One-a-Day Women's 50+ Multivitamin Complete      omega-3 fatty acids/fish oil (FISH OIL-OMEGA-3 FATTY ACIDS) 300-1,000 mg capsule Take 1 capsule by mouth once daily.      UNABLE TO FIND Take 2 capsules by mouth once daily. medication name: Digestive Advantage      valsartan (DIOVAN) 160 MG tablet TAKE 1 TABLET BY MOUTH ONCE DAILY. (Patient not taking: Reported on 10/24/2023) 90  "tablet 3     No current facility-administered medications for this visit.       Past Medical History:   Diagnosis Date    Arthritis     COVID-19 02/08/2023    Dental bridge present     upper removable partial    Family history of pancreatic cancer     Fibrocystic breast     Migraines     Obesity     Pancreas cyst     Primary hypertension 02/02/2022       Past Surgical History:   Procedure Laterality Date    BREAST BIOPSY      BREAST CYST ASPIRATION      BREAST SURGERY      Dont remember    COLONOSCOPY N/A 10/07/2015    Procedure: COLONOSCOPY;  Surgeon: Eagle Stack MD;  Location: [facility];  Service: Endoscopy;  Laterality: N/A;    DILATION AND CURETTAGE OF UTERUS      ENDOSCOPIC ULTRASOUND OF UPPER GASTROINTESTINAL TRACT N/A 11/03/2020    Procedure: ULTRASOUND, UPPER GI TRACT, ENDOSCOPIC;  Surgeon: Kei Juarez MD;  Location: [facility] (2ND FLR);  Service: Endoscopy;  Laterality: N/A;  Covid-19 test 10/31/20 at [facility]    ENDOSCOPIC ULTRASOUND OF UPPER GASTROINTESTINAL TRACT N/A 07/14/2023    Procedure: ULTRASOUND, UPPER GI TRACT, ENDOSCOPIC;  Surgeon: Burak Garcia MD;  Location: [facility] (2ND FLR);  Service: Endoscopy;  Laterality: N/A;  6/15/23-Instructions via portal-DS  6/26 pre call complete  pre call 7/10, pt confirmed - mf    ESOPHAGOGASTRODUODENOSCOPY N/A 07/19/2019    Procedure: ESOPHAGOGASTRODUODENOSCOPY (EGD);  Surgeon: Cherrie Sheets MD;  Location: [facility];  Service: Endoscopy;  Laterality: N/A;    EYE SURGERY  December 2021    Cateract    fibrocystic breast       KNEE ARTHROSCOPY W/ MENISCAL REPAIR      TUBAL LIGATION           Review of Systems:  ROS:  Constitutional: no fever or chills  Eyes: no visual changes  ENT: no nasal congestion or sore throat  Respiratory: no cough or shortness of breath  Musculoskeletal: no arthralgias or myalgias      OBJECTIVE:     PHYSICAL EXAM:  Height: 5' 4.02" (162.6 cm) Weight: 102.2 kg (225 lb 3.2 oz)   General: Well developed, well nourished, in no " acute distress.  Neurological: Mood & affect are normal.  HEENT: NCAT, sclera nonicteric   Lungs: Respirations are equal and unlabored.   CV: 2+ bilateral upper and lower extremity pulses.   Skin: Intact throughout with no rashes, erythema, or lesions  Extremities: No LE edema, no erythema or warmth of the skin in either lower extremity.    bilateral Knee Exam:  Knee Range of Motion: 0-130   Effusion: none  Condition of skin: intact  Location of tenderness: Medial joint line   Strength: 5 of 5 quadriceps strength and 5 of 5 hamstring strength  Stability:  stable to testing  Varus /Valgus stress:  normal    Bilateral Hip Examination:  full painless range of motion, without tenderness    Right shoulder  Skin intact  No warmth or effusion  TTP anterior shoulder  ROM is painful  , ER 40, IR L5  + impingement  + castillo  + jobes  - drop arm    IMAGING:    X-rays of the right knee, personally reviewed by me, demonstrate mild degenerative changes, osteophyte formation.  No fracture or dislocation.     X-rays of the right shoulder, personally reviewed by me, demonstrate area of calcification below acromion.  No fracture or dislocation.       ASSESSMENT     1. Primary osteoarthritis of right knee    2. Calcific tendonitis of right shoulder    3. Primary osteoarthritis of left knee      PLAN:     We discussed with the patient at length all the different treatment options available for the knee including NSAIDS, acetaminophen, rest, ice, knee strengthening exercise, steroid injections for temporary relief, Viscosupplementation, and knee arthroplasty.     - Discussed options for knee arthritis including rest, HEP, activity modification, occasional NSAIDS or tylenol  - Left knee CSI performed today- has helped in the past  - Right shoulder- CSI performed today.  Recommend reset, ice as needed.  Will monitor for improvement- if symptoms not better recommend PT   - Follow up if symptoms worsen or fail to improve

## 2023-11-06 ENCOUNTER — HOSPITAL ENCOUNTER (OUTPATIENT)
Dept: RADIOLOGY | Facility: HOSPITAL | Age: 58
Discharge: HOME | End: 2023-11-06
Attending: FAMILY MEDICINE
Payer: COMMERCIAL

## 2023-11-06 ENCOUNTER — OFFICE VISIT (OUTPATIENT)
Dept: OBSTETRICS AND GYNECOLOGY | Facility: CLINIC | Age: 58
End: 2023-11-06
Payer: COMMERCIAL

## 2023-11-06 VITALS
BODY MASS INDEX: 38.45 KG/M2 | WEIGHT: 224.13 LBS | DIASTOLIC BLOOD PRESSURE: 78 MMHG | SYSTOLIC BLOOD PRESSURE: 120 MMHG

## 2023-11-06 DIAGNOSIS — Z12.31 ENCOUNTER FOR SCREENING MAMMOGRAM FOR MALIGNANT NEOPLASM OF BREAST: ICD-10-CM

## 2023-11-06 DIAGNOSIS — Z01.419 WELL WOMAN EXAM WITH ROUTINE GYNECOLOGICAL EXAM: Primary | ICD-10-CM

## 2023-11-06 DIAGNOSIS — Z00.00 HEALTH MAINTENANCE EXAMINATION: ICD-10-CM

## 2023-11-06 DIAGNOSIS — Z12.31 BREAST CANCER SCREENING BY MAMMOGRAM: ICD-10-CM

## 2023-11-06 PROCEDURE — 77067 SCR MAMMO BI INCL CAD: CPT | Mod: TC

## 2023-11-06 PROCEDURE — 3074F PR MOST RECENT SYSTOLIC BLOOD PRESSURE < 130 MM HG: ICD-10-PCS | Mod: CPTII,S$GLB,, | Performed by: OBSTETRICS & GYNECOLOGY

## 2023-11-06 PROCEDURE — 3061F NEG MICROALBUMINURIA REV: CPT | Mod: CPTII,S$GLB,, | Performed by: OBSTETRICS & GYNECOLOGY

## 2023-11-06 PROCEDURE — 3078F PR MOST RECENT DIASTOLIC BLOOD PRESSURE < 80 MM HG: ICD-10-PCS | Mod: CPTII,S$GLB,, | Performed by: OBSTETRICS & GYNECOLOGY

## 2023-11-06 PROCEDURE — 77063 BREAST TOMOSYNTHESIS BI: CPT | Mod: 26,,, | Performed by: RADIOLOGY

## 2023-11-06 PROCEDURE — 1159F MED LIST DOCD IN RCRD: CPT | Mod: CPTII,S$GLB,, | Performed by: OBSTETRICS & GYNECOLOGY

## 2023-11-06 PROCEDURE — 3008F PR BODY MASS INDEX (BMI) DOCUMENTED: ICD-10-PCS | Mod: CPTII,S$GLB,, | Performed by: OBSTETRICS & GYNECOLOGY

## 2023-11-06 PROCEDURE — 87624 HPV HI-RISK TYP POOLED RSLT: CPT | Performed by: OBSTETRICS & GYNECOLOGY

## 2023-11-06 PROCEDURE — 1160F PR REVIEW ALL MEDS BY PRESCRIBER/CLIN PHARMACIST DOCUMENTED: ICD-10-PCS | Mod: CPTII,S$GLB,, | Performed by: OBSTETRICS & GYNECOLOGY

## 2023-11-06 PROCEDURE — 1159F PR MEDICATION LIST DOCUMENTED IN MEDICAL RECORD: ICD-10-PCS | Mod: CPTII,S$GLB,, | Performed by: OBSTETRICS & GYNECOLOGY

## 2023-11-06 PROCEDURE — 3044F HG A1C LEVEL LT 7.0%: CPT | Mod: CPTII,S$GLB,, | Performed by: OBSTETRICS & GYNECOLOGY

## 2023-11-06 PROCEDURE — 99396 PREV VISIT EST AGE 40-64: CPT | Mod: S$GLB,,, | Performed by: OBSTETRICS & GYNECOLOGY

## 2023-11-06 PROCEDURE — 3066F NEPHROPATHY DOC TX: CPT | Mod: CPTII,S$GLB,, | Performed by: OBSTETRICS & GYNECOLOGY

## 2023-11-06 PROCEDURE — 77067 SCR MAMMO BI INCL CAD: CPT | Mod: 26,,, | Performed by: RADIOLOGY

## 2023-11-06 PROCEDURE — 3074F SYST BP LT 130 MM HG: CPT | Mod: CPTII,S$GLB,, | Performed by: OBSTETRICS & GYNECOLOGY

## 2023-11-06 PROCEDURE — 99396 PR PREVENTIVE VISIT,EST,40-64: ICD-10-PCS | Mod: S$GLB,,, | Performed by: OBSTETRICS & GYNECOLOGY

## 2023-11-06 PROCEDURE — 3061F PR NEG MICROALBUMINURIA RESULT DOCUMENTED/REVIEW: ICD-10-PCS | Mod: CPTII,S$GLB,, | Performed by: OBSTETRICS & GYNECOLOGY

## 2023-11-06 PROCEDURE — 1160F RVW MEDS BY RX/DR IN RCRD: CPT | Mod: CPTII,S$GLB,, | Performed by: OBSTETRICS & GYNECOLOGY

## 2023-11-06 PROCEDURE — 77067 MAMMO DIGITAL SCREENING BILAT WITH TOMO: ICD-10-PCS | Mod: 26,,, | Performed by: RADIOLOGY

## 2023-11-06 PROCEDURE — 99999 PR PBB SHADOW E&M-EST. PATIENT-LVL III: CPT | Mod: PBBFAC,,, | Performed by: OBSTETRICS & GYNECOLOGY

## 2023-11-06 PROCEDURE — 77063 MAMMO DIGITAL SCREENING BILAT WITH TOMO: ICD-10-PCS | Mod: 26,,, | Performed by: RADIOLOGY

## 2023-11-06 PROCEDURE — 3008F BODY MASS INDEX DOCD: CPT | Mod: CPTII,S$GLB,, | Performed by: OBSTETRICS & GYNECOLOGY

## 2023-11-06 PROCEDURE — 3078F DIAST BP <80 MM HG: CPT | Mod: CPTII,S$GLB,, | Performed by: OBSTETRICS & GYNECOLOGY

## 2023-11-06 PROCEDURE — 3066F PR DOCUMENTATION OF TREATMENT FOR NEPHROPATHY: ICD-10-PCS | Mod: CPTII,S$GLB,, | Performed by: OBSTETRICS & GYNECOLOGY

## 2023-11-06 PROCEDURE — 88175 CYTOPATH C/V AUTO FLUID REDO: CPT | Performed by: OBSTETRICS & GYNECOLOGY

## 2023-11-06 PROCEDURE — 4010F PR ACE/ARB THEARPY RXD/TAKEN: ICD-10-PCS | Mod: CPTII,S$GLB,, | Performed by: OBSTETRICS & GYNECOLOGY

## 2023-11-06 PROCEDURE — 4010F ACE/ARB THERAPY RXD/TAKEN: CPT | Mod: CPTII,S$GLB,, | Performed by: OBSTETRICS & GYNECOLOGY

## 2023-11-06 PROCEDURE — 3044F PR MOST RECENT HEMOGLOBIN A1C LEVEL <7.0%: ICD-10-PCS | Mod: CPTII,S$GLB,, | Performed by: OBSTETRICS & GYNECOLOGY

## 2023-11-06 PROCEDURE — 99999 PR PBB SHADOW E&M-EST. PATIENT-LVL III: ICD-10-PCS | Mod: PBBFAC,,, | Performed by: OBSTETRICS & GYNECOLOGY

## 2023-11-06 NOTE — PROGRESS NOTES
History & Physical  Gynecology      SUBJECTIVE:     Chief Complaint: Annual Exam       History of Present Illness:  Annual Exam-Postmenopausal  Ms. Naranjo is a 57 y/o female who presents for annual exam. The patient has no complaints today. The patient is sexually active with her . GYN screening history: last pap: approximate date 2020 and was normal and last mammogram: approximate date 2022 and was normal.     The patient has never been taking hormone replacement therapy. Patient denies post-menopausal vaginal bleeding. The patient wears seatbelts: yes. The patient participates in regular exercise: no. Has the patient ever been transfused or tattooed?: not asked. The patient reports that there is not domestic violence in her life.         Review of patient's allergies indicates:   Allergen Reactions    Dilaudid [hydromorphone] Other (See Comments)     Hallucination       Past Medical History:   Diagnosis Date    Arthritis     COVID-19 02/08/2023    Dental bridge present     upper removable partial    Family history of pancreatic cancer     Fibrocystic breast     Migraines     Obesity     Pancreas cyst     Primary hypertension 02/02/2022     Past Surgical History:   Procedure Laterality Date    BREAST BIOPSY      BREAST CYST ASPIRATION      BREAST SURGERY      Dont remember    COLONOSCOPY N/A 10/07/2015    Procedure: COLONOSCOPY;  Surgeon: Eagle Stack MD;  Location: [facility];  Service: Endoscopy;  Laterality: N/A;    DILATION AND CURETTAGE OF UTERUS      ENDOSCOPIC ULTRASOUND OF UPPER GASTROINTESTINAL TRACT N/A 11/03/2020    Procedure: ULTRASOUND, UPPER GI TRACT, ENDOSCOPIC;  Surgeon: Kei Juarez MD;  Location: [facility] ([address] [place] [address]);  Service: Endoscopy;  Laterality: N/A;  Covid-19 test 10/31/20 at [facility]    ENDOSCOPIC ULTRASOUND OF UPPER GASTROINTESTINAL TRACT N/A 07/14/2023    Procedure: ULTRASOUND, UPPER GI TRACT, ENDOSCOPIC;  Surgeon: Burak Garcia MD;  Location: [facility] ([address] [place] [address]);   Service: Endoscopy;  Laterality: N/A;  6/15/23-Instructions via portal-DS   pre call complete  pre call 7/10, pt confirmed - mf    ESOPHAGOGASTRODUODENOSCOPY N/A 2019    Procedure: ESOPHAGOGASTRODUODENOSCOPY (EGD);  Surgeon: Cherrie Sheets MD;  Location: [facility];  Service: Endoscopy;  Laterality: N/A;    EYE SURGERY  2021    Cateract    fibrocystic breast       KNEE ARTHROSCOPY W/ MENISCAL REPAIR      TUBAL LIGATION       OB History          2    Para   2    Term   2            AB        Living   2         SAB        IAB        Ectopic        Multiple        Live Births               Obstetric Comments   Menopause @ age 45  MMG 2017 NEG  H/o abnormal pap, no treatment needed.  Pap neg 2017  c-scope in                Family History   Problem Relation Age of Onset    Heart disease Mother     Cataracts Mother     Glaucoma Mother     Cancer Father         Prostate, lung, colon, brain, gallbladder, skin    Colon cancer Father     Pancreatic cancer Sister 54    Breast cancer Sister 42        unilateral    Cancer Sister 40        Breast    Cancer Sister         Pancreatic    No Known Problems Brother     Asthma Maternal Aunt     Heart disease Maternal Aunt     Heart disease Maternal Aunt     No Known Problems Maternal Grandmother     No Known Problems Maternal Grandfather     No Known Problems Paternal Grandmother     No Known Problems Paternal Grandfather     Cancer Maternal Cousin         unknown origin    Breast cancer Maternal Cousin     Cancer Maternal Cousin         prostate?    Prostate cancer Maternal Cousin     Cervical cancer Other     Cancer Other         possible ovarian cancer    COPD Neg Hx     Amblyopia Neg Hx     Blindness Neg Hx     Diabetes Neg Hx     Hypertension Neg Hx     Macular degeneration Neg Hx     Retinal detachment Neg Hx     Strabismus Neg Hx     Stroke Neg Hx     Thyroid disease Neg Hx      Social History     Tobacco Use    Smoking status: Never     Smokeless tobacco: Never   Substance Use Topics    Alcohol use: No    Drug use: No       Current Outpatient Medications   Medication Sig    cholecalciferol, vitamin D3, 50 mcg (2,000 unit) Chew Take 2,000 Units by mouth once daily. Per patient    multivit/folic acid/vit K1 (ONE-A-DAY WOMEN'S 50 PLUS ORAL) Take 1 capsule by mouth once daily. Per patient, One-a-Day Women's 50+ Multivitamin Complete    omega-3 fatty acids/fish oil (FISH OIL-OMEGA-3 FATTY ACIDS) 300-1,000 mg capsule Take 1 capsule by mouth once daily.    UNABLE TO FIND Take 2 capsules by mouth once daily. medication name: Digestive Advantage    hydroCHLOROthiazide (HYDRODIURIL) 25 MG tablet TAKE 1 TABLET BY MOUTH EVERY DAY (Patient not taking: Reported on 10/24/2023)    LINZESS 290 mcg Cap capsule Take 1 capsule (290 mcg total) by mouth before breakfast. (Patient not taking: Reported on 10/24/2023)    valsartan (DIOVAN) 160 MG tablet TAKE 1 TABLET BY MOUTH ONCE DAILY. (Patient not taking: Reported on 10/24/2023)     No current facility-administered medications for this visit.         Review of Systems:  Review of Systems   Constitutional:  Negative for chills and fever.   Eyes:  Negative for visual disturbance.   Respiratory:  Negative for cough and wheezing.    Cardiovascular:  Negative for chest pain and palpitations.   Gastrointestinal:  Negative for abdominal pain, nausea and vomiting.   Genitourinary:  Negative for dysuria, frequency, hematuria, pelvic pain, vaginal bleeding, vaginal discharge and vaginal pain.   Neurological:  Negative for headaches.   Psychiatric/Behavioral:  Negative for depression.         OBJECTIVE:     Physical Exam:  Physical Exam  Vitals and nursing note reviewed. Exam conducted with a chaperone present.   Constitutional:       Appearance: She is well-developed.   Cardiovascular:      Rate and Rhythm: Normal rate.   Pulmonary:      Effort: Pulmonary effort is normal. No respiratory distress.   Chest:   Breasts:     Breasts  are symmetrical.   Abdominal:      General: There is no distension.      Palpations: Abdomen is soft.      Tenderness: There is no abdominal tenderness.   Genitourinary:     Vagina: No vaginal discharge.   Skin:     General: Skin is warm and dry.   Neurological:      Mental Status: She is alert and oriented to person, place, and time.       ASSESSMENT:       ICD-10-CM ICD-9-CM    1. Well woman exam with routine gynecological exam  Z01.419 V72.31 Liquid-Based Pap Smear, Screening      HPV High Risk Genotypes, PCR      2. Breast cancer screening by mammogram  Z12.31 V76.12         Plan:      Mary was seen today for annual exam.    Diagnoses and all orders for this visit:    Well woman exam with routine gynecological exam  -     Liquid-Based Pap Smear, Screening  -     HPV High Risk Genotypes, PCR  - Mammogram scheduled for today    Breast cancer screening by mammogram  - Mammogram ordered 2015      Orders Placed This Encounter   Procedures    HPV High Risk Genotypes, PCR       Follow up in about 1 year (around 11/6/2024) for Well Woman/Annual.    Counseling time: 15 minutes    Araceli Asencio

## 2023-11-13 LAB
FINAL PATHOLOGIC DIAGNOSIS: NORMAL
Lab: NORMAL

## 2023-11-15 NOTE — PROGRESS NOTES
"Denton Dodd presents to [facility] with complaints of left ankle / foot swelling.      History of Present Illness  This is a 37-year-old female that presents today with complaints of left ankle / foot swelling.     She states she first noticed this swelling back the end of July / beginning of August when she returned from a trip to [place]. She states she came to the office at that time for new onset swelling / pain in her foot and had a full work up to include imaging which showed soft tissue swelling. She was given anti-inflammatories and steroids and states her symptoms seemed to have resolved.   Just recently she just noticed the left ankle / foot swelling again. She states she only ever has swelling in the left foot / ankle and never in the right. She states being on her feet for long periods induces the swelling. She is not currently having any pain.     She has Hypertension and is currently taking Nifedipine daily for treatment. She states she has been on this medication since 2018 with no prior concerns or issues. She has been on Hypertension medication since the age of 15. She describes having a problem with her heart making the left not function as well as it should. She had extensive work-ups as a child but has had no routine follow-up as an adult. She denies any chest pain, shortness of breath or dizziness. She does endorse occasional headaches.     The following portions of the patient's history were personally reviewed and updated as appropriate: allergies, current medications, past medical history, past surgical history, past family history, and past social history.       Objective   Vital Signs:   /82 (BP Location: Left arm, Patient Position: Sitting, Cuff Size: Adult)   Pulse 58   Temp 97.7 °F (36.5 °C) (Temporal)   Ht 170.2 cm (67\")   Wt 78.4 kg (172 lb 14.4 oz)   SpO2 99%   BMI 27.08 kg/m²     Body mass index is 27.08 kg/m².    All labs, " (12 PM) - Decadron was given IM in the right gluteus alaina. Tolerated well.   imaging, test results, and specialty provider notes reviewed with patient.     Physical Exam  Vitals reviewed.   Constitutional:       Appearance: Normal appearance.   Cardiovascular:      Rate and Rhythm: Normal rate and regular rhythm.      Heart sounds: Normal heart sounds.   Pulmonary:      Effort: Pulmonary effort is normal.      Breath sounds: Normal breath sounds.   Musculoskeletal:      Left lower leg: Edema present.   Skin:     General: Skin is warm.   Neurological:      Mental Status: She is alert and oriented to person, place, and time.   Psychiatric:         Attention and Perception: Attention normal.         Mood and Affect: Mood normal.         Speech: Speech normal.         Behavior: Behavior normal. Behavior is cooperative.         Assessment and Plan:  Diagnoses and all orders for this visit:    1. Swelling of left foot (Primary)    2. Secondary hypertension  -     Adult Transthoracic Echo Limited W/ Cont if Necessary Per Protocol; Future      Patient to track BP at home for two weeks and provide log to provider for review. Echo to be obtained for evaluation of diagnosed pediatric heart condition. Decrease / avoid salt intake.   If condition worsens or fails to improve, may need to consider diuretic therapy or compression therapy as well as possible ultrasound of lower extremity for blood flow.     Follow Up:  No follow-ups on file.    Patient was given instructions and counseling regarding her condition or for health maintenance advice. Please see specific information pulled into the AVS if appropriate.

## 2023-12-11 ENCOUNTER — OFFICE VISIT (OUTPATIENT)
Dept: SLEEP MEDICINE | Facility: CLINIC | Age: 58
End: 2023-12-11
Payer: COMMERCIAL

## 2023-12-11 VITALS
WEIGHT: 224 LBS | HEART RATE: 61 BPM | SYSTOLIC BLOOD PRESSURE: 122 MMHG | DIASTOLIC BLOOD PRESSURE: 72 MMHG | HEIGHT: 64 IN | BODY MASS INDEX: 38.24 KG/M2

## 2023-12-11 DIAGNOSIS — G47.33 OSA (OBSTRUCTIVE SLEEP APNEA): Primary | ICD-10-CM

## 2023-12-11 PROCEDURE — 3044F HG A1C LEVEL LT 7.0%: CPT | Mod: CPTII,S$GLB,, | Performed by: PHYSICIAN ASSISTANT

## 2023-12-11 PROCEDURE — 3074F SYST BP LT 130 MM HG: CPT | Mod: CPTII,S$GLB,, | Performed by: PHYSICIAN ASSISTANT

## 2023-12-11 PROCEDURE — 99214 PR OFFICE/OUTPT VISIT, EST, LEVL IV, 30-39 MIN: ICD-10-PCS | Mod: S$GLB,,, | Performed by: PHYSICIAN ASSISTANT

## 2023-12-11 PROCEDURE — 99214 OFFICE O/P EST MOD 30 MIN: CPT | Mod: S$GLB,,, | Performed by: PHYSICIAN ASSISTANT

## 2023-12-11 PROCEDURE — 1159F PR MEDICATION LIST DOCUMENTED IN MEDICAL RECORD: ICD-10-PCS | Mod: CPTII,S$GLB,, | Performed by: PHYSICIAN ASSISTANT

## 2023-12-11 PROCEDURE — 3008F BODY MASS INDEX DOCD: CPT | Mod: CPTII,S$GLB,, | Performed by: PHYSICIAN ASSISTANT

## 2023-12-11 PROCEDURE — 3044F PR MOST RECENT HEMOGLOBIN A1C LEVEL <7.0%: ICD-10-PCS | Mod: CPTII,S$GLB,, | Performed by: PHYSICIAN ASSISTANT

## 2023-12-11 PROCEDURE — 3078F PR MOST RECENT DIASTOLIC BLOOD PRESSURE < 80 MM HG: ICD-10-PCS | Mod: CPTII,S$GLB,, | Performed by: PHYSICIAN ASSISTANT

## 2023-12-11 PROCEDURE — 3061F PR NEG MICROALBUMINURIA RESULT DOCUMENTED/REVIEW: ICD-10-PCS | Mod: CPTII,S$GLB,, | Performed by: PHYSICIAN ASSISTANT

## 2023-12-11 PROCEDURE — 3074F PR MOST RECENT SYSTOLIC BLOOD PRESSURE < 130 MM HG: ICD-10-PCS | Mod: CPTII,S$GLB,, | Performed by: PHYSICIAN ASSISTANT

## 2023-12-11 PROCEDURE — 99999 PR PBB SHADOW E&M-EST. PATIENT-LVL III: CPT | Mod: PBBFAC,,, | Performed by: PHYSICIAN ASSISTANT

## 2023-12-11 PROCEDURE — 1160F RVW MEDS BY RX/DR IN RCRD: CPT | Mod: CPTII,S$GLB,, | Performed by: PHYSICIAN ASSISTANT

## 2023-12-11 PROCEDURE — 1160F PR REVIEW ALL MEDS BY PRESCRIBER/CLIN PHARMACIST DOCUMENTED: ICD-10-PCS | Mod: CPTII,S$GLB,, | Performed by: PHYSICIAN ASSISTANT

## 2023-12-11 PROCEDURE — 3008F PR BODY MASS INDEX (BMI) DOCUMENTED: ICD-10-PCS | Mod: CPTII,S$GLB,, | Performed by: PHYSICIAN ASSISTANT

## 2023-12-11 PROCEDURE — 1159F MED LIST DOCD IN RCRD: CPT | Mod: CPTII,S$GLB,, | Performed by: PHYSICIAN ASSISTANT

## 2023-12-11 PROCEDURE — 3078F DIAST BP <80 MM HG: CPT | Mod: CPTII,S$GLB,, | Performed by: PHYSICIAN ASSISTANT

## 2023-12-11 PROCEDURE — 3066F NEPHROPATHY DOC TX: CPT | Mod: CPTII,S$GLB,, | Performed by: PHYSICIAN ASSISTANT

## 2023-12-11 PROCEDURE — 99999 PR PBB SHADOW E&M-EST. PATIENT-LVL III: ICD-10-PCS | Mod: PBBFAC,,, | Performed by: PHYSICIAN ASSISTANT

## 2023-12-11 PROCEDURE — 4010F PR ACE/ARB THEARPY RXD/TAKEN: ICD-10-PCS | Mod: CPTII,S$GLB,, | Performed by: PHYSICIAN ASSISTANT

## 2023-12-11 PROCEDURE — 3061F NEG MICROALBUMINURIA REV: CPT | Mod: CPTII,S$GLB,, | Performed by: PHYSICIAN ASSISTANT

## 2023-12-11 PROCEDURE — 4010F ACE/ARB THERAPY RXD/TAKEN: CPT | Mod: CPTII,S$GLB,, | Performed by: PHYSICIAN ASSISTANT

## 2023-12-11 PROCEDURE — 3066F PR DOCUMENTATION OF TREATMENT FOR NEPHROPATHY: ICD-10-PCS | Mod: CPTII,S$GLB,, | Performed by: PHYSICIAN ASSISTANT

## 2023-12-11 NOTE — PROGRESS NOTES
Referred by No ref. provider found     NEW PATIENT VISIT    Mary Naranjo  is a pleasant 58 y.o. female  with PMH significant for HTN, preDM, migraines, DALTON, BMI 38+, JULIO C      Here today for: CPAP follow-up     PLAN last visit 8/9/23:   -recommend sleep testing   -HST ordered  -discussed trial therapy if JULIO C present and the patient is open to a trial of CPAP therapy  -discussed JULIO C and CPAP with patient in detail, including possible complications of untreated JULIO C like heart attack/stroke  -advised on strict driving precautions; advised never to drive drowsy      Since last visit:   Using CPAP regularly with significant improvement in symptoms. States overall significantly less tired, has more energy in the day since beginning therapy. States mask interface and pressure settings are comfortable. No complaints at this time      PAP history   Problems    Mask Nasal pillows   Pressure 6-12cwp   DME HME   Machine age AirSense 10 10/19/23   Download 12/11/23: 26/30 x 6hrs 50mins, 6-12cwp (6.8/9.7/11), leak (1.6/5/7.3), AHI 0.9         SLEEP SCHEDULE   Environment     Bed Time 8:30-9PM   Sleep Latency Not long at all   Arousals 2-3   Nocturia 0-1 (rarely)   Back to sleep Not long at all   Wake time 4:30AM   Naps No planned naps   Work          Past Medical History:   Diagnosis Date    Arthritis     COVID-19 02/08/2023    Dental bridge present     upper removable partial    Family history of pancreatic cancer     Fibrocystic breast     Migraines     Obesity     Pancreas cyst     Primary hypertension 02/02/2022     Patient Active Problem List   Diagnosis    Prediabetes    Family history of pancreatic cancer    Primary hypertension    Class 2 severe obesity due to excess calories with serious comorbidity and body mass index (BMI) of 37.0 to 37.9 in adult    Monoallelic mutation of RET gene    Fatigue    Iron deficiency anemia    Other hyperlipidemia       Current Outpatient Medications:     cholecalciferol, vitamin D3, 50  "mcg (2,000 unit) Chew, Take 2,000 Units by mouth once daily. Per patient, Disp: , Rfl:     hydroCHLOROthiazide (HYDRODIURIL) 25 MG tablet, TAKE 1 TABLET BY MOUTH EVERY DAY (Patient not taking: Reported on 10/24/2023), Disp: 90 tablet, Rfl: 3    LINZESS 290 mcg Cap capsule, Take 1 capsule (290 mcg total) by mouth before breakfast. (Patient not taking: Reported on 10/24/2023), Disp: 90 capsule, Rfl: 3    multivit/folic acid/vit K1 (ONE-A-DAY WOMEN'S 50 PLUS ORAL), Take 1 capsule by mouth once daily. Per patient, One-a-Day Women's 50+ Multivitamin Complete, Disp: , Rfl:     omega-3 fatty acids/fish oil (FISH OIL-OMEGA-3 FATTY ACIDS) 300-1,000 mg capsule, Take 1 capsule by mouth once daily., Disp: , Rfl:     UNABLE TO FIND, Take 2 capsules by mouth once daily. medication name: Digestive Advantage, Disp: , Rfl:     valsartan (DIOVAN) 160 MG tablet, TAKE 1 TABLET BY MOUTH ONCE DAILY. (Patient not taking: Reported on 10/24/2023), Disp: 90 tablet, Rfl: 3       Vitals:    12/11/23 1008   BP: 122/72   BP Location: Left arm   Patient Position: Sitting   BP Method: Medium (Automatic)   Pulse: 61   Weight: 101.6 kg (224 lb)   Height: 5' 4.02" (1.626 m)     Physical Exam:    GEN:   Well-appearing  Psych:  Appropriate affect, demonstrates insight  SKIN:  No rash on the face or bridge of the nose      LABS:   Lab Results   Component Value Date    HGB 11.5 (L) 08/08/2023    CO2 27 08/04/2023       RECORDS REVIEWED PREVIOUSLY:    HST 9.25.23: AHI 8, RDI 17    Last sleep note 8/9/23    CPAP interrogation 12/11/23: 26/30 x 6hrs 50mins, 6-12cwp (6.8/9.7/11), leak (1.6/5/7.3), AHI 0.9    ASSESSMENT     [place] Sleepiness Scale:  Sitting and reading:   3  Watching TV:    3  Passenger in a car x 1 hr:  0  Sitting quietly after lunch:  2  Lying down to rest in PM:  1  Sitting, inactive in public:  0  Sitting+ talking to someone:  0  Stopped in traffic:   0  Total    9/24     PROBLEM DESCRIPTION/ Sx on Presentation Interval Hx  STATUS "   JULIO C    + snoring, no arousals, + witnessed apneas ( is witness to sleep)    good usage and efficiency  controlled   Daytime Sx    + sleepiness when inactive   ESS 21/24 on intake (reviewed from 8/9/23) significantly less sleepy since starting on CPAP improved   Insomnia    Trouble falling asleep: no  Maintenance:         wakes frequently, not difficult at all to return to sleep  Prior hypnotics:        Current hypnotics:     sleep is more consolidated overall on CPAP, but still getting up 1 x nightly to use the bathroom improved   Other issues:     PLAN     -using and benefiting from CPAP therapy  -continue CPAP nightly  -CPAP supplies ordered  -discussed JULIO C and CPAP with patient in detail, including possible complications of untreated JULIO C like heart attack/stroke  -advised on strict driving precautions; advised never to drive drowsy    Advised on plan of care. Answered all patient questions. Patient verbalized understanding and voiced agreement with plan of care.       RTC 12 months or as needed     The patient was given open opportunity to ask questions and/or express concerns about treatment plan. All questions/concerns were discussed.     Two patient identifiers used prior to evaluation.

## 2023-12-11 NOTE — LETTER
December 11, 2023      [facility]  [address] [place] [address], [facility] [address]  [facility] [address]  Phone: [phone]       Patient: Mary Naranjo   YOB: 1965  Date of Visit: 12/11/2023    To Whom It May Concern:    Sheila Naranjo  was at [facility] on 12/11/2023. The patient may return to work/school on 12/12/23 with no restrictions. If you have any questions or concerns, or if I can be of further assistance, please do not hesitate to contact me.    Sincerely,    Kristi Ross PA-C

## 2024-01-04 ENCOUNTER — TELEPHONE (OUTPATIENT)
Dept: FAMILY MEDICINE | Facility: CLINIC | Age: 59
End: 2024-01-04
Payer: COMMERCIAL

## 2024-01-04 NOTE — TELEPHONE ENCOUNTER
----- Message from Promise Johansen sent at 1/4/2024  4:51 PM CST -----  Regarding: self [phone]  Type: Patient Call Back    Who called: self     What is the request in detail: pt reports having 120/50 and have been running low, she has not taken her medication in a week due to numbers being low.     Can the clinic reply by [facility]? no     Would the patient rather a call back or a response via [facility]? Call back     Best call back number: [phone]      Additional Information: Declined on call nurse

## 2024-01-04 NOTE — TELEPHONE ENCOUNTER
BP currently 120/50.  Past 5 days:  12.31- 84/54  1.1- 120/73  1.2- 105/59  1.3- 92/57  States she has felt dizzy and lightheaded intermittently.  Last took her BP meds 12.30 due to BP being low.

## 2024-02-08 ENCOUNTER — LAB VISIT (OUTPATIENT)
Dept: LAB | Facility: HOSPITAL | Age: 59
End: 2024-02-08
Attending: FAMILY MEDICINE
Payer: COMMERCIAL

## 2024-02-08 DIAGNOSIS — R73.03 PREDIABETES: Chronic | ICD-10-CM

## 2024-02-08 DIAGNOSIS — D50.9 IRON DEFICIENCY ANEMIA, UNSPECIFIED IRON DEFICIENCY ANEMIA TYPE: ICD-10-CM

## 2024-02-08 DIAGNOSIS — I10 PRIMARY HYPERTENSION: Chronic | ICD-10-CM

## 2024-02-08 DIAGNOSIS — Z00.00 HEALTH MAINTENANCE EXAMINATION: ICD-10-CM

## 2024-02-08 LAB
ALBUMIN SERPL BCP-MCNC: 4.3 G/DL (ref 3.5–5.2)
ALP SERPL-CCNC: 76 U/L (ref 55–135)
ALT SERPL W/O P-5'-P-CCNC: 24 U/L (ref 10–44)
ANION GAP SERPL CALC-SCNC: 12 MMOL/L (ref 8–16)
AST SERPL-CCNC: 23 U/L (ref 10–40)
BASOPHILS # BLD AUTO: 0.05 K/UL (ref 0–0.2)
BASOPHILS NFR BLD: 1 % (ref 0–1.9)
BILIRUB SERPL-MCNC: 0.8 MG/DL (ref 0.1–1)
BUN SERPL-MCNC: 15 MG/DL (ref 6–20)
CALCIUM SERPL-MCNC: 9.8 MG/DL (ref 8.7–10.5)
CHLORIDE SERPL-SCNC: 102 MMOL/L (ref 95–110)
CHOLEST SERPL-MCNC: 208 MG/DL (ref 120–199)
CHOLEST/HDLC SERPL: 4.3 {RATIO} (ref 2–5)
CO2 SERPL-SCNC: 27 MMOL/L (ref 23–29)
CREAT SERPL-MCNC: 0.9 MG/DL (ref 0.5–1.4)
DIFFERENTIAL METHOD BLD: ABNORMAL
EOSINOPHIL # BLD AUTO: 0.2 K/UL (ref 0–0.5)
EOSINOPHIL NFR BLD: 4 % (ref 0–8)
ERYTHROCYTE [DISTWIDTH] IN BLOOD BY AUTOMATED COUNT: 14.7 % (ref 11.5–14.5)
EST. GFR  (NO RACE VARIABLE): >60 ML/MIN/1.73 M^2
ESTIMATED AVG GLUCOSE: 114 MG/DL (ref 68–131)
FERRITIN SERPL-MCNC: 173 NG/ML (ref 20–300)
GLUCOSE SERPL-MCNC: 94 MG/DL (ref 70–110)
HBA1C MFR BLD: 5.6 % (ref 4–5.6)
HCT VFR BLD AUTO: 38.6 % (ref 37–48.5)
HDLC SERPL-MCNC: 48 MG/DL (ref 40–75)
HDLC SERPL: 23.1 % (ref 20–50)
HGB BLD-MCNC: 11.9 G/DL (ref 12–16)
IMM GRANULOCYTES # BLD AUTO: 0 K/UL (ref 0–0.04)
IMM GRANULOCYTES NFR BLD AUTO: 0 % (ref 0–0.5)
IRON SERPL-MCNC: 61 UG/DL (ref 30–160)
LDLC SERPL CALC-MCNC: 144.8 MG/DL (ref 63–159)
LYMPHOCYTES # BLD AUTO: 2.5 K/UL (ref 1–4.8)
LYMPHOCYTES NFR BLD: 50.1 % (ref 18–48)
MCH RBC QN AUTO: 24.7 PG (ref 27–31)
MCHC RBC AUTO-ENTMCNC: 30.8 G/DL (ref 32–36)
MCV RBC AUTO: 80 FL (ref 82–98)
MONOCYTES # BLD AUTO: 0.5 K/UL (ref 0.3–1)
MONOCYTES NFR BLD: 9.7 % (ref 4–15)
NEUTROPHILS # BLD AUTO: 1.8 K/UL (ref 1.8–7.7)
NEUTROPHILS NFR BLD: 35.2 % (ref 38–73)
NONHDLC SERPL-MCNC: 160 MG/DL
NRBC BLD-RTO: 0 /100 WBC
PLATELET # BLD AUTO: 333 K/UL (ref 150–450)
PMV BLD AUTO: 10.5 FL (ref 9.2–12.9)
POTASSIUM SERPL-SCNC: 3.5 MMOL/L (ref 3.5–5.1)
PROT SERPL-MCNC: 8.1 G/DL (ref 6–8.4)
RBC # BLD AUTO: 4.81 M/UL (ref 4–5.4)
SATURATED IRON: 14 % (ref 20–50)
SODIUM SERPL-SCNC: 141 MMOL/L (ref 136–145)
TOTAL IRON BINDING CAPACITY: 450 UG/DL (ref 250–450)
TRANSFERRIN SERPL-MCNC: 304 MG/DL (ref 200–375)
TRIGL SERPL-MCNC: 76 MG/DL (ref 30–150)
WBC # BLD AUTO: 4.97 K/UL (ref 3.9–12.7)

## 2024-02-08 PROCEDURE — 80061 LIPID PANEL: CPT | Performed by: FAMILY MEDICINE

## 2024-02-08 PROCEDURE — 82728 ASSAY OF FERRITIN: CPT | Performed by: FAMILY MEDICINE

## 2024-02-08 PROCEDURE — 85025 COMPLETE CBC W/AUTO DIFF WBC: CPT | Performed by: FAMILY MEDICINE

## 2024-02-08 PROCEDURE — 83036 HEMOGLOBIN GLYCOSYLATED A1C: CPT | Performed by: FAMILY MEDICINE

## 2024-02-08 PROCEDURE — 83540 ASSAY OF IRON: CPT | Performed by: FAMILY MEDICINE

## 2024-02-08 PROCEDURE — 80053 COMPREHEN METABOLIC PANEL: CPT | Performed by: FAMILY MEDICINE

## 2024-02-08 PROCEDURE — 36415 COLL VENOUS BLD VENIPUNCTURE: CPT | Mod: PN | Performed by: FAMILY MEDICINE

## 2024-02-15 ENCOUNTER — OFFICE VISIT (OUTPATIENT)
Dept: FAMILY MEDICINE | Facility: CLINIC | Age: 59
End: 2024-02-15
Payer: COMMERCIAL

## 2024-02-15 VITALS
TEMPERATURE: 99 F | WEIGHT: 226.63 LBS | SYSTOLIC BLOOD PRESSURE: 116 MMHG | OXYGEN SATURATION: 99 % | DIASTOLIC BLOOD PRESSURE: 68 MMHG | BODY MASS INDEX: 38.69 KG/M2 | HEIGHT: 64 IN | HEART RATE: 68 BPM

## 2024-02-15 DIAGNOSIS — M54.32 LEFT SIDED SCIATICA: ICD-10-CM

## 2024-02-15 DIAGNOSIS — I10 PRIMARY HYPERTENSION: Primary | Chronic | ICD-10-CM

## 2024-02-15 DIAGNOSIS — E66.01 CLASS 2 SEVERE OBESITY DUE TO EXCESS CALORIES WITH SERIOUS COMORBIDITY AND BODY MASS INDEX (BMI) OF 38.0 TO 38.9 IN ADULT: Chronic | ICD-10-CM

## 2024-02-15 DIAGNOSIS — D50.9 IRON DEFICIENCY ANEMIA, UNSPECIFIED IRON DEFICIENCY ANEMIA TYPE: ICD-10-CM

## 2024-02-15 DIAGNOSIS — E78.49 OTHER HYPERLIPIDEMIA: Chronic | ICD-10-CM

## 2024-02-15 PROCEDURE — 3074F SYST BP LT 130 MM HG: CPT | Mod: CPTII,S$GLB,, | Performed by: FAMILY MEDICINE

## 2024-02-15 PROCEDURE — 3044F HG A1C LEVEL LT 7.0%: CPT | Mod: CPTII,S$GLB,, | Performed by: FAMILY MEDICINE

## 2024-02-15 PROCEDURE — 99999 PR PBB SHADOW E&M-EST. PATIENT-LVL V: CPT | Mod: PBBFAC,,, | Performed by: FAMILY MEDICINE

## 2024-02-15 PROCEDURE — 3008F BODY MASS INDEX DOCD: CPT | Mod: CPTII,S$GLB,, | Performed by: FAMILY MEDICINE

## 2024-02-15 PROCEDURE — 1159F MED LIST DOCD IN RCRD: CPT | Mod: CPTII,S$GLB,, | Performed by: FAMILY MEDICINE

## 2024-02-15 PROCEDURE — 99214 OFFICE O/P EST MOD 30 MIN: CPT | Mod: S$GLB,,, | Performed by: FAMILY MEDICINE

## 2024-02-15 PROCEDURE — 3078F DIAST BP <80 MM HG: CPT | Mod: CPTII,S$GLB,, | Performed by: FAMILY MEDICINE

## 2024-02-15 PROCEDURE — 1160F RVW MEDS BY RX/DR IN RCRD: CPT | Mod: CPTII,S$GLB,, | Performed by: FAMILY MEDICINE

## 2024-02-15 RX ORDER — GABAPENTIN 100 MG/1
100 CAPSULE ORAL NIGHTLY
Qty: 90 CAPSULE | Refills: 3 | Status: SHIPPED | OUTPATIENT
Start: 2024-02-15 | End: 2024-05-06

## 2024-02-15 RX ORDER — VALSARTAN AND HYDROCHLOROTHIAZIDE 160; 25 MG/1; MG/1
1 TABLET ORAL DAILY
Qty: 90 TABLET | Refills: 3 | Status: SHIPPED | OUTPATIENT
Start: 2024-02-15 | End: 2025-02-14

## 2024-02-15 NOTE — LETTER
February 15, 2024      [facility]  [address]  [address]  [place] [address]  Phone: [phone]       Patient: Mary Naranjo   YOB: 1965  Date of Visit: 02/15/2024    To Whom It May Concern:    Sheila Naranjo  was at [facility] on 02/15/2024. The patient may return to work on 02/16/2024 with no restrictions. If you have any questions or concerns, or if I can be of further assistance, please do not hesitate to contact me.    Sincerely,    Ramon Olivares Jr., MD

## 2024-02-15 NOTE — PROGRESS NOTES
"  Patient Name: Mary Naranjo    : 1965  MRN: [id number]      Subjective:     Patient ID: Mary is a 58 y.o. female    Chief Complaint:  Hypertension, Hyperlipidemia, and Sciatica    58-year-old female presents for follow-up on hypertension, hyperlipidemia, and anemia.    Report tingling down left leg. Reports tingling is every night. Tingling is mostly on outer thigh but does go down to foot. Symptoms for over 3 months. Also has numbness. No burning sensation. Improved if gets up         Review of Systems   Constitutional:  Negative for unexpected weight change.   HENT:  Negative for ear pain and sore throat.    Eyes:  Negative for visual disturbance.   Respiratory:  Negative for shortness of breath.    Cardiovascular:  Negative for chest pain and palpitations.   Gastrointestinal:  Negative for abdominal pain and blood in stool.   Genitourinary:  Negative for dysuria and frequency.   Musculoskeletal:  Positive for arthralgias (knees) and leg pain.   Integumentary:  Negative for rash.   Hematological:  Negative for adenopathy.   Psychiatric/Behavioral:  Negative for suicidal ideas.         Objective:   /68 (BP Location: Left arm, Patient Position: Sitting, BP Method: Large (Manual))   Pulse 68   Temp 98.6 °F (37 °C) (Oral)   Ht 5' 4.02" (1.626 m)   Wt 102.8 kg (226 lb 10.1 oz)   LMP 01/15/2018   SpO2 99%   BMI 38.88 kg/m²     Physical Exam  Constitutional:       General: She is not in acute distress.     Appearance: She is obese. She is not ill-appearing.   HENT:      Head: Normocephalic and atraumatic.      Right Ear: External ear normal.      Left Ear: External ear normal.      Nose: Nose normal.   Eyes:      Extraocular Movements: Extraocular movements intact.      Pupils: Pupils are equal, round, and reactive to light.   Cardiovascular:      Rate and Rhythm: Normal rate and regular rhythm.      Pulses: Normal pulses.   Pulmonary:      Effort: Pulmonary effort is normal. No respiratory " distress.      Breath sounds: No wheezing or rales.   Abdominal:      General: Abdomen is flat. Bowel sounds are normal. There is no distension.      Tenderness: There is no abdominal tenderness. There is no guarding.   Musculoskeletal:      Cervical back: Normal range of motion. No rigidity.      Right lower leg: No edema.      Left lower leg: No edema.   Lymphadenopathy:      Cervical: No cervical adenopathy.   Skin:     Capillary Refill: Capillary refill takes less than 2 seconds.   Neurological:      General: No focal deficit present.      Mental Status: She is alert.   Psychiatric:         Behavior: Behavior normal.         Thought Content: Thought content normal.        Lab Visit on 02/08/2024   Component Date Value Ref Range Status    Sodium 02/08/2024 141  136 - 145 mmol/L Final    Potassium 02/08/2024 3.5  3.5 - 5.1 mmol/L Final    Chloride 02/08/2024 102  95 - 110 mmol/L Final    CO2 02/08/2024 27  23 - 29 mmol/L Final    Glucose 02/08/2024 94  70 - 110 mg/dL Final    BUN 02/08/2024 15  6 - 20 mg/dL Final    Creatinine 02/08/2024 0.9  0.5 - 1.4 mg/dL Final    Calcium 02/08/2024 9.8  8.7 - 10.5 mg/dL Final    Total Protein 02/08/2024 8.1  6.0 - 8.4 g/dL Final    Albumin 02/08/2024 4.3  3.5 - 5.2 g/dL Final    Total Bilirubin 02/08/2024 0.8  0.1 - 1.0 mg/dL Final    Comment: For infants and newborns, interpretation of results should be based  on gestational age, weight and in agreement with clinical  observations.    Premature Infant recommended reference ranges:  Up to 24 hours.............<8.0 mg/dL  Up to 48 hours............<12.0 mg/dL  3-5 days..................<15.0 mg/dL  6-29 days.................<15.0 mg/dL      Alkaline Phosphatase 02/08/2024 76  55 - 135 U/L Final    AST 02/08/2024 23  10 - 40 U/L Final    ALT 02/08/2024 24  10 - 44 U/L Final    eGFR 02/08/2024 >60  >60 mL/min/1.73 m^2 Final    Anion Gap 02/08/2024 12  8 - 16 mmol/L Final    Cholesterol 02/08/2024 208 (H)  120 - 199 mg/dL Final     Comment: The National Cholesterol Education Program (NCEP) has set the  following guidelines (reference ranges) for Cholesterol:  Optimal.....................<200 mg/dL  Borderline High.............200-239 mg/dL  High........................> or = 240 mg/dL      Triglycerides 02/08/2024 76  30 - 150 mg/dL Final    Comment: The National Cholesterol Education Program (NCEP) has set the  following guidelines (reference values) for triglycerides:  Normal......................<150 mg/dL  Borderline High.............150-199 mg/dL  High........................200-499 mg/dL      HDL 02/08/2024 48  40 - 75 mg/dL Final    Comment: The National Cholesterol Education Program (NCEP) has set the  following guidelines (reference values) for HDL Cholesterol:  Low...............<40 mg/dL  Optimal...........>60 mg/dL      LDL Cholesterol 02/08/2024 144.8  63.0 - 159.0 mg/dL Final    Comment: The National Cholesterol Education Program (NCEP) has set the  following guidelines (reference values) for LDL Cholesterol:  Optimal.......................<130 mg/dL  Borderline High...............130-159 mg/dL  High..........................160-189 mg/dL  Very High.....................>190 mg/dL      HDL/Cholesterol Ratio 02/08/2024 23.1  20.0 - 50.0 % Final    Total Cholesterol/HDL Ratio 02/08/2024 4.3  2.0 - 5.0 Final    Non-HDL Cholesterol 02/08/2024 160  mg/dL Final    Comment: Risk category and Non-HDL cholesterol goals:  Coronary heart disease (CHD)or equivalent (10-year risk of CHD >20%):  Non-HDL cholesterol goal     <130 mg/dL  Two or more CHD risk factors and 10-year risk of CHD <= 20%:  Non-HDL cholesterol goal     <160 mg/dL  0 to 1 CHD risk factor:  Non-HDL cholesterol goal     <190 mg/dL      Hemoglobin A1C 02/08/2024 5.6  4.0 - 5.6 % Final    Comment: ADA Screening Guidelines:  5.7-6.4%  Consistent with prediabetes  >or=6.5%  Consistent with diabetes    High levels of fetal hemoglobin interfere with the HbA1C  assay. Heterozygous  hemoglobin variants (HbS, HgC, etc)do  not significantly interfere with this assay.   However, presence of multiple variants may affect accuracy.      Estimated Avg Glucose 02/08/2024 114  68 - 131 mg/dL Final    WBC 02/08/2024 4.97  3.90 - 12.70 K/uL Final    RBC 02/08/2024 4.81  4.00 - 5.40 M/uL Final    Hemoglobin 02/08/2024 11.9 (L)  12.0 - 16.0 g/dL Final    Hematocrit 02/08/2024 38.6  37.0 - 48.5 % Final    MCV 02/08/2024 80 (L)  82 - 98 fL Final    MCH 02/08/2024 24.7 (L)  27.0 - 31.0 pg Final    MCHC 02/08/2024 30.8 (L)  32.0 - 36.0 g/dL Final    RDW 02/08/2024 14.7 (H)  11.5 - 14.5 % Final    Platelets 02/08/2024 333  150 - 450 K/uL Final    MPV 02/08/2024 10.5  9.2 - 12.9 fL Final    Immature Granulocytes 02/08/2024 0.0  0.0 - 0.5 % Final    Gran # (ANC) 02/08/2024 1.8  1.8 - 7.7 K/uL Final    Immature Grans (Abs) 02/08/2024 0.00  0.00 - 0.04 K/uL Final    Comment: Mild elevation in immature granulocytes is non specific and   can be seen in a variety of conditions including stress response,   acute inflammation, trauma and pregnancy. Correlation with other   laboratory and clinical findings is essential.      Lymph # 02/08/2024 2.5  1.0 - 4.8 K/uL Final    Mono # 02/08/2024 0.5  0.3 - 1.0 K/uL Final    Eos # 02/08/2024 0.2  0.0 - 0.5 K/uL Final    Baso # 02/08/2024 0.05  0.00 - 0.20 K/uL Final    nRBC 02/08/2024 0  0 /100 WBC Final    Gran % 02/08/2024 35.2 (L)  38.0 - 73.0 % Final    Lymph % 02/08/2024 50.1 (H)  18.0 - 48.0 % Final    Mono % 02/08/2024 9.7  4.0 - 15.0 % Final    Eosinophil % 02/08/2024 4.0  0.0 - 8.0 % Final    Basophil % 02/08/2024 1.0  0.0 - 1.9 % Final    Differential Method 02/08/2024 Automated   Final    Iron 02/08/2024 61  30 - 160 ug/dL Final    Transferrin 02/08/2024 304  200 - 375 mg/dL Final    TIBC 02/08/2024 450  250 - 450 ug/dL Final    Saturated Iron 02/08/2024 14 (L)  20 - 50 % Final    Ferritin 02/08/2024 173  20.0 - 300.0 ng/mL Final       The 10-year ASCVD risk score  (Harman ROBERTS, et al., 2019) is: 4.8%    Values used to calculate the score:      Age: 58 years      Sex: Female      Is Non- : Yes      Diabetic: No      Tobacco smoker: No      Systolic Blood Pressure: 116 mmHg      Is BP treated: Yes      HDL Cholesterol: 48 mg/dL      Total Cholesterol: 208 mg/dL    Assessment        ICD-10-CM ICD-9-CM   1. Primary hypertension  I10 401.9   2. Other hyperlipidemia  E78.49 272.4   3. Class 2 severe obesity due to excess calories with serious comorbidity and body mass index (BMI) of 38.0 to 38.9 in adult  E66.01 278.01    Z68.38 V85.38   4. Iron deficiency anemia, unspecified iron deficiency anemia type  D50.9 280.9   5. Left sided sciatica  M54.32 724.3         Plan:     1. Primary hypertension  Assessment & Plan:  Blood pressure is at goal.    Continue current doses of valsartan hydrochlorothiazide and will send in as a combination pill.    Orders:  -     valsartan-hydrochlorothiazide (DIOVAN-HCT) 160-25 mg per tablet; Take 1 tablet by mouth once daily.  Dispense: 90 tablet; Refill: 3  -     Comprehensive Metabolic Panel; Future; Expected date: 08/15/2024    2. Other hyperlipidemia  Overview:  Lab Results   Component Value Date    CHOL 208 (H) 02/08/2024    CHOL 192 08/04/2023    CHOL 229 (H) 12/01/2022     Lab Results   Component Value Date    HDL 48 02/08/2024    HDL 49 08/04/2023    HDL 54 12/01/2022     Lab Results   Component Value Date    LDLCALC 144.8 02/08/2024    LDLCALC 131.2 08/04/2023    LDLCALC 157.8 12/01/2022     Lab Results   Component Value Date    TRIG 76 02/08/2024    TRIG 59 08/04/2023    TRIG 86 12/01/2022     Lab Results   Component Value Date    CHOLHDL 23.1 02/08/2024    CHOLHDL 25.5 08/04/2023    CHOLHDL 23.6 12/01/2022     The 10-year ASCVD risk score (Harman ROBERTS, et al., 2019) is: 4.8%    Values used to calculate the score:      Age: 58 years      Sex: Female      Is Non- : Yes      Diabetic: No      Tobacco  "smoker: No      Systolic Blood Pressure: 116 mmHg      Is BP treated: Yes      HDL Cholesterol: 48 mg/dL      Total Cholesterol: 208 mg/dL     Assessment & Plan:  Not in a statin benefit group.  Continue dietary/lifestyle modifications and recheck in a year    Orders:  -     Comprehensive Metabolic Panel; Future; Expected date: 08/15/2024  -     Lipid Panel; Future; Expected date: 08/15/2024    3. Class 2 severe obesity due to excess calories with serious comorbidity and body mass index (BMI) of 38.0 to 38.9 in adult  Assessment & Plan:  Wt Readings from Last 3 Encounters:   02/15/24 0954 102.8 kg (226 lb 10.1 oz)   12/11/23 1008 101.6 kg (224 lb)   11/06/23 0838 101.6 kg (224 lb 1.6 oz)      Estimated body mass index is 38.88 kg/m² as calculated from the following:    Height as of this encounter: 5' 4.02" (1.626 m).    Weight as of this encounter: 102.8 kg (226 lb 10.1 oz).  Ideal body weight: 54.7 kg (120 lb 11.1 oz)     The patient's BMI has been recorded in the chart. The patient has been provided educational materials regarding the benefits of attaining and maintaining a normal weight. We will continue to address and follow this issue during follow up visits.  Referral to bariatric medicine placed    Orders:  -     Ambulatory referral/consult to Bariatric/Obesity Medicine; Future; Expected date: 02/22/2024    4. Iron deficiency anemia, unspecified iron deficiency anemia type  Assessment & Plan:  Advised starting low-dose iron once daily.  Recheck labs in the next six months.    Orders:  -     ferrous sulfate 140 mg (45 mg iron) TbSR; Take 1 tablet by mouth once daily.  -     CBC Auto Differential; Future; Expected date: 08/15/2024  -     Iron and TIBC; Future; Expected date: 08/15/2024  -     Ferritin; Future; Expected date: 08/15/2024    5. Left sided sciatica  Assessment & Plan:  Check x-ray of lumbar spine further evaluation.    Will start trial of gabapentin.    As she reports the pain has been going on for " several months, will get Neurology evaluation for possible EMG    Orders:  -     gabapentin (NEURONTIN) 100 MG capsule; Take 1 capsule (100 mg total) by mouth every evening.  Dispense: 90 capsule; Refill: 3  -     Ambulatory referral/consult to Neurology; Future; Expected date: 02/22/2024  -     X-Ray Lumbar Spine Ap Lateral w/Flex Ext; Future; Expected date: 02/15/2024               -Ramon Olivares Jr., MD, AAHIVS      This office note has been dictated.  This dictation has been generated using M-Modal Fluency Direct dictation; some phonetic errors may occur.         Patient Instructions   For the low iron- start over the counter low dose iron daily. Example is:      You can change your valsartan and hydrochlorothiazide to the combination    For the sciatic pain, start gabapentin 100 mg an hour before bed. This medication might increase sleepiness    Follow Up  Follow up in about 6 months (around 8/15/2024) for Annual.    Future Appointments   Date Time Provider Department Center   3/20/2024  9:20 AM Arnaldo Juárez MD [facility]   5/6/2024  8:00 AM La Askew MD [facility] NEURO [facility]   8/15/2024  8:15 AM LAB, [facility] D[facility]   8/22/2024  9:40 AM Ramon Olivares Jr., MD [facility]

## 2024-02-15 NOTE — ASSESSMENT & PLAN NOTE
"Wt Readings from Last 3 Encounters:   02/15/24 0954 102.8 kg (226 lb 10.1 oz)   12/11/23 1008 101.6 kg (224 lb)   11/06/23 0838 101.6 kg (224 lb 1.6 oz)      Estimated body mass index is 38.88 kg/m² as calculated from the following:    Height as of this encounter: 5' 4.02" (1.626 m).    Weight as of this encounter: 102.8 kg (226 lb 10.1 oz).  Ideal body weight: 54.7 kg (120 lb 11.1 oz)     The patient's BMI has been recorded in the chart. The patient has been provided educational materials regarding the benefits of attaining and maintaining a normal weight. We will continue to address and follow this issue during follow up visits.  Referral to bariatric medicine placed  "

## 2024-02-15 NOTE — PATIENT INSTRUCTIONS
For the low iron- start over the counter low dose iron daily. Example is:      You can change your valsartan and hydrochlorothiazide to the combination    For the sciatic pain, start gabapentin 100 mg an hour before bed. This medication might increase sleepiness

## 2024-02-16 ENCOUNTER — TELEPHONE (OUTPATIENT)
Dept: BARIATRICS | Facility: CLINIC | Age: 59
End: 2024-02-16
Payer: COMMERCIAL

## 2024-02-19 PROBLEM — M54.32 LEFT SIDED SCIATICA: Status: ACTIVE | Noted: 2024-02-19

## 2024-02-19 PROBLEM — M54.32 LEFT SIDED SCIATICA: Status: ACTIVE | Noted: 2024-02-15

## 2024-02-19 NOTE — ASSESSMENT & PLAN NOTE
Blood pressure is at goal.    Continue current doses of valsartan hydrochlorothiazide and will send in as a combination pill.

## 2024-02-19 NOTE — ASSESSMENT & PLAN NOTE
Check x-ray of lumbar spine further evaluation.    Will start trial of gabapentin.    As she reports the pain has been going on for several months, will get Neurology evaluation for possible EMG

## 2024-02-23 DIAGNOSIS — M54.32 LEFT SIDED SCIATICA: Primary | ICD-10-CM

## 2024-02-27 ENCOUNTER — CLINICAL SUPPORT (OUTPATIENT)
Dept: REHABILITATION | Facility: HOSPITAL | Age: 59
End: 2024-02-27
Attending: FAMILY MEDICINE
Payer: COMMERCIAL

## 2024-02-27 DIAGNOSIS — M54.42 CHRONIC LEFT-SIDED LOW BACK PAIN WITH LEFT-SIDED SCIATICA: ICD-10-CM

## 2024-02-27 DIAGNOSIS — R26.2 DIFFICULTY WALKING: ICD-10-CM

## 2024-02-27 DIAGNOSIS — G89.29 CHRONIC LEFT-SIDED LOW BACK PAIN WITH LEFT-SIDED SCIATICA: ICD-10-CM

## 2024-02-27 DIAGNOSIS — M54.32 LEFT SIDED SCIATICA: ICD-10-CM

## 2024-02-27 PROCEDURE — 97161 PT EVAL LOW COMPLEX 20 MIN: CPT

## 2024-02-27 PROCEDURE — 97112 NEUROMUSCULAR REEDUCATION: CPT

## 2024-03-02 PROBLEM — G89.29 CHRONIC LEFT-SIDED LOW BACK PAIN WITH LEFT-SIDED SCIATICA: Status: ACTIVE | Noted: 2024-03-02

## 2024-03-02 PROBLEM — M54.42 CHRONIC LEFT-SIDED LOW BACK PAIN WITH LEFT-SIDED SCIATICA: Status: ACTIVE | Noted: 2024-03-02

## 2024-03-02 PROBLEM — R26.2 DIFFICULTY WALKING: Status: ACTIVE | Noted: 2024-03-02

## 2024-03-02 NOTE — PLAN OF CARE
[facility]   Physical Therapy Initial Evaluation      Name: Mary Naranjo  [facility] Number: [id number]    Therapy Diagnosis:   Encounter Diagnoses   Name Primary?    Left sided sciatica     Chronic left-sided low back pain with left-sided sciatica     Difficulty walking         Physician: Ramon Olivares Jr., MD    Physician Orders: PT Eval and Treat   Medical Diagnosis from Referral: M54.32 (ICD-10-CM) - Left sided sciatica  Evaluation Date: 2/27/2024  Authorization Period Expiration: 2/22/25  Plan of Care Expiration: 6/1/24  Progress Note Due: 4/1/24  Date of Surgery: N/A   Visit # / Visits authorized: 1/ 1   FOTO: 1/ 3    Precautions: Standard     Time In: 10:05 AM   Time Out: 11:05 AM   Total Billable Time: 60 minutes    Subjective     Date of onset: 3 months ago     History of current condition - Mary reports: left low back pain with radiating symptoms down her leg which goes into her lateral foot. She reports her pain is worse in the morning. She currently reports a pain level of 3/10 at rest which increases to 10/10 at maximum. She describes her pain as sharp and stabbing. She reports she was able to walk 2-3 miles before this pain started. Current aggravators of her low back pain includes sitting > 30 minutes and lying down. Eases include use of Tylenol and Gabapentin. Mary would like to attend physical therapy to reduce her pain so she may be able to move more.     Falls: None     Imaging: Lumbar spine X-Ray 2/15/24    EXAMINATION:  XR LUMBAR SPINE AP AND LAT WITH FLEX/EXT     CLINICAL HISTORY:  Sciatica, left side     TECHNIQUE:  AP, lateral and spot images were performed of the lumbar spine.  Additional flexion and extension views obtained.     COMPARISON:  Radiograph lumbar spine 08/02/2016, CT abdomen pelvis 04/08/2021     FINDINGS:  Vertebral bodies are normal in height without evidence of fracture.     Mild straightening of the normal lumbar lordosis without  significant spondylolisthesis in neutral position.  No abnormal translation with flexion and extension.     Loss of disc height with some degenerative endplate osteophyte formation at L5-S1.  Remainder of the intervertebral disc heights are well maintained.  Mild facet hypertrophy in the lower lumbar spine.     Impression:     Degenerative change as above.       Prior Therapy: None for this condition  Social History: Patient lives at home without any stairs to enter her house.   Occupation: Billing Department for [employer]   Prior Level of Function: Able to walk 2-3 miles   Current Level of Function: Unable to participate in recreational activities     Pain:  Current 3/10, worst 10/10, best 3/10   Location: Left lumbar spine    Description: Sharp, stabbing   Aggravating Factors: Sitting, lying, coughing   Easing Factors: Pain medication     Patients goals: Reduce her pain so she may increase her activity level      Medical History:   Past Medical History:   Diagnosis Date    Arthritis     COVID-19 02/08/2023    Dental bridge present     upper removable partial    Family history of pancreatic cancer     Fibrocystic breast     Migraines     Obesity     Pancreas cyst     Primary hypertension 02/02/2022       Surgical History:   Mary Naranjo  has a past surgical history that includes Tubal ligation; fibrocystic breast ; Colonoscopy (N/A, 10/07/2015); Knee arthroscopy w/ meniscal repair; Breast biopsy; Breast cyst aspiration; Dilation and curettage of uterus; Esophagogastroduodenoscopy (N/A, 07/19/2019); Endoscopic ultrasound of upper gastrointestinal tract (N/A, 11/03/2020); Endoscopic ultrasound of upper gastrointestinal tract (N/A, 07/14/2023); Eye surgery (December 2021); and Breast surgery.    Medications:   Mary has a current medication list which includes the following prescription(s): cholecalciferol (vitamin d3), ferrous sulfate, gabapentin, linzess, multivit-minerals/folic acid, fish oil-omega-3 fatty  acids, UNABLE TO FIND, and valsartan-hydrochlorothiazide.    Allergies:   Review of patient's allergies indicates:   Allergen Reactions    Dilaudid [hydromorphone] Other (See Comments)     Hallucination        Objective      Functional Movements:   Gait: Patient ambulates with an upright posture, demonstrating limited thoracic rotation.   OH Squat: Patient initiates squat with knees, valgus load at medial knees, bilaterally.   SLS: Pain at the left low back when attempting to perform a single leg stance.       Standing Thoracolumbar Range of Motion:    % Observation Pain   Flexion Fingertips to distal shin     Extension 50%     Right Rotation 50%     Left Rotation 50%     Right Sidebend Fingertips to mid patella  Yes    Left Sidebend Fingertips to mid patella          Hip Active Range of Motion:    Right Left   Flexion 95 85   Extension NT NT   Internal Rotation 40 25   External Rotation 30 20       Myotomes:   Right  Left    Hip Flexors: 4/5 4/5   Quads: 4/5 3/5   DF: 5/5 4/5   Big toe extension: 4/5 4/5   PF: 5/5 4/5   Hamstrings:  4/5 3/5     Dermatomes:  All lower extremity dermatomes intact to light touch    Reflexes:  Patella: 1+ bilaterally   Achilles: Absent     Special Tests:   Observation/Result   Quadrant  + Left side    Slump  + Left side    Repeated extension No change      Palpation: Tenderness to palpation at the left lumbar spine (L4/L5)        Intake Outcome Measure for FOTO Survey    Therapist reviewed FOTO scores for Mary Naranjo on 2/27/2024.   FOTO report - see Media section or FOTO account episode details.    Intake Score: See media section          Treatment     Total Treatment time (time-based codes) separate from Evaluation: 15 minutes     Mary received the treatments listed below:      therapeutic exercises to develop strength, endurance, ROM, flexibility, posture, and core stabilization for 00 minutes including:      manual therapy techniques: Joint mobilizations, Manual traction,  Myofacial release, Manual Lymphatic Drainage, Soft tissue Mobilization, and Friction Massage were applied to the: lumbar spine for 00 minutes, including:      neuromuscular re-education activities to improve: Balance, Coordination, Kinesthetic, Sense, Posture, and Proprioception for 15 minutes. The following activities were included:    Sciatic nerve glides with instruction (bias sural nerve next visit)   Prone on elbows x 3'       therapeutic activities to improve functional performance for 00 minutes, including:        Patient Education and Home Exercises     Education provided:   - Patient was educated upon performance of her HEP and prognosis.     Written Home Exercises Provided: yes. Exercises were reviewed and Mary was able to demonstrate them prior to the end of the session.  Mary demonstrated good  understanding of the education provided. See EMR under Patient Instructions for exercises provided during therapy sessions.    Assessment     Mary is a 58 y.o. female referred to outpatient Physical Therapy with a medical diagnosis of Left sided sciatica. Patient presents with left sided low back which radiates marybeth her left lower extremity on the lateral side of the leg, pain with right side bending, limited left hip range of motion, and a positive slump test. Given pain with prolonged sitting, pain in the morning, and h/o of low back pain with coughing the patient likely presents with disc related lumbar radiculopathy. She will benefit from a course of physical therapy to address the listed deficits so she may perform all daily tasks and return to walking without low back pain.     Patient prognosis is Good.   Patient will benefit from skilled outpatient Physical Therapy to address the deficits stated above and in the chart below, provide patient /family education, and to maximize patientt's level of independence.     Plan of care discussed with patient: Yes  Patient's spiritual, cultural and educational  needs considered and patient is agreeable to the plan of care and goals as stated below:     Anticipated Barriers for therapy: Obesity, hypertension    Medical Necessity is demonstrated by the following  History  Co-morbidities and personal factors that may impact the plan of care [] LOW: no personal factors / co-morbidities  [x] MODERATE: 1-2 personal factors / co-morbidities  [] HIGH: 3+ personal factors / co-morbidities    Moderate / High Support Documentation:   Co-morbidities affecting plan of care: Obesity, hypertension    Personal Factors:   lifestyle     Examination  Body Structures and Functions, activity limitations and participation restrictions that may impact the plan of care [x] LOW: addressing 1-2 elements  [] MODERATE: 3+ elements  [] HIGH: 4+ elements (please support below)    Moderate / High Support Documentation:      Clinical Presentation [x] LOW: stable  [] MODERATE: Evolving  [] HIGH: Unstable     Decision Making/ Complexity Score: low       Goals:  Short Term Goals: 0-6 weeks   Patient will be compliant with her HEP 75% of the prescribed amount.   Patient to improve left hip rotation equal to the left in 4 weeks.   Patient to report a centralization of radiating symptoms in 4 weeks.   Patient will be able to walk for 15 minutes without low back pain in 6 weeks.   Patient will improve her FOTO score to >40 in 6 weeks.     Long Term Goals: 6-12 weeks   Patient will be compliant with her % of the prescribed amount.   Patient will report no radiating symptoms down her left lower extremity in 6-8 weeks.   Patient will be bend and rotate without low back pain in 8 weeks.   Patient will be able to walk for 30 minutes without left low back pain in 10 weeks.   Patient will improve her FOTO score to >52 in 12 weeks.     Plan     Plan of care Certification: 2/27/2024 to 6/1/24.    Outpatient Physical Therapy 1-2 times weekly for 12 weeks to include the following interventions: Cervical/Lumbar  Traction, Electrical Stimulation  , Gait Training, Manual Therapy, Moist Heat/ Ice, Neuromuscular Re-ed, Patient Education, Self Care, Therapeutic Activities, and Therapeutic Exercise.     Stiven Pettit, PT  Dc Plaza, PT

## 2024-03-05 ENCOUNTER — CLINICAL SUPPORT (OUTPATIENT)
Dept: REHABILITATION | Facility: HOSPITAL | Age: 59
End: 2024-03-05
Payer: COMMERCIAL

## 2024-03-05 DIAGNOSIS — M54.32 LEFT SIDED SCIATICA: Primary | ICD-10-CM

## 2024-03-05 DIAGNOSIS — R26.2 DIFFICULTY WALKING: ICD-10-CM

## 2024-03-05 DIAGNOSIS — M54.42 CHRONIC LEFT-SIDED LOW BACK PAIN WITH LEFT-SIDED SCIATICA: ICD-10-CM

## 2024-03-05 DIAGNOSIS — G89.29 CHRONIC LEFT-SIDED LOW BACK PAIN WITH LEFT-SIDED SCIATICA: ICD-10-CM

## 2024-03-05 PROCEDURE — 97140 MANUAL THERAPY 1/> REGIONS: CPT

## 2024-03-05 PROCEDURE — 97110 THERAPEUTIC EXERCISES: CPT

## 2024-03-05 PROCEDURE — 97112 NEUROMUSCULAR REEDUCATION: CPT

## 2024-03-05 NOTE — PROGRESS NOTES
[facility]   Physical Therapy Treatment Note     Name: Mary Lopezfton  [facility] Number: [id number]    Therapy Diagnosis:   Encounter Diagnoses   Name Primary?    Left sided sciatica Yes    Chronic left-sided low back pain with left-sided sciatica     Difficulty walking      Physician: Ramon Olivares Jr., MD    Visit Date: 3/5/2024    Physician Orders: PT Eval and Treat   Medical Diagnosis from Referral: M54.32 (ICD-10-CM) - Left sided sciatica  Evaluation Date: 2/27/2024  Authorization Period Expiration: 12/31/24  Plan of Care Expiration: 6/1/24  Progress Note Due: 4/1/24  Date of Surgery: N/A   Visit # / Visits authorized: 1/ 6 + eval    FOTO: 1/ 3     Precautions: Standard      Time In: 12:10 PM   Time Out: 1:10 PM   Total Billable Time: 30 minutes      SUBJECTIVE     Pt reports: she has been performing her home exercises, despite her low back pain. Radiating pain down her left lateral leg remains, radiating down to her left lateral ankle.     She was compliant with home exercise program.  Response to previous treatment: Good   Functional change: No change     Pain: NR/10  Location:  Left lumbar spine      OBJECTIVE     Straight leg raise test: + L lower extremity     Treatment       Mary received the treatments listed below:      Therapeutic exercises to develop strength, endurance, ROM, flexibility, posture, and core stabilization for 15 minutes including:    Bike x 5' for improved tolerance to cardiovascular exercise   Thoracic rotations side lying (short arc) x 20 each side       Manual therapy techniques: Joint mobilizations, Manual traction, Myofacial release, Manual Lymphatic Drainage, Soft tissue Mobilization, and Friction Massage were applied to the: lumbar spine for 14 minutes, including:    Straight leg raise assessment   Thoracic manipulation   Lumbar Distraction manual     Therapeutic activities to improve functional performance for 00 minutes,  including:      Neuromuscular re-education activities to improve: Balance, Coordination, Kinesthetic, Sense, Proprioception, and Posture for 31 minutes. The following activities were included:     SLR nerve glides (sural nerve) 2 x 20  Prone on elbows x 3'   Bridges 2 x 10   Supine hip abduction with GTB 2 x 10   Standing low rows with OTB 3 x 10       Patient Education and Home Exercises     Home Exercises Provided and Patient Education Provided     Education provided:   - Patient was educated upon performance of her HEP and prognosis.      Written Home Exercises Provided: yes. Exercises were reviewed and Mary was able to demonstrate them prior to the end of the session.  Mary demonstrated good  understanding of the education provided. See EMR under Patient Instructions for exercises provided during therapy sessions    ASSESSMENT     Mary returns to physical therapy following her initial evaluation with low back pain and radiating symptoms down her left lower extremity following the sural nerve distribution. She continued with extension based exercises as she was introduced to bridges and low rows. Patient educated upon importance of daily exercise. Consider lumbar spine manipulation next visit.     Mary Is progressing well towards her goals.     Pt prognosis is Good.     Pt will continue to benefit from skilled outpatient physical therapy to address the deficits listed in the problem list box on initial evaluation, provide pt/family education and to maximize pt's level of independence in the home and community environment.     Pt's spiritual, cultural and educational needs considered and pt agreeable to plan of care and goals.     Anticipated barriers to physical therapy: Obesity, hypertension     Goals:   Short Term Goals: 0-6 weeks   Patient will be compliant with her HEP 75% of the prescribed amount.   Patient to improve left hip rotation equal to the left in 4 weeks.   Patient to report a  centralization of radiating symptoms in 4 weeks.   Patient will be able to walk for 15 minutes without low back pain in 6 weeks.   Patient will improve her FOTO score to >40 in 6 weeks.      Long Term Goals: 6-12 weeks   Patient will be compliant with her % of the prescribed amount.   Patient will report no radiating symptoms down her left lower extremity in 6-8 weeks.   Patient will be bend and rotate without low back pain in 8 weeks.   Patient will be able to walk for 30 minutes without left low back pain in 10 weeks.   Patient will improve her FOTO score to >52 in 12 weeks.    PLAN     Address lumbar radiculopathy symptoms through extension based exercises.     Stiven Pettit, PT, DPT

## 2024-03-11 ENCOUNTER — CLINICAL SUPPORT (OUTPATIENT)
Dept: REHABILITATION | Facility: HOSPITAL | Age: 59
End: 2024-03-11
Payer: COMMERCIAL

## 2024-03-11 DIAGNOSIS — M54.42 CHRONIC LEFT-SIDED LOW BACK PAIN WITH LEFT-SIDED SCIATICA: ICD-10-CM

## 2024-03-11 DIAGNOSIS — G89.29 CHRONIC LEFT-SIDED LOW BACK PAIN WITH LEFT-SIDED SCIATICA: ICD-10-CM

## 2024-03-11 DIAGNOSIS — M54.32 LEFT SIDED SCIATICA: Primary | ICD-10-CM

## 2024-03-11 DIAGNOSIS — R26.2 DIFFICULTY WALKING: ICD-10-CM

## 2024-03-11 PROCEDURE — 97140 MANUAL THERAPY 1/> REGIONS: CPT

## 2024-03-11 PROCEDURE — 97112 NEUROMUSCULAR REEDUCATION: CPT

## 2024-03-11 PROCEDURE — 97110 THERAPEUTIC EXERCISES: CPT

## 2024-03-11 NOTE — PROGRESS NOTES
[facility]   Physical Therapy Treatment Note     Name: Mary Naranjo  [facility] Number: [id number]    Therapy Diagnosis:   Encounter Diagnoses   Name Primary?    Left sided sciatica Yes    Chronic left-sided low back pain with left-sided sciatica     Difficulty walking      Physician: Ramon Olivares Jr., MD    Visit Date: 3/11/2024    Physician Orders: PT Eval and Treat   Medical Diagnosis from Referral: M54.32 (ICD-10-CM) - Left sided sciatica  Evaluation Date: 2/27/2024  Authorization Period Expiration: 12/31/24  Plan of Care Expiration: 6/1/24  Progress Note Due: 4/1/24  Date of Surgery: N/A   Visit # / Visits authorized: 2/ 6 + eval    FOTO: 1/ 3     Precautions: Standard      Time In: 10:07 AM   Time Out: 11:02 AM   Total Billable Time: 55 minutes      SUBJECTIVE     Pt reports: her low back pain has been more manageable compared to her initial evaluation. She states she is getting up when at work to go for short walks.      She was compliant with home exercise program.  Response to previous treatment: Good   Functional change: No change     Pain: NR/10  Location:  Left lumbar spine      OBJECTIVE     3/5/24  Straight leg raise test: + L lower extremity     Treatment       Mary received the treatments listed below:      Therapeutic exercises to develop strength, endurance, ROM, flexibility, posture, and core stabilization for 13 minutes including:    Bike x 5' for improved tolerance to cardiovascular exercise   Thoracic rotations side lying (short arc) x 20 each side       Manual therapy techniques: Joint mobilizations, Manual traction, Myofacial release, Manual Lymphatic Drainage, Soft tissue Mobilization, and Friction Massage were applied to the: lumbar spine for 10 minutes, including:    Lumbar spine P-A mobilizations   Lumbar manipulation       Therapeutic activities to improve functional performance for 00 minutes, including:      Neuromuscular re-education activities to  improve: Balance, Coordination, Kinesthetic, Sense, Proprioception, and Posture for 32 minutes. The following activities were included:    Femoral nerve glides 2 x 20  Prone on elbows x 3'   Bridges 2 x 10   Supine hip abduction with GTB 2 x 10   Standing low rows with OTB 3 x 10       Patient Education and Home Exercises     Home Exercises Provided and Patient Education Provided     Education provided:   - Patient was educated upon performance of her HEP and prognosis.      Written Home Exercises Provided: yes. Exercises were reviewed and Mary was able to demonstrate them prior to the end of the session.  Mary demonstrated good  understanding of the education provided. See EMR under Patient Instructions for exercises provided during therapy sessions    ASSESSMENT     Mary demonstrates improved tolerance to interventions as her thoracic and lumbar spine mobility is improving. Radiating symptoms remain at the left lateral thigh and do not radiate past her knee. Lumbar spine manipulation was performed followed by femoral nerve glides prone. Continue with extension based exercises.     Mary Is progressing well towards her goals.     Pt prognosis is Good.     Pt will continue to benefit from skilled outpatient physical therapy to address the deficits listed in the problem list box on initial evaluation, provide pt/family education and to maximize pt's level of independence in the home and community environment.     Pt's spiritual, cultural and educational needs considered and pt agreeable to plan of care and goals.     Anticipated barriers to physical therapy: Obesity, hypertension     Goals:   Short Term Goals: 0-6 weeks   Patient will be compliant with her HEP 75% of the prescribed amount.   Patient to improve left hip rotation equal to the left in 4 weeks.   Patient to report a centralization of radiating symptoms in 4 weeks.   Patient will be able to walk for 15 minutes without low back pain in 6 weeks.    Patient will improve her FOTO score to >40 in 6 weeks.      Long Term Goals: 6-12 weeks   Patient will be compliant with her % of the prescribed amount.   Patient will report no radiating symptoms down her left lower extremity in 6-8 weeks.   Patient will be bend and rotate without low back pain in 8 weeks.   Patient will be able to walk for 30 minutes without left low back pain in 10 weeks.   Patient will improve her FOTO score to >52 in 12 weeks.    PLAN     Address lumbar radiculopathy symptoms through extension based exercises.     Stiven Pettit, PT, DPT

## 2024-03-18 ENCOUNTER — CLINICAL SUPPORT (OUTPATIENT)
Dept: REHABILITATION | Facility: HOSPITAL | Age: 59
End: 2024-03-18
Payer: COMMERCIAL

## 2024-03-18 DIAGNOSIS — R26.2 DIFFICULTY WALKING: ICD-10-CM

## 2024-03-18 DIAGNOSIS — G89.29 CHRONIC LEFT-SIDED LOW BACK PAIN WITH LEFT-SIDED SCIATICA: ICD-10-CM

## 2024-03-18 DIAGNOSIS — M54.32 LEFT SIDED SCIATICA: Primary | ICD-10-CM

## 2024-03-18 DIAGNOSIS — M54.42 CHRONIC LEFT-SIDED LOW BACK PAIN WITH LEFT-SIDED SCIATICA: ICD-10-CM

## 2024-03-18 PROCEDURE — 97140 MANUAL THERAPY 1/> REGIONS: CPT

## 2024-03-18 PROCEDURE — 97110 THERAPEUTIC EXERCISES: CPT

## 2024-03-18 PROCEDURE — 97112 NEUROMUSCULAR REEDUCATION: CPT

## 2024-03-18 NOTE — PROGRESS NOTES
[facility]   Physical Therapy Treatment Note     Name: Mary Naranjo  [facility] Number: [id number]    Therapy Diagnosis:   Encounter Diagnoses   Name Primary?    Left sided sciatica Yes    Chronic left-sided low back pain with left-sided sciatica     Difficulty walking      Physician: Ramon Olivares Jr., MD    Visit Date: 3/18/2024    Physician Orders: PT Eval and Treat   Medical Diagnosis from Referral: M54.32 (ICD-10-CM) - Left sided sciatica  Evaluation Date: 2/27/2024  Authorization Period Expiration: 12/31/24  Plan of Care Expiration: 6/1/24  Progress Note Due: 4/1/24  Date of Surgery: N/A   Visit # / Visits authorized: 3/ 6 + eval    FOTO: 1/ 3     Precautions: Standard      Time In: 11:05 AM   Time Out: 12:00 PM    Total Billable Time: 55 minutes      SUBJECTIVE     Pt reports: her low back was very sore over the weekend. She states she was getting numbness/tingling down her left leg that stopped at her knee. Patient reports she was in her bed most of the day yesterday due to her low back pain.       She was compliant with home exercise program.  Response to previous treatment: Good   Functional change: Increase in low back pain over the past weekend      Pain: 10/10 prior to taking Tylenol   Location:  Left lumbar spine      OBJECTIVE     3/5/24  Straight leg raise test: + L lower extremity     Treatment       Mary received the treatments listed below:      Therapeutic exercises to develop strength, endurance, ROM, flexibility, posture, and core stabilization for 12 minutes including:    Bike x 7' for improved tolerance to cardiovascular exercise and lumbar spine mobility   Standing hip extension 2 x 10       Manual therapy techniques: Joint mobilizations, Manual traction, Myofacial release, Manual Lymphatic Drainage, Soft tissue Mobilization, and Friction Massage were applied to the: lumbar spine for 13 minutes, including:    Lumbar manipulation   Lumbar P-A mobilizations        Therapeutic activities to improve functional performance for 00 minutes, including:      Neuromuscular re-education activities to improve: Balance, Coordination, Kinesthetic, Sense, Proprioception, and Posture for 30 minutes. The following activities were included:    Femoral nerve glides 3 x 10  Prone on elbows x 3'   Bridges 3 x 10   Supine hip abduction with GTB 3 x 10   Low rows with BTB 3 x 10   Standing horizontal abduction with GTB 3 x 10       Patient Education and Home Exercises     Home Exercises Provided and Patient Education Provided     Education provided:   - Patient was educated upon performance of her HEP and prognosis.      Written Home Exercises Provided: yes. Exercises were reviewed and Mary was able to demonstrate them prior to the end of the session.  Mary demonstrated good  understanding of the education provided. See EMR under Patient Instructions for exercises provided during therapy sessions    ASSESSMENT     Mary presented to physical therapy with increased low back pain and radiating symptoms down her left leg compared to last week. Extension based exercises continued as standing hip extension introduced. Patient's symptoms were reduced with lumbar spine P-A mobilizations. Patient educated upon importance of walking program to reduce pain following prolonged sitting.     Mary Is progressing well towards her goals.     Pt prognosis is Good.     Pt will continue to benefit from skilled outpatient physical therapy to address the deficits listed in the problem list box on initial evaluation, provide pt/family education and to maximize pt's level of independence in the home and community environment.     Pt's spiritual, cultural and educational needs considered and pt agreeable to plan of care and goals.     Anticipated barriers to physical therapy: Obesity, hypertension     Goals:   Short Term Goals: 0-6 weeks   Patient will be compliant with her HEP 75% of the prescribed  amount.   Patient to improve left hip rotation equal to the left in 4 weeks.   Patient to report a centralization of radiating symptoms in 4 weeks.   Patient will be able to walk for 15 minutes without low back pain in 6 weeks.   Patient will improve her FOTO score to >40 in 6 weeks.      Long Term Goals: 6-12 weeks   Patient will be compliant with her % of the prescribed amount.   Patient will report no radiating symptoms down her left lower extremity in 6-8 weeks.   Patient will be bend and rotate without low back pain in 8 weeks.   Patient will be able to walk for 30 minutes without left low back pain in 10 weeks.   Patient will improve her FOTO score to >52 in 12 weeks.    PLAN     Address lumbar radiculopathy symptoms through extension based exercises.     Stiven Pettit, PT, DPT

## 2024-03-25 ENCOUNTER — CLINICAL SUPPORT (OUTPATIENT)
Dept: REHABILITATION | Facility: HOSPITAL | Age: 59
End: 2024-03-25
Payer: COMMERCIAL

## 2024-03-25 DIAGNOSIS — R26.2 DIFFICULTY WALKING: ICD-10-CM

## 2024-03-25 DIAGNOSIS — M54.42 CHRONIC LEFT-SIDED LOW BACK PAIN WITH LEFT-SIDED SCIATICA: ICD-10-CM

## 2024-03-25 DIAGNOSIS — M54.32 LEFT SIDED SCIATICA: Primary | ICD-10-CM

## 2024-03-25 DIAGNOSIS — G89.29 CHRONIC LEFT-SIDED LOW BACK PAIN WITH LEFT-SIDED SCIATICA: ICD-10-CM

## 2024-03-25 PROCEDURE — 97140 MANUAL THERAPY 1/> REGIONS: CPT

## 2024-03-25 PROCEDURE — 97110 THERAPEUTIC EXERCISES: CPT

## 2024-03-25 PROCEDURE — 97112 NEUROMUSCULAR REEDUCATION: CPT

## 2024-03-25 NOTE — PROGRESS NOTES
[facility]   Physical Therapy Treatment Note     Name: Mary Lopezfton  [facility] Number: [id number]    Therapy Diagnosis:   Encounter Diagnoses   Name Primary?    Left sided sciatica Yes    Chronic left-sided low back pain with left-sided sciatica     Difficulty walking      Physician: Ramon Olivares Jr., MD    Visit Date: 3/25/2024    Physician Orders: PT Eval and Treat   Medical Diagnosis from Referral: M54.32 (ICD-10-CM) - Left sided sciatica  Evaluation Date: 2/27/2024  Authorization Period Expiration: 12/31/24  Plan of Care Expiration: 6/1/24  Progress Note Due: 4/1/24  Date of Surgery: N/A   Visit # / Visits authorized: 4/ 6 + eval    FOTO: 1/ 3     Precautions: Standard      Time In: 11:10 AM   Time Out: 12:05 AM    Total Billable Time: 30 minutes      SUBJECTIVE     Pt reports: she mopped over the weekend which resulted in an increase in low back pain. She states performing her bridges and the e-stim unit has helped her reduce her low back pain.       She was compliant with home exercise program.  Response to previous treatment: Good   Functional change: Increase in low back pain over the past weekend      Pain: 5/10  Location:  Left lumbar spine      OBJECTIVE     3/5/24  Straight leg raise test: + L lower extremity     Treatment       Mary received the treatments listed below:      Therapeutic exercises to develop strength, endurance, ROM, flexibility, posture, and core stabilization for 13 minutes including:    Standing hip extension 3 x 10   Bridges 3 x 10    NP  Bike x 8' for improved tolerance to cardiovascular exercise and lumbar spine mobility     Manual therapy techniques: Joint mobilizations, Manual traction, Myofacial release, Manual Lymphatic Drainage, Soft tissue Mobilization, and Friction Massage were applied to the: lumbar spine for 12 minutes, including:    Lumbar manipulation   Lumbar P-A mobilizations     Next session: extension standing and prone, flexion in  "standing, straight leg raise both sides to assess for referred vs. radiating pain     Therapeutic activities to improve functional performance for 00 minutes, including:      Neuromuscular re-education activities to improve: Balance, Coordination, Kinesthetic, Sense, Proprioception, and Posture for 30 minutes. The following activities were included:    Femoral nerve glides side lying 2 x 15   Prone on elbows x 4'    PPT with BP cuff x 20 with 5" hold   Bent knee fall out 2 x 10 each direction      Patient Education and Home Exercises     Home Exercises Provided and Patient Education Provided     Education provided:   - Patient was educated upon performance of her HEP and prognosis.      Written Home Exercises Provided: yes. Exercises were reviewed and Mary was able to demonstrate them prior to the end of the session.  Mary demonstrated good  understanding of the education provided. See EMR under Patient Instructions for exercises provided during therapy sessions    ASSESSMENT     Mary continues to have radiating pain of her left lateral thigh that stops at her knee. Lumbar manipulation followed by nerve glides performed to reduce radiating symptoms. She was introduced to postural training which included posterior pelvic tilts and bent knee fall outs. Next session, thorough re-assessment will be performed to assess for referred pain versus radiating pain.      Mary Is progressing well towards her goals.     Pt prognosis is Good.     Pt will continue to benefit from skilled outpatient physical therapy to address the deficits listed in the problem list box on initial evaluation, provide pt/family education and to maximize pt's level of independence in the home and community environment.     Pt's spiritual, cultural and educational needs considered and pt agreeable to plan of care and goals.     Anticipated barriers to physical therapy: Obesity, hypertension     Goals:   Short Term Goals: 0-6 weeks   Patient " will be compliant with her HEP 75% of the prescribed amount.   Patient to improve left hip rotation equal to the left in 4 weeks.   Patient to report a centralization of radiating symptoms in 4 weeks.   Patient will be able to walk for 15 minutes without low back pain in 6 weeks.   Patient will improve her FOTO score to >40 in 6 weeks.      Long Term Goals: 6-12 weeks   Patient will be compliant with her % of the prescribed amount.   Patient will report no radiating symptoms down her left lower extremity in 6-8 weeks.   Patient will be bend and rotate without low back pain in 8 weeks.   Patient will be able to walk for 30 minutes without left low back pain in 10 weeks.   Patient will improve her FOTO score to >52 in 12 weeks.    PLAN     Address lumbar radiculopathy symptoms through extension based exercises.     Stiven Pettit, PT, DPT

## 2024-05-06 ENCOUNTER — OFFICE VISIT (OUTPATIENT)
Dept: NEUROLOGY | Facility: CLINIC | Age: 59
End: 2024-05-06
Payer: COMMERCIAL

## 2024-05-06 ENCOUNTER — LAB VISIT (OUTPATIENT)
Dept: LAB | Facility: HOSPITAL | Age: 59
End: 2024-05-06
Attending: INTERNAL MEDICINE
Payer: COMMERCIAL

## 2024-05-06 VITALS
HEART RATE: 62 BPM | SYSTOLIC BLOOD PRESSURE: 117 MMHG | HEIGHT: 64 IN | WEIGHT: 225.5 LBS | OXYGEN SATURATION: 97 % | BODY MASS INDEX: 38.5 KG/M2 | DIASTOLIC BLOOD PRESSURE: 56 MMHG

## 2024-05-06 DIAGNOSIS — H53.452 LOSS OF PERIPHERAL VISUAL FIELD, LEFT: ICD-10-CM

## 2024-05-06 DIAGNOSIS — M54.16 LUMBAR RADICULOPATHY, CHRONIC: ICD-10-CM

## 2024-05-06 DIAGNOSIS — M54.16 LUMBAR RADICULOPATHY, CHRONIC: Primary | ICD-10-CM

## 2024-05-06 DIAGNOSIS — I63.89 OTHER CEREBRAL INFARCTION: ICD-10-CM

## 2024-05-06 DIAGNOSIS — M54.32 LEFT SIDED SCIATICA: ICD-10-CM

## 2024-05-06 LAB
FOLATE SERPL-MCNC: 16.8 NG/ML (ref 4–24)
TSH SERPL DL<=0.005 MIU/L-ACNC: 1.74 UIU/ML (ref 0.4–4)
VIT B12 SERPL-MCNC: 848 PG/ML (ref 210–950)

## 2024-05-06 PROCEDURE — 86334 IMMUNOFIX E-PHORESIS SERUM: CPT | Performed by: INTERNAL MEDICINE

## 2024-05-06 PROCEDURE — 1159F MED LIST DOCD IN RCRD: CPT | Mod: CPTII,S$GLB,, | Performed by: INTERNAL MEDICINE

## 2024-05-06 PROCEDURE — 84165 PROTEIN E-PHORESIS SERUM: CPT | Mod: 26,,, | Performed by: PATHOLOGY

## 2024-05-06 PROCEDURE — 84425 ASSAY OF VITAMIN B-1: CPT | Performed by: INTERNAL MEDICINE

## 2024-05-06 PROCEDURE — 3008F BODY MASS INDEX DOCD: CPT | Mod: CPTII,S$GLB,, | Performed by: INTERNAL MEDICINE

## 2024-05-06 PROCEDURE — 3074F SYST BP LT 130 MM HG: CPT | Mod: CPTII,S$GLB,, | Performed by: INTERNAL MEDICINE

## 2024-05-06 PROCEDURE — 3044F HG A1C LEVEL LT 7.0%: CPT | Mod: CPTII,S$GLB,, | Performed by: INTERNAL MEDICINE

## 2024-05-06 PROCEDURE — 99205 OFFICE O/P NEW HI 60 MIN: CPT | Mod: S$GLB,,, | Performed by: INTERNAL MEDICINE

## 2024-05-06 PROCEDURE — 84207 ASSAY OF VITAMIN B-6: CPT | Performed by: INTERNAL MEDICINE

## 2024-05-06 PROCEDURE — 36415 COLL VENOUS BLD VENIPUNCTURE: CPT | Performed by: INTERNAL MEDICINE

## 2024-05-06 PROCEDURE — 84165 PROTEIN E-PHORESIS SERUM: CPT | Performed by: INTERNAL MEDICINE

## 2024-05-06 PROCEDURE — 99999 PR PBB SHADOW E&M-EST. PATIENT-LVL V: CPT | Mod: PBBFAC,,, | Performed by: INTERNAL MEDICINE

## 2024-05-06 PROCEDURE — 86334 IMMUNOFIX E-PHORESIS SERUM: CPT | Mod: 26,,, | Performed by: PATHOLOGY

## 2024-05-06 PROCEDURE — 84443 ASSAY THYROID STIM HORMONE: CPT | Performed by: INTERNAL MEDICINE

## 2024-05-06 PROCEDURE — 3078F DIAST BP <80 MM HG: CPT | Mod: CPTII,S$GLB,, | Performed by: INTERNAL MEDICINE

## 2024-05-06 PROCEDURE — 82607 VITAMIN B-12: CPT | Performed by: INTERNAL MEDICINE

## 2024-05-06 PROCEDURE — 82746 ASSAY OF FOLIC ACID SERUM: CPT | Performed by: INTERNAL MEDICINE

## 2024-05-06 RX ORDER — ATORVASTATIN CALCIUM 20 MG/1
20 TABLET, FILM COATED ORAL DAILY
Qty: 90 TABLET | Refills: 3 | Status: SHIPPED | OUTPATIENT
Start: 2024-05-06 | End: 2025-05-06

## 2024-05-06 RX ORDER — GABAPENTIN 300 MG/1
600 CAPSULE ORAL NIGHTLY
Qty: 120 CAPSULE | Refills: 2 | Status: SHIPPED | OUTPATIENT
Start: 2024-05-06

## 2024-05-06 NOTE — PROGRESS NOTES
GENERAL NEUROLOGY VISIT   05/06/2024  History:    Patient is a 59 y.o. female with past medical history of hypertension, Bell's palsy on the right, migraine headaches presented to the clinic for evaluation of left-sided numbness.  History obtained from patient and chart review.    Patient states that she has been suffering from tingling on the left side for the last few years.  States the pain is in the lower back going down her left leg in the back.  Has gait imbalance but denies falls.  Denies burning pain but does have numbness when she sits on for a long time.  She states that tingling has been progressing over the last 2 years but numbness has remained the same.  She is placed on gabapentin 100 mg nightly which she takes as needed for pain with no relief.  Since she was also completed physical therapy which helped partially.  Looking into her chart, patient presented to the ED in December 2022 for visual symptoms, left-sided weakness and numbness.  Upon further history taking, she does remember that the left-sided numbness started suddenly at this time and also involves her left arm and left side face.  She does confirmed that she had Bell's palsy which was on the right side.  Patient at that time had received vessel imaging as well as MRI brain which were both unremarkable.  She has continued to have numbness and tingling pain in the left side of the face as well as arm and leg.  No noticeable facial droop.  Her left arm does feel heavier and number at times.  She also reports that her left-sided peripheral vision has been a problem since and she has tried multiple glasses prescriptions with no improvement.      Past Medical History:   Diagnosis Date    Arthritis     COVID-19 02/08/2023    Dental bridge present     upper removable partial    Family history of pancreatic cancer     Fibrocystic breast     Migraines     Obesity     Pancreas cyst     Primary hypertension 02/02/2022       Past Surgical History:    Procedure Laterality Date    BREAST BIOPSY      BREAST CYST ASPIRATION      BREAST SURGERY      Dont remember    COLONOSCOPY N/A 10/07/2015    Procedure: COLONOSCOPY;  Surgeon: Eagle Stack MD;  Location: [facility];  Service: Endoscopy;  Laterality: N/A;    DILATION AND CURETTAGE OF UTERUS      ENDOSCOPIC ULTRASOUND OF UPPER GASTROINTESTINAL TRACT N/A 11/03/2020    Procedure: ULTRASOUND, UPPER GI TRACT, ENDOSCOPIC;  Surgeon: Kei Juarez MD;  Location: [facility] (2ND FLR);  Service: Endoscopy;  Laterality: N/A;  Covid-19 test 10/31/20 at [facility]    ENDOSCOPIC ULTRASOUND OF UPPER GASTROINTESTINAL TRACT N/A 07/14/2023    Procedure: ULTRASOUND, UPPER GI TRACT, ENDOSCOPIC;  Surgeon: Burak Garcia MD;  Location: [facility] (2ND FLR);  Service: Endoscopy;  Laterality: N/A;  6/15/23-Instructions via portal-DS  6/26 pre call complete  pre call 7/10, pt confirmed - mf    ESOPHAGOGASTRODUODENOSCOPY N/A 07/19/2019    Procedure: ESOPHAGOGASTRODUODENOSCOPY (EGD);  Surgeon: Cherrie Sheets MD;  Location: [facility];  Service: Endoscopy;  Laterality: N/A;    EYE SURGERY  December 2021    Cateract    fibrocystic breast       KNEE ARTHROSCOPY W/ MENISCAL REPAIR      TUBAL LIGATION         Social History     Socioeconomic History    Marital status:    Occupational History    Occupation: administrative   Tobacco Use    Smoking status: Never    Smokeless tobacco: Never   Substance and Sexual Activity    Alcohol use: No    Drug use: No    Sexual activity: Not Currently       Review of patient's allergies indicates:   Allergen Reactions    Dilaudid [hydromorphone] Other (See Comments)     Hallucination       Current Outpatient Medications on File Prior to Visit   Medication Sig Dispense Refill    cholecalciferol, vitamin D3, 50 mcg (2,000 unit) Chew Take 2,000 Units by mouth once daily. Per patient      ferrous sulfate 140 mg (45 mg iron) TbSR Take 1 tablet by mouth once daily.      gabapentin (NEURONTIN) 100 MG  "capsule Take 1 capsule (100 mg total) by mouth every evening. 90 capsule 3    multivit/folic acid/vit K1 (ONE-A-DAY WOMEN'S 50 PLUS ORAL) Take 1 capsule by mouth once daily. Per patient, One-a-Day Women's 50+ Multivitamin Complete      omega-3 fatty acids/fish oil (FISH OIL-OMEGA-3 FATTY ACIDS) 300-1,000 mg capsule Take 1 capsule by mouth once daily.      UNABLE TO FIND Take 2 capsules by mouth once daily. medication name: Digestive Advantage      valsartan-hydrochlorothiazide (DIOVAN-HCT) 160-25 mg per tablet Take 1 tablet by mouth once daily. 90 tablet 3    LINZESS 290 mcg Cap capsule Take 1 capsule (290 mcg total) by mouth before breakfast. (Patient not taking: Reported on 5/6/2024) 90 capsule 3     No current facility-administered medications on file prior to visit.        Family history:  Noncontributory    Review Of Systems     Constitutional Negative for fevers, chills, weigh loss   HEENT Negative for hearing loss, dysphagia, sore throat, diplopia   Respiratory Negative for shortness of breath, cough    Cardiovascular Negative for chest pain, palpitations    Gastrointestinal Negative for constipation, diarrhea, early satiety    Skin Negative for rashes    Musculoskeletal Negative for joint pains, myalgias.   Neurological See Above    Psychological Negative for sleep disturbances.    Heme/Lymph Negative for easy bruising, easy bleeding    Endocrine Negative for polyuria, polydypsia     Physical Exam:     Physical Examination  BP (!) 117/56 (BP Location: Right arm, Patient Position: Sitting, BP Method: Large (Automatic))   Pulse 62   Ht 5' 4.25" (1.632 m)   Wt 102.3 kg (225 lb 8.5 oz)   LMP 01/15/2018   SpO2 97%   BMI 38.41 kg/m²   Body mass index is 38.41 kg/m².      Neurological Exam  Mental Status:   Alert and oriented to name, date, location, president.   Naming/Fluency/Comprehension/Repetition intact.   Recent/remote memory, registration, attention span/concentration, fund of knowledge intact by " history.    CN:   II, III, IV, VI: PERRL, EOMI, no nystagmus, no visual field deficits noted however is blurry in the left sided field bilaterally.  V:  Subjective dysesthesia in the left side face VII: symmetrical facial movement, nice smile, no drooping.VIII: grossly intact to hearingXII: tongue midline           Motor:   5/5 in all 4 extremities                Reflexes:    Bicep Tricep Brachioradial Patellar Achilles   Left 2+ 2+ 2+ 2+ 1+   Right 2+ 2+ 2+ 2+ 1+   No Clonus, down going toes b/l.    Sensation: on both UEs and LEs    Intact light touch bilaterally, reduced vibration below the knee bilaterally    Coordination:    Tremor: Absent.    Gait:    Wide-based gait, Romberg positive    Interval/Previous Work-up:   Following images were personally reviewed and interpreted:   MRI brain without contrast 2022 :[facility]    CTA head and neck 2022: no LVO/HGS    Blood work 02/08/2024  Hemoglobin A1c 5.6  .8    Impression:   #left-sided numbness  #polyneuropathy    Sudden onset of symptoms on the left side including the face concerning for vascular etiology, however imaging at that time was negative for stroke.  The excessive tingling and pain in the region concerning for thalamic infarct. We will obtain repeat MRI brain and CTA head and neck to look for interval changes.      Patient does have reduced vibration below the knees bilaterally with ataxic gait, we will obtain blood work for evaluate for toxic metabolic etiology along with imaging of lower back and nerve conduction studies.  Patient has already undergone PT with good but unsustained relief in the past.    Plan:   #left-sided numbness  - obtain MRI brain without contrast  - obtain CTA head and neck  - starting on Lipitor 20 mg daily  - referral to Ophthalmology for visual field testing  -Goal Parameters for TIA/stroke: LDL<70 mg/dl, HbA1C: <7.0 %,  SBP<130/80 (discussed risk factor optimization in order to reduce the risk of future events with help of  PCP)  -Diet and Exercise: Discussed aggressive lifestyle modification. Eat primarily plant-based foods, such as fruits and vegetables, whole grains, legumes (beans) and nuts. Discussed Mediterranean diet. Daily exercise (150 minutes per week).  -Provided education regarding future stroke identification: I went over the usual stroke warning signs and symptoms, and the need to activate EMS (call 911) as soon as the symptoms present including-sudden onset numbness or weakness of the face, arm, or leg; especially on one side of the body; sudden confusion, trouble speaking, or understanding; sudden trouble seeing in one or both eyes; sudden trouble walking; dizziness; loss of coordination.    #polyneuropathy  - obtain MRI L-spine without contrast  - NCS/EMG of left upper and lower extremity  - blood work with vitamin B1, B6, B9, B12, TSH, protein electrophoresis  - increase gabapentin to 600 mg nightly    RTC 4 months or sooner if needed    Time spent on this encounter: 60 minutes. This includes face to face time and non-face to face time preparing to see the patient (eg, review of tests), obtaining and/or reviewing separately obtained history, documenting clinical information in the electronic or other health record, independently interpreting results and communicating results to the patient/family/caregiver, or care coordinator.     La Askew MD  Neurology

## 2024-05-07 LAB
ALBUMIN SERPL ELPH-MCNC: 4.22 G/DL (ref 3.35–5.55)
ALPHA1 GLOB SERPL ELPH-MCNC: 0.26 G/DL (ref 0.17–0.41)
ALPHA2 GLOB SERPL ELPH-MCNC: 0.81 G/DL (ref 0.43–0.99)
B-GLOBULIN SERPL ELPH-MCNC: 0.86 G/DL (ref 0.5–1.1)
GAMMA GLOB SERPL ELPH-MCNC: 1.04 G/DL (ref 0.67–1.58)
INTERPRETATION SERPL IFE-IMP: NORMAL
PATHOLOGIST INTERPRETATION IFE: NORMAL
PATHOLOGIST INTERPRETATION SPE: NORMAL
PROT SERPL-MCNC: 7.2 G/DL (ref 6–8.4)

## 2024-05-10 LAB
PYRIDOXAL SERPL-MCNC: 45 UG/L (ref 5–50)
VIT B1 BLD-MCNC: 63 UG/L (ref 38–122)

## 2024-05-24 ENCOUNTER — TELEPHONE (OUTPATIENT)
Dept: OPHTHALMOLOGY | Facility: CLINIC | Age: 59
End: 2024-05-24
Payer: COMMERCIAL

## 2024-05-24 NOTE — TELEPHONE ENCOUNTER
----- Message from Ashlee Tipton MA sent at 5/6/2024 11:42 AM CDT -----  Pt referred to dr abdullahi

## 2024-06-25 ENCOUNTER — TELEPHONE (OUTPATIENT)
Dept: NEUROLOGY | Facility: CLINIC | Age: 59
End: 2024-06-25
Payer: COMMERCIAL

## 2024-06-25 DIAGNOSIS — M54.32 LEFT SIDED SCIATICA: Primary | ICD-10-CM

## 2024-06-25 NOTE — TELEPHONE ENCOUNTER
----- Message from Donna Crabtree MA sent at 6/24/2024  4:23 PM CDT -----  Regarding: FW: Lab orders    ----- Message -----  From: Radha Randle  Sent: 6/24/2024  12:20 PM CDT  To: [facility] Staff  Subject: Lab orders                                       Hello,       This patient is scheduled for a ct scan. The patient will need a cmp, bmp, or creatinine lab prior to receiving contrast. Please put in the lab order as soon as possible to avoid any delay in patient care.     Thank you,  Radha ZAVALA

## 2024-06-27 ENCOUNTER — HOSPITAL ENCOUNTER (OUTPATIENT)
Dept: RADIOLOGY | Facility: HOSPITAL | Age: 59
Discharge: HOME | End: 2024-06-27
Attending: INTERNAL MEDICINE
Payer: COMMERCIAL

## 2024-06-27 ENCOUNTER — TELEPHONE (OUTPATIENT)
Dept: NEUROLOGY | Facility: CLINIC | Age: 59
End: 2024-06-27
Payer: COMMERCIAL

## 2024-06-27 DIAGNOSIS — I63.89 OTHER CEREBRAL INFARCTION: ICD-10-CM

## 2024-06-27 DIAGNOSIS — M54.16 LUMBAR RADICULOPATHY, CHRONIC: ICD-10-CM

## 2024-06-27 DIAGNOSIS — M54.32 LEFT SIDED SCIATICA: ICD-10-CM

## 2024-06-27 PROCEDURE — 72148 MRI LUMBAR SPINE W/O DYE: CPT | Mod: TC

## 2024-06-27 PROCEDURE — 70496 CT ANGIOGRAPHY HEAD: CPT | Mod: TC

## 2024-06-27 PROCEDURE — 70551 MRI BRAIN STEM W/O DYE: CPT | Mod: 26,,, | Performed by: RADIOLOGY

## 2024-06-27 PROCEDURE — 72148 MRI LUMBAR SPINE W/O DYE: CPT | Mod: 26,,, | Performed by: RADIOLOGY

## 2024-06-27 PROCEDURE — 70496 CT ANGIOGRAPHY HEAD: CPT | Mod: 26,,, | Performed by: RADIOLOGY

## 2024-06-27 PROCEDURE — 25500020 PHARM REV CODE 255: Performed by: INTERNAL MEDICINE

## 2024-06-27 PROCEDURE — 70498 CT ANGIOGRAPHY NECK: CPT | Mod: 26,,, | Performed by: RADIOLOGY

## 2024-06-27 PROCEDURE — 70551 MRI BRAIN STEM W/O DYE: CPT | Mod: TC

## 2024-06-27 RX ADMIN — IOHEXOL 75 ML: 350 INJECTION, SOLUTION INTRAVENOUS at 10:06

## 2024-06-27 NOTE — TELEPHONE ENCOUNTER
Pt notified             ----- Message from Juan A Malone MA sent at 6/27/2024 11:14 AM CDT -----  Type:  Patient Returning Call    Who Called: Self    Who Left Message for Patient:Donna Crabtree    Does the patient know what this is regarding?:yes, her appt. Being rescheduled ..     Would the patient rather a call back or a response via [facility]? Yes, call     Best Call Back Number: [phone] (home)

## 2024-06-27 NOTE — TELEPHONE ENCOUNTER
Lm for pt regarding r/s emg from 09/10/24 @ 8am to 09/17/24 @ 8am due to pt needing 4 extremities performed       Thank you            Pt  notified of emg appt

## 2024-07-22 ENCOUNTER — TELEPHONE (OUTPATIENT)
Dept: FAMILY MEDICINE | Facility: CLINIC | Age: 59
End: 2024-07-22
Payer: COMMERCIAL

## 2024-07-22 DIAGNOSIS — M72.2 PLANTAR FASCIITIS: Primary | ICD-10-CM

## 2024-07-22 NOTE — TELEPHONE ENCOUNTER
----- Message from Ranjith Gasper sent at 7/22/2024 11:25 AM CDT -----  Regarding: self  Type: Patient Call Back    Who called:self    What is the request in detail:pt is calling in regards of the referral to [facility] , stated she was suppse to receive a call back    Can the clinic reply by [facility]?no    Would the patient rather a call back or a response via [facility]? callback    Best call back number:[phone]    Additional Information:

## 2024-07-25 DIAGNOSIS — Z80.0 FAMILY HISTORY OF PANCREATIC CANCER: Primary | ICD-10-CM

## 2024-07-29 ENCOUNTER — OFFICE VISIT (OUTPATIENT)
Dept: PODIATRY | Facility: CLINIC | Age: 59
End: 2024-07-29
Payer: COMMERCIAL

## 2024-07-29 ENCOUNTER — HOSPITAL ENCOUNTER (OUTPATIENT)
Dept: RADIOLOGY | Facility: HOSPITAL | Age: 59
Discharge: HOME | End: 2024-07-29
Attending: PODIATRIST
Payer: COMMERCIAL

## 2024-07-29 VITALS — WEIGHT: 225.5 LBS | BODY MASS INDEX: 38.5 KG/M2 | HEIGHT: 64 IN

## 2024-07-29 DIAGNOSIS — M79.672 LEFT FOOT PAIN: ICD-10-CM

## 2024-07-29 DIAGNOSIS — M72.2 PLANTAR FASCIITIS: Primary | ICD-10-CM

## 2024-07-29 DIAGNOSIS — M21.619 BUNION: ICD-10-CM

## 2024-07-29 DIAGNOSIS — M72.2 PLANTAR FASCIITIS: ICD-10-CM

## 2024-07-29 PROCEDURE — 99203 OFFICE O/P NEW LOW 30 MIN: CPT | Mod: S$GLB,,, | Performed by: PODIATRIST

## 2024-07-29 PROCEDURE — 3008F BODY MASS INDEX DOCD: CPT | Mod: CPTII,S$GLB,, | Performed by: PODIATRIST

## 2024-07-29 PROCEDURE — 73630 X-RAY EXAM OF FOOT: CPT | Mod: 26,LT,, | Performed by: RADIOLOGY

## 2024-07-29 PROCEDURE — 99999 PR PBB SHADOW E&M-EST. PATIENT-LVL III: CPT | Mod: PBBFAC,,, | Performed by: PODIATRIST

## 2024-07-29 PROCEDURE — 1159F MED LIST DOCD IN RCRD: CPT | Mod: CPTII,S$GLB,, | Performed by: PODIATRIST

## 2024-07-29 PROCEDURE — 3044F HG A1C LEVEL LT 7.0%: CPT | Mod: CPTII,S$GLB,, | Performed by: PODIATRIST

## 2024-07-29 PROCEDURE — 73630 X-RAY EXAM OF FOOT: CPT | Mod: TC,FY,PO,LT

## 2024-07-29 RX ORDER — DICLOFENAC SODIUM 10 MG/G
2 GEL TOPICAL 4 TIMES DAILY
Qty: 450 G | Refills: 3 | Status: SHIPPED | OUTPATIENT
Start: 2024-07-29

## 2024-07-29 RX ORDER — MELOXICAM 15 MG/1
15 TABLET ORAL DAILY
Qty: 20 TABLET | Refills: 0 | Status: SHIPPED | OUTPATIENT
Start: 2024-07-29

## 2024-07-29 NOTE — PROGRESS NOTES
Subjective:     Patient ID: Mary Naranjo is a 59 y.o. female.    Chief Complaint: Foot Pain (Left foot) and Bunions (B/l)    Mary is a 59 y.o. female who presents to the podiatry clinic  with complaint of  left foot pain. Onset of the symptoms was several weeks ago. Precipitating event: none known. Current symptoms include: ability to bear weight, but with some pain. Aggravating factors: any weight bearing. Symptoms have progressed to a point and plateaued. Patient has had no prior foot problems. Evaluation to date: none. Treatment to date: none.     Review of Systems   Constitutional: Negative for chills.   Cardiovascular:  Negative for chest pain and claudication.   Respiratory:  Negative for cough.    Skin:  Positive for color change, dry skin and nail changes.   Musculoskeletal:  Positive for joint pain.   Gastrointestinal:  Negative for nausea.   Neurological:  Positive for paresthesias. Negative for numbness.   Psychiatric/Behavioral:  The patient is not nervous/anxious.         Objective:     Physical Exam  Constitutional:       Appearance: She is well-developed.      Comments: Oriented to time, place, and person.   Cardiovascular:      Comments: DP and PT pulses are palpable bilaterally. 3 sec capillary refill time and toes and feet are warm to touch proximally .  There is  hair growth on the feet and toes b/l. There is no edema b/l. No spider veins or varicosities present b/l.     Musculoskeletal:      Comments: Equinus noted b/l ankles with < 10 deg DF noted. MMT 5/5 in DF/PF/Inv/Ev resistance with no reproduction of pain in any direction. Passive range of motion of ankle and pedal joints is painless b/l.  Pain on palpation plantar medial left heel, no pain with ROM or MMT or medial and lateral compression of heel, - tinel's sign    Decreased stride, station of gait.  apropulsive toe off.  Increased angle and base of gait.      Patient has hammertoes of digits 2-5 bilateral partially reducible  without symptom today.     Visible and palpable bunion without pain at dorsomedial 1st metatarsal head right and left.  Hallux abducted right and left partially reducible, tracks laterally without being track bound.  No ecchymosis, erythema, edema, or cardinal signs infection or signs of trauma same foot.     Fat pad atrophy to heels and met heads bilateral       Feet:      Right foot:      Skin integrity: No callus or dry skin.      Left foot:      Skin integrity: No callus or dry skin.   Lymphadenopathy:      Comments: Negative lymphadenopathy bilateral popliteal fossa and tarsal tunnel.   Skin:     Comments: No open lesions, lacerations or wounds noted.Interdigital spaces clean, dry and intact b/l. No erythema noted to b/l foot.  Nails normal color and trophic qualities.     Neurological:      Mental Status: She is alert.      Comments: Light touch, proprioception, and sharp/dull sensation are all intact bilaterally. Protective threshold with the [place]-Wienstein monofilament is intact bilaterally.    Psychiatric:         Behavior: Behavior is cooperative.           Assessment:      Encounter Diagnoses   Name Primary?    Plantar fasciitis Yes    Bunion      Plan:     Mary was seen today for foot pain and bunions.    Diagnoses and all orders for this visit:    Plantar fasciitis  -     Ambulatory referral/consult to Podiatry  -     X-Ray Foot Complete Left; Future    Bunion    Other orders  -     diclofenac sodium (VOLTAREN ARTHRITIS PAIN) 1 % Gel; Apply 2 g topically 4 (four) times daily.  -     meloxicam (MOBIC) 15 MG tablet; Take 1 tablet (15 mg total) by mouth once daily.      I counseled the patient on her conditions, their implications and medical management.      Discussed different treatment options for heel pain. including conservative and interventional.  I gave written and verbal instructions on heel cord stretching and this was demonstrated for the patient. Patient expressed understanding. Discussed  wearing appropriate shoe gear and avoiding flats, slippers, sandals, barefoot, and sockfeet. Recommended arch supports. My recommendation for OTC supports is [employer], [employer]ICS tennis shoes.       Patient instructed on adequate icing techniques. Patient should ice the affected area at least once per day x 10 minutes for 10 days . I advised the  patient that extra icing would also be beneficial to ensure adequate anti inflammatory effect     Stretching handout dispensed to patient. Instructions on adequate stretching reviewed in clinic      Rx Voltaren gel to be applied to affected area up to 3-4 x daily as needed for pain    Patient instructed on adequate icing techniques. Patient should ice the affected area at least once per day x 10 minutes for 10 days . I advised the patient that extra icing would also be beneficial to ensure adequate anti inflammatory effect      Mobic prescribed. Patient was instructed on dosing information. Discontinue if adverse effects occur      Conservative treatments for bunion discussed include padding wide width shoes ; Surgical treatments discussed and generic post-op course. Patient opting to pursue surgical option after months of conservative care.

## 2024-08-14 ENCOUNTER — PATIENT OUTREACH (OUTPATIENT)
Dept: ADMINISTRATIVE | Facility: HOSPITAL | Age: 59
End: 2024-08-14
Payer: COMMERCIAL

## 2024-08-14 DIAGNOSIS — Z12.31 BREAST CANCER SCREENING BY MAMMOGRAM: ICD-10-CM

## 2024-08-14 DIAGNOSIS — R73.03 PREDIABETES: Primary | ICD-10-CM

## 2024-08-14 NOTE — PROGRESS NOTES
Lab linked to appointment scheduled for 08/15/24. Portal message sent with order so pt can schedule her mammogram. Immunization's updated/triggered.

## 2024-08-15 ENCOUNTER — LAB VISIT (OUTPATIENT)
Dept: LAB | Facility: HOSPITAL | Age: 59
End: 2024-08-15
Attending: FAMILY MEDICINE
Payer: COMMERCIAL

## 2024-08-15 DIAGNOSIS — E78.49 OTHER HYPERLIPIDEMIA: Chronic | ICD-10-CM

## 2024-08-15 DIAGNOSIS — D50.9 IRON DEFICIENCY ANEMIA, UNSPECIFIED IRON DEFICIENCY ANEMIA TYPE: ICD-10-CM

## 2024-08-15 DIAGNOSIS — R73.03 PREDIABETES: ICD-10-CM

## 2024-08-15 DIAGNOSIS — I10 PRIMARY HYPERTENSION: Chronic | ICD-10-CM

## 2024-08-15 LAB
ALBUMIN SERPL BCP-MCNC: 4 G/DL (ref 3.5–5.2)
ALP SERPL-CCNC: 106 U/L (ref 55–135)
ALT SERPL W/O P-5'-P-CCNC: 29 U/L (ref 10–44)
ANION GAP SERPL CALC-SCNC: 13 MMOL/L (ref 8–16)
AST SERPL-CCNC: 22 U/L (ref 10–40)
BASOPHILS # BLD AUTO: 0.04 K/UL (ref 0–0.2)
BASOPHILS NFR BLD: 0.6 % (ref 0–1.9)
BILIRUB SERPL-MCNC: 1.2 MG/DL (ref 0.1–1)
BUN SERPL-MCNC: 12 MG/DL (ref 6–20)
CALCIUM SERPL-MCNC: 10.1 MG/DL (ref 8.7–10.5)
CHLORIDE SERPL-SCNC: 104 MMOL/L (ref 95–110)
CHOLEST SERPL-MCNC: 144 MG/DL (ref 120–199)
CHOLEST/HDLC SERPL: 2.9 {RATIO} (ref 2–5)
CO2 SERPL-SCNC: 24 MMOL/L (ref 23–29)
CREAT SERPL-MCNC: 0.8 MG/DL (ref 0.5–1.4)
DIFFERENTIAL METHOD BLD: ABNORMAL
EOSINOPHIL # BLD AUTO: 0.2 K/UL (ref 0–0.5)
EOSINOPHIL NFR BLD: 2.8 % (ref 0–8)
ERYTHROCYTE [DISTWIDTH] IN BLOOD BY AUTOMATED COUNT: 15.6 % (ref 11.5–14.5)
EST. GFR  (NO RACE VARIABLE): >60 ML/MIN/1.73 M^2
ESTIMATED AVG GLUCOSE: 120 MG/DL (ref 68–131)
FERRITIN SERPL-MCNC: 187 NG/ML (ref 20–300)
GLUCOSE SERPL-MCNC: 91 MG/DL (ref 70–110)
HBA1C MFR BLD: 5.8 % (ref 4–5.6)
HCT VFR BLD AUTO: 36.2 % (ref 37–48.5)
HDLC SERPL-MCNC: 50 MG/DL (ref 40–75)
HDLC SERPL: 34.7 % (ref 20–50)
HGB BLD-MCNC: 11.2 G/DL (ref 12–16)
IMM GRANULOCYTES # BLD AUTO: 0.01 K/UL (ref 0–0.04)
IMM GRANULOCYTES NFR BLD AUTO: 0.2 % (ref 0–0.5)
IRON SERPL-MCNC: 55 UG/DL (ref 30–160)
LDLC SERPL CALC-MCNC: 78.4 MG/DL (ref 63–159)
LYMPHOCYTES # BLD AUTO: 2.8 K/UL (ref 1–4.8)
LYMPHOCYTES NFR BLD: 43.4 % (ref 18–48)
MCH RBC QN AUTO: 24.7 PG (ref 27–31)
MCHC RBC AUTO-ENTMCNC: 30.9 G/DL (ref 32–36)
MCV RBC AUTO: 80 FL (ref 82–98)
MONOCYTES # BLD AUTO: 0.6 K/UL (ref 0.3–1)
MONOCYTES NFR BLD: 9 % (ref 4–15)
NEUTROPHILS # BLD AUTO: 2.8 K/UL (ref 1.8–7.7)
NEUTROPHILS NFR BLD: 44 % (ref 38–73)
NONHDLC SERPL-MCNC: 94 MG/DL
NRBC BLD-RTO: 0 /100 WBC
PLATELET # BLD AUTO: 295 K/UL (ref 150–450)
PMV BLD AUTO: 11 FL (ref 9.2–12.9)
POTASSIUM SERPL-SCNC: 4 MMOL/L (ref 3.5–5.1)
PROT SERPL-MCNC: 7.6 G/DL (ref 6–8.4)
RBC # BLD AUTO: 4.53 M/UL (ref 4–5.4)
SATURATED IRON: 13 % (ref 20–50)
SODIUM SERPL-SCNC: 141 MMOL/L (ref 136–145)
TOTAL IRON BINDING CAPACITY: 420 UG/DL (ref 250–450)
TRANSFERRIN SERPL-MCNC: 284 MG/DL (ref 200–375)
TRIGL SERPL-MCNC: 78 MG/DL (ref 30–150)
WBC # BLD AUTO: 6.33 K/UL (ref 3.9–12.7)

## 2024-08-15 PROCEDURE — 36415 COLL VENOUS BLD VENIPUNCTURE: CPT | Mod: PN | Performed by: FAMILY MEDICINE

## 2024-08-15 PROCEDURE — 83540 ASSAY OF IRON: CPT | Performed by: FAMILY MEDICINE

## 2024-08-15 PROCEDURE — 80061 LIPID PANEL: CPT | Performed by: FAMILY MEDICINE

## 2024-08-15 PROCEDURE — 83036 HEMOGLOBIN GLYCOSYLATED A1C: CPT | Performed by: FAMILY MEDICINE

## 2024-08-15 PROCEDURE — 82728 ASSAY OF FERRITIN: CPT | Performed by: FAMILY MEDICINE

## 2024-08-15 PROCEDURE — 80053 COMPREHEN METABOLIC PANEL: CPT | Performed by: FAMILY MEDICINE

## 2024-08-15 PROCEDURE — 85025 COMPLETE CBC W/AUTO DIFF WBC: CPT | Performed by: FAMILY MEDICINE

## 2024-08-16 ENCOUNTER — PATIENT MESSAGE (OUTPATIENT)
Dept: PODIATRY | Facility: CLINIC | Age: 59
End: 2024-08-16
Payer: COMMERCIAL

## 2024-08-21 ENCOUNTER — OFFICE VISIT (OUTPATIENT)
Dept: PODIATRY | Facility: CLINIC | Age: 59
End: 2024-08-21
Payer: COMMERCIAL

## 2024-08-21 VITALS — WEIGHT: 224.88 LBS | HEIGHT: 64 IN | BODY MASS INDEX: 38.39 KG/M2

## 2024-08-21 DIAGNOSIS — M21.619 BUNION: ICD-10-CM

## 2024-08-21 DIAGNOSIS — M72.2 PLANTAR FASCIITIS: ICD-10-CM

## 2024-08-21 DIAGNOSIS — M25.572 PAIN OF JOINT OF LEFT ANKLE AND FOOT: Primary | ICD-10-CM

## 2024-08-21 PROCEDURE — 99999 PR PBB SHADOW E&M-EST. PATIENT-LVL III: CPT | Mod: PBBFAC,,, | Performed by: PODIATRIST

## 2024-08-21 RX ORDER — DICLOFENAC SODIUM 75 MG/1
75 TABLET, DELAYED RELEASE ORAL 2 TIMES DAILY
Qty: 30 TABLET | Refills: 2 | Status: SHIPPED | OUTPATIENT
Start: 2024-08-21

## 2024-08-22 ENCOUNTER — OFFICE VISIT (OUTPATIENT)
Dept: FAMILY MEDICINE | Facility: CLINIC | Age: 59
End: 2024-08-22
Payer: COMMERCIAL

## 2024-08-22 VITALS
RESPIRATION RATE: 18 BRPM | DIASTOLIC BLOOD PRESSURE: 88 MMHG | WEIGHT: 231.69 LBS | SYSTOLIC BLOOD PRESSURE: 122 MMHG | BODY MASS INDEX: 39.55 KG/M2 | HEIGHT: 64 IN

## 2024-08-22 DIAGNOSIS — R73.03 PREDIABETES: Chronic | ICD-10-CM

## 2024-08-22 DIAGNOSIS — Z00.00 HEALTH MAINTENANCE EXAMINATION: Primary | ICD-10-CM

## 2024-08-22 DIAGNOSIS — I10 PRIMARY HYPERTENSION: Chronic | ICD-10-CM

## 2024-08-22 DIAGNOSIS — Z01.419 WELL WOMAN EXAM: ICD-10-CM

## 2024-08-22 DIAGNOSIS — E78.49 OTHER HYPERLIPIDEMIA: Chronic | ICD-10-CM

## 2024-08-22 DIAGNOSIS — D50.9 IRON DEFICIENCY ANEMIA, UNSPECIFIED IRON DEFICIENCY ANEMIA TYPE: ICD-10-CM

## 2024-08-22 DIAGNOSIS — M25.512 LEFT SHOULDER PAIN, UNSPECIFIED CHRONICITY: Primary | ICD-10-CM

## 2024-08-22 DIAGNOSIS — M75.32 CALCIFIC TENDONITIS OF LEFT SHOULDER: ICD-10-CM

## 2024-08-22 DIAGNOSIS — Z12.31 ENCOUNTER FOR SCREENING MAMMOGRAM FOR MALIGNANT NEOPLASM OF BREAST: ICD-10-CM

## 2024-08-22 PROCEDURE — 99396 PREV VISIT EST AGE 40-64: CPT | Mod: S$GLB,,, | Performed by: FAMILY MEDICINE

## 2024-08-22 PROCEDURE — 3074F SYST BP LT 130 MM HG: CPT | Mod: CPTII,S$GLB,, | Performed by: FAMILY MEDICINE

## 2024-08-22 PROCEDURE — 3079F DIAST BP 80-89 MM HG: CPT | Mod: CPTII,S$GLB,, | Performed by: FAMILY MEDICINE

## 2024-08-22 PROCEDURE — 99999 PR PBB SHADOW E&M-EST. PATIENT-LVL V: CPT | Mod: PBBFAC,,, | Performed by: FAMILY MEDICINE

## 2024-08-22 PROCEDURE — 3008F BODY MASS INDEX DOCD: CPT | Mod: CPTII,S$GLB,, | Performed by: FAMILY MEDICINE

## 2024-08-22 PROCEDURE — 3044F HG A1C LEVEL LT 7.0%: CPT | Mod: CPTII,S$GLB,, | Performed by: FAMILY MEDICINE

## 2024-08-22 RX ORDER — VALSARTAN AND HYDROCHLOROTHIAZIDE 160; 25 MG/1; MG/1
1 TABLET ORAL DAILY
Qty: 90 TABLET | Refills: 3 | Status: SHIPPED | OUTPATIENT
Start: 2024-08-22 | End: 2025-08-22

## 2024-08-22 RX ORDER — ATORVASTATIN CALCIUM 20 MG/1
20 TABLET, FILM COATED ORAL DAILY
Qty: 90 TABLET | Refills: 3 | Status: SHIPPED | OUTPATIENT
Start: 2024-08-22 | End: 2025-08-22

## 2024-08-22 NOTE — LETTER
August 22, 2024      [address]  [place] [address]  Phone: [phone]       Patient: Mary Naranjo   YOB: 1965  Date of Visit: 08/22/2024    To Whom It May Concern:    Mary Naranjo  was at [facility] on 08/21/2024 and on 08/22/2024. The patient may return to work on 08/23/2024 with no restrictions. If you have any questions or concerns, or if I can be of further assistance, please do not hesitate to contact me.    Sincerely,     Ramon Olivares Jr., MD, AAHIVS  [address]  [place] [address]  Dept: [phone]

## 2024-08-24 ENCOUNTER — HOSPITAL ENCOUNTER (OUTPATIENT)
Dept: RADIOLOGY | Facility: HOSPITAL | Age: 59
Discharge: HOME | End: 2024-08-24
Attending: INTERNAL MEDICINE
Payer: COMMERCIAL

## 2024-08-24 DIAGNOSIS — Z80.0 FAMILY HISTORY OF PANCREATIC CANCER: ICD-10-CM

## 2024-08-24 PROCEDURE — 74183 MRI ABD W/O CNTR FLWD CNTR: CPT | Mod: TC

## 2024-08-24 PROCEDURE — 76376 3D RENDER W/INTRP POSTPROCES: CPT | Mod: TC

## 2024-08-24 PROCEDURE — 76376 3D RENDER W/INTRP POSTPROCES: CPT | Mod: 26,,, | Performed by: RADIOLOGY

## 2024-08-24 PROCEDURE — 25500020 PHARM REV CODE 255: Performed by: INTERNAL MEDICINE

## 2024-08-24 PROCEDURE — 74183 MRI ABD W/O CNTR FLWD CNTR: CPT | Mod: 26,,, | Performed by: RADIOLOGY

## 2024-08-24 PROCEDURE — A9585 GADOBUTROL INJECTION: HCPCS | Performed by: INTERNAL MEDICINE

## 2024-08-24 RX ORDER — GADOBUTROL 604.72 MG/ML
10 INJECTION INTRAVENOUS
Status: COMPLETED | OUTPATIENT
Start: 2024-08-24 | End: 2024-08-24

## 2024-08-24 RX ADMIN — GADOBUTROL 10 ML: 604.72 INJECTION INTRAVENOUS at 08:08

## 2024-08-26 ENCOUNTER — OFFICE VISIT (OUTPATIENT)
Dept: ORTHOPEDICS | Facility: CLINIC | Age: 59
End: 2024-08-26
Payer: COMMERCIAL

## 2024-08-26 DIAGNOSIS — G89.29 CHRONIC RIGHT SHOULDER PAIN: Primary | ICD-10-CM

## 2024-08-26 DIAGNOSIS — M25.511 CHRONIC RIGHT SHOULDER PAIN: Primary | ICD-10-CM

## 2024-08-26 DIAGNOSIS — M75.32 CALCIFIC TENDONITIS OF LEFT SHOULDER: ICD-10-CM

## 2024-08-26 PROCEDURE — 99213 OFFICE O/P EST LOW 20 MIN: CPT | Mod: S$GLB,,, | Performed by: ORTHOPAEDIC SURGERY

## 2024-08-26 PROCEDURE — 1159F MED LIST DOCD IN RCRD: CPT | Mod: CPTII,S$GLB,, | Performed by: ORTHOPAEDIC SURGERY

## 2024-08-26 PROCEDURE — 1160F RVW MEDS BY RX/DR IN RCRD: CPT | Mod: CPTII,S$GLB,, | Performed by: ORTHOPAEDIC SURGERY

## 2024-08-26 PROCEDURE — 99999 PR PBB SHADOW E&M-EST. PATIENT-LVL IV: CPT | Mod: PBBFAC,,, | Performed by: ORTHOPAEDIC SURGERY

## 2024-08-26 PROCEDURE — 3044F HG A1C LEVEL LT 7.0%: CPT | Mod: CPTII,S$GLB,, | Performed by: ORTHOPAEDIC SURGERY

## 2024-08-26 NOTE — PROGRESS NOTES
Assessment: 59 y.o. female with right rotator cuff tendinitis, trapezius spasm     I explained my diagnostic impression and the reasoning behind it in detail, using layman's terms.      Plan:   - Sports referral for OMT for trapezius pain   - PT referral - focus on cuff but can help with muscular pain as well   - Declined injection - has found them to be painful in the past  - Recommended she continued diclofenac- may take  weeks to work. Try adding OTC tylenol  - MRI R shoulder - this pain is recurrent and not controlled with NSAIDs, injection. Will call with results  - Return to clinic in 12 weeks. Return sooner if symptoms worsen or fail to improve.    All questions were answered in detail. The patient is in full agreement with the treatment plan and will proceed accordingly.    Chief Complaint   Patient presents with    Right Shoulder - Pain, Swelling     Initial visit (8/26/24): Mary Naranjo is a 59 y.o. female who presents today complaining of right shoulder pain  Duration of symptoms:  about 4 -6 weeks   Trauma or new activity: no  Pain is constant  Pain is progressively worsening   Aggravating factors: overhead motion, laying on right side   Relieving factors: rest  Localizes pain to anterior shoulder, subdeltoid fossa, trapezius. Radiates to the neck. Does not radiate down the arm    Pain is trapezius is severe with motion - patient reports she feels a knot here   Night pain is present and is disruptive to sleep  Radicular symptoms: no n/t   Associated symptoms:  limited range of motion.    Prior treatment:  Has diclofenac 75 mg prescribed for foot pain - helpful for the foot but is not at all helpful for the shoulder   Pain does interfere with sleep and activities of daily living .    This patient was seen in consultation at the request of Dr. Ramon Olivares Jr.    This is the extent of the patient's complaints at this time.     Hand dominance: Right     Occupation: Desk worker     Review of patient's  allergies indicates:   Allergen Reactions    Dilaudid [hydromorphone] Other (See Comments)     Hallucination     Physical Exam:   Vitals:    08/26/24 0824   PainSc: 10-Worst pain ever   PainLoc: Shoulder     General: Patient is alert, awake and oriented to time, place and person. Mood and affect are appropriate.  Patient does not appear to be in any distress, denies any constitutional symptoms and appears stated age.   HEENT: Pupils are equal and round, sclera are not injected. External examination of ears and nose reveals no abnormalities. Cranial nerves II-X are grossly intact  Neck: examination demonstrates painless active range of motion. Spurling's sign is negative  Skin: no rashes, abrasions or open wounds on the affected extremity   Resp: No respiratory distress or audible wheezing   CV: 2+  pulses, all extremities warm and well perfused   Right Shoulder    Shoulder Range of Motion    Right     Left   (Active/Passive)       Forward Elevation     140/150*           165/170  External rotation (arm at side)  45/45             45/45   Internal rotation behind the back  L5*             L5     Range of motion is painful   Acromioclavicular joint is tender  Crossbody test: negative    Neer's positive   Hawkin's positive    Keith's positive  Drop arm negative  Belly press negative      Cuff Strength     Right     Left   Supraspinatus        4/5    5/5  Infraspinatus     5/5    5/5  Subscapularis     5/5    5/5    Deltoid testing            5/5    5/5    Speeds positive  Yergasons positive    Elbow examination demonstrates no tenderness to palpation and has normal range of motion.     ltsi C5-T1  + epl, io, fds, fdp   2+ RP      Imaging:  3 views of the right shoulder:  positive for degenerative changes of the AC joint. The humeral head is well centered on the AP and axillary views.  No calcifcations seen. No cortical changes of the greater tuberosity. There is not significant degenerative change of the glenohumeral  joint or posterior subluxation of the humeral head. No acute changes or fracture.      I personally reviewed and interpreted the patient's imaging obtained today in clinic     A note notifying Dr. Ramon Olivares Jr. of my findings was sent via the electronic medical record     This note was created by combination of typed  and M-Modal dictation. Transcription and phonetic errors may be present.  If there are any questions, please contact me.      Current Outpatient Medications:     atorvastatin (LIPITOR) 20 MG tablet, Take 1 tablet (20 mg total) by mouth once daily., Disp: 90 tablet, Rfl: 3    cholecalciferol, vitamin D3, 50 mcg (2,000 unit) Chew, Take 2,000 Units by mouth once daily. Per patient, Disp: , Rfl:     diclofenac (VOLTAREN) 75 MG EC tablet, Take 1 tablet (75 mg total) by mouth 2 (two) times daily., Disp: 30 tablet, Rfl: 2    ferrous sulfate 140 mg (45 mg iron) TbSR, Take 2 tablets by mouth once daily., Disp: , Rfl:     multivit/folic acid/vit K1 (ONE-A-DAY WOMEN'S 50 PLUS ORAL), Take 1 capsule by mouth once daily. Per patient, One-a-Day Women's 50+ Multivitamin Complete, Disp: , Rfl:     omega-3 fatty acids/fish oil (FISH OIL-OMEGA-3 FATTY ACIDS) 300-1,000 mg capsule, Take 1 capsule by mouth once daily., Disp: , Rfl:     valsartan-hydrochlorothiazide (DIOVAN-HCT) 160-25 mg per tablet, Take 1 tablet by mouth once daily., Disp: 90 tablet, Rfl: 3    Past Medical History:   Diagnosis Date    Arthritis     COVID-19 02/08/2023    Dental bridge present     upper removable partial    Family history of pancreatic cancer     Fibrocystic breast     Migraines     Obesity     Pancreas cyst     Primary hypertension 02/02/2022       Active Problem List with Overview Notes    Diagnosis Date Noted    Chronic left-sided low back pain with left-sided sciatica 03/02/2024    Difficulty walking 03/02/2024    Left sided sciatica 02/15/2024    Fatigue 08/08/2023    Iron deficiency anemia 08/08/2023    Other  hyperlipidemia 2023     Lab Results   Component Value Date    CHOL 208 (H) 2024    CHOL 192 2023    CHOL 229 (H) 2022     Lab Results   Component Value Date    HDL 48 2024    HDL 49 2023    HDL 54 2022     Lab Results   Component Value Date    LDLCALC 144.8 2024    LDLCALC 131.2 2023    LDLCALC 157.8 2022     Lab Results   Component Value Date    TRIG 76 2024    TRIG 59 2023    TRIG 86 2022     Lab Results   Component Value Date    CHOLHDL 23.1 2024    CHOLHDL 25.5 2023    CHOLHDL 23.6 2022     The 10-year ASCVD risk score (Harman ROBERTS, et al., 2019) is: 4.8%    Values used to calculate the score:      Age: 58 years      Sex: Female      Is Non- : Yes      Diabetic: No      Tobacco smoker: No      Systolic Blood Pressure: 116 mmHg      Is BP treated: Yes      HDL Cholesterol: 48 mg/dL      Total Cholesterol: 208 mg/dL       Primary hypertension 2022    Class 2 severe obesity due to excess calories with serious comorbidity and body mass index (BMI) of 38.0 to 38.9 in adult 2022    Family history of pancreatic cancer 09/10/2020     2020  Twin sister  of Pancreatic cancer.  Patient saw genetics- See Monoallelic mutation of RET gene    GI:  10/15/2020:  Likely will pursue yearly endoscopic ultrasounds for the purposes of pancreatic cancer screening.  We did discuss that her current family history is not classic for the development of pancreatic cancer however the presence of pancreatic cancer in an identical twin is not discussed in current guidelines regarding screening for pancreatic cancer.     2020  Endoscopic  A cystic lesion was seen in the pancreatic tail.     10[place] [facility] [place]oscopic US  A cystic lesion was seen in the pancreatic tail.   CT or MRI of the abd with  IV contrast in 1 year for surveillance.       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  Last visit with Genetics on 2020 and genetic testing result reviewed:  Germline Cancer-Genetic Test Results  Test(s) performed:  [facility] Common Hereditary Cancers Panel + RET gene  Number of genes analyzed:  48 genes  Laboratory:  [facility]  Sample collection date:  2020  Results report date:  09/10/2020  Full report to be scanned into Epic under the Labs and/or Media tab(s).  FINDINGS:    Gene Variant Zygosity Variant Classification   RET C.31C>A ([id number]) heterozygous Uncertain significance      Dermatology exams  2020- No skin cancer signs  02--No lesions suspicious for malignancy noted.      Prediabetes 2014     Lab Results   Component Value Date    HGBA1C 5.7 (H) 2023    HGBA1C 5.6 2022    HGBA1C 5.7 (H) 2021            Past Surgical History:   Procedure Laterality Date    BREAST BIOPSY      BREAST CYST ASPIRATION      BREAST SURGERY      Dont remember    COLONOSCOPY N/A 10/07/2015    Procedure: COLONOSCOPY;  Surgeon: Eagle Stack MD;  Location: [facility];  Service: Endoscopy;  Laterality: N/A;    DILATION AND CURETTAGE OF UTERUS      ENDOSCOPIC ULTRASOUND OF UPPER GASTROINTESTINAL TRACT N/A 2020    Procedure: ULTRASOUND, UPPER GI TRACT, ENDOSCOPIC;  Surgeon: Kei Juarez MD;  Location: [facility] ([address] [place] [address]);  Service: Endoscopy;  Laterality: N/A;  Covid-19 test 10/31/20 at [facility] -     ENDOSCOPIC ULTRASOUND OF UPPER GASTROINTESTINAL TRACT N/A 2023    Procedure: ULTRASOUND, UPPER GI TRACT, ENDOSCOPIC;  Surgeon: Burak Garcia MD;  Location: [facility] ([address] [place] [address]);  Service: Endoscopy;  Laterality: N/A;  6/15/23-Instructions via portal-DS   pre call complete  pre call 7/10, pt confirmed - mf    ESOPHAGOGASTRODUODENOSCOPY N/A 2019    Procedure: ESOPHAGOGASTRODUODENOSCOPY (EGD);  Surgeon: Cherrie Sheets MD;  Location: [facility];  Service: Endoscopy;  Laterality:  N/A;    EYE SURGERY  December 2021    Cateract    fibrocystic breast       KNEE ARTHROSCOPY W/ MENISCAL REPAIR      TUBAL LIGATION         Social History     Socioeconomic History    Marital status:    Occupational History    Occupation: administrative   Tobacco Use    Smoking status: Never    Smokeless tobacco: Never   Substance and Sexual Activity    Alcohol use: No    Drug use: No    Sexual activity: Not Currently

## 2024-08-26 NOTE — LETTER
August 26, 2024      [facility]  [address]  [address]  [place] [address]  Phone: [phone]       Patient: Mary Naranjo   YOB: 1965  Date of Visit: 08/26/2024    To Whom It May Concern:    Sheila Naranjo  was at [facility] on 08/26/2024. The patient may return to work/school on 8/26/24. If you have any questions or concerns, or if I can be of further assistance, please do not hesitate to contact me.    Sincerely,    Maite Cruz MD

## 2024-08-27 NOTE — PROGRESS NOTES
Patient Name: Mary Naranjo    : 1965  MRN: [id number]      Subjective:     Patient ID: Mary is a 59 y.o. female    Chief Complaint:  Annual Exam    History of Present Illness  The patient is a 59-year-old female who presents for an annual physical exam.    She reports overall good health, with the exception of low iron levels. She has been diagnosed with iron deficiency anemia and has tried various iron supplements without significant improvement. She is considering a B12 injection and also takes a multivitamin with copper due to previously low copper levels.    Her daily medications include atorvastatin, vitamin D supplement, Slow FE, gabapentin (300 mg, 2 tablets in the evening), Linzess, multivitamin, Omega-3, and valsartan/hydrochlorothiazide. She has not taken Linzess for an extended period. She also takes Digestive Advantage, an over-the-counter supplement. She was prescribed diclofenac yesterday for her Achilles tendon but has not yet started the medication.    She is experiencing severe shoulder pain that disrupts her sleep and is interested in seeing a specialist.    She is due for a mammogram in 2024 and typically has this done at the hospital. Her last Pap smear was on 2023. She has upcoming appointments with a neurologist in 2024, a podiatrist on 10/03/2024, and an ophthalmologist on 10/10/2024. She has an MRI of the abdomen scheduled for 2024.    She admits to consuming sweets after meals. She did not follow up with the weight loss doctor due to cost concerns. She is requesting a work note for today's visit.    IMMUNIZATIONS  She is up to date on her COVID-19 vaccine.      Review of Systems   Constitutional:  Negative for unexpected weight change.   HENT:  Negative for ear pain and sore throat.    Eyes:  Negative for visual disturbance.   Respiratory:  Negative for shortness of breath.    Cardiovascular:  Negative for chest pain and palpitations.   Gastrointestinal:   "Negative for abdominal pain and blood in stool.   Genitourinary:  Negative for dysuria and frequency.   Integumentary:  Negative for rash.   Hematological:  Negative for adenopathy.   Psychiatric/Behavioral:  Negative for suicidal ideas.         Objective:   /88 (BP Location: Right arm, Patient Position: Sitting, BP Method: Large (Manual))   Resp 18   Ht 5' 4" (1.626 m)   Wt 105.1 kg (231 lb 11.3 oz)   LMP 01/15/2018   BMI 39.77 kg/m²     Physical Exam    Physical Exam  Constitutional:       General: She is not in acute distress.     Appearance: She is obese. She is not ill-appearing.   HENT:      Head: Normocephalic and atraumatic.      Right Ear: External ear normal.      Left Ear: External ear normal.      Nose: Nose normal.   Eyes:      Extraocular Movements: Extraocular movements intact.      Pupils: Pupils are equal, round, and reactive to light.   Cardiovascular:      Rate and Rhythm: Normal rate and regular rhythm.      Pulses: Normal pulses.   Pulmonary:      Effort: Pulmonary effort is normal. No respiratory distress.      Breath sounds: No wheezing or rales.   Abdominal:      General: Abdomen is flat. Bowel sounds are normal. There is no distension.      Tenderness: There is no abdominal tenderness. There is no guarding.   Musculoskeletal:      Cervical back: Normal range of motion. No rigidity.      Right lower leg: No edema.      Left lower leg: No edema.   Lymphadenopathy:      Cervical: No cervical adenopathy.   Skin:     Capillary Refill: Capillary refill takes less than 2 seconds.   Neurological:      General: No focal deficit present.      Mental Status: She is alert.   Psychiatric:         Behavior: Behavior normal.         Thought Content: Thought content normal.       Lab Visit on 08/15/2024   Component Date Value Ref Range Status    Sodium 08/15/2024 141  136 - 145 mmol/L Final    Potassium 08/15/2024 4.0  3.5 - 5.1 mmol/L Final    Chloride 08/15/2024 104  95 - 110 mmol/L Final    " CO2 08/15/2024 24  23 - 29 mmol/L Final    Glucose 08/15/2024 91  70 - 110 mg/dL Final    BUN 08/15/2024 12  6 - 20 mg/dL Final    Creatinine 08/15/2024 0.8  0.5 - 1.4 mg/dL Final    Calcium 08/15/2024 10.1  8.7 - 10.5 mg/dL Final    Total Protein 08/15/2024 7.6  6.0 - 8.4 g/dL Final    Albumin 08/15/2024 4.0  3.5 - 5.2 g/dL Final    Total Bilirubin 08/15/2024 1.2 (H)  0.1 - 1.0 mg/dL Final    Comment: For infants and newborns, interpretation of results should be based  on gestational age, weight and in agreement with clinical  observations.    Premature Infant recommended reference ranges:  Up to 24 hours.............<8.0 mg/dL  Up to 48 hours............<12.0 mg/dL  3-5 days..................<15.0 mg/dL  6-29 days.................<15.0 mg/dL      Alkaline Phosphatase 08/15/2024 106  55 - 135 U/L Final    AST 08/15/2024 22  10 - 40 U/L Final    ALT 08/15/2024 29  10 - 44 U/L Final    eGFR 08/15/2024 >60  >60 mL/min/1.73 m^2 Final    Anion Gap 08/15/2024 13  8 - 16 mmol/L Final    Cholesterol 08/15/2024 144  120 - 199 mg/dL Final    Comment: The National Cholesterol Education Program (NCEP) has set the  following guidelines (reference ranges) for Cholesterol:  Optimal.....................<200 mg/dL  Borderline High.............200-239 mg/dL  High........................> or = 240 mg/dL      Triglycerides 08/15/2024 78  30 - 150 mg/dL Final    Comment: The National Cholesterol Education Program (NCEP) has set the  following guidelines (reference values) for triglycerides:  Normal......................<150 mg/dL  Borderline High.............150-199 mg/dL  High........................200-499 mg/dL      HDL 08/15/2024 50  40 - 75 mg/dL Final    Comment: The National Cholesterol Education Program (NCEP) has set the  following guidelines (reference values) for HDL Cholesterol:  Low...............<40 mg/dL  Optimal...........>60 mg/dL      LDL Cholesterol 08/15/2024 78.4  63.0 - 159.0 mg/dL Final    Comment: The National  Cholesterol Education Program (NCEP) has set the  following guidelines (reference values) for LDL Cholesterol:  Optimal.......................<130 mg/dL  Borderline High...............130-159 mg/dL  High..........................160-189 mg/dL  Very High.....................>190 mg/dL      HDL/Cholesterol Ratio 08/15/2024 34.7  20.0 - 50.0 % Final    Total Cholesterol/HDL Ratio 08/15/2024 2.9  2.0 - 5.0 Final    Non-HDL Cholesterol 08/15/2024 94  mg/dL Final    Comment: Risk category and Non-HDL cholesterol goals:  Coronary heart disease (CHD)or equivalent (10-year risk of CHD >20%):  Non-HDL cholesterol goal     <130 mg/dL  Two or more CHD risk factors and 10-year risk of CHD <= 20%:  Non-HDL cholesterol goal     <160 mg/dL  0 to 1 CHD risk factor:  Non-HDL cholesterol goal     <190 mg/dL      WBC 08/15/2024 6.33  3.90 - 12.70 K/uL Final    RBC 08/15/2024 4.53  4.00 - 5.40 M/uL Final    Hemoglobin 08/15/2024 11.2 (L)  12.0 - 16.0 g/dL Final    Hematocrit 08/15/2024 36.2 (L)  37.0 - 48.5 % Final    MCV 08/15/2024 80 (L)  82 - 98 fL Final    MCH 08/15/2024 24.7 (L)  27.0 - 31.0 pg Final    MCHC 08/15/2024 30.9 (L)  32.0 - 36.0 g/dL Final    RDW 08/15/2024 15.6 (H)  11.5 - 14.5 % Final    Platelets 08/15/2024 295  150 - 450 K/uL Final    MPV 08/15/2024 11.0  9.2 - 12.9 fL Final    Immature Granulocytes 08/15/2024 0.2  0.0 - 0.5 % Final    Gran # (ANC) 08/15/2024 2.8  1.8 - 7.7 K/uL Final    Immature Grans (Abs) 08/15/2024 0.01  0.00 - 0.04 K/uL Final    Comment: Mild elevation in immature granulocytes is non specific and   can be seen in a variety of conditions including stress response,   acute inflammation, trauma and pregnancy. Correlation with other   laboratory and clinical findings is essential.      Lymph # 08/15/2024 2.8  1.0 - 4.8 K/uL Final    Mono # 08/15/2024 0.6  0.3 - 1.0 K/uL Final    Eos # 08/15/2024 0.2  0.0 - 0.5 K/uL Final    Baso # 08/15/2024 0.04  0.00 - 0.20 K/uL Final    nRBC 08/15/2024 0  0 /100  WBC Final    Gran % 08/15/2024 44.0  38.0 - 73.0 % Final    Lymph % 08/15/2024 43.4  18.0 - 48.0 % Final    Mono % 08/15/2024 9.0  4.0 - 15.0 % Final    Eosinophil % 08/15/2024 2.8  0.0 - 8.0 % Final    Basophil % 08/15/2024 0.6  0.0 - 1.9 % Final    Differential Method 08/15/2024 Automated   Final    Iron 08/15/2024 55  30 - 160 ug/dL Final    Transferrin 08/15/2024 284  200 - 375 mg/dL Final    TIBC 08/15/2024 420  250 - 450 ug/dL Final    Saturated Iron 08/15/2024 13 (L)  20 - 50 % Final    Ferritin 08/15/2024 187  20.0 - 300.0 ng/mL Final    Hemoglobin A1C 08/15/2024 5.8 (H)  4.0 - 5.6 % Final    Comment: ADA Screening Guidelines:  5.7-6.4%  Consistent with prediabetes  >or=6.5%  Consistent with diabetes    High levels of fetal hemoglobin interfere with the HbA1C  assay. Heterozygous hemoglobin variants (HbS, HgC, etc)do  not significantly interfere with this assay.   However, presence of multiple variants may affect accuracy.      Estimated Avg Glucose 08/15/2024 120  68 - 131 mg/dL Final         Assessment        ICD-10-CM ICD-9-CM   1. Health maintenance examination  Z00.00 V70.0   2. Encounter for screening mammogram for malignant neoplasm of breast  Z12.31 V76.12   3. Well woman exam  Z01.419 V72.31   4. Primary hypertension  I10 401.9   5. Prediabetes  R73.03 790.29   6. Other hyperlipidemia  E78.49 272.4   7. Iron deficiency anemia, unspecified iron deficiency anemia type  D50.9 280.9   8. Calcific tendonitis of left shoulder  M75.32 726.11         Plan:     Assessment & Plan  1 Health Maintenance.  She is current with her COVID-19 vaccinations. An influenza vaccine will be administered during a nurse visit in October. She is due for a mammogram after November 6, 2024, and a colonoscopy by October 1, 2025. A Pap smear is not needed until 2028. An MRCP is scheduled for August 24, 2024, to evaluate her pancreatic system due to family history of pancreatic cancer.  Age appropriate screenings, vaccinations,  and recommendations reviewed and discussed.  Appropriate orders placed and previous results reviewed.     2. Hypertension  Good blood pressure control with current regimen.  Will continue current regimen.    3. Prediabetes.  Her hemoglobin A1c has returned to the prediabetic range. She is advised to reduce excessive carbohydrate intake, including bread, rice, pasta, and sweets. Lab values will be reassessed in six months.    3. Iron Deficiency Anemia.  Her iron levels are low but stable. The dosage of Slow FE will be increased to two pills daily. She reports no benefit from previous high-dose iron supplements. Lab values will be reassessed in six months.    4. Calcific Tendinitis.  She reports significant shoulder pain, especially at night. A referral will be made to a shoulder specialist, for further evaluation and management.     ORDERS  1. Health maintenance examination    2. Encounter for screening mammogram for malignant neoplasm of breast  -     Mammo Digital Screening Bilat w/ Celestino; Future; Expected date: 11/06/2024    3. Well woman exam  -     Ambulatory referral/consult to Obstetrics / Gynecology; Future; Expected date: 08/29/2024    4. Primary hypertension  -     valsartan-hydrochlorothiazide (DIOVAN-HCT) 160-25 mg per tablet; Take 1 tablet by mouth once daily.  Dispense: 90 tablet; Refill: 3  -     Comprehensive metabolic panel; Future; Expected date: 02/22/2025  -     Lipid panel; Future; Expected date: 02/22/2025    5. Prediabetes  Overview:  Lab Results   Component Value Date    HGBA1C 5.8 (H) 08/15/2024    HGBA1C 5.6 02/08/2024    HGBA1C 5.7 (H) 08/04/2023       Orders:  -     Hemoglobin A1c; Future; Expected date: 02/22/2025    6. Other hyperlipidemia  Overview:  Lab Results   Component Value Date    CHOL 144 08/15/2024    CHOL 208 (H) 02/08/2024    CHOL 192 08/04/2023     Lab Results   Component Value Date    HDL 50 08/15/2024    HDL 48 02/08/2024    HDL 49 08/04/2023     Lab Results   Component  Value Date    LDLCALC 78.4 08/15/2024    LDLCALC 144.8 02/08/2024    LDLCALC 131.2 08/04/2023     Lab Results   Component Value Date    TRIG 78 08/15/2024    TRIG 76 02/08/2024    TRIG 59 08/04/2023     Lab Results   Component Value Date    CHOLHDL 34.7 08/15/2024    CHOLHDL 23.1 02/08/2024    CHOLHDL 25.5 08/04/2023     The 10-year ASCVD risk score (Harman ROBERTS, et al., 2019) is: 4.1%    Values used to calculate the score:      Age: 59 years      Sex: Female      Is Non- : Yes      Diabetic: No      Tobacco smoker: No      Systolic Blood Pressure: 122 mmHg      Is BP treated: Yes      HDL Cholesterol: 50 mg/dL      Total Cholesterol: 144 mg/dL     Orders:  -     atorvastatin (LIPITOR) 20 MG tablet; Take 1 tablet (20 mg total) by mouth once daily.  Dispense: 90 tablet; Refill: 3  -     Comprehensive metabolic panel; Future; Expected date: 02/22/2025  -     Lipid panel; Future; Expected date: 02/22/2025    7. Iron deficiency anemia, unspecified iron deficiency anemia type  Overview:  Lab Results   Component Value Date    HGB 11.2 (L) 08/15/2024    HGB 11.9 (L) 02/08/2024    HGB 11.5 (L) 08/08/2023      Lab Results   Component Value Date    HCT 36.2 (L) 08/15/2024    HCT 38.6 02/08/2024    HCT 37.7 08/08/2023     Lab Results   Component Value Date    MCV 80 (L) 08/15/2024    MCV 80 (L) 02/08/2024    MCV 80 (L) 08/08/2023      Lab Results   Component Value Date    RETIC 1.0 08/08/2023    RETIC 0.8 06/18/2019       Lab Results   Component Value Date    IRON 55 08/15/2024    IRON 61 02/08/2024    IRON 66 08/08/2023      Lab Results   Component Value Date    FESATURATED 13 (L) 08/15/2024    FESATURATED 14 (L) 02/08/2024    FESATURATED 15 (L) 08/08/2023      Lab Results   Component Value Date    TIBC 420 08/15/2024    TIBC 450 02/08/2024    TIBC 445 08/08/2023      Lab Results   Component Value Date    FERRITIN 187 08/15/2024    FERRITIN 173 02/08/2024    FERRITIN 178 08/08/2023     Lab Results    Component Value Date    TRANSFERRIN 284 08/15/2024    TRANSFERRIN 304 02/08/2024    TRANSFERRIN 301 08/08/2023      Lab Results   Component Value Date    SEDRATE 20 06/18/2019      Lab Results   Component Value Date    CRP 10.0 (H) 06/18/2019       Orders:  -     CBC auto differential; Future; Expected date: 02/22/2025  -     Iron and TIBC; Future; Expected date: 02/22/2025  -     Ferritin; Future; Expected date: 02/22/2025  -     ferrous sulfate 140 mg (45 mg iron) TbSR; Take 2 tablets by mouth once daily.    8. Calcific tendonitis of left shoulder  -     Ambulatory referral/consult to Orthopedics; Future; Expected date: 08/29/2024             -Ramon Olivares Jr., MD, AAHIVS      This note was generated with the assistance of ambient listening technology. Verbal consent was obtained by the patient and accompanying visitor(s) for the recording of patient appointment to facilitate this note. I attest to having reviewed and edited the generated note for accuracy, though some syntax or spelling errors may persist. Please contact the author of this note for any clarification.          There are no Patient Instructions on file for this visit.    Follow Up  Follow up in about 6 months (around 2/22/2025) for Hypertension, Lipids (fasting labs a week prior).    Future Appointments   Date Time Provider Department Center   8/29/2024  8:30 AM ROBERT Mortensen MD [facility] SPORTS [place]   9/5/2024  7:15 AM [facility] MRI1 LIMIT 350 LBS [facility] MRI [facility] [place]   9/11/2024  7:00 AM Aroldo Cabrera Jr., PT [facility] OP RHB [facility] [place] - B   9/12/2024 12:30 PM [facility] OIC-MRI1 [facility] MRI [facility]   9/17/2024  8:00 AM EMG, [facility] NEURO [facility] [place]   10/3/2024  1:00 PM Mike Haro DPM [facility] POD [place] [facility] Ort   10/10/2024  9:40 AM Natalia Tidwell OD [facility] OPTOMTY [place] kim   10/15/2024  8:00 AM NURSE, [facility] IM [facility] -    11/15/2024  9:00 AM [facility] MAMMO1 [facility] MAMMO [facility]   11/15/2024 10:30 AM  Araceli Asencio MD [facility]   2/14/2025  8:45 AM LAB, [facility] DRAW STATION [facility] LAB [facility]   2/21/2025  8:40 AM Ramon Olivares Jr., MD [facility]

## 2024-08-28 ENCOUNTER — TELEPHONE (OUTPATIENT)
Dept: SPORTS MEDICINE | Facility: CLINIC | Age: 59
End: 2024-08-28
Payer: COMMERCIAL

## 2024-08-30 NOTE — PROGRESS NOTES
Subjective:      Patient ID: Mary Naranjo is a 59 y.o. female.    Chief Complaint:   Bunions (Bilateral ) and Foot Pain (Left radiating from heel with sharp stabbing pains )    Mary is a 59 y.o. female who presents to the podiatry clinic  with complaint of  left foot pain. Onset of the symptoms was several weeks ago. Precipitating event: none known. Current symptoms include: ability to bear weight, but with some pain. Aggravating factors: any weight bearing. Symptoms have gradually worsened. Patient has had no prior foot problems. Evaluation to date: none. Treatment to date: avoidance of offending activity. Patients rates pain 5/10 on pain scale.    Review of Systems   Constitutional: Negative for chills, decreased appetite, fever and malaise/fatigue.   HENT:  Negative for congestion, hearing loss, nosebleeds and tinnitus.    Eyes:  Negative for double vision, pain, photophobia and visual disturbance.   Cardiovascular:  Negative for chest pain, claudication, cyanosis and leg swelling.   Respiratory:  Negative for cough, hemoptysis, shortness of breath and wheezing.    Endocrine: Negative for cold intolerance and heat intolerance.   Hematologic/Lymphatic: Negative for adenopathy and bleeding problem.   Skin:  Negative for color change, dry skin, itching, nail changes and suspicious lesions.   Musculoskeletal:  Positive for joint pain, myalgias and stiffness. Negative for arthritis.   Gastrointestinal:  Negative for abdominal pain, jaundice, nausea and vomiting.   Genitourinary:  Negative for dysuria, frequency and hematuria.   Neurological:  Negative for difficulty with concentration, loss of balance, numbness, paresthesias and sensory change.   Psychiatric/Behavioral:  Negative for altered mental status, hallucinations and suicidal ideas. The patient is not nervous/anxious.    Allergic/Immunologic: Negative for environmental allergies and persistent infections.           Objective:      Physical  Exam  Vitals reviewed.   Constitutional:       Appearance: She is well-developed.   HENT:      Head: Normocephalic and atraumatic.   Cardiovascular:      Pulses:           Dorsalis pedis pulses are 2+ on the right side and 2+ on the left side.        Posterior tibial pulses are 2+ on the right side and 2+ on the left side.   Pulmonary:      Effort: Pulmonary effort is normal.   Musculoskeletal:         General: Normal range of motion.      Comments: Inspection and palpation of the muscles joints and bones of both lower extremities reveal that muscle strength for the anterior lateral and posterior muscle groups and intrinsic muscle groups of the foot are all 5 over 5 symmetrical.    Pain on palpation plantar medial left heel, no pain with ROM or MMT or medial and lateral compression of heel, - tinel's sign    Tenderness left ankle ATFL without instability.   Skin:     General: Skin is warm and dry.      Capillary Refill: Capillary refill takes 2 to 3 seconds.      Comments: Skin turgor is normal bilaterally.  Skin texture is well hydrated to both lower extremities.  No lesions or rashes or wounds appreciated bilaterally.  Nail plates 1 through 5 bilaterally are within normal limits for length and thickness.  No nail clubbing or incurvation noted.   Neurological:      Mental Status: She is alert and oriented to person, place, and time.      Comments: Sharp dull light touch vibratory proprioceptive sensation are intact bilaterally.  Deep tendon reflexes to patellar and Achilles tendon are symmetrical 2 over 4 bilaterally.  No ankle clonus or Babinski reflexes noted bilaterally.  Coordination is normal to both feet and lower extremities.   Psychiatric:         Behavior: Behavior normal.               Assessment:       Encounter Diagnoses   Name Primary?    Pain of joint of left ankle and foot Yes    Plantar fasciitis     Bunion      Independent visualization of imaging was performed.  Results were reviewed in detail  with patient.       Plan:       Mary was seen today for bunions and foot pain.    Diagnoses and all orders for this visit:    Pain of joint of left ankle and foot  -     MRI Ankle Without Contrast Left; Future  -     HME - OTHER    Plantar fasciitis    Bunion    Other orders  -     diclofenac (VOLTAREN) 75 MG EC tablet; Take 1 tablet (75 mg total) by mouth 2 (two) times daily.      I counseled the patient on her conditions, their implications and medical management.    The nature of the condition, options for management, as well as potential risks and complications were discussed in detail with patient. Patient was amenable to my recommendations and left my office fully informed and will follow up as instructed or sooner if necessary.      Conservative and surgical options discussed. Begin brace, ice Nsaids.    I recommended patient be fitted for orthoses.  I explained that orthoses may improve function of the foot, reduce pain, decrease pronation, increase efficiency of muscle function of the foot and ankle and prevent surgery.  Alternative forms of biomechanical control of the foot and ankle were discussed with the patient.      MRI ordered.

## 2024-09-05 ENCOUNTER — HOSPITAL ENCOUNTER (OUTPATIENT)
Dept: RADIOLOGY | Facility: HOSPITAL | Age: 59
Discharge: HOME | End: 2024-09-05
Attending: ORTHOPAEDIC SURGERY
Payer: COMMERCIAL

## 2024-09-05 ENCOUNTER — PATIENT MESSAGE (OUTPATIENT)
Dept: ADMINISTRATIVE | Facility: HOSPITAL | Age: 59
End: 2024-09-05
Payer: COMMERCIAL

## 2024-09-05 DIAGNOSIS — G89.29 CHRONIC RIGHT SHOULDER PAIN: ICD-10-CM

## 2024-09-05 DIAGNOSIS — M25.511 CHRONIC RIGHT SHOULDER PAIN: ICD-10-CM

## 2024-09-05 PROCEDURE — 73221 MRI JOINT UPR EXTREM W/O DYE: CPT | Mod: TC,RT

## 2024-09-05 PROCEDURE — 73221 MRI JOINT UPR EXTREM W/O DYE: CPT | Mod: 26,RT,, | Performed by: RADIOLOGY

## 2024-09-12 ENCOUNTER — OFFICE VISIT (OUTPATIENT)
Dept: SPORTS MEDICINE | Facility: CLINIC | Age: 59
End: 2024-09-12
Payer: COMMERCIAL

## 2024-09-12 ENCOUNTER — HOSPITAL ENCOUNTER (OUTPATIENT)
Dept: RADIOLOGY | Facility: HOSPITAL | Age: 59
Discharge: HOME | End: 2024-09-12
Attending: PODIATRIST
Payer: COMMERCIAL

## 2024-09-12 VITALS
WEIGHT: 231.69 LBS | BODY MASS INDEX: 39.55 KG/M2 | DIASTOLIC BLOOD PRESSURE: 77 MMHG | HEIGHT: 64 IN | SYSTOLIC BLOOD PRESSURE: 126 MMHG

## 2024-09-12 DIAGNOSIS — M25.572 PAIN OF JOINT OF LEFT ANKLE AND FOOT: ICD-10-CM

## 2024-09-12 DIAGNOSIS — M67.911 ROTATOR CUFF DISORDER, RIGHT: ICD-10-CM

## 2024-09-12 DIAGNOSIS — G89.29 CHRONIC RIGHT SHOULDER PAIN: Primary | ICD-10-CM

## 2024-09-12 DIAGNOSIS — M75.01 ADHESIVE CAPSULITIS OF RIGHT SHOULDER: ICD-10-CM

## 2024-09-12 DIAGNOSIS — M25.511 CHRONIC RIGHT SHOULDER PAIN: Primary | ICD-10-CM

## 2024-09-12 PROCEDURE — 73721 MRI JNT OF LWR EXTRE W/O DYE: CPT | Mod: TC,LT

## 2024-09-12 PROCEDURE — 73721 MRI JNT OF LWR EXTRE W/O DYE: CPT | Mod: 26,LT,, | Performed by: RADIOLOGY

## 2024-09-12 PROCEDURE — 99999 PR PBB SHADOW E&M-EST. PATIENT-LVL III: CPT | Mod: PBBFAC,,, | Performed by: ORTHOPAEDIC SURGERY

## 2024-09-12 RX ORDER — TRIAMCINOLONE ACETONIDE 40 MG/ML
40 INJECTION, SUSPENSION INTRA-ARTICULAR; INTRAMUSCULAR
Status: COMPLETED | OUTPATIENT
Start: 2024-09-12 | End: 2024-09-12

## 2024-09-12 RX ADMIN — TRIAMCINOLONE ACETONIDE 40 MG: 40 INJECTION, SUSPENSION INTRA-ARTICULAR; INTRAMUSCULAR at 11:09
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
## 2024-09-16 ENCOUNTER — OFFICE VISIT (OUTPATIENT)
Dept: ORTHOPEDICS | Facility: CLINIC | Age: 59
End: 2024-09-16
Payer: COMMERCIAL

## 2024-09-16 ENCOUNTER — TELEPHONE (OUTPATIENT)
Dept: FAMILY MEDICINE | Facility: CLINIC | Age: 59
End: 2024-09-16
Payer: COMMERCIAL

## 2024-09-16 ENCOUNTER — TELEPHONE (OUTPATIENT)
Dept: PODIATRY | Facility: CLINIC | Age: 59
End: 2024-09-16
Payer: COMMERCIAL

## 2024-09-16 DIAGNOSIS — M75.121 NONTRAUMATIC COMPLETE TEAR OF RIGHT ROTATOR CUFF: Primary | ICD-10-CM

## 2024-09-16 PROCEDURE — 3044F HG A1C LEVEL LT 7.0%: CPT | Mod: CPTII,S$GLB,, | Performed by: ORTHOPAEDIC SURGERY

## 2024-09-16 PROCEDURE — 1160F RVW MEDS BY RX/DR IN RCRD: CPT | Mod: CPTII,S$GLB,, | Performed by: ORTHOPAEDIC SURGERY

## 2024-09-16 PROCEDURE — 99213 OFFICE O/P EST LOW 20 MIN: CPT | Mod: S$GLB,,, | Performed by: ORTHOPAEDIC SURGERY

## 2024-09-16 PROCEDURE — 1159F MED LIST DOCD IN RCRD: CPT | Mod: CPTII,S$GLB,, | Performed by: ORTHOPAEDIC SURGERY

## 2024-09-16 PROCEDURE — 99999 PR PBB SHADOW E&M-EST. PATIENT-LVL II: CPT | Mod: PBBFAC,,, | Performed by: ORTHOPAEDIC SURGERY

## 2024-09-16 NOTE — TELEPHONE ENCOUNTER
Patient gave verbal understanding of further treatment plan per MRI results after reviewed by Dr. Haro. Patient appointment reminder in the mail on today for evaluation/surgical consult plan.

## 2024-09-16 NOTE — TELEPHONE ENCOUNTER
----- Message from Davina Malone MA sent at 9/16/2024  3:39 PM CDT -----  Patient needs a pre-op appt for right shoulder surgery with deep sedation on 11/05/24. If there are any question please call the clinic. Thanks Davina [phone]

## 2024-09-16 NOTE — PROGRESS NOTES
Assessment: 59 y.o. female with right rotator cuff tear     I explained my diagnostic impression and the reasoning behind it in detail, using layman's terms.      Plan:   - Right shoulder arthroscopy with cuff repair, possible biceps tenotomy     We have discussed the surgery and anticipated recovery.  The patient understands that there may be limited mobility up to several weeks after surgery depending on procedures that are performed at the time of surgery.    The details of the surgical procedure were explained, including the location of probable incisions and a description of likely hardware and/or grafts to be used.  The patient understands the likely convalescence after surgery, in particular the expected postop rehab and recovery course. Alternatives both operative and non-operative with associated risks and benefits discussed. The outlined risks and potential complications of the proposed procedure include but are not limited to: bleeding, infection, vessel and/or nerve damage, pain, numbness, tingling, compartment syndrome, need for additional surgery, failure to return to pre-injury and/or preoperative functional status, inability to return to work, scar sensitivity, delayed healing, complex regional pain syndrome, weakness, partial and/or incomplete relief of symptoms, persistence of and/or worsening of symptoms, hardware and/or surgical failure, prominent and/or symptomatic hardware possibly necessitating future removal, failure of repair to heal, retear, loss of function, stiffness, functional debility, dysfunction, need for prolonged postoperative rehabilitation, fracture, deep venous thrombosis, pulmonary embolism, arthritis and death.  The patient states an understanding and wishes to proceed with surgery.   All questions were answered.  No guarantees were implied or stated.  Written informed consent was obtained.     All questions were answered in detail. The patient is in full agreement with the  [FreeTextEntry1] : Ms. Hanley is a 42-year-old woman presenting for a revisit encounter with ongoing neck and right sided localized lower back, occasionally traveling into the right lower extremity precipitated by a fall down stairs in November 2020. She underwent a right SI joint injection for diagnostic and therapeutic purposes which did not give her any relief at all. She plans to see Dr. Mcgregor again and would like to hold off on any additional lower back injections at this time. She was advised to follow up with the flexion-extension x-rays prior to seeing him. \par \par With regards to her neck, she will proceed with a VENTURA. \par \par For symptom control, She will continue with tizanidine and diclofenac. UDS was repeated today. We will await the final report. If UDS is negative for THC, I will then prescribe Tramadol. She will follow up in 4-6 weeks. treatment plan and will proceed accordingly.    Chief Complaint   Patient presents with    Right Shoulder - Pain     Initial visit (8/26/24): Mary Naranjo is a 59 y.o. female who presents today complaining of right shoulder pain  Duration of symptoms:  about 4 -6 weeks   Trauma or new activity: no  Pain is constant  Pain is progressively worsening   Aggravating factors: overhead motion, laying on right side   Relieving factors: rest  Localizes pain to anterior shoulder, subdeltoid fossa, trapezius. Radiates to the neck. Does not radiate down the arm    Pain is trapezius is severe with motion - patient reports she feels a knot here   Night pain is present and is disruptive to sleep  Radicular symptoms: no n/t   Associated symptoms:  limited range of motion.    Prior treatment:  Has diclofenac 75 mg prescribed for foot pain - helpful for the foot but is not at all helpful for the shoulder   Pain does interfere with sleep and activities of daily living .    9/17/24  Still having pain  Had injection last week with Dr Espinoza - no relief     This is the extent of the patient's complaints at this time.     Hand dominance: Right     Occupation: Desk worker     Review of patient's allergies indicates:   Allergen Reactions    Dilaudid [hydromorphone] Other (See Comments)     Hallucination     Physical Exam:   Vitals:    09/16/24 1449   PainSc: 0-No pain   PainLoc: Shoulder     General: Patient is alert, awake and oriented to time, place and person. Mood and affect are appropriate.  Patient does not appear to be in any distress, denies any constitutional symptoms and appears stated age.   HEENT: Pupils are equal and round, sclera are not injected. External examination of ears and nose reveals no abnormalities. Cranial nerves II-X are grossly intact  Neck: examination demonstrates painless active range of motion. Spurling's sign is negative  Skin: no rashes, abrasions or open wounds on the affected extremity   Resp: No  respiratory distress or audible wheezing   CV: 2+  pulses, all extremities warm and well perfused   Right Shoulder    Shoulder Range of Motion    Right     Left   (Active/Passive)       Forward Elevation     90/130           165/170  External rotation (arm at side)  45/45             45/45   Internal rotation behind the back  L5*             L5     Range of motion is painful   Acromioclavicular joint is tender  Crossbody test: negative    Neer's positive   Hawkin's positive    Keith's positive  Drop arm negative  Belly press negative      Cuff Strength     Right     Left   Supraspinatus        4/5    5/5  Infraspinatus     5/5    5/5  Subscapularis     5/5    5/5    Deltoid testing            5/5    5/5    Speeds positive  Yergasons positive    Elbow examination demonstrates no tenderness to palpation and has normal range of motion.     ltsi C5-T1  + epl, io, fds, fdp   2+ RP      Imaging:  3 views of the right shoulder:  positive for degenerative changes of the AC joint. The humeral head is well centered on the AP and axillary views.  No calcifcations seen. No cortical changes of the greater tuberosity. There is not significant degenerative change of the glenohumeral joint or posterior subluxation of the humeral head. No acute changes or fracture.      MRI shows full thickness tear of the SS, tendinosis of the IS, synovitis, intact biceps     I personally reviewed and interpreted the patient's imaging obtained prior to visit      This note was created by combination of typed  and M-Modal dictation. Transcription and phonetic errors may be present.  If there are any questions, please contact me.      Current Outpatient Medications:     atorvastatin (LIPITOR) 20 MG tablet, Take 1 tablet (20 mg total) by mouth once daily., Disp: 90 tablet, Rfl: 3    cholecalciferol, vitamin D3, 50 mcg (2,000 unit) Chew, Take 2,000 Units by mouth once daily. Per patient, Disp: , Rfl:     diclofenac (VOLTAREN) 75 MG EC tablet,  Take 1 tablet (75 mg total) by mouth 2 (two) times daily., Disp: 30 tablet, Rfl: 2    ferrous sulfate 140 mg (45 mg iron) TbSR, Take 2 tablets by mouth once daily., Disp: , Rfl:     multivit/folic acid/vit K1 (ONE-A-DAY WOMEN'S 50 PLUS ORAL), Take 1 capsule by mouth once daily. Per patient, One-a-Day Women's 50+ Multivitamin Complete, Disp: , Rfl:     omega-3 fatty acids/fish oil (FISH OIL-OMEGA-3 FATTY ACIDS) 300-1,000 mg capsule, Take 1 capsule by mouth once daily., Disp: , Rfl:     valsartan-hydrochlorothiazide (DIOVAN-HCT) 160-25 mg per tablet, Take 1 tablet by mouth once daily., Disp: 90 tablet, Rfl: 3    Past Medical History:   Diagnosis Date    Arthritis     COVID-19 02/08/2023    Dental bridge present     upper removable partial    Family history of pancreatic cancer     Fibrocystic breast     Migraines     Obesity     Pancreas cyst     Primary hypertension 02/02/2022       Active Problem List with Overview Notes    Diagnosis Date Noted    Chronic left-sided low back pain with left-sided sciatica 03/02/2024    Difficulty walking 03/02/2024    Left sided sciatica 02/15/2024    Fatigue 08/08/2023    Iron deficiency anemia 08/08/2023     Lab Results   Component Value Date    HGB 11.2 (L) 08/15/2024    HGB 11.9 (L) 02/08/2024    HGB 11.5 (L) 08/08/2023      Lab Results   Component Value Date    HCT 36.2 (L) 08/15/2024    HCT 38.6 02/08/2024    HCT 37.7 08/08/2023     Lab Results   Component Value Date    MCV 80 (L) 08/15/2024    MCV 80 (L) 02/08/2024    MCV 80 (L) 08/08/2023      Lab Results   Component Value Date    RETIC 1.0 08/08/2023    RETIC 0.8 06/18/2019       Lab Results   Component Value Date    IRON 55 08/15/2024    IRON 61 02/08/2024    IRON 66 08/08/2023      Lab Results   Component Value Date    FESATURATED 13 (L) 08/15/2024    FESATURATED 14 (L) 02/08/2024    FESATURATED 15 (L) 08/08/2023      Lab Results   Component Value Date    [facility] 420 08/15/2024    [facility] 450 02/08/2024    [facility] 445 08/08/2023       Lab Results   Component Value Date    FERRITIN 187 08/15/2024    FERRITIN 173 2024    FERRITIN 178 2023     Lab Results   Component Value Date    TRANSFERRIN 284 08/15/2024    TRANSFERRIN 304 2024    TRANSFERRIN 301 2023      Lab Results   Component Value Date    SEDRATE 20 2019      Lab Results   Component Value Date    CRP 10.0 (H) 2019         Other hyperlipidemia 2023     Lab Results   Component Value Date    CHOL 144 08/15/2024    CHOL 208 (H) 2024    CHOL 192 2023     Lab Results   Component Value Date    HDL 50 08/15/2024    HDL 48 2024    HDL 49 2023     Lab Results   Component Value Date    LDLCALC 78.4 08/15/2024    LDLCALC 144.8 2024    LDLCALC 131.2 2023     Lab Results   Component Value Date    TRIG 78 08/15/2024    TRIG 76 2024    TRIG 59 2023     Lab Results   Component Value Date    CHOLHDL 34.7 08/15/2024    CHOLHDL 23.1 2024    CHOLHDL 25.5 2023     The 10-year ASCVD risk score (Harman ROBERTS, et al., 2019) is: 4.1%    Values used to calculate the score:      Age: 59 years      Sex: Female      Is Non- : Yes      Diabetic: No      Tobacco smoker: No      Systolic Blood Pressure: 122 mmHg      Is BP treated: Yes      HDL Cholesterol: 50 mg/dL      Total Cholesterol: 144 mg/dL       Primary hypertension 2022    Class 2 severe obesity due to excess calories with serious comorbidity and body mass index (BMI) of 38.0 to 38.9 in adult 2022    Family history of pancreatic cancer 09/10/2020     2020  Twin sister  of Pancreatic cancer.  Patient saw genetics- See Monoallelic mutation of RET gene    GI:  10/15/2020:  Likely will pursue yearly endoscopic ultrasounds for the purposes of pancreatic cancer screening.  We did discuss that her current family history is not classic for the development of pancreatic cancer however the presence of pancreatic cancer in an identical  twin is not discussed in current guidelines regarding screening for pancreatic cancer.     2020  Endoscopic  A cystic lesion was seen in the pancreatic tail.     10- Endoscopic US   Done at [facility]    2023 Endoscopic US  A cystic lesion was seen in the pancreatic tail.   CT or MRI of the abd with  IV contrast in 1 year for surveillance.       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  Last visit with Genetics on 2020 and genetic testing result reviewed:  Germline Cancer-Genetic Test Results  Test(s) performed:  [facility] Common Hereditary Cancers Panel + RET gene  Number of genes analyzed:  48 genes  Laboratory:  [facility]  Sample collection date:  2020  Results report date:  09/10/2020  Full report to be scanned into Epic under the Labs and/or Media tab(s).  FINDINGS:    Gene Variant Zygosity Variant Classification   RET C.31C>A (p.[id number]) heterozygous Uncertain significance      Dermatology exams  2020- No skin cancer signs  02--No lesions suspicious for malignancy noted.      Prediabetes 2014     Lab Results   Component Value Date    HGBA1C 5.8 (H) 08/15/2024    HGBA1C 5.6 2024    HGBA1C 5.7 (H) 2023            Past Surgical History:   Procedure Laterality Date    BREAST BIOPSY      BREAST CYST ASPIRATION      BREAST SURGERY      Dont remember    COLONOSCOPY N/A 10/07/2015    Procedure: COLONOSCOPY;  Surgeon: Eagle Stack MD;  Location: [facility];  Service: Endoscopy;  Laterality: N/A;    DILATION AND CURETTAGE OF UTERUS      ENDOSCOPIC ULTRASOUND OF UPPER GASTROINTESTINAL TRACT N/A 2020    Procedure: ULTRASOUND, UPPER GI TRACT, ENDOSCOPIC;  Surgeon: Kei Juarez MD;  Location: [facility] ([address] [place] [address]);  Service: Endoscopy;  Laterality: N/A;  Covid-19 test 10/31/20 at [facility]    ENDOSCOPIC ULTRASOUND OF UPPER GASTROINTESTINAL TRACT N/A 2023    Procedure: ULTRASOUND, UPPER GI TRACT,  ENDOSCOPIC;  Surgeon: Burak Garcia MD;  Location: [facility] ENDO ([address] [place] [address]);  Service: Endoscopy;  Laterality: N/A;  6/15/23-Instructions via portal-DS  6/26 pre call complete  pre call 7/10, pt confirmed - mf    ESOPHAGOGASTRODUODENOSCOPY N/A 07/19/2019    Procedure: ESOPHAGOGASTRODUODENOSCOPY (EGD);  Surgeon: Cherrie Sheets MD;  Location: [facility];  Service: Endoscopy;  Laterality: N/A;    EYE SURGERY  December 2021    Cateract    fibrocystic breast       KNEE ARTHROSCOPY W/ MENISCAL REPAIR      TUBAL LIGATION         Social History     Socioeconomic History    Marital status:    Occupational History    Occupation: administrative   Tobacco Use    Smoking status: Never    Smokeless tobacco: Never   Substance and Sexual Activity    Alcohol use: No    Drug use: No    Sexual activity: Not Currently

## 2024-09-16 NOTE — TELEPHONE ENCOUNTER
Lvm letting pt know that her preop appointment was schedule with her pcp for oct 18 .  ----- Message from Davina Malone MA sent at 9/16/2024  3:39 PM CDT -----  Patient needs a pre-op appt for right shoulder surgery with deep sedation on 11/05/24. If there are any question please call the clinic. Thanks Davina [phone]

## 2024-09-17 ENCOUNTER — PROCEDURE VISIT (OUTPATIENT)
Dept: NEUROLOGY | Facility: CLINIC | Age: 59
End: 2024-09-17
Payer: COMMERCIAL

## 2024-09-17 ENCOUNTER — TELEPHONE (OUTPATIENT)
Dept: ORTHOPEDICS | Facility: CLINIC | Age: 59
End: 2024-09-17
Payer: COMMERCIAL

## 2024-09-17 DIAGNOSIS — M54.16 LUMBAR RADICULOPATHY, CHRONIC: ICD-10-CM

## 2024-09-17 NOTE — TELEPHONE ENCOUNTER
----- Message from Maite Cruz MD sent at 9/17/2024  3:59 PM CDT -----  Can you let her know I did some research last night - we can do the cuff repair anytime 4 weeks after her injection. I told her 8 in clinic but we can do it a little earlier if she would like. Earliest we could do it is 10/15

## 2024-09-19 ENCOUNTER — LAB VISIT (OUTPATIENT)
Dept: LAB | Facility: HOSPITAL | Age: 59
End: 2024-09-19
Attending: NURSE PRACTITIONER
Payer: COMMERCIAL

## 2024-09-19 ENCOUNTER — OFFICE VISIT (OUTPATIENT)
Dept: FAMILY MEDICINE | Facility: CLINIC | Age: 59
End: 2024-09-19
Payer: COMMERCIAL

## 2024-09-19 VITALS
WEIGHT: 232.56 LBS | HEART RATE: 69 BPM | SYSTOLIC BLOOD PRESSURE: 125 MMHG | TEMPERATURE: 98 F | HEIGHT: 64 IN | RESPIRATION RATE: 19 BRPM | OXYGEN SATURATION: 97 % | BODY MASS INDEX: 39.7 KG/M2 | DIASTOLIC BLOOD PRESSURE: 72 MMHG

## 2024-09-19 DIAGNOSIS — I10 PRIMARY HYPERTENSION: Chronic | ICD-10-CM

## 2024-09-19 DIAGNOSIS — Z01.818 PRE-OP EXAMINATION: ICD-10-CM

## 2024-09-19 DIAGNOSIS — D50.9 IRON DEFICIENCY ANEMIA, UNSPECIFIED IRON DEFICIENCY ANEMIA TYPE: ICD-10-CM

## 2024-09-19 DIAGNOSIS — Z01.818 PRE-OP EXAMINATION: Primary | ICD-10-CM

## 2024-09-19 DIAGNOSIS — R73.03 PREDIABETES: Chronic | ICD-10-CM

## 2024-09-19 LAB
ANION GAP SERPL CALC-SCNC: 10 MMOL/L (ref 8–16)
BASOPHILS # BLD AUTO: 0.04 K/UL (ref 0–0.2)
BASOPHILS NFR BLD: 0.5 % (ref 0–1.9)
BUN SERPL-MCNC: 18 MG/DL (ref 6–20)
CALCIUM SERPL-MCNC: 10 MG/DL (ref 8.7–10.5)
CHLORIDE SERPL-SCNC: 105 MMOL/L (ref 95–110)
CO2 SERPL-SCNC: 26 MMOL/L (ref 23–29)
CREAT SERPL-MCNC: 0.8 MG/DL (ref 0.5–1.4)
DIFFERENTIAL METHOD BLD: ABNORMAL
EOSINOPHIL # BLD AUTO: 0.1 K/UL (ref 0–0.5)
EOSINOPHIL NFR BLD: 1.4 % (ref 0–8)
ERYTHROCYTE [DISTWIDTH] IN BLOOD BY AUTOMATED COUNT: 15.9 % (ref 11.5–14.5)
EST. GFR  (NO RACE VARIABLE): >60 ML/MIN/1.73 M^2
GLUCOSE SERPL-MCNC: 74 MG/DL (ref 70–110)
HCT VFR BLD AUTO: 36.8 % (ref 37–48.5)
HGB BLD-MCNC: 11.3 G/DL (ref 12–16)
IMM GRANULOCYTES # BLD AUTO: 0.02 K/UL (ref 0–0.04)
IMM GRANULOCYTES NFR BLD AUTO: 0.3 % (ref 0–0.5)
LYMPHOCYTES # BLD AUTO: 3.3 K/UL (ref 1–4.8)
LYMPHOCYTES NFR BLD: 42.7 % (ref 18–48)
MCH RBC QN AUTO: 24.6 PG (ref 27–31)
MCHC RBC AUTO-ENTMCNC: 30.7 G/DL (ref 32–36)
MCV RBC AUTO: 80 FL (ref 82–98)
MONOCYTES # BLD AUTO: 0.6 K/UL (ref 0.3–1)
MONOCYTES NFR BLD: 8.4 % (ref 4–15)
NEUTROPHILS # BLD AUTO: 3.6 K/UL (ref 1.8–7.7)
NEUTROPHILS NFR BLD: 46.7 % (ref 38–73)
NRBC BLD-RTO: 0 /100 WBC
OHS QRS DURATION: 96 MS
OHS QTC CALCULATION: 438 MS
PLATELET # BLD AUTO: 324 K/UL (ref 150–450)
PMV BLD AUTO: 10.4 FL (ref 9.2–12.9)
POTASSIUM SERPL-SCNC: 4.1 MMOL/L (ref 3.5–5.1)
RBC # BLD AUTO: 4.59 M/UL (ref 4–5.4)
SODIUM SERPL-SCNC: 141 MMOL/L (ref 136–145)
WBC # BLD AUTO: 7.64 K/UL (ref 3.9–12.7)

## 2024-09-19 PROCEDURE — 99999 PR PBB SHADOW E&M-EST. PATIENT-LVL IV: CPT | Mod: PBBFAC,,, | Performed by: NURSE PRACTITIONER

## 2024-09-19 PROCEDURE — 80048 BASIC METABOLIC PNL TOTAL CA: CPT | Performed by: NURSE PRACTITIONER

## 2024-09-19 PROCEDURE — 93010 ELECTROCARDIOGRAM REPORT: CPT | Mod: S$GLB,,, | Performed by: INTERNAL MEDICINE

## 2024-09-19 PROCEDURE — 85025 COMPLETE CBC W/AUTO DIFF WBC: CPT | Performed by: NURSE PRACTITIONER

## 2024-09-19 PROCEDURE — 36415 COLL VENOUS BLD VENIPUNCTURE: CPT | Mod: PN | Performed by: NURSE PRACTITIONER

## 2024-09-19 NOTE — LETTER
September 19, 2024      [facility]  [address]  [address]  [place] [address]  Phone: [phone]       Patient: Mary Naranjo   YOB: 1965  Date of Visit: 09/19/2024    To Whom It May Concern:    Sheila Naranjo  was at [facility] on 09/19/2024. The patient may return to work/school on 9/19/24 with no restrictions. If you have any questions or concerns, or if I can be of further assistance, please do not hesitate to contact me.    Sincerely,    Zane Santiago, NP

## 2024-09-19 NOTE — PROGRESS NOTES
Routine Office Visit    Patient Name: Mary Naranjo    : 1965  MRN: [id number]    Chief Complaint:  Preop exam    Subjective:  Mary is a 59 y.o. female who presents today for:    Preop exam - Patient who is known to me reports today for a preoperative exam.  The patient is scheduled for a rotator cuff repair next month.    Personal history of stroke or TIA:  None  Personal history of CAD or structural heart disease:  None  Personal history of CKD:  None  Personal history of diabetes mellitus:  None, although patient is prediabetic  Insulin use:  None    Problems with or reactions to anesthesia in the past:  None reported  Smoker:  She is a nonsmoker  Blood thinners:  Denies use    She can walk up at least 7 flights of steps without any shortness breath or chest pain.  She can also walk at least 2 miles without any exertional symptoms.  She does have JULIO C and reports she will be restarting her CPAP soon.    Past Medical History  Past Medical History:   Diagnosis Date    Arthritis     COVID-19 2023    Dental bridge present     upper removable partial    Family history of pancreatic cancer     Fibrocystic breast     Migraines     Obesity     Pancreas cyst     Primary hypertension 2022       Family History  Family History   Problem Relation Name Age of Onset    Heart disease Mother Mone Skelton     Cataracts Mother Mone Skelton     Glaucoma Mother Mone Skelton     Cancer Father Papa Skelton         Prostate, lung, colon, brain, gallbladder, skin    Colon cancer Father Papa Skelton     Pancreatic cancer Sister Ervin 54    Breast cancer Sister Yajaira 42        unilateral    Cancer Sister Yajaira Sandifer 40        Breast    Cancer Sister Ervin Skelton         Pancreatic    No Known Problems Brother Papa     Asthma Maternal Aunt Micheline Plunkett     Heart disease Maternal Aunt Micheline Plunkett     Heart disease Maternal Aunt Priscila Morejon     No Known Problems Maternal Grandmother      No Known  Problems Maternal Grandfather      No Known Problems Paternal Grandmother      No Known Problems Paternal Grandfather      Cancer Maternal Cousin Quinten         unknown origin    Breast cancer Maternal Cousin      Cancer Maternal Cousin          prostate?    Prostate cancer Maternal Cousin      Cervical cancer Other Esperanza     Cancer Other Esperanza         possible ovarian cancer    COPD Neg Hx      Amblyopia Neg Hx      Blindness Neg Hx      Diabetes Neg Hx      Hypertension Neg Hx      Macular degeneration Neg Hx      Retinal detachment Neg Hx      Strabismus Neg Hx      Stroke Neg Hx      Thyroid disease Neg Hx         Current Medications  Current Outpatient Medications on File Prior to Visit   Medication Sig Dispense Refill    atorvastatin (LIPITOR) 20 MG tablet Take 1 tablet (20 mg total) by mouth once daily. 90 tablet 3    cholecalciferol, vitamin D3, 50 mcg (2,000 unit) Chew Take 2,000 Units by mouth once daily. Per patient      diclofenac (VOLTAREN) 75 MG EC tablet Take 1 tablet (75 mg total) by mouth 2 (two) times daily. 30 tablet 2    ferrous sulfate 140 mg (45 mg iron) TbSR Take 2 tablets by mouth once daily.      multivit/folic acid/vit K1 (ONE-A-DAY WOMEN'S 50 PLUS ORAL) Take 1 capsule by mouth once daily. Per patient, One-a-Day Women's 50+ Multivitamin Complete      omega-3 fatty acids/fish oil (FISH OIL-OMEGA-3 FATTY ACIDS) 300-1,000 mg capsule Take 1 capsule by mouth once daily.      valsartan-hydrochlorothiazide (DIOVAN-HCT) 160-25 mg per tablet Take 1 tablet by mouth once daily. 90 tablet 3     No current facility-administered medications on file prior to visit.       Allergies   Review of patient's allergies indicates:   Allergen Reactions    Dilaudid [hydromorphone] Other (See Comments)     Hallucination       Review of Systems (Pertinent positives)  Review of Systems   Constitutional: Negative.  Negative for chills and fever.   HENT: Negative.  Negative for congestion, sinus pain and sore throat.  "   Eyes: Negative.    Respiratory:  Negative for cough, shortness of breath and wheezing.    Cardiovascular:  Negative for chest pain, palpitations, orthopnea and claudication.   Gastrointestinal: Negative.  Negative for abdominal pain, diarrhea, nausea and vomiting.   Genitourinary: Negative.  Negative for dysuria, frequency and urgency.   Musculoskeletal: Negative.  Negative for back pain, joint pain and neck pain.   Skin: Negative.    Neurological: Negative.  Negative for dizziness, tingling, loss of consciousness and headaches.   Endo/Heme/Allergies: Negative.    Psychiatric/Behavioral: Negative.         /72 (BP Location: Left arm, Patient Position: Sitting, BP Method: Medium (Manual))   Pulse 69   Temp 97.9 °F (36.6 °C) (Oral)   Resp 19   Ht 5' 4" (1.626 m)   Wt 105.5 kg (232 lb 9.4 oz)   LMP 01/15/2018   SpO2 97%   BMI 39.92 kg/m²     Physical Exam  Vitals reviewed.   Constitutional:       General: She is not in acute distress.     Appearance: Normal appearance. She is not ill-appearing, toxic-appearing or diaphoretic.   HENT:      Head: Normocephalic and atraumatic.   Cardiovascular:      Rate and Rhythm: Normal rate and regular rhythm.      Pulses: Normal pulses.      Heart sounds: Normal heart sounds.   Pulmonary:      Effort: Pulmonary effort is normal. No respiratory distress.      Breath sounds: Normal breath sounds. No wheezing.   Abdominal:      General: Bowel sounds are normal. There is no distension.      Palpations: Abdomen is soft.      Tenderness: There is no abdominal tenderness.   Musculoskeletal:         General: No swelling, tenderness or deformity. Normal range of motion.   Skin:     General: Skin is warm and dry.      Capillary Refill: Capillary refill takes less than 2 seconds.   Neurological:      General: No focal deficit present.      Mental Status: She is alert and oriented to person, place, and time.   Psychiatric:         Mood and Affect: Mood normal.         Behavior: " Behavior normal.          Assessment/Plan:  Mary Naranjo is a 59 y.o. female who presents today for :    Mary was seen today for shoulder pain.    Diagnoses and all orders for this visit:    Pre-op examination  -     IN OFFICE EKG 12-LEAD (to Muse)  -     CBC W/ AUTO DIFFERENTIAL; Future  -     BASIC METABOLIC PANEL; Future    Primary hypertension  -     IN OFFICE EKG 12-LEAD (to Muse)  -     CBC W/ AUTO DIFFERENTIAL; Future  -     BASIC METABOLIC PANEL; Future    Iron deficiency anemia, unspecified iron deficiency anemia type    Prediabetes    - EKG done in office shows normal sinus rhythm  - patient medically optimized at low risk for low risk procedure; no need for further cardiac workup prior  - will check lab work prior to procedure today  - follow-up as needed        This office note has been dictated.  This dictation has been generated using M-Modal Fluency Direct dictation; some phonetic errors may occur.

## 2024-09-24 ENCOUNTER — ANESTHESIA EVENT (OUTPATIENT)
Dept: SURGERY | Facility: HOSPITAL | Age: 59
End: 2024-09-24
Payer: COMMERCIAL

## 2024-09-24 DIAGNOSIS — M75.121 COMPLETE TEAR OF RIGHT ROTATOR CUFF: ICD-10-CM

## 2024-09-24 DIAGNOSIS — M75.121 NONTRAUMATIC COMPLETE TEAR OF RIGHT ROTATOR CUFF: Primary | ICD-10-CM

## 2024-09-24 RX ORDER — MUPIROCIN 20 MG/G
OINTMENT TOPICAL
OUTPATIENT
Start: 2024-09-24

## 2024-09-24 RX ORDER — CEFAZOLIN SODIUM 2 G/50ML
2 SOLUTION INTRAVENOUS
OUTPATIENT
Start: 2024-09-24

## 2024-09-25 PROBLEM — M54.16 LUMBAR RADICULOPATHY, CHRONIC: Status: ACTIVE | Noted: 2024-09-25

## 2024-09-25 PROBLEM — R20.2 NUMBNESS AND TINGLING OF BOTH LOWER EXTREMITIES: Status: ACTIVE | Noted: 2024-09-25

## 2024-09-25 PROBLEM — R20.0 NUMBNESS AND TINGLING OF BOTH LOWER EXTREMITIES: Status: ACTIVE | Noted: 2024-09-25

## 2024-09-30 ENCOUNTER — HOSPITAL ENCOUNTER (OUTPATIENT)
Dept: PREADMISSION TESTING | Facility: HOSPITAL | Age: 59
Discharge: HOME | End: 2024-09-30
Attending: ORTHOPAEDIC SURGERY
Payer: COMMERCIAL

## 2024-09-30 VITALS
OXYGEN SATURATION: 100 % | SYSTOLIC BLOOD PRESSURE: 122 MMHG | HEART RATE: 57 BPM | RESPIRATION RATE: 20 BRPM | WEIGHT: 228.19 LBS | BODY MASS INDEX: 39.17 KG/M2 | DIASTOLIC BLOOD PRESSURE: 65 MMHG

## 2024-09-30 DIAGNOSIS — Z01.818 PREOP TESTING: Primary | ICD-10-CM

## 2024-09-30 LAB
ALBUMIN SERPL BCP-MCNC: 4.1 G/DL (ref 3.5–5.2)
ALP SERPL-CCNC: 112 U/L (ref 55–135)
ALT SERPL W/O P-5'-P-CCNC: 47 U/L (ref 10–44)
ANION GAP SERPL CALC-SCNC: 12 MMOL/L (ref 8–16)
AST SERPL-CCNC: 28 U/L (ref 10–40)
BASOPHILS # BLD AUTO: 0.04 K/UL (ref 0–0.2)
BASOPHILS NFR BLD: 0.6 % (ref 0–1.9)
BILIRUB SERPL-MCNC: 0.8 MG/DL (ref 0.1–1)
BUN SERPL-MCNC: 17 MG/DL (ref 6–20)
CALCIUM SERPL-MCNC: 9.5 MG/DL (ref 8.7–10.5)
CHLORIDE SERPL-SCNC: 105 MMOL/L (ref 95–110)
CO2 SERPL-SCNC: 23 MMOL/L (ref 23–29)
CREAT SERPL-MCNC: 0.8 MG/DL (ref 0.5–1.4)
DIFFERENTIAL METHOD BLD: ABNORMAL
EOSINOPHIL # BLD AUTO: 0.1 K/UL (ref 0–0.5)
EOSINOPHIL NFR BLD: 1.8 % (ref 0–8)
ERYTHROCYTE [DISTWIDTH] IN BLOOD BY AUTOMATED COUNT: 15.9 % (ref 11.5–14.5)
EST. GFR  (NO RACE VARIABLE): >60 ML/MIN/1.73 M^2
GLUCOSE SERPL-MCNC: 69 MG/DL (ref 70–110)
HCT VFR BLD AUTO: 37.6 % (ref 37–48.5)
HGB BLD-MCNC: 11.5 G/DL (ref 12–16)
IMM GRANULOCYTES # BLD AUTO: 0.01 K/UL (ref 0–0.04)
IMM GRANULOCYTES NFR BLD AUTO: 0.2 % (ref 0–0.5)
LYMPHOCYTES # BLD AUTO: 2.7 K/UL (ref 1–4.8)
LYMPHOCYTES NFR BLD: 41.2 % (ref 18–48)
MCH RBC QN AUTO: 24.8 PG (ref 27–31)
MCHC RBC AUTO-ENTMCNC: 30.6 G/DL (ref 32–36)
MCV RBC AUTO: 81 FL (ref 82–98)
MONOCYTES # BLD AUTO: 0.5 K/UL (ref 0.3–1)
MONOCYTES NFR BLD: 7.5 % (ref 4–15)
NEUTROPHILS # BLD AUTO: 3.2 K/UL (ref 1.8–7.7)
NEUTROPHILS NFR BLD: 48.7 % (ref 38–73)
NRBC BLD-RTO: 0 /100 WBC
PLATELET # BLD AUTO: 304 K/UL (ref 150–450)
PMV BLD AUTO: 10.2 FL (ref 9.2–12.9)
POTASSIUM SERPL-SCNC: 3.9 MMOL/L (ref 3.5–5.1)
PROT SERPL-MCNC: 7.8 G/DL (ref 6–8.4)
RBC # BLD AUTO: 4.63 M/UL (ref 4–5.4)
SODIUM SERPL-SCNC: 140 MMOL/L (ref 136–145)
WBC # BLD AUTO: 6.57 K/UL (ref 3.9–12.7)

## 2024-09-30 PROCEDURE — 80053 COMPREHEN METABOLIC PANEL: CPT | Performed by: ORTHOPAEDIC SURGERY

## 2024-09-30 PROCEDURE — 36415 COLL VENOUS BLD VENIPUNCTURE: CPT | Performed by: ORTHOPAEDIC SURGERY

## 2024-09-30 PROCEDURE — 85025 COMPLETE CBC W/AUTO DIFF WBC: CPT | Performed by: ORTHOPAEDIC SURGERY

## 2024-09-30 NOTE — ANESTHESIA PREPROCEDURE EVALUATION
09/30/2024  Mary Naranjo is a 59 y.o., female scheduled for REPAIR, ROTATOR CUFF, ARTHROSCOPIC (Right: Shoulder) - on 10/22/2024.      Past Medical History:   Diagnosis Date    Arthritis     COVID-19 02/08/2023    Dental bridge present     upper removable partial    Family history of pancreatic cancer     Fibrocystic breast     Migraines     Obesity     Pancreas cyst     Primary hypertension 02/02/2022         Past Surgical History:   Procedure Laterality Date    BREAST BIOPSY      BREAST CYST ASPIRATION      BREAST SURGERY      Dont remember    COLONOSCOPY N/A 10/07/2015    Procedure: COLONOSCOPY;  Surgeon: Eagle Stack MD;  Location: [facility];  Service: Endoscopy;  Laterality: N/A;    DILATION AND CURETTAGE OF UTERUS      ENDOSCOPIC ULTRASOUND OF UPPER GASTROINTESTINAL TRACT N/A 11/03/2020    Procedure: ULTRASOUND, UPPER GI TRACT, ENDOSCOPIC;  Surgeon: Kei Juarez MD;  Location: [facility] ([address] [place] [address]);  Service: Endoscopy;  Laterality: N/A;  Covid-19 test 10/31/20 at [facility]    ENDOSCOPIC ULTRASOUND OF UPPER GASTROINTESTINAL TRACT N/A 07/14/2023    Procedure: ULTRASOUND, UPPER GI TRACT, ENDOSCOPIC;  Surgeon: Burak Garcia MD;  Location: [facility] ([facility]);  Service: Endoscopy;  Laterality: N/A;  6/15/23-Instructions via portal-DS  6/26 pre call complete  pre call 7/10, pt confirmed - mf    ESOPHAGOGASTRODUODENOSCOPY N/A 07/19/2019    Procedure: ESOPHAGOGASTRODUODENOSCOPY (EGD);  Surgeon: Cherrie Sheets MD;  Location: [facility];  Service: Endoscopy;  Laterality: N/A;    EYE SURGERY  December 2021    Cateract    fibrocystic breast       KNEE ARTHROSCOPY W/ MENISCAL REPAIR      TUBAL LIGATION             Pre-op Assessment    I have reviewed the Patient Summary Reports.     I have reviewed the Nursing Notes.       Review of Systems  Anesthesia Hx:  No problems with previous Anesthesia              Denies Family Hx of Anesthesia complications.    Denies Personal Hx of Anesthesia complications.                    Social:  Non-Smoker, No Alcohol Use       Hematology/Oncology:    Oncology Normal                Hematology Comments: No blood thinner; no bleeding disorder                    EENT/Dental:  EENT/Dental Normal           Cardiovascular:  Exercise tolerance: good   Hypertension   Denies MI.      Denies Dysrhythmias.       hyperlipidemia       Functional Capacity good / => 4 METS                         Pulmonary:        Sleep Apnea, CPAP                Renal/:  Renal/ Normal                 Hepatic/GI:  Hepatic/GI Normal                    Musculoskeletal:      Nontraumatic complete tear of right rotator cuff             Neurological:    Neuromuscular Disease,   Denies Headaches.                                 Endocrine:        Obesity / BMI > 30  Dermatological:  Skin Normal    Psych:  Psychiatric Normal                    Physical Exam  General: Well nourished, Cooperative, Alert and Oriented    Airway:  Mallampati: II   Mouth Opening: Normal  TM Distance: 4 - 6 cm  Tongue: Normal  Neck ROM: Normal ROM    Dental:  No teeth on top  Chest/Lungs:  Normal Respiratory Rate, Clear to auscultation    Heart:  Rate: Normal  Rhythm: Regular Rhythm      Wt Readings from Last 3 Encounters:   10/17/24 103 kg (227 lb 1.6 oz)   10/03/24 103 kg (227 lb 1.2 oz)   09/30/24 103.5 kg (228 lb 2.8 oz)     Temp Readings from Last 3 Encounters:   10/22/24 36.8 °C (98.3 °F) (Oral)   09/19/24 36.6 °C (97.9 °F) (Oral)   02/15/24 37 °C (98.6 °F) (Oral)     BP Readings from Last 3 Encounters:   10/22/24 136/63   10/03/24 130/70   09/30/24 122/65     Pulse Readings from Last 3 Encounters:   10/22/24 74   10/03/24 (!) 54   09/30/24 (!) 57     Lab Results   Component Value Date    WBC 6.57 09/30/2024    HGB 11.5 (L) 09/30/2024    HCT 37.6 09/30/2024    MCV 81 (L) 09/30/2024     09/30/2024       CMP  Sodium   Date Value  Ref Range Status   09/30/2024 140 136 - 145 mmol/L Final     Potassium   Date Value Ref Range Status   09/30/2024 3.9 3.5 - 5.1 mmol/L Final     Chloride   Date Value Ref Range Status   09/30/2024 105 95 - 110 mmol/L Final     CO2   Date Value Ref Range Status   09/30/2024 23 23 - 29 mmol/L Final     Glucose   Date Value Ref Range Status   09/30/2024 69 (L) 70 - 110 mg/dL Final     BUN   Date Value Ref Range Status   09/30/2024 17 6 - 20 mg/dL Final     Creatinine   Date Value Ref Range Status   09/30/2024 0.8 0.5 - 1.4 mg/dL Final     Calcium   Date Value Ref Range Status   09/30/2024 9.5 8.7 - 10.5 mg/dL Final     Total Protein   Date Value Ref Range Status   09/30/2024 7.8 6.0 - 8.4 g/dL Final     Albumin   Date Value Ref Range Status   09/30/2024 4.1 3.5 - 5.2 g/dL Final     Total Bilirubin   Date Value Ref Range Status   09/30/2024 0.8 0.1 - 1.0 mg/dL Final     Comment:     For infants and newborns, interpretation of results should be based  on gestational age, weight and in agreement with clinical  observations.    Premature Infant recommended reference ranges:  Up to 24 hours.............<8.0 mg/dL  Up to 48 hours............<12.0 mg/dL  3-5 days..................<15.0 mg/dL  6-29 days.................<15.0 mg/dL       Alkaline Phosphatase   Date Value Ref Range Status   09/30/2024 112 55 - 135 U/L Final     AST   Date Value Ref Range Status   09/30/2024 28 10 - 40 U/L Final     ALT   Date Value Ref Range Status   09/30/2024 47 (H) 10 - 44 U/L Final     Anion Gap   Date Value Ref Range Status   09/30/2024 12 8 - 16 mmol/L Final     eGFR   Date Value Ref Range Status   09/30/2024 >60 >60 mL/min/1.73 m^2 Final         Anesthesia Plan  Type of Anesthesia, risks & benefits discussed:    Anesthesia Type: Gen ETT  Intra-op Monitoring Plan: Standard ASA Monitors  Post Op Pain Control Plan: multimodal analgesia and IV/PO Opioids PRN  Induction:  IV  Informed Consent: Informed consent signed with the Patient and all  parties understand the risks and agree with anesthesia plan.  All questions answered.   ASA Score: 2  Day of Surgery Review of History & Physical: H&P Update referred to the surgeon/provider.  Anesthesia Plan Notes: patient medically optimized at low risk for low risk procedure; no need for further cardiac workup prior by PCP    Ready For Surgery From Anesthesia Perspective.     .

## 2024-09-30 NOTE — DISCHARGE INSTRUCTIONS
YOUR PROCEDURE WILL BE AT [facility] at [address] [place] [address]                  Before 7 AM, enter through the Emergency Entrance..   After 7 AM enter through the Main Entrance.                 Report to the Same Day Surgery Registration Desk in the hallway.(Just beside the Same Day Surgery Unit)      Your procedure  is scheduled for _____10/22/2024_____.    Call [phone] between 2pm and 5pm on ____10/21/2024___to find out your arrival time for the day of surgery.    You may have two visitors.  No children under 12 years old.     You will be going to the Same Day Surgery Unit on the 2nd floor of the hospital.    Important instructions:  Do not eat anything after midnight.  You may have plain water, non carbonated.  You may also have Gatorade or Powerade after midnight.    Stop all fluids 2 hours before your surgery.    It is okay to brush your teeth.  Do not have gum, candy or mints.    SEE MEDICATION SHEET.   TAKE MEDICATIONS AS DIRECTED.      Do not take any diabetic medication on the morning of surgery unless instructed to do so by your doctor or pre op nurse.      All GLP-1 weekly diabetic/weight loss medications must not be taken for one week before your surgery, or your surgery could be canceled.      STOP taking Aspirin, Ibuprofen,  Advil, Motrin, Mobic(meloxicam), Aleve (naproxen), Fish oil, and Vitamin E for at least 7 days before your surgery.     You may take Tylenol if needed which is not a blood thinner.    Please shower the night before and the morning of your surgery.      Follow any Prep Instructions given by your surgeon.    Use Chlorhexidine soap as instructed by your pre op nurse.   Please place clean linens on your bed the night before surgery. Please wear fresh clean clothing after each shower.    No shaving of procedural area at least 4-5 days before surgery due to increased risk of skin irritation and/or possible infection.    Female patients  may be asked for a urine specimen on the morning of the surgery.  Please check with your nurse before using the restroom.    Contact lenses and removable denture work may not be worn during your procedure.    You may wear deodorant only. If you are having breast surgery, do not wear deodorant on the operative side.    Do not wear powder, body lotion, perfume/cologne or make-up, oils, creams or rubs.    Do not wear any jewelry or have any metal on your body.    You will be asked to remove any dentures or partials for the procedure.    If you are going home on the same day of surgery, you must arrange for a family member or a friend to drive you home.  Public transportation is prohibited.  You will not be able to drive home if you were given anesthesia or sedation.    Patients who want to have their Post-op prescriptions filled from our in-house [facility], bring a Credit/Debit Card or cash with you. A co-pay may be required.  The pharmacy closes at 5:30 pm.    Wear loose fitting clothes allowing for bandages.    Please leave money and valuables home.      You may bring your cell phone.    Call the doctor if fever or illness should occur before your surgery.    Call [phone] to contact us here if needed.                            CLOTHES ON DAY OF SURGERY    SHOULDER surgery:  you must have a very oversized shirt.  Very, Very large.  You will probably have a large sling on with your arm strapped to your chest.  You will not be able to put the arm of the operated shoulder into a sleeve.  You can put the arm of the un-operated shoulder into the sleeve, but the shirt will need to be draped over the operated shoulder.       ARM or HAND surgery:  make sure that your sleeves are large and loose enough to pass over large dressings or cast.      BREAST or UNDERARM surgery:  wear a loose, button down shirt so that you can dress without raising your arms over your head.    ABDOMINAL surgery:  wear loose, comfortable  clothing.  Nothing tight around the abdomen.  NO JEANS    PENIS or SCROTAL surgery:  loose comfortable clothing.  Large sweat pants, pajama pants or a robe.  ABSOLUTELY NO JEANS      LEG or FOOT surgery:  wear large loose pants that are able to pass over any large dressings or casts.  You could also wear loose shorts or a skirt.

## 2024-10-03 ENCOUNTER — OFFICE VISIT (OUTPATIENT)
Dept: PODIATRY | Facility: CLINIC | Age: 59
End: 2024-10-03
Payer: COMMERCIAL

## 2024-10-03 ENCOUNTER — PATIENT MESSAGE (OUTPATIENT)
Dept: ORTHOPEDICS | Facility: CLINIC | Age: 59
End: 2024-10-03
Payer: COMMERCIAL

## 2024-10-03 VITALS
HEART RATE: 54 BPM | SYSTOLIC BLOOD PRESSURE: 130 MMHG | WEIGHT: 227.06 LBS | HEIGHT: 64 IN | DIASTOLIC BLOOD PRESSURE: 70 MMHG | BODY MASS INDEX: 38.76 KG/M2

## 2024-10-03 DIAGNOSIS — M25.572 PAIN OF JOINT OF LEFT ANKLE AND FOOT: Primary | ICD-10-CM

## 2024-10-03 DIAGNOSIS — M72.2 PLANTAR FASCIITIS: ICD-10-CM

## 2024-10-03 DIAGNOSIS — M79.672 LEFT FOOT PAIN: ICD-10-CM

## 2024-10-03 PROCEDURE — 99214 OFFICE O/P EST MOD 30 MIN: CPT | Mod: S$GLB,,, | Performed by: PODIATRIST

## 2024-10-03 PROCEDURE — 3075F SYST BP GE 130 - 139MM HG: CPT | Mod: CPTII,S$GLB,, | Performed by: PODIATRIST

## 2024-10-03 PROCEDURE — 3044F HG A1C LEVEL LT 7.0%: CPT | Mod: CPTII,S$GLB,, | Performed by: PODIATRIST

## 2024-10-03 PROCEDURE — 3008F BODY MASS INDEX DOCD: CPT | Mod: CPTII,S$GLB,, | Performed by: PODIATRIST

## 2024-10-03 PROCEDURE — 99999 PR PBB SHADOW E&M-EST. PATIENT-LVL III: CPT | Mod: PBBFAC,,, | Performed by: PODIATRIST

## 2024-10-03 PROCEDURE — 1160F RVW MEDS BY RX/DR IN RCRD: CPT | Mod: CPTII,S$GLB,, | Performed by: PODIATRIST

## 2024-10-03 PROCEDURE — 1159F MED LIST DOCD IN RCRD: CPT | Mod: CPTII,S$GLB,, | Performed by: PODIATRIST

## 2024-10-03 PROCEDURE — 3078F DIAST BP <80 MM HG: CPT | Mod: CPTII,S$GLB,, | Performed by: PODIATRIST

## 2024-10-03 RX ORDER — MELOXICAM 15 MG/1
15 TABLET ORAL DAILY
Qty: 30 TABLET | Refills: 0 | Status: SHIPPED | OUTPATIENT
Start: 2024-10-03

## 2024-10-08 ENCOUNTER — ANESTHESIA (OUTPATIENT)
Dept: SURGERY | Facility: HOSPITAL | Age: 59
End: 2024-10-08
Payer: COMMERCIAL

## 2024-10-13 NOTE — PROGRESS NOTES
Subjective:      Patient ID: Mary Naranjo is a 59 y.o. female.    Chief Complaint:   Results (MRI ) and Foot Problem (Left foot )    Mary is a 59 y.o. female who presents to the podiatry clinic  with complaint of  left foot pain. Onset of the symptoms was several weeks ago. Precipitating event: none known. Current symptoms include: ability to bear weight, but with some pain. Aggravating factors: any weight bearing. Symptoms have gradually worsened. Patient has had no prior foot problems. Evaluation to date: none. Treatment to date: avoidance of offending activity. Patients rates pain 5/10 on pain scale.  Here for MRI review.    Review of Systems   Constitutional: Negative for chills, decreased appetite, fever and malaise/fatigue.   HENT:  Negative for congestion, hearing loss, nosebleeds and tinnitus.    Eyes:  Negative for double vision, pain, photophobia and visual disturbance.   Cardiovascular:  Negative for chest pain, claudication, cyanosis and leg swelling.   Respiratory:  Negative for cough, hemoptysis, shortness of breath and wheezing.    Endocrine: Negative for cold intolerance and heat intolerance.   Hematologic/Lymphatic: Negative for adenopathy and bleeding problem.   Skin:  Negative for color change, dry skin, itching, nail changes and suspicious lesions.   Musculoskeletal:  Positive for joint pain, myalgias and stiffness. Negative for arthritis.   Gastrointestinal:  Negative for abdominal pain, jaundice, nausea and vomiting.   Genitourinary:  Negative for dysuria, frequency and hematuria.   Neurological:  Negative for difficulty with concentration, loss of balance, numbness, paresthesias and sensory change.   Psychiatric/Behavioral:  Negative for altered mental status, hallucinations and suicidal ideas. The patient is not nervous/anxious.    Allergic/Immunologic: Negative for environmental allergies and persistent infections.           Objective:      Physical Exam  Vitals reviewed.    Constitutional:       Appearance: She is well-developed.   HENT:      Head: Normocephalic and atraumatic.   Cardiovascular:      Pulses:           Dorsalis pedis pulses are 2+ on the right side and 2+ on the left side.        Posterior tibial pulses are 2+ on the right side and 2+ on the left side.   Pulmonary:      Effort: Pulmonary effort is normal.   Musculoskeletal:         General: Normal range of motion.      Comments: Inspection and palpation of the muscles joints and bones of both lower extremities reveal that muscle strength for the anterior lateral and posterior muscle groups and intrinsic muscle groups of the foot are all 5 over 5 symmetrical.    Pain on palpation plantar medial left heel, no pain with ROM or MMT or medial and lateral compression of heel, - tinel's sign    Tenderness left ankle ATFL without instability.   Skin:     General: Skin is warm and dry.      Capillary Refill: Capillary refill takes 2 to 3 seconds.      Comments: Skin turgor is normal bilaterally.  Skin texture is well hydrated to both lower extremities.  No lesions or rashes or wounds appreciated bilaterally.  Nail plates 1 through 5 bilaterally are within normal limits for length and thickness.  No nail clubbing or incurvation noted.   Neurological:      Mental Status: She is alert and oriented to person, place, and time.      Comments: Sharp dull light touch vibratory proprioceptive sensation are intact bilaterally.  Deep tendon reflexes to patellar and Achilles tendon are symmetrical 2 over 4 bilaterally.  No ankle clonus or Babinski reflexes noted bilaterally.  Coordination is normal to both feet and lower extremities.   Psychiatric:         Behavior: Behavior normal.               Assessment:       Encounter Diagnoses   Name Primary?    Pain of joint of left ankle and foot Yes    Plantar fasciitis     Left foot pain      Independent visualization of imaging was performed.  Results were reviewed in detail with patient.        Plan:       Mary was seen today for results and foot problem.    Diagnoses and all orders for this visit:    Pain of joint of left ankle and foot  -     HME - OTHER    Plantar fasciitis    Left foot pain    Other orders  -     meloxicam (MOBIC) 15 MG tablet; Take 1 tablet (15 mg total) by mouth once daily.      I counseled the patient on her conditions, their implications and medical management.    The nature of the condition, options for management, as well as potential risks and complications were discussed in detail with patient. Patient was amenable to my recommendations and left my office fully informed and will follow up as instructed or sooner if necessary.      Conservative and surgical options discussed.  Continue brace, ice Nsaids.    I recommended patient be fitted for orthoses.  I explained that orthoses may improve function of the foot, reduce pain, decrease pronation, increase efficiency of muscle function of the foot and ankle and prevent surgery.  Alternative forms of biomechanical control of the foot and ankle were discussed with the patient.      Begin walking boot.  Follow-up to review MRI once available.

## 2024-10-15 ENCOUNTER — CLINICAL SUPPORT (OUTPATIENT)
Dept: FAMILY MEDICINE | Facility: CLINIC | Age: 59
End: 2024-10-15
Payer: COMMERCIAL

## 2024-10-15 DIAGNOSIS — Z23 FLU VACCINE NEED: Primary | ICD-10-CM

## 2024-10-15 PROCEDURE — 90656 IIV3 VACC NO PRSV 0.5 ML IM: CPT | Mod: S$GLB,,, | Performed by: FAMILY MEDICINE

## 2024-10-15 PROCEDURE — 99999 PR PBB SHADOW E&M-EST. PATIENT-LVL I: CPT | Mod: PBBFAC,,,

## 2024-10-15 PROCEDURE — 90471 IMMUNIZATION ADMIN: CPT | Mod: S$GLB,,, | Performed by: FAMILY MEDICINE

## 2024-10-15 NOTE — PROGRESS NOTES
Low dose flu vaccine given to left deltoid IM. Instructed to wait 15 mins. Per monitoring. No adverse drug reaction noted. VIS given to patient.

## 2024-10-21 ENCOUNTER — TELEPHONE (OUTPATIENT)
Dept: SURGERY | Facility: HOSPITAL | Age: 59
End: 2024-10-21
Payer: COMMERCIAL

## 2024-10-21 RX ORDER — LIDOCAINE HYDROCHLORIDE 10 MG/ML
1 INJECTION, SOLUTION EPIDURAL; INFILTRATION; INTRACAUDAL; PERINEURAL ONCE
OUTPATIENT
Start: 2024-10-21 | End: 2024-10-21

## 2024-10-22 ENCOUNTER — HOSPITAL ENCOUNTER (OUTPATIENT)
Facility: HOSPITAL | Age: 59
Discharge: HOME | End: 2024-10-22
Attending: ORTHOPAEDIC SURGERY | Admitting: ORTHOPAEDIC SURGERY
Payer: COMMERCIAL

## 2024-10-22 VITALS
OXYGEN SATURATION: 96 % | TEMPERATURE: 98 F | BODY MASS INDEX: 38.98 KG/M2 | WEIGHT: 227.13 LBS | DIASTOLIC BLOOD PRESSURE: 65 MMHG | RESPIRATION RATE: 18 BRPM | SYSTOLIC BLOOD PRESSURE: 135 MMHG | HEART RATE: 68 BPM

## 2024-10-22 DIAGNOSIS — M75.121 COMPLETE TEAR OF RIGHT ROTATOR CUFF: ICD-10-CM

## 2024-10-22 DIAGNOSIS — M75.121 NONTRAUMATIC COMPLETE TEAR OF RIGHT ROTATOR CUFF: ICD-10-CM

## 2024-10-22 PROCEDURE — 63600175 PHARM REV CODE 636 W HCPCS: Performed by: ORTHOPAEDIC SURGERY

## 2024-10-22 PROCEDURE — 71000015 HC POSTOP RECOV 1ST HR: Performed by: ORTHOPAEDIC SURGERY

## 2024-10-22 PROCEDURE — 71000033 HC RECOVERY, INTIAL HOUR: Performed by: ORTHOPAEDIC SURGERY

## 2024-10-22 PROCEDURE — C1713 ANCHOR/SCREW BN/BN,TIS/BN: HCPCS | Performed by: ORTHOPAEDIC SURGERY

## 2024-10-22 PROCEDURE — 29828 SHO ARTHRS SRG BICP TENODSIS: CPT | Mod: 52,51,RT, | Performed by: ORTHOPAEDIC SURGERY

## 2024-10-22 PROCEDURE — 63600175 PHARM REV CODE 636 W HCPCS: Performed by: STUDENT IN AN ORGANIZED HEALTH CARE EDUCATION/TRAINING PROGRAM

## 2024-10-22 PROCEDURE — 25000003 PHARM REV CODE 250: Performed by: STUDENT IN AN ORGANIZED HEALTH CARE EDUCATION/TRAINING PROGRAM

## 2024-10-22 PROCEDURE — 64415 NJX AA&/STRD BRCH PLXS IMG: CPT | Mod: 59,RT | Performed by: ANESTHESIOLOGY

## 2024-10-22 PROCEDURE — 36000711: Performed by: ORTHOPAEDIC SURGERY

## 2024-10-22 PROCEDURE — 37000009 HC ANESTHESIA EA ADD 15 MINS: Performed by: ORTHOPAEDIC SURGERY

## 2024-10-22 PROCEDURE — 25000003 PHARM REV CODE 250: Performed by: ORTHOPAEDIC SURGERY

## 2024-10-22 PROCEDURE — 29827 SHO ARTHRS SRG RT8TR CUF RPR: CPT | Mod: RT,,, | Performed by: ORTHOPAEDIC SURGERY

## 2024-10-22 PROCEDURE — 37000008 HC ANESTHESIA 1ST 15 MINUTES: Performed by: ORTHOPAEDIC SURGERY

## 2024-10-22 PROCEDURE — 63600175 PHARM REV CODE 636 W HCPCS: Performed by: ANESTHESIOLOGY

## 2024-10-22 PROCEDURE — 36000710: Performed by: ORTHOPAEDIC SURGERY

## 2024-10-22 PROCEDURE — 27201423 OPTIME MED/SURG SUP & DEVICES STERILE SUPPLY: Performed by: ORTHOPAEDIC SURGERY

## 2024-10-22 DEVICE — ANCHOR HEALICOIL RGNSRB 5.5MM: Type: IMPLANTABLE DEVICE | Site: SHOULDER | Status: FUNCTIONAL

## 2024-10-22 DEVICE — ANCHOR HEALICOIL SUT 4.75MM: Type: IMPLANTABLE DEVICE | Site: SHOULDER | Status: FUNCTIONAL

## 2024-10-22 RX ORDER — LIDOCAINE HYDROCHLORIDE 20 MG/ML
INJECTION INTRAVENOUS
Status: DISCONTINUED | OUTPATIENT
Start: 2024-10-22 | End: 2024-10-22

## 2024-10-22 RX ORDER — ROCURONIUM BROMIDE 10 MG/ML
INJECTION, SOLUTION INTRAVENOUS
Status: DISCONTINUED | OUTPATIENT
Start: 2024-10-22 | End: 2024-10-22

## 2024-10-22 RX ORDER — IBUPROFEN 800 MG/1
800 TABLET ORAL 3 TIMES DAILY
Qty: 42 TABLET | Refills: 0 | Status: SHIPPED | OUTPATIENT
Start: 2024-10-22 | End: 2024-11-05

## 2024-10-22 RX ORDER — OXYCODONE HYDROCHLORIDE 5 MG/1
5 TABLET ORAL EVERY 4 HOURS PRN
Qty: 25 TABLET | Refills: 0 | Status: SHIPPED | OUTPATIENT
Start: 2024-10-22

## 2024-10-22 RX ORDER — SODIUM CHLORIDE 0.9 % (FLUSH) 0.9 %
10 SYRINGE (ML) INJECTION
Status: DISCONTINUED | OUTPATIENT
Start: 2024-10-22 | End: 2024-10-22 | Stop reason: HOSPADM

## 2024-10-22 RX ORDER — FENTANYL CITRATE 50 UG/ML
INJECTION, SOLUTION INTRAMUSCULAR; INTRAVENOUS
Status: DISCONTINUED | OUTPATIENT
Start: 2024-10-22 | End: 2024-10-22

## 2024-10-22 RX ORDER — TRANEXAMIC ACID 10 MG/ML
1000 INJECTION, SOLUTION INTRAVENOUS
Status: COMPLETED | OUTPATIENT
Start: 2024-10-22 | End: 2024-10-22

## 2024-10-22 RX ORDER — ONDANSETRON HYDROCHLORIDE 2 MG/ML
INJECTION, SOLUTION INTRAVENOUS
Status: DISCONTINUED | OUTPATIENT
Start: 2024-10-22 | End: 2024-10-22

## 2024-10-22 RX ORDER — PHENYLEPHRINE HYDROCHLORIDE 10 MG/ML
INJECTION INTRAVENOUS
Status: DISCONTINUED | OUTPATIENT
Start: 2024-10-22 | End: 2024-10-22

## 2024-10-22 RX ORDER — MUPIROCIN 20 MG/G
OINTMENT TOPICAL
Status: DISCONTINUED | OUTPATIENT
Start: 2024-10-22 | End: 2024-10-22 | Stop reason: HOSPADM

## 2024-10-22 RX ORDER — DEXAMETHASONE SODIUM PHOSPHATE 4 MG/ML
INJECTION, SOLUTION INTRA-ARTICULAR; INTRALESIONAL; INTRAMUSCULAR; INTRAVENOUS; SOFT TISSUE
Status: DISCONTINUED | OUTPATIENT
Start: 2024-10-22 | End: 2024-10-22

## 2024-10-22 RX ORDER — FENTANYL CITRATE 50 UG/ML
25 INJECTION, SOLUTION INTRAMUSCULAR; INTRAVENOUS EVERY 5 MIN PRN
Status: DISCONTINUED | OUTPATIENT
Start: 2024-10-22 | End: 2024-10-22 | Stop reason: HOSPADM

## 2024-10-22 RX ORDER — ROPIVACAINE HYDROCHLORIDE 5 MG/ML
INJECTION, SOLUTION EPIDURAL; INFILTRATION; PERINEURAL
Status: COMPLETED | OUTPATIENT
Start: 2024-10-22 | End: 2024-10-22

## 2024-10-22 RX ORDER — GLUCAGON 1 MG
1 KIT INJECTION
Status: DISCONTINUED | OUTPATIENT
Start: 2024-10-22 | End: 2024-10-22 | Stop reason: HOSPADM

## 2024-10-22 RX ORDER — PROPOFOL 10 MG/ML
VIAL (ML) INTRAVENOUS
Status: DISCONTINUED | OUTPATIENT
Start: 2024-10-22 | End: 2024-10-22

## 2024-10-22 RX ORDER — MIDAZOLAM HYDROCHLORIDE 1 MG/ML
INJECTION INTRAMUSCULAR; INTRAVENOUS
Status: DISCONTINUED | OUTPATIENT
Start: 2024-10-22 | End: 2024-10-22

## 2024-10-22 RX ORDER — ACETAMINOPHEN 500 MG
500 TABLET ORAL EVERY 6 HOURS
Qty: 56 TABLET | Refills: 0 | Status: SHIPPED | OUTPATIENT
Start: 2024-10-22 | End: 2024-11-05

## 2024-10-22 RX ORDER — SODIUM CHLORIDE 0.9 % (FLUSH) 0.9 %
10 SYRINGE (ML) INJECTION ONCE
Status: DISCONTINUED | OUTPATIENT
Start: 2024-10-22 | End: 2024-10-22 | Stop reason: HOSPADM

## 2024-10-22 RX ORDER — PREGABALIN 75 MG/1
75 CAPSULE ORAL 2 TIMES DAILY
Qty: 28 CAPSULE | Refills: 0 | Status: SHIPPED | OUTPATIENT
Start: 2024-10-22 | End: 2024-11-05

## 2024-10-22 RX ORDER — CEFAZOLIN 2 G/1
2 INJECTION, POWDER, FOR SOLUTION INTRAMUSCULAR; INTRAVENOUS
Status: COMPLETED | OUTPATIENT
Start: 2024-10-22 | End: 2024-10-22

## 2024-10-22 RX ORDER — EPINEPHRINE 0.1 MG/ML
INJECTION INTRAVENOUS
Status: DISCONTINUED | OUTPATIENT
Start: 2024-10-22 | End: 2024-10-22 | Stop reason: HOSPADM

## 2024-10-22 RX ORDER — ONDANSETRON HYDROCHLORIDE 2 MG/ML
4 INJECTION, SOLUTION INTRAVENOUS DAILY PRN
Status: DISCONTINUED | OUTPATIENT
Start: 2024-10-22 | End: 2024-10-22 | Stop reason: HOSPADM

## 2024-10-22 RX ADMIN — LIDOCAINE HYDROCHLORIDE 100 MG: 20 INJECTION, SOLUTION INTRAVENOUS at 02:10

## 2024-10-22 RX ADMIN — MIDAZOLAM HYDROCHLORIDE 1 MG: 1 INJECTION INTRAMUSCULAR; INTRAVENOUS at 01:10

## 2024-10-22 RX ADMIN — DEXAMETHASONE SODIUM PHOSPHATE 4 MG: 4 INJECTION, SOLUTION INTRAMUSCULAR; INTRAVENOUS at 02:10

## 2024-10-22 RX ADMIN — ROCURONIUM BROMIDE 60 MG: 10 INJECTION INTRAVENOUS at 02:10

## 2024-10-22 RX ADMIN — PROPOFOL 160 MG: 10 INJECTION, EMULSION INTRAVENOUS at 02:10

## 2024-10-22 RX ADMIN — FENTANYL CITRATE 50 MCG: 50 INJECTION, SOLUTION INTRAMUSCULAR; INTRAVENOUS at 02:10

## 2024-10-22 RX ADMIN — ONDANSETRON 4 MG: 2 INJECTION, SOLUTION INTRAMUSCULAR; INTRAVENOUS at 04:10

## 2024-10-22 RX ADMIN — ROPIVACAINE HYDROCHLORIDE 15 ML: 5 INJECTION, SOLUTION EPIDURAL; INFILTRATION; PERINEURAL at 01:10

## 2024-10-22 RX ADMIN — PROPOFOL 30 MG: 10 INJECTION, EMULSION INTRAVENOUS at 04:10

## 2024-10-22 RX ADMIN — MIDAZOLAM HYDROCHLORIDE 1 MG: 1 INJECTION INTRAMUSCULAR; INTRAVENOUS at 02:10

## 2024-10-22 RX ADMIN — PHENYLEPHRINE HYDROCHLORIDE 100 MCG: 10 INJECTION INTRAVENOUS at 03:10

## 2024-10-22 RX ADMIN — FENTANYL CITRATE 50 MCG: 50 INJECTION, SOLUTION INTRAMUSCULAR; INTRAVENOUS at 01:10

## 2024-10-22 RX ADMIN — TRANEXAMIC ACID 1000 MG: 10 INJECTION, SOLUTION INTRAVENOUS at 02:10

## 2024-10-22 RX ADMIN — CEFAZOLIN 2 G: 2 INJECTION, POWDER, FOR SOLUTION INTRAMUSCULAR; INTRAVENOUS at 03:10

## 2024-10-22 RX ADMIN — SODIUM CHLORIDE: 0.9 INJECTION, SOLUTION INTRAVENOUS at 02:10

## 2024-10-22 RX ADMIN — ROCURONIUM BROMIDE 10 MG: 10 INJECTION INTRAVENOUS at 03:10

## 2024-10-22 RX ADMIN — GLYCOPYRROLATE 0.2 MG: 0.2 INJECTION, SOLUTION INTRAMUSCULAR; INTRAVITREAL at 03:10

## 2024-10-22 NOTE — DISCHARGE INSTRUCTIONS
Rotator Cuff Tear Post-Operative Instructions     Maite Cruz MD  Clinic phone number: [phone]    Call your surgeon for:   Uncontrolled nausea or vomiting  Persistent numbness or tingling in the arm after the block has worn off   Fevers greater than 101.4  Redness surrounding the incision (swelling is normal)  Shortness of breath/difficulty breathing   Chest pain  Any other concerns you may have   Pain:  You will be sent home from the hospital with a pain medication e-prescribed to your pharmacy   The anesthesiologist will perform a block which will numb your arm for several hours after the surgery. When you start to feel sensation return (in the form of pain or tingling) start taking your pain medication. The medicine takes about an hour to work do you do not want to wait until the block has fully worn off.   The first two days will be the most painful. After the pain starts to lessen you should start weaning the narcotic medication  An anti-nausea medicine will be sent with the pain medication as many people do experience nausea as a side effect  An over the counter stool softener such as Miralax can be taken to avoid constipation as a side effect of the pain medication   The pain medication is intended to make your pain more tolerable. It is not intended to relieve 100% of pain.  Do not drive while taking pain medications   Avoid taking other sedative medications such as sleeping pills while taking narcotics.   Ice  You will be sent home with a polar care wrap as part of your dressings. It will be hooked up to a polar care ice pump.   For the first 4 days, use the machine for 30 minutes on and then 30 minutes off. Turning the unit off is especially important when your arm is numb from the block to avoid thermal injury to the skin.  After day 5 use as needed for pain control.   To fill the unit:   Unlock handles and remove lid   Fill with cold water to the indicated line and then with ice  Replace lid and  lock   Keep unit upright   To use the polar care:  Before turning on make sure the pad is connected  Plug in  -- this will turn the unit on  Refilling:  Unplug unit to turn off  Disconnect the wrap by pushing down on the metal tab and gently pulling the connectors apart  Drain unit and refill  Reattach connector by pushing down on metal piece and pushing the connectors together   After the dressing is removed, keep the blue towel between the skin and the cooling pad so the pad does not directly contact your skin.    You will be sent home with instructions of how the pad should be re-applied after your dressing change.  Dressing care:    Leave your dressing on for three days. You may remove it and then recover each incision with a large band-aid   If you prefer to leave the dressing on until seen in clinic that is fine as long as the dressing is not soaked through  It is normal for the dressing to have a large amount of fluid on it.  This will be mixed with some blood.  Fluid is used during the surgery and will slowly leak out of the wounds over the first few days.  Do not get the dressing or incisions wet.  You will need to carefully shower or take a sponge bath to avoid wetting the dressing.  To wash under your arm safely, bend forward slightly to allow the arm to gently move forward so you can clean under your arm without raising it to the side   Sling Instructions:  Wear sling at all times until instructed otherwise. It will be worn a total of 4-6 weeks depending on the size of your rotator cuff tear  Keep the pillow between the sling and your body in place as well  The sling may be removed for hygiene and dressing.    You may take the sling off to perform elbow range of motion exercises.  If a biceps tenodesis has been performed this should be avoided the first two weeks.   Post op activity and precautions:  Keep sling on as described above   Do not actively move the arm at the shoulder until instructed to do so  by your surgeon.   No lifting   When lying on your back, put a pillow or towel behind the elbow to support the arm   No pushing yourself up out of a chair or off of your bed with the operative arm  You may find it more comfortable to sleep in about 30-40 degrees of inclination - in a recliner or on multiple pillows  Do not lay on the same side of the operative arm   To wash under your arm safely, bend forward slightly to allow the arm to gently move forward so you can clean under your arm without raising it to the side  Avoid any activities or places that may result in a fall (walking on uneven ground, crowded places, etc)  Physical Therapy  Physical therapy will start at 2-6 weeks post op depending on the size of your tear   In general, strengthening is not started until 12 weeks and after motion has been restored   Follow up appointment  You will be seen in clinic in 2 weeks following your procedure.  This will be made before you leave the hospital   Sutures will be removed  More detailed activity instructions will be given at that time   Post operative pain control     You have been prescribed multiple medications to help with pain control and minimize use of narcotic pain medications. This is called multimodal pain control.   Take these medications scheduled for the first week (take on the below schedule whether or not you are having pain)   Ibuprofen 800 mg every 8 hours  Tylenol 500 mg every 6 hours   Lyrica 75 mg twice a day     Take oxycodone 5 mg every 6 hours as needed for breakthrough pain (only if pain is not well controlled with the above medications)    If you use a CPAP for sleep apnea it is extremely important to use this as instructed while taking the pain medication. Untreated sleep apnea and narcotic use can cause respiratory arrest and death     Fall Prevention  Millions of people fall every year and injure themselves. You may have had anesthesia or sedation which may increase your risk of  falling. You may have health issues that put you at an increased risk of falling.     Here are ways to reduce your risk of falling.    Make your home safe by keeping walkways clear of objects you may trip over.  Use non-slip pads under rugs. Do not use area rugs or small throw rugs.  Use non-slip mats in bathtubs and showers.  Install handrails and lights on staircases.  Do not walk in poorly lit areas.  Do not stand on chairs or wobbly ladders.  Use caution when reaching overhead or looking upward. This position can cause a loss of balance.  Be sure your shoes fit properly, have non-slip bottoms and are in good condition.   Wear shoes both inside and out. Avoid going barefoot or wearing slippers.  Be cautious when going up and down stairs, curbs, and when walking on uneven sidewalks.  If your balance is poor, consider using a cane or walker.  If your fall was related to alcohol use, stop or limit alcohol intake.   If your fall was related to use of sleeping medicines, talk to your doctor about this. You may need to reduce your dosage at bedtime if you awaken during the night to go to the bathroom.    To reduce the need for nighttime bathroom trips:  Avoid drinking fluids for several hours before going to bed  Empty your bladder before going to bed  Men can keep a urinal at the bedside  Stay as active as you can. Balance, flexibility, strength, and endurance all come from exercise. They all play a role in preventing falls. Ask your healthcare provider which types of activity are right for you.  Get your vision checked on a regular basis.  If you have pets, know where they are before you stand up or walk so you don't trip over them.  Use night lights.      ACTIVITY LEVEL: If you have received sedation or an anesthetic, you may feel sleepy for several hours. Rest  until you are more awake. Slowly resume your normal activities. No heavy lifting, nothing more that 10-15 pounds until surgery site is healed.      NO  driving, alcoholic beverages or signing legal documents for next 24 hours or while taking pain medication    BATHING: You may shower after your dressing is removed, but no tub baths, hot tubs, saunas or swimming until you see the doctor.    DIET: You may resume your home diet. If nausea is present, increase your diet gradually with fluids and bland foods. Drink extra water for the next 48 hours.     Medications: Pain medication should be taken only if needed and as directed. IF antibiotics are prescribed, the  medication should be taken until completed. You will be given an updated list of you medications.    CALL THE DOCTOR:  Fever over 101°F --any signs of infection including body aches, fever, chills, redness at surgery site   Severe pain that doesnt go away with medication.  Upset stomach and vomiting that is persistent  Problems urinating, unable to urinate or heavy bleeding (with or without clots)      Fall Prevention  Millions of people fall every year and injure themselves. You may have had anesthesia or sedation which may increase your risk of falling. You may have health issues that put you at an increased risk of falling.     Here are ways to reduce your risk of falling.    Make your home safe by keeping walkways clear of objects you may trip over.  Use non-slip pads under rugs. Do not use area rugs or small throw rugs.  Use non-slip mats in bathtubs and showers.  Install handrails and lights on staircases.  Do not walk in poorly lit areas.  Do not stand on chairs or wobbly ladders.  Use caution when reaching overhead or looking upward. This position can cause a loss of balance.  Be sure your shoes fit properly, have non-slip bottoms and are in good condition.   Wear shoes both inside and out. Avoid going barefoot or wearing slippers.  Be cautious when going up and down stairs, curbs, and when walking on uneven sidewalks.  If your balance is poor, consider using a cane or walker.  If your fall was  related to alcohol use, stop or limit alcohol intake.   If your fall was related to use of sleeping medicines, talk to your doctor about this. You may need to reduce your dosage at bedtime if you awaken during the night to go to the bathroom.    To reduce the need for nighttime bathroom trips:  Avoid drinking fluids for several hours before going to bed  Empty your bladder before going to bed  Men can keep a urinal at the bedside  Stay as active as you can. Balance, flexibility, strength, and endurance all come from exercise. They all play a role in preventing falls. Ask your healthcare provider which types of activity are right for you.  Get your vision checked on a regular basis.  If you have pets, know where they are before you stand up or walk so you don't trip over them.  Use night lights. What you should know:    Call the office for and appointment if one has not already been made.    You may experience common minor surgical & anesthesia related discomforts such as: ?  muscle aches, headaches, pain/discomfort, nausea & vomiting. These should improve in   24-48 hrs.    Swelling and discoloration/bruising are common around surgical incision; a cold pack over the   wound during the first 24 hours will help to minimize swelling and bruising.     You have received sedation/anesthesia today: You may NOT drive, drink alcohol, sign legal documents for the next 24 hours.    It is important that you stay ahead of the pain. Take pain medication as prescribed by   your doctor. After 24-48 hours, you may take Acetaminophen (Tylenol) or Ibuprofen   (Advil) for minor discomfort, unless you are restricted from using these.    Rest today: A responsible adult should stay with you the first night after your procedure.  You may resume regular activities 24 hours after the procedure.    You may slowly resume your regular diet as tolerated. Drink plenty of fluids the first 48 hours and you may resume your   usual diet.      Activity: No heavy lifting (over 10 pounds), pushing or pulling until your   post op visit. Your doctor's office may have told you to limit your lifting to less weight, or even no weight.  Be sure to follow those instructions.    Call the doctor for any of the following problems:   fever above 101,   severe pain, bleeding, or abdominal distention (swelling).   If constipated you may take any stool softener you choose.     Occasionally small areas of skin numbness or an unpleasant skin sensation can result. Also, you may find that your incision is swollen and tender for a few days.  Some redness around sutures and staples is a normal reaction, but if the discomfort persists or worsens, call you doctor. Please go to nearest Emergency Room/call 911 if you feel your symptoms need immediate attention.    You will be sent home with a polar care wrap as part of your care. It will be hooked up to an ice pump.  For the first 4 days , use the machine for 30 minutes on and then 30 minutes off. ( or  time and or number of days specified by your doctor)   . You may use it after day 5 to use as needed for pain control. Be very cautious when anesthesia has given you a block to numb your extremity to monitor the ice therapy to avoid thermal injury to your skin. ( Do not leave the machine on , turn it on and off  at the 30 minute intervals and use a towel or cloth under the wrap to protect the skin )      To fill the unit:         Unlock the handles and remove lid        Fill with cold water to the indicated line and then with ice to the indicated line        Replace the lid and lock         Keep unit upright   To use the polar care:         Before turning on make sure the pad is connected         Plug in -- this will turn the unit on   Refilling:         UNPLUG unit to turn OFF         Disconnect the wrap by pushing down on the metal tab and gently pulling the connectors apart         Drain unit and refill          Reattach  connector by pushing down on metal piece and pushing the connectors together   To reapply the wrap  follow directions given to you or follow the numbers on the wrap 1,2,3

## 2024-10-22 NOTE — ANESTHESIA PROCEDURE NOTES
Intubation    Date/Time: 10/22/2024 2:46 PM    Performed by: Noe Blakely CRNA  Authorized by: Jarod Osman Chi, MD    Intubation:     Induction:  Intravenous    Intubated:  Postinduction    Mask Ventilation:  Easy mask    Attempts:  1    Attempted By:  CRNA    Method of Intubation:  Video laryngoscopy    Blade:  Silverman 3    Laryngeal View Grade: Grade I - full view of cords      Difficult Airway Encountered?: No      Complications:  None    Airway Device:  Oral endotracheal tube    Airway Device Size:  7.0    Style/Cuff Inflation:  Cuffed (inflated to minimal occlusive pressure)    Tube secured:  22    Secured at:  The lips    Placement Verified By:  Capnometry and Revisualization with laryngoscopy    Complicating Factors:  None    Findings Post-Intubation:  BS equal bilateral and atraumatic/condition of teeth unchanged

## 2024-10-22 NOTE — OP NOTE
Surgeon: Maite Cruz MD     PATIENT INFORMATION   Mary Naranjo 59 y.o. female 1965  MRN: [id number]  LOCATION: [facility]     DATE OF PROCEDURE: 10/22/2024     PREOPERATIVE DIAGNOSES:   Right    1. Rotator cuff tear   2. Biceps tendinopathy  3. Shoulder impingement, bursitis    POSTOPERATIVE DIAGNOSES:   Right   1. Rotator cuff tear, full thickness involving the supraspinatus   2. Biceps tendinopathy     OPERATION:   Right Shoulder  1. Arthroscopic  supraspinatus rotator cuff repair   2. Arthroscopic biceps tenotomy   3. Extensive debridement        Surgeon(s) and Role:     * Maite Cruz MD - Primary     ANESTHESIA: Regional with General Anesthesia     ESTIMATED BLOOD LOSS: less than 50 mL     IMPLANTS:   Implant Name Type Inv. Item Serial No.  Lot No. LRB No. Used Action   ANCHOR HEALICOIL SUT 4.75MM - [id number]  ANCHOR HEALICOIL SUT 4.75MM  [employer] [id number] Right 1 Implanted   ANCHOR HEALICOIL SUT 4.75MM - [id number]  ANCHOR HEALICOIL SUT 4.75MM  [employer] [id number] Right 1 Implanted   ANCHOR HEALICOIL RGNSRB 5.5MM - [id number]  ANCHOR HEALICOIL RGNSRB 5.5MM  [employer] [id number] Right 1 Implanted   ANCHOR HEALICOIL RGNSRB 5.5MM - [id number]  ANCHOR HEALICOIL RGNSRB 5.5MM  [employer] [id number] Right 1 Implanted   ANCHOR HEALICOIL RGNSRB 5.5MM - [id number]  ANCHOR HEALICOIL RGNSRB 5.5MM  [employer]  Right 1 Implanted           SPECIMENS:       Specimen (12h ago, onward)     None          COMPLICATIONS: None.      INTRAOPERATIVE COUNTS: Correct.      PROPHYLACTIC IV ANTIBIOTICS: Given per [facility] Protocol.     INDICATIONS FOR OPERATION:  Mary Naranjo 59 y.o. female has been seen and evaluated in the office for continued shoulder pain associated with a rotator cuff tear despite extensive non operative treatment. After a lengthy discussion with the patient, they wished to proceed with surgical intervention. The patient was fully informed of risks and benefits.      DESCRIPTION OF PROCEDURE:  The patient was identified and marked in the preoperative holding area.  All questions and concerns were addressed.  Regional anesthesia was performed by anesthesia personnel. The patient was then brought to the operating room and underwent general anesthesia and was positioned in the beach chair position. All bony prominences were padded. The right upper extremity was prepped and draped in the normal sterile fashion. A time-out was called confirming the correct patient, site, side, procedure, and that appropriate antibiotics had been administered within 30 min of incision. Diagnostic arthroscopy was performed through the standard posterior portal.  Systematic review of the joint demonstrated intact subscapularis, full-thickness tear of the supraspinatus, intact infraspinatus, intact glenohumeral cartilage, tendinopathy of the biceps tendon, degenerative labral fraying.   The biceps was released with arthroscopic scissors and allowed to auto tenodese.  The biceps stump, labrum and undersurface of the supraspinatus were debrided with a shaver.    The scope was then introduced the subacromial space. A bursectomy was performed using a combination of the radiofrequency device and shaver.  A full-thickness tear of the full width of the supraspinatus was noted.  It was not significantly retracted from the footprint.  The footprint  was prepared with a shaver and laurie.  The tear was repaired using  2 double loaded medial row anchors with sutures passed in a horizontal mattress fashion.  A dog-ear was noted posteriorly so a luggage tag was passed into this area.  Two sutures from both anchors along with a luggage tag were dunked into a posterior lateral row anchor.  The remaining sutures from each anchor were dunked into an anterior lateral row anchor.  Following repair, the tendon was probed and found to be well compressed to the repair site.  The head was noted to be well covered with the  repair.       There was not a significant acromial osteophyte so no acromioplasty was performed.     The instruments were removed from the shoulder.  Portal sites were closed.     Sterile dressings were applied followed a polar Care wrap and Super Sling the patient was awakened from anesthesia and brought to recovery room without complication     POSTOPERATIVE PLAN: Plan to follow supraspinatus repair protocol (<3 cm in total size). Passive motion may begin at 2 weeks. Active motion at 6 weeks. No biceps resistive activity x 8 weeks. No cuff resistive activity x 12 weeks.

## 2024-10-22 NOTE — H&P
Assessment: 59 y.o. female with right rotator cuff tear      I explained my diagnostic impression and the reasoning behind it in detail, using layman's terms.       Plan:   - Right shoulder arthroscopy with cuff repair, possible biceps tenotomy      We have discussed the surgery and anticipated recovery.  The patient understands that there may be limited mobility up to several weeks after surgery depending on procedures that are performed at the time of surgery.     The details of the surgical procedure were explained, including the location of probable incisions and a description of likely hardware and/or grafts to be used.  The patient understands the likely convalescence after surgery, in particular the expected postop rehab and recovery course. Alternatives both operative and non-operative with associated risks and benefits discussed. The outlined risks and potential complications of the proposed procedure include but are not limited to: bleeding, infection, vessel and/or nerve damage, pain, numbness, tingling, compartment syndrome, need for additional surgery, failure to return to pre-injury and/or preoperative functional status, inability to return to work, scar sensitivity, delayed healing, complex regional pain syndrome, weakness, partial and/or incomplete relief of symptoms, persistence of and/or worsening of symptoms, hardware and/or surgical failure, prominent and/or symptomatic hardware possibly necessitating future removal, failure of repair to heal, retear, loss of function, stiffness, functional debility, dysfunction, need for prolonged postoperative rehabilitation, fracture, deep venous thrombosis, pulmonary embolism, arthritis and death.  The patient states an understanding and wishes to proceed with surgery.   All questions were answered.  No guarantees were implied or stated.  Written informed consent was obtained.      All questions were answered in detail. The patient is in full agreement with  the treatment plan and will proceed accordingly.         Chief Complaint   Patient presents with    Right Shoulder - Pain      Initial visit (8/26/24): Mary Naranjo is a 59 y.o. female who presents today complaining of right shoulder pain  Duration of symptoms:  about 4 -6 weeks   Trauma or new activity: no  Pain is constant  Pain is progressively worsening   Aggravating factors: overhead motion, laying on right side   Relieving factors: rest  Localizes pain to anterior shoulder, subdeltoid fossa, trapezius. Radiates to the neck. Does not radiate down the arm               Pain is trapezius is severe with motion - patient reports she feels a knot here   Night pain is present and is disruptive to sleep  Radicular symptoms: no n/t   Associated symptoms:  limited range of motion.    Prior treatment:  Has diclofenac 75 mg prescribed for foot pain - helpful for the foot but is not at all helpful for the shoulder   Pain does interfere with sleep and activities of daily living .     9/17/24  Still having pain  Had injection last week with Dr Espinoza - no relief     10/22/24   Here for surgery   No changes     This is the extent of the patient's complaints at this time.      Hand dominance: Right     Occupation: Desk worker           Review of patient's allergies indicates:   Allergen Reactions    Dilaudid [hydromorphone] Other (See Comments)       Hallucination      Physical Exam:       Vitals:     09/16/24 1449   PainSc: 0-No pain   PainLoc: Shoulder      General: Patient is alert, awake and oriented to time, place and person. Mood and affect are appropriate.  Patient does not appear to be in any distress, denies any constitutional symptoms and appears stated age.   HEENT: Pupils are equal and round, sclera are not injected. External examination of ears and nose reveals no abnormalities. Cranial nerves II-X are grossly intact  Neck: examination demonstrates painless active range of motion. Spurling's sign is  negative  Skin: no rashes, abrasions or open wounds on the affected extremity   Resp: No respiratory distress or audible wheezing   CV: 2+  pulses, all extremities warm and well perfused   Right Shoulder     Shoulder Range of Motion                           Right                                       Left   (Active/Passive)                                        Forward Elevation                                           90/130                      165/170  External rotation (arm at side)                        45/45                                     45/45       Internal rotation behind the back                    L5*                                         L5               Range of motion is painful   Acromioclavicular joint is tender  Crossbody test: negative     Neer's positive   Hawkin's positive     Keith's positive  Drop arm negative  Belly press negative        Cuff Strength                                                 Right                                       Left   Supraspinatus                                                 4/5                                           5/5  Infraspinatus                                                   5/5                                           5/5  Subscapularis                                                 5/5                                           5/5     Deltoid testing                                                 5/5                                           5/5     Speeds positive  Yergasons positive     Elbow examination demonstrates no tenderness to palpation and has normal range of motion.      ltsi C5-T1  + epl, io, fds, fdp   2+ RP      Imaging:  3 views of the right shoulder:  positive for degenerative changes of the AC joint. The humeral head is well centered on the AP and axillary views.  No calcifcations seen. No cortical changes of the greater tuberosity. There is not significant degenerative change of the glenohumeral joint or posterior  subluxation of the humeral head. No acute changes or fracture.       MRI shows full thickness tear of the SS, tendinosis of the IS, synovitis, intact biceps      I personally reviewed and interpreted the patient's imaging obtained prior to visit      This note was created by combination of typed  and M-Modal dictation. Transcription and phonetic errors may be present.  If there are any questions, please contact me.       Current Medications      Current Outpatient Medications:     atorvastatin (LIPITOR) 20 MG tablet, Take 1 tablet (20 mg total) by mouth once daily., Disp: 90 tablet, Rfl: 3    cholecalciferol, vitamin D3, 50 mcg (2,000 unit) Chew, Take 2,000 Units by mouth once daily. Per patient, Disp: , Rfl:     diclofenac (VOLTAREN) 75 MG EC tablet, Take 1 tablet (75 mg total) by mouth 2 (two) times daily., Disp: 30 tablet, Rfl: 2    ferrous sulfate 140 mg (45 mg iron) TbSR, Take 2 tablets by mouth once daily., Disp: , Rfl:     multivit/folic acid/vit K1 (ONE-A-DAY WOMEN'S 50 PLUS ORAL), Take 1 capsule by mouth once daily. Per patient, One-a-Day Women's 50+ Multivitamin Complete, Disp: , Rfl:     omega-3 fatty acids/fish oil (FISH OIL-OMEGA-3 FATTY ACIDS) 300-1,000 mg capsule, Take 1 capsule by mouth once daily., Disp: , Rfl:     valsartan-hydrochlorothiazide (DIOVAN-HCT) 160-25 mg per tablet, Take 1 tablet by mouth once daily., Disp: 90 tablet, Rfl: 3             Past Medical History:   Diagnosis Date    Arthritis      COVID-19 02/08/2023    Dental bridge present       upper removable partial    Family history of pancreatic cancer      Fibrocystic breast      Migraines      Obesity      Pancreas cyst      Primary hypertension 02/02/2022               Active Problem List with Overview Notes     Diagnosis Date Noted    Chronic left-sided low back pain with left-sided sciatica 03/02/2024    Difficulty walking 03/02/2024    Left sided sciatica 02/15/2024    Fatigue 08/08/2023    Iron deficiency anemia  08/08/2023             Lab Results   Component Value Date     HGB 11.2 (L) 08/15/2024     HGB 11.9 (L) 02/08/2024     HGB 11.5 (L) 08/08/2023            Lab Results   Component Value Date     HCT 36.2 (L) 08/15/2024     HCT 38.6 02/08/2024     HCT 37.7 08/08/2023            Lab Results   Component Value Date     MCV 80 (L) 08/15/2024     MCV 80 (L) 02/08/2024     MCV 80 (L) 08/08/2023            Lab Results   Component Value Date     RETIC 1.0 08/08/2023     RETIC 0.8 06/18/2019             Lab Results   Component Value Date     IRON 55 08/15/2024     IRON 61 02/08/2024     IRON 66 08/08/2023            Lab Results   Component Value Date     FESATURATED 13 (L) 08/15/2024     FESATURATED 14 (L) 02/08/2024     FESATURATED 15 (L) 08/08/2023            Lab Results   Component Value Date     TIBC 420 08/15/2024     TIBC 450 02/08/2024     TIBC 445 08/08/2023            Lab Results   Component Value Date     FERRITIN 187 08/15/2024     FERRITIN 173 02/08/2024     FERRITIN 178 08/08/2023            Lab Results   Component Value Date     TRANSFERRIN 284 08/15/2024     TRANSFERRIN 304 02/08/2024     TRANSFERRIN 301 08/08/2023            Lab Results   Component Value Date     SEDRATE 20 06/18/2019            Lab Results   Component Value Date     CRP 10.0 (H) 06/18/2019           Other hyperlipidemia 08/08/2023             Lab Results   Component Value Date     CHOL 144 08/15/2024     CHOL 208 (H) 02/08/2024     CHOL 192 08/04/2023            Lab Results   Component Value Date     HDL 50 08/15/2024     HDL 48 02/08/2024     HDL 49 08/04/2023            Lab Results   Component Value Date     LDLCALC 78.4 08/15/2024     LDLCALC 144.8 02/08/2024     LDLCALC 131.2 08/04/2023            Lab Results   Component Value Date     TRIG 78 08/15/2024     TRIG 76 02/08/2024     TRIG 59 08/04/2023            Lab Results   Component Value Date     CHOLHDL 34.7 08/15/2024     CHOLHDL 23.1 02/08/2024     CHOLHDL 25.5 08/04/2023      The  10-year ASCVD risk score (Harman ROBERTS, et al., 2019) is: 4.1%    Values used to calculate the score:      Age: 59 years      Sex: Female      Is Non- : Yes      Diabetic: No      Tobacco smoker: No      Systolic Blood Pressure: 122 mmHg      Is BP treated: Yes      HDL Cholesterol: 50 mg/dL      Total Cholesterol: 144 mg/dL        Primary hypertension 2022    Class 2 severe obesity due to excess calories with serious comorbidity and body mass index (BMI) of 38.0 to 38.9 in adult 2022    Family history of pancreatic cancer 09/10/2020       2020                Twin sister  of Pancreatic cancer.  Patient saw genetics- See Monoallelic mutation of RET gene     GI:  10/15/2020:  Likely will pursue yearly endoscopic ultrasounds for the purposes of pancreatic cancer screening.  We did discuss that her current family history is not classic for the development of pancreatic cancer however the presence of pancreatic cancer in an identical twin is not discussed in current guidelines regarding screening for pancreatic cancer.                2020                        Endoscopic  A cystic lesion was seen in the pancreatic tail.      10-07-2021Endoscopic US              Done at [facility]     [id number] US  A cystic lesion was seen in the pancreatic tail.   CT or MRI of the abd with  IV contrast in 1 year for surveillance.        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  Last visit with Genetics on 2020 and genetic testing result reviewed:  Germline Cancer-Genetic Test Results  Test(s) performed:  [employer] Common Hereditary Cancers Panel + RET gene  Number of genes analyzed:  48 genes  Laboratory:  [facility]  Sample collection date:  2020  Results report date:  09/10/2020  Full report to be scanned into Epic under the Labs and/or Media tab(s).  FINDINGS:    Gene Variant Zygosity Variant Classification   RET C.31C>A  ([id number]) heterozygous Uncertain significance      Dermatology exams  09-- No skin cancer signs  02--No lesions suspicious for malignancy noted.       Prediabetes 08/22/2014             Lab Results   Component Value Date     HGBA1C 5.8 (H) 08/15/2024     HGBA1C 5.6 02/08/2024     HGBA1C 5.7 (H) 08/04/2023                      Past Surgical History:   Procedure Laterality Date    BREAST BIOPSY        BREAST CYST ASPIRATION        BREAST SURGERY         Dont remember    COLONOSCOPY N/A 10/07/2015     Procedure: COLONOSCOPY;  Surgeon: Eagle Stack MD;  Location: [facility];  Service: Endoscopy;  Laterality: N/A;    DILATION AND CURETTAGE OF UTERUS        ENDOSCOPIC ULTRASOUND OF UPPER GASTROINTESTINAL TRACT N/A 11/03/2020     Procedure: ULTRASOUND, UPPER GI TRACT, ENDOSCOPIC;  Surgeon: Kei Juarez MD;  Location: [facility] ([address] [place] [address]);  Service: Endoscopy;  Laterality: N/A;  Covid-19 test 10/31/20 at [facility] -     ENDOSCOPIC ULTRASOUND OF UPPER GASTROINTESTINAL TRACT N/A 07/14/2023     Procedure: ULTRASOUND, UPPER GI TRACT, ENDOSCOPIC;  Surgeon: Burak Garcia MD;  Location: [facility] (2ND FLR);  Service: Endoscopy;  Laterality: N/A;  6/15/23-Instructions via portal-DS  6/26 pre call complete  pre call 7/10, pt confirmed - mf    ESOPHAGOGASTRODUODENOSCOPY N/A 07/19/2019     Procedure: ESOPHAGOGASTRODUODENOSCOPY (EGD);  Surgeon: Cherrie Sheets MD;  Location: [facility];  Service: Endoscopy;  Laterality: N/A;    EYE SURGERY   December 2021     Cateract    fibrocystic breast         KNEE ARTHROSCOPY W/ MENISCAL REPAIR        TUBAL LIGATION             Social History           Socioeconomic History    Marital status:    Occupational History    Occupation: administrative   Tobacco Use    Smoking status: Never    Smokeless tobacco: Never   Substance and Sexual Activity    Alcohol use: No    Drug use: No    Sexual activity: Not Currently

## 2024-10-22 NOTE — ANESTHESIA PROCEDURE NOTES
Peripheral Block    Patient location during procedure: pre-op   Block not for primary anesthetic.  Reason for block: at surgeon's request and post-op pain management   Post-op Pain Location: Right shoulder pain   Start time: 10/22/2024 1:29 PM  Timeout: 10/22/2024 1:28 PM   End time: 10/22/2024 1:36 PM    Staffing  Authorizing Provider: Jarod Osman Chi, MD  Performing Provider: Jarod Osman Chi, MD    Staffing  Performed by: Jarod Osman Chi, MD  Authorized by: Jarod Osman Chi, MD    Preanesthetic Checklist  Completed: patient identified, IV checked, site marked, risks and benefits discussed, surgical consent, monitors and equipment checked, pre-op evaluation and timeout performed  Peripheral Block  Patient position: supine  Prep: ChloraPrep  Patient monitoring: heart rate, cardiac monitor, continuous pulse ox, continuous capnometry and frequent blood pressure checks  Block type: interscalene  Laterality: right  Injection technique: single shot  Needle  Needle type: Stimuplex   Needle gauge: 22 G  Needle length: 2 in  Needle localization: anatomical landmarks and ultrasound guidance   -ultrasound image captured on disc.  Assessment  Injection assessment: negative aspiration, negative parasthesia and local visualized surrounding nerve  Paresthesia pain: none  Heart rate change: no  Slow fractionated injection: yes  Pain Tolerance: comfortable throughout block and no complaints  Medications:    Medications: ropivacaine (NAROPIN) injection 0.5% - Perineural   15 mL - 10/22/2024 1:36:00 PM    Additional Notes  VSS.  [facility] RN monitoring vitals throughout procedure.  Patient tolerated procedure well. Ropiv given in 5mL increments, with negative aspiration in between.

## 2024-10-22 NOTE — PLAN OF CARE
Pt up to void wo diff.pt and spouse vb understanding of all instsructions and rx.pt deneis discomfort, able to move fingers slightly, some numbness remains. Pt desires to go home

## 2024-10-22 NOTE — OR NURSING
1328 - Time out done for Block per Dr Osman  Pt on monitors   1336 - Block complete - pt tolerated well

## 2024-10-22 NOTE — BRIEF OP NOTE
[facility] - Surgery  Brief Operative Note    Surgery Date: 10/22/2024     Surgeons and Role:     * Maite Cruz MD - Primary    Assisting Surgeon: None    Pre-op Diagnosis:  Nontraumatic complete tear of right rotator cuff [M75.121]    Post-op Diagnosis:  Post-Op Diagnosis Codes:     * Nontraumatic complete tear of right rotator cuff [M75.121]    Procedure(s) (LRB):  REPAIR, ROTATOR CUFF, ARTHROSCOPIC (Right)    Anesthesia: General/Regional    Operative Findings: full thickness tear of SS     Estimated Blood Loss: less than 5 cc         Specimens:   Specimen (24h ago, onward)      None              Discharge Note    OUTCOME: Patient tolerated treatment/procedure well without complication and is now ready for discharge.    DISPOSITION: Home or Self Care    FINAL DIAGNOSIS:  Right rotator cuff tear     FOLLOWUP: In clinic    DISCHARGE INSTRUCTIONS:    Discharge Procedure Orders   Diet Adult Regular   Order Comments: Resume previous home diet     Change dressing (specify)   Order Comments: See patient instructions

## 2024-10-22 NOTE — TRANSFER OF CARE
Anesthesia Transfer of Care Note    Patient: Mary Naranjo    Procedure(s) Performed: Procedure(s) (LRB):  REPAIR, ROTATOR CUFF, ARTHROSCOPIC (Right)    Patient location: PACU    Anesthesia Type: general    Transport from OR: Transported from OR on 6-10 L/min O2 by face mask with adequate spontaneous ventilation    Post pain: adequate analgesia    Post assessment: no apparent anesthetic complications and tolerated procedure well    Post vital signs: stable    Level of consciousness: awake and alert    Nausea/Vomiting: no nausea/vomiting    Complications: none    Transfer of care protocol was followed      Last vitals: Visit Vitals  /62 (BP Location: Left arm, Patient Position: Lying)   Pulse 87   Temp 36.1 °C (97 °F) (Temporal)   Resp 18   Wt 103 kg (227 lb 1.6 oz)   LMP 01/15/2018   SpO2 100%   Breastfeeding No   BMI 38.98 kg/m²

## 2024-10-23 NOTE — ANESTHESIA POSTPROCEDURE EVALUATION
Anesthesia Post Evaluation    Patient: Mary Naranjo    Procedure(s) Performed: Procedure(s) (LRB):  REPAIR, ROTATOR CUFF, ARTHROSCOPIC WITH EXTENSIVE DEBRIDEMENT (Right)    Final Anesthesia Type: general      Patient location during evaluation: PACU  Patient participation: Yes- Able to Participate  Level of consciousness: awake and alert, oriented and awake  Post-procedure vital signs: reviewed and stable  Airway patency: patent    PONV status at discharge: No PONV  Anesthetic complications: no      Cardiovascular status: blood pressure returned to baseline  Respiratory status: unassisted, spontaneous ventilation and room air  Hydration status: euvolemic  Follow-up not needed.              Vitals Value Taken Time   /65 10/22/24 1748   Temp 36.5 °C (97.7 °F) 10/22/24 1748   Pulse 68 10/22/24 1748   Resp 18 10/22/24 1748   SpO2 96 % 10/22/24 1748         Event Time   Out of Recovery 17:43:00         Pain/Ming Score: Ming Score: 10 (10/22/2024  5:48 PM)

## 2024-10-29 ENCOUNTER — TELEPHONE (OUTPATIENT)
Dept: FAMILY MEDICINE | Facility: CLINIC | Age: 59
End: 2024-10-29
Payer: COMMERCIAL

## 2024-10-30 ENCOUNTER — TELEPHONE (OUTPATIENT)
Dept: ORTHOPEDICS | Facility: CLINIC | Age: 59
End: 2024-10-30
Payer: COMMERCIAL

## 2024-10-30 NOTE — PROGRESS NOTES
Postoperative Visit    History of Present Illness:   Mary is 2 weeks s/p right rotator cuff repair (full thickness SS) biceps tenotomy and extensive debridement  (DOS- 10/22/24)  She is doing well -- pain is well controlled and She is continuing to take narcotic pain medications   She is compliant with activity restrictions and is wearing sling and instructed     Physical Examination:    Vitals:    11/07/24 1009   PainSc:   7   PainLoc: Shoulder      NAD  right upper extremity:  Incision over the shoulder is healing well with suture in place   No erythema, drainageor other signs of infection. Appropriate post operative swelling is present    Assessment/Plan:  59 y.o. female s/p right rotator cuff repair, biceps tenotomy, and extensive debridement (DOS-10/22/24)    Plan  Sutures were removed today and steri strips were placed   Keep PT appointment   elbow, wrist, and hand exercises as instructed   Physical therapy prescription placed   Follow up in 4 weeks       All questions were answered in detail. The patient is in full agreement with the treatment plan and will proceed accordingly.

## 2024-11-07 ENCOUNTER — OFFICE VISIT (OUTPATIENT)
Dept: ORTHOPEDICS | Facility: CLINIC | Age: 59
End: 2024-11-07
Payer: COMMERCIAL

## 2024-11-07 DIAGNOSIS — M75.121 NONTRAUMATIC COMPLETE TEAR OF RIGHT ROTATOR CUFF: Primary | ICD-10-CM

## 2024-11-07 PROCEDURE — 1159F MED LIST DOCD IN RCRD: CPT | Mod: CPTII,S$GLB,, | Performed by: ORTHOPAEDIC SURGERY

## 2024-11-07 PROCEDURE — 1160F RVW MEDS BY RX/DR IN RCRD: CPT | Mod: CPTII,S$GLB,, | Performed by: ORTHOPAEDIC SURGERY

## 2024-11-07 PROCEDURE — 3044F HG A1C LEVEL LT 7.0%: CPT | Mod: CPTII,S$GLB,, | Performed by: ORTHOPAEDIC SURGERY

## 2024-11-07 PROCEDURE — 99024 POSTOP FOLLOW-UP VISIT: CPT | Mod: S$GLB,,, | Performed by: ORTHOPAEDIC SURGERY

## 2024-11-07 PROCEDURE — 99999 PR PBB SHADOW E&M-EST. PATIENT-LVL III: CPT | Mod: PBBFAC,,, | Performed by: ORTHOPAEDIC SURGERY

## 2024-11-07 RX ORDER — IBUPROFEN 800 MG/1
800 TABLET ORAL 3 TIMES DAILY
Qty: 42 TABLET | Refills: 0 | Status: SHIPPED | OUTPATIENT
Start: 2024-11-07 | End: 2024-11-21

## 2024-11-07 RX ORDER — ACETAMINOPHEN 500 MG
500 TABLET ORAL EVERY 6 HOURS
Qty: 56 TABLET | Refills: 0 | Status: SHIPPED | OUTPATIENT
Start: 2024-11-07 | End: 2024-11-21

## 2024-11-07 RX ORDER — OXYCODONE HYDROCHLORIDE 5 MG/1
5 TABLET ORAL EVERY 4 HOURS PRN
Qty: 25 TABLET | Refills: 0 | Status: SHIPPED | OUTPATIENT
Start: 2024-11-07

## 2024-11-11 ENCOUNTER — CLINICAL SUPPORT (OUTPATIENT)
Dept: REHABILITATION | Facility: HOSPITAL | Age: 59
End: 2024-11-11
Payer: COMMERCIAL

## 2024-11-11 DIAGNOSIS — R29.898 WEAKNESS OF RIGHT UPPER EXTREMITY: ICD-10-CM

## 2024-11-11 DIAGNOSIS — M25.511 ACUTE PAIN OF RIGHT SHOULDER: Primary | ICD-10-CM

## 2024-11-11 DIAGNOSIS — M25.611 DECREASED ROM OF RIGHT SHOULDER: ICD-10-CM

## 2024-11-11 DIAGNOSIS — M75.121 COMPLETE TEAR OF RIGHT ROTATOR CUFF: ICD-10-CM

## 2024-11-11 PROCEDURE — 97161 PT EVAL LOW COMPLEX 20 MIN: CPT | Mod: PN

## 2024-11-11 PROCEDURE — 97110 THERAPEUTIC EXERCISES: CPT | Mod: PN

## 2024-11-12 NOTE — PLAN OF CARE
[facility]  Physical Therapy Initial Evaluation    Date: 11/11/2024   Name: Mary Naranjo  Clinic Number: [id number]    Therapy Diagnosis:   Encounter Diagnoses   Name Primary?    Complete tear of right rotator cuff     Acute pain of right shoulder Yes    Decreased ROM of right shoulder     Weakness of right upper extremity      Physician: Maite Cruz MD    Physician Orders: PT Eval and Treat   Medical Diagnosis from Referral: M75.121 (ICD-10-CM) - Complete tear of right rotator cuff   Evaluation Date: 11/11/2024  Authorization Period Expiration: 12/31/24  Plan of Care Expiration: 3/11/25  Visit # / Visits authorized: 1/ 20    Progress Note Due: 12/11/24  FOTO: 1/1    Precautions: Standard, Plan to follow supraspinatus repair protocol (<3 cm in total size). Passive motion may begin at 2 weeks. Active motion at 6 weeks. No biceps resistive activity x 8 weeks. No cuff resistive activity x 12 weeks.   Date of surgery: 10/22/24, 2 weeks and 6 days     Time In: 3:02  Time Out: 3:45  Total Appointment Time (timed & untimed codes): 43 minutes    Subjective   Date of onset: 10/22/24  History of current condition - Mary reports: that she had surgery on 10/22/24. She states that prior to surgery, she was experiencing R shoulder pain and would receive injections in the shoulder, which provided temporary relief, but the pain would return. She states that she eventually had an MRI, which revealed a rotator cuff tear, leading to the need for surgery. She reports being in significant pain since the surgery. Pain meds provide some relief, but she is unable to lie flat due to pressure in the shoulder. She states that she receives a lot of help from her family for daily activities, as she is unable to use her dominant R arm. She was advised to wear the sling at all times and has been following the protocol.    Pain:  Current 7/10, worst 10/10, best 7/10   Location: right shoulder    Description: Aching and Deep, nagging   Aggravating Factors: unable to do any overhead activities due to shoulder surgery  Easing Factors: pain medications relieve pain a little    Prior Therapy: does not recall  Social History: lives with their spouse  Occupation:   Prior Level of Function: independent   Current Level of Function: needs mod assist due to not being able to use her dominant R arm    Pts goals: to relive her pain and get the shoulder back to working after the surgery.    Imaging: see imaging      Medical History:   Past Medical History:   Diagnosis Date    Arthritis     COVID-19 02/08/2023    Dental bridge present     upper removable partial    Family history of pancreatic cancer     Fibrocystic breast     Migraines     Obesity     Pancreas cyst     Primary hypertension 02/02/2022    Sleep apnea        Surgical History:   Mary Naranjo  has a past surgical history that includes Tubal ligation; fibrocystic breast ; Colonoscopy (N/A, 10/07/2015); Knee arthroscopy w/ meniscal repair; Breast biopsy; Breast cyst aspiration; Dilation and curettage of uterus; Esophagogastroduodenoscopy (N/A, 07/19/2019); Endoscopic ultrasound of upper gastrointestinal tract (N/A, 11/03/2020); Endoscopic ultrasound of upper gastrointestinal tract (N/A, 07/14/2023); Eye surgery (December 2021); Breast surgery; and Arthroscopic repair of rotator cuff of shoulder (Right, 10/22/2024).    Medications:   Mary has a current medication list which includes the following prescription(s): acetaminophen, atorvastatin, cholecalciferol (vitamin d3), ferrous sulfate, ibuprofen, multivit-minerals/folic acid, fish oil-omega-3 fatty acids, oxycodone, pregabalin, and valsartan-hydrochlorothiazide.    Allergies:   Review of patient's allergies indicates:  No Known Allergies     Objective     Observation:     Posture Alignment: slouched posture;forward head   Shoulder rotation at rest: protracted     Sensation:  Light touch: intact to light touch    DTR: intact     GAIT DEVIATIONS: Mary ambulates with no AD and displays no R arm swing as she is complaint with R shoulder sling.     Passive Range of Motion (degrees):   Shoulder Right Left   Flexion Unable to tolerate PROM today due to 10/10 pain, will assess at follow up  WFL   Abduction See above  WFL   ER  See above  WFL   IR  NP due to surgery  WFL      Active Range of Motion in (degrees):   Shoulder Right Left   Flexion NP due to surgery  WFL   Abduction NP due to surgery WFL   ER Functional reach (NP due to surgery) Functional reach T1   IR  Functional reach (NP due to surgery) Functional reach T7     Upper Extremity Strength  (R) UE  (L) UE    Elbow flexion: NP due to surgery Elbow flexion: 4+/5   Elbow extension: NP due to surgery Elbow extension: 4+/5   Shoulder flexion: NP due to surgery Shoulder flexion: 4+/5   Shoulder Abduction: NP due to surgery Shoulder abduction: 4+/5   Shoulder ER NP due to surgery Shoulder ER 4+/5   Shoulder IR NP due to surgery Shoulder IR 4+/5     Palpation: global R shoulder and upper trap pain    Flexibility: decreased R upper trap and levator scapula flexibility secondary to shoulder pain and stiffness.    CMS Impairment/Limitation/Restriction for FOTO Survey    Therapist reviewed FOTO scores for Mary Naranjo on 11/11/2024.   FOTO documents entered into [employer] - see Media section.    Limitation Score:          TREATMENT   Treatment Time In: 3:02  Treatment Time Out: 3:45  Total Treatment time separate from Evaluation: 15 minutes    Mary received therapeutic exercises to develop strength, ROM, and flexibility for 15 minutes including:  Seated upper traps  strach 3x30 sec   Seated levator scap stretch 3x30 sec   AROM wrist flexion 3x10  AROM wrist extension 3x10    Home Exercises and Patient Education Provided    Education provided:   Patient education on nature of current condition and PHYSICAL THERAPY POC  Written HOME EXERCISE  PROGRAM provided, reviewed, patient demo understanding  Pt was instructed to follow protocol for this surgery.     Written Home Exercises Provided: yes.  Exercises were reviewed and Mary was able to demonstrate them prior to the end of the session.  Mary demonstrated good  understanding of the education provided.     See EMR under Patient Instructions for exercises provided 11/11/2024.    Assessment   Mary is a 59 y.o. female referred to outpatient Physical Therapy with a medical diagnosis of complete tear of the right rotator cuff s/p rotator cuff repair on 10/22/24. Pt presents to PT compliant with the sling. Upon evaluation, Pt presents with decreased ROM, muscle strength, flexibility, and joint mobility. Pt also presents with increased pain and stiffness, as well as impaired posture. Pt demonstrated decreased R upper traps and levator scap flexibility. PT will focus on addressing the patient's impairments to enhance function and assist in returning the patient to PLOF. The subjective and objective findings are addressed through specific goals outlined in the plan of care.    Pt prognosis is Good.   Pt will benefit from skilled outpatient Physical Therapy to address the deficits stated above and in the chart below, provide pt/family education, and to maximize pt's level of independence.     Plan of care discussed with patient: Yes  Pt's spiritual, cultural and educational needs considered and patient is agreeable to the plan of care and goals as stated below:     Anticipated Barriers for therapy: none     Medical Necessity is demonstrated by the following  History  Co-morbidities and personal factors that may impact the plan of care [x] LOW: no personal factors / co-morbidities  [] MODERATE: 1-2 personal factors / co-morbidities  [] HIGH: 3+ personal factors / co-morbidities    Moderate / High Support Documentation:   Co-morbidities affecting plan of care:   Past Medical History:   Diagnosis Date     Arthritis     COVID-19 02/08/2023    Dental bridge present     upper removable partial    Family history of pancreatic cancer     Fibrocystic breast     Migraines     Obesity     Pancreas cyst     Primary hypertension 02/02/2022    Sleep apnea      Personal Factors:   no deficits     Examination  Body Structures and Functions, activity limitations and participation restrictions that may impact the plan of care [x] LOW: addressing 1-2 elements  [] MODERATE: 3+ elements  [] HIGH: 4+ elements (please support below)    Moderate / High Support Documentation: R shoulder pain, decreased R shoulder ROM, decreased R UE strength, decreased activity tolerance, decreased upper trap and levator scap flexibility     Clinical Presentation [x] LOW: stable  [] MODERATE: Evolving  [] HIGH: Unstable     Decision Making/ Complexity Score: low       GOALS: Short Term Goals: 4 weeks  1.Report decreased in pain at worse less than  <   / =  5  /10  to increase tolerance for functional mobility. On going  2. Pt to improve R shoulder passive range of motion flexion 160 deg and ER 60 deg to allow for improved functional mobility to allow for improvement in IADLs. On going  3. Increased R UE MMT 1/2 grade to increase tolerance for ADL and work activities. On going  4. Pt to report be conscious of impaired sitting and standing posture daily to decrease pain. On going  5. Pt to tolerate HEP to improve ROM and independence with ADL's. On going    Long Term Goals: 8 weeks  1.Report decreased in pain at worse less than  <   / =  2  /10  to increase tolerance for functional mobility. On going  2.  Patient will demonstrate improved overall function per FOTO Survey to CI = at least 1% but < 20% impaired, limited or restricted score or less.On going  3.Increased R UE MMT 1 grade to increase tolerance for ADL and work activities.On going  4. Pt to report and demonstrate proper posture in standing and sitting to decrease pain. On going  5. Pt to be  Independent with HEP to improve ROM and independence with ADL's.On going  6. Pt to improve passive range of motion flexion 180 deg and ER 75 deg to allow for improvement in IADLs. On going    Plan   Plan of care Certification: 11/11/2024 to 3/11/25.    Outpatient Physical Therapy 3 times weekly for 20 weeks to include the following interventions: Electrical Stimulation , Manual Therapy, Moist Heat/ Ice, Neuromuscular Re-ed, Patient Education, Self Care, Therapeutic Activities, and Therapeutic Exercise. Mindi Allen, PT, DPT      I CERTIFY THE NEED FOR THESE SERVICES FURNISHED UNDER THIS PLAN OF TREATMENT AND WHILE UNDER MY CARE   Physician's comments:     Physician's Signature: ___________________________________________________

## 2024-11-13 ENCOUNTER — CLINICAL SUPPORT (OUTPATIENT)
Dept: REHABILITATION | Facility: HOSPITAL | Age: 59
End: 2024-11-13
Payer: COMMERCIAL

## 2024-11-13 ENCOUNTER — TELEPHONE (OUTPATIENT)
Dept: ORTHOPEDICS | Facility: CLINIC | Age: 59
End: 2024-11-13
Payer: COMMERCIAL

## 2024-11-13 DIAGNOSIS — M25.511 ACUTE PAIN OF RIGHT SHOULDER: Primary | ICD-10-CM

## 2024-11-13 DIAGNOSIS — R29.898 WEAKNESS OF RIGHT UPPER EXTREMITY: ICD-10-CM

## 2024-11-13 DIAGNOSIS — M25.611 DECREASED ROM OF RIGHT SHOULDER: ICD-10-CM

## 2024-11-13 PROCEDURE — 97530 THERAPEUTIC ACTIVITIES: CPT | Mod: PN

## 2024-11-13 PROCEDURE — 97112 NEUROMUSCULAR REEDUCATION: CPT | Mod: PN

## 2024-11-13 NOTE — PROGRESS NOTES
[facility]   Physical Therapy Treatment Note     Name: Mary Naranjo  [facility] Number: [id number]    Therapy Diagnosis:   Encounter Diagnoses   Name Primary?    Acute pain of right shoulder Yes    Decreased ROM of right shoulder     Weakness of right upper extremity      Physician: Maite Cruz MD    Visit Date: 11/13/2024    Physician Orders: PT Eval and Treat   Medical Diagnosis from Referral: M75.121 (ICD-10-CM) - Complete tear of right rotator cuff   Evaluation Date: 11/11/2024  Authorization Period Expiration: 12/31/24  Plan of Care Expiration: 3/11/25  Visit # / Visits authorized: 1/ 20    Progress Note Due: 12/11/24  FOTO: 1/1     Precautions: Standard, Plan to follow supraspinatus repair protocol (<3 cm in total size). Passive motion may begin at 2 weeks. Active motion at 6 weeks. No biceps resistive activity x 8 weeks. No cuff resistive activity x 12 weeks.   Date of surgery: 10/22/24, 3 weeks and 1 day     OPERATION:   Right Shoulder  1. Arthroscopic  supraspinatus rotator cuff repair   2. Arthroscopic biceps tenotomy   3. Extensive debridement    Time In: 8:00  Time Out: 8:45  Total Billable Time: 45 minutes      SUBJECTIVE     Pt reports: that she took an oxycodone 30 minutes before physical therapy but states she is still in a lot of pain.  She was compliant with home exercise program.  Response to previous treatment: No change   Functional change: None    Pain: 10/10  Location: right shoulder      OBJECTIVE     Objective Measures updated at progress report unless specified.   Passive Range of Motion (degrees):   Shoulder Right (11/13/24)   Flexion 60 degrees   Abduction NP due to surgery    ER @ 20 degree shoulder abduction ~10 degrees   IR @ 20 degree shoulder abduction WFL      TREATMENT     Mary received the treatments listed below:      therapeutic activities to improve functional performance and functional mobility for 30  minutes, including:  Supine gentle  PROM ER 0-10 degrees (goal for week 2-3: 0-30 degree)  Supine gentle PROM shoulder flexion 0-60 degrees (goal for week 2-3: 0-90 degree)  Supine gentle PROM Elbow extension    received therapeutic exercises to develop strength and ROM for 00  minutes including:    neuromuscular re-education activities to improve: Balance, Proprioception, Posture, and muscles motor control for 15 minutes. The following activities were included:  Seated upper traps  strach 3x30 sec   Seated levator scap stretch 3x30 sec   AROM wrist flexion 3x10  AROM wrist extension 3x10  Towel squeezes /c hand 3x10  Seated Scapular retraction 3x10    received the following manual therapy techniques: soft tissue mobilization were applied to the: R upper trapezius for 00 minutes, including:    PATIENT EDUCATION AND HOME EXERCISES     Home Exercises Provided and Patient Education Provided     Education provided:   Patient education on nature of current condition and PHYSICAL THERAPY POC  Written HOME EXERCISE PROGRAM provided, reviewed, patient demo understanding  Pt was instructed to follow protocol for this surgery.     Written Home Exercises Provided: Patient instructed to cont prior HEP. Exercises were reviewed and Mary was able to demonstrate them prior to the end of the session.  Mary demonstrated good  understanding of the education provided. See EMR under Patient Instructions for exercises provided during therapy sessions    ASSESSMENT     Pt presents to her first PT follow-up s/p arthroscopic supraspinatus rotator cuff repair, arthroscopic biceps tenotomy, and extensive debridement. Today, pt is 3 weeks and 1 day post-op. Began PROM for shoulder flexion, ER, and elbow extension per protocol. Pt was able to achieve 60° shoulder flexion PROM, ~10° shoulder ER with the shoulder abducted at 20°, and near-full elbow extension. However, pt reported extreme pain before and during PROM, which limited her tolerance and ability to achieve goal ROM  (week 2-3 goals: shoulder ER 0-30°, shoulder abduction 0-90°). Introduced active scapular retraction to improve postural awareness and wrist/hand exercises to enhance distal strength. Mod TC and demonstration were provided for proper scapular retraction technique. Will progress per protocol.    Mary Is progressing well towards her goals.   Pt prognosis is Good.     Pt will continue to benefit from skilled outpatient physical therapy to address the deficits listed in the problem list box on initial evaluation, provide pt/family education and to maximize pt's level of independence in the home and community environment.     Pt's spiritual, cultural and educational needs considered and pt agreeable to plan of care and goals.     Anticipated barriers to physical therapy: none     GOALS: Short Term Goals: 4 weeks  1.Report decreased in pain at worse less than  <   / =  5  /10  to increase tolerance for functional mobility. On going  2. Pt to improve R shoulder passive range of motion flexion 160 deg and ER 60 deg to allow for improved functional mobility to allow for improvement in IADLs. On going  3. Increased R UE MMT 1/2 grade to increase tolerance for ADL and work activities. On going  4. Pt to report be conscious of impaired sitting and standing posture daily to decrease pain. On going  5. Pt to tolerate HEP to improve ROM and independence with ADL's. On going     Long Term Goals: 8 weeks  1.Report decreased in pain at worse less than  <   / =  2  /10  to increase tolerance for functional mobility. On going  2.  Patient will demonstrate improved overall function per FOTO Survey to CI = at least 1% but < 20% impaired, limited or restricted score or less.On going  3.Increased R UE MMT 1 grade to increase tolerance for ADL and work activities.On going  4. Pt to report and demonstrate proper posture in standing and sitting to decrease pain. On going  5. Pt to be Independent with HEP to improve ROM and independence  with ADL's.On going  6. Pt to improve passive range of motion flexion 180 deg and ER 75 deg to allow for improvement in IADLs. On going    PLAN     Plan of care Certification: 11/11/2024 to 3/11/25.     Outpatient Physical Therapy 3 times weekly for 20 weeks to include the following interventions: Electrical Stimulation , Manual Therapy, Moist Heat/ Ice, Neuromuscular Re-ed, Patient Education, Self Care, Therapeutic Activities, and Therapeutic Exercise. Dry needling    Esthere Alejandro, PT, DPT

## 2024-11-13 NOTE — TELEPHONE ENCOUNTER
LVM with patient. Disability paperwork is completed and can be picked up behind the .     Gayatri Le MS, ATC, OTC  Clinical/Surgical Assistant - Dr. Maite Cruz  Orthopedics  Phone: [phone]

## 2024-11-18 ENCOUNTER — CLINICAL SUPPORT (OUTPATIENT)
Dept: REHABILITATION | Facility: HOSPITAL | Age: 59
End: 2024-11-18
Payer: COMMERCIAL

## 2024-11-18 DIAGNOSIS — M25.611 DECREASED ROM OF RIGHT SHOULDER: ICD-10-CM

## 2024-11-18 DIAGNOSIS — M25.511 ACUTE PAIN OF RIGHT SHOULDER: Primary | ICD-10-CM

## 2024-11-18 DIAGNOSIS — R29.898 WEAKNESS OF RIGHT UPPER EXTREMITY: ICD-10-CM

## 2024-11-18 PROCEDURE — 97530 THERAPEUTIC ACTIVITIES: CPT | Mod: PN

## 2024-11-18 PROCEDURE — 97112 NEUROMUSCULAR REEDUCATION: CPT | Mod: PN

## 2024-11-18 NOTE — PROGRESS NOTES
[facility]   Physical Therapy Treatment Note     Name: Mary Naranjo  [facility] Number: [id number]    Therapy Diagnosis:   Encounter Diagnoses   Name Primary?    Acute pain of right shoulder Yes    Decreased ROM of right shoulder     Weakness of right upper extremity      Physician: Maite Cruz MD    Visit Date: 11/18/2024    Physician Orders: PT Eval and Treat   Medical Diagnosis from Referral: M75.121 (ICD-10-CM) - Complete tear of right rotator cuff   Evaluation Date: 11/11/2024  Authorization Period Expiration: 12/31/24  Plan of Care Expiration: 3/11/25  Visit # / Visits authorized: 1/ 20    Progress Note Due: 12/11/24  FOTO: 1/1     Precautions: Standard, Plan to follow supraspinatus repair protocol (<3 cm in total size). Passive motion may begin at 2 weeks. Active motion at 6 weeks. No biceps resistive activity x 8 weeks. No cuff resistive activity x 12 weeks.   Date of surgery: 10/22/24, 3 weeks and 6 days     OPERATION:   Right Shoulder  1. Arthroscopic  supraspinatus rotator cuff repair   2. Arthroscopic biceps tenotomy   3. Extensive debridement    Time In: 2:02  Time Out: 2:52  Total Billable Time: 50 minutes    SUBJECTIVE     Pt reports: taking pain medication 30 minutes before coming in. She states that she feels pressure in her shoulder when attempting to lay on her back, which prevents her from laying flat.  She was compliant with home exercise program.  Response to previous treatment: No change   Functional change: None    Pain: 10/10  Location: right shoulder      OBJECTIVE     Objective Measures updated at progress report unless specified.   Passive Range of Motion (degrees):   Shoulder Right (11/18/24)   Flexion  80 degrees   Abduction NP due to surgery    ER @ 20 degree shoulder abduction 10 degrees   IR @ 20 degree shoulder abduction WFL      TREATMENT     Mary received the treatments listed below:      therapeutic activities to improve functional performance  and functional mobility for 30  minutes, including:  Codman pendulum fwd/bwd, lat 2x10 ea  Supine gentle PROM ER 0-10 degrees (goal for week 2-3: 0-30 degree)  Supine gentle PROM shoulder flexion 0-60 degrees (goal for week 2-3: 0-90 degree)  Supine gentle PROM Elbow extension    received therapeutic exercises to develop strength and ROM for 00  minutes including:    neuromuscular re-education activities to improve: Balance, Proprioception, Posture, and muscles motor control for 20 minutes. The following activities were included:  Seated upper traps  stretch 3x30 sec   Seated levator scap stretch 3x30 sec   AROM wrist flexion 3x10  AROM wrist extension 3x10  Digiflex (green) squeeze 3x10  Seated Scapular retraction 3x10  Seated shoulder rolls 3x10    received the following manual therapy techniques: soft tissue mobilization were applied to the: R upper trapezius for 00 minutes, including:    PATIENT EDUCATION AND HOME EXERCISES     Home Exercises Provided and Patient Education Provided     Education provided:   Patient education on nature of current condition and PHYSICAL THERAPY POC  Written HOME EXERCISE PROGRAM provided, reviewed, patient demo understanding  Pt was instructed to follow protocol for this surgery.     Written Home Exercises Provided: Patient instructed to cont prior HEP. Exercises were reviewed and Mary was able to demonstrate them prior to the end of the session.  Mary demonstrated good  understanding of the education provided. See EMR under Patient Instructions for exercises provided during therapy sessions    ASSESSMENT     Pt presents to her second PT follow-up s/p arthroscopic supraspinatus rotator cuff repair, arthroscopic biceps tenotomy, and extensive debridement. Today, pt is 3 weeks and 6 days post-op. Codman pendulums were introduced in fwd/bwd and lateral directions; pt reported feeling pressure in the shoulder but was able to perform 20 reps in each direction and stated the pain  was tolerable. PROM for shoulder flexion, ER, and elbow extension was continued per protocol. Pt achieved 80° shoulder flexion PROM, 10° shoulder ER with the shoulder abducted at 20°, and near-full elbow extension. Noted improved shoulder flexion PROM from 60° in the last session to 80° in this session. However, pt continues to report extreme pain before and during PROM, limiting her tolerance and ability to achieve goal ROM (week 2-3 goals: shoulder ER 0-30°, shoulder abduction 0-90°). Min TC and demonstration were provided to ensure proper scapular retraction and shoulder rolls technique. Will progress per protocol.    Mary Is progressing well towards her goals.   Pt prognosis is Good.     Pt will continue to benefit from skilled outpatient physical therapy to address the deficits listed in the problem list box on initial evaluation, provide pt/family education and to maximize pt's level of independence in the home and community environment.     Pt's spiritual, cultural and educational needs considered and pt agreeable to plan of care and goals.     Anticipated barriers to physical therapy: none     GOALS: Short Term Goals: 4 weeks  1.Report decreased in pain at worse less than  <   / =  5  /10  to increase tolerance for functional mobility. On going  2. Pt to improve R shoulder passive range of motion flexion 160 deg and ER 60 deg to allow for improved functional mobility to allow for improvement in IADLs. On going  3. Increased R UE MMT 1/2 grade to increase tolerance for ADL and work activities. On going  4. Pt to report be conscious of impaired sitting and standing posture daily to decrease pain. On going  5. Pt to tolerate HEP to improve ROM and independence with ADL's. On going     Long Term Goals: 8 weeks  1.Report decreased in pain at worse less than  <   / =  2  /10  to increase tolerance for functional mobility. On going  2.  Patient will demonstrate improved overall function per FOTO Survey to CI =  at least 1% but < 20% impaired, limited or restricted score or less.On going  3.Increased R UE MMT 1 grade to increase tolerance for ADL and work activities.On going  4. Pt to report and demonstrate proper posture in standing and sitting to decrease pain. On going  5. Pt to be Independent with HEP to improve ROM and independence with ADL's.On going  6. Pt to improve passive range of motion flexion 180 deg and ER 75 deg to allow for improvement in IADLs. On going    PLAN     Plan of care Certification: 11/11/2024 to 3/11/25.     Outpatient Physical Therapy 3 times weekly for 20 weeks to include the following interventions: Electrical Stimulation , Manual Therapy, Moist Heat/ Ice, Neuromuscular Re-ed, Patient Education, Self Care, Therapeutic Activities, and Therapeutic Exercise. Dry billy Allen, PT, DPT

## 2024-11-21 ENCOUNTER — CLINICAL SUPPORT (OUTPATIENT)
Dept: REHABILITATION | Facility: HOSPITAL | Age: 59
End: 2024-11-21
Payer: COMMERCIAL

## 2024-11-21 DIAGNOSIS — R29.898 WEAKNESS OF RIGHT UPPER EXTREMITY: ICD-10-CM

## 2024-11-21 DIAGNOSIS — M25.611 DECREASED ROM OF RIGHT SHOULDER: ICD-10-CM

## 2024-11-21 DIAGNOSIS — M25.511 ACUTE PAIN OF RIGHT SHOULDER: Primary | ICD-10-CM

## 2024-11-21 PROCEDURE — 97530 THERAPEUTIC ACTIVITIES: CPT | Mod: PN

## 2024-11-21 PROCEDURE — 97112 NEUROMUSCULAR REEDUCATION: CPT | Mod: PN

## 2024-11-21 NOTE — PROGRESS NOTES
[facility]   Physical Therapy Treatment Note     Name: Mary Naranjo  [facility] Number: [id number]    Therapy Diagnosis:   Encounter Diagnoses   Name Primary?    Acute pain of right shoulder Yes    Decreased ROM of right shoulder     Weakness of right upper extremity      Physician: Maite Cruz MD    Visit Date: 11/21/2024    Physician Orders: PT Eval and Treat   Medical Diagnosis from Referral: M75.121 (ICD-10-CM) - Complete tear of right rotator cuff   Evaluation Date: 11/11/2024  Authorization Period Expiration: 12/31/24  Plan of Care Expiration: 3/11/25  Visit # / Visits authorized: 1/ 20    Progress Note Due: 12/11/24  FOTO: 1/1     Precautions: Standard, Plan to follow supraspinatus repair protocol (<3 cm in total size). Passive motion may begin at 2 weeks. Active motion at 6 weeks. No biceps resistive activity x 8 weeks. No cuff resistive activity x 12 weeks.   Date of surgery: 10/22/24, 4 weeks and 2 days     OPERATION:   Right Shoulder  1. Arthroscopic  supraspinatus rotator cuff repair   2. Arthroscopic biceps tenotomy   3. Extensive debridement    Time In: 8:10  Time Out: 9:00  Total Billable Time: 50 minutes    SUBJECTIVE     Pt reports: taking 2 Tylenol 30 minutes before coming in. She states that when she lays in an upright position, she tends to slip down, resulting in a more flat position, which she previously could not tolerate at all due to pressure in her shoulder. However, she is now tolerating it a little bit. She states her pain is a 7/10 this morning after taking her pain medications.    She was compliant with home exercise program.  Response to previous treatment: No change   Functional change: None    Pain: 7/10  Location: right shoulder      OBJECTIVE     Objective Measures updated at progress report unless specified.   Passive Range of Motion (degrees):   Shoulder Right (11/18/24)   Flexion  85 degrees   Abduction NP due to surgery    ER @ 20 degree  shoulder abduction 14 degrees   IR @ 20 degree shoulder abduction WFL      TREATMENT     Mary received the treatments listed below:      therapeutic activities to improve functional performance and functional mobility for 25  minutes, including:  Codman pendulum fwd/bwd, lat, CW&CCW 3x10 ea  Supine gentle PROM ER 0-14 degrees (goal for week 2-3: 0-30 degree)  Supine gentle PROM shoulder flexion 0-85 degrees (goal for week 2-3: 0-90 degree)  Supine gentle PROM Elbow extension    received therapeutic exercises to develop strength and ROM for 00  minutes including:    neuromuscular re-education activities to improve: Balance, Proprioception, Posture, and muscles motor control for 17 minutes. The following activities were included:  Seated upper traps  stretch 3x30 sec (with heating pack on bilateral UT)  Seated levator scap stretch 3x30 sec (with heating pack on bilateral UT)  AROM wrist flexion 3x10  AROM wrist extension 3x10  Digiflex (blue) squeeze 3x10  Seated Scapular retraction 3x10 (with heating pack on bilateral UT)  Seated shoulder rolls 3x10 (with heating pack on bilateral UT)    received the following manual therapy techniques: soft tissue mobilization were applied to the: R upper trapezius for 08 minutes, including:    PATIENT EDUCATION AND HOME EXERCISES     Home Exercises Provided and Patient Education Provided     Education provided:   Pt was instructed to follow protocol for this surgery.   Pt educated to perform shoulder rolls, scapular retractions, UT and levator scap stretches at home, along with wrist exercises. Pt also educated to apply a heating pack to R upper trap to further decrease muscle tightness and tone.    Written Home Exercises Provided: Patient instructed to cont prior HEP. Exercises were reviewed and Mary was able to demonstrate them prior to the end of the session.  Mary demonstrated good  understanding of the education provided. See EMR under Patient Instructions for  exercises provided during therapy sessions    ASSESSMENT     Pt presents to her third PT follow-up s/p arthroscopic supraspinatus rotator cuff repair, arthroscopic biceps tenotomy, and extensive debridement. Today, pt is 4 weeks and 2 days post-op. Continued with Codman pendulums in forward/backward and lateral directions; today added clockwise and counterclockwise directions. Pt reported continued pressure in the shoulder but was able to perform 30 reps in each direction and stated the pain was tolerable. PROM for shoulder flexion, ER, and elbow extension was continued per protocol. Pt achieved 85° shoulder flexion PROM, 14° shoulder ER with the shoulder abducted at 20°, and full elbow extension. Noted improved shoulder flexion PROM from 80° in the last session to 85° in this session. However, pt continues to report extreme pain before and during PROM, limiting her tolerance and ability to achieve goal ROM (week 2-3 goals: shoulder ER 0-30°, shoulder abduction 0-90°). Min TC and demonstration were provided to ensure proper scapular retraction and shoulder rolls technique. Today, soft tissue mobilization was introduced to R UT due to noted increased tautness and muscle guarding, which slightly improved after this MT. Heating pack was also introduced to R UT to further decrease tautness. Will progress per protocol.    Mary Is progressing well towards her goals.   Pt prognosis is Good.     Pt will continue to benefit from skilled outpatient physical therapy to address the deficits listed in the problem list box on initial evaluation, provide pt/family education and to maximize pt's level of independence in the home and community environment.     Pt's spiritual, cultural and educational needs considered and pt agreeable to plan of care and goals.     Anticipated barriers to physical therapy: none     GOALS: Short Term Goals: 4 weeks  1.Report decreased in pain at worse less than  <   / =  5  /10  to increase  tolerance for functional mobility. On going  2. Pt to improve R shoulder passive range of motion flexion 160 deg and ER 60 deg to allow for improved functional mobility to allow for improvement in IADLs. On going  3. Increased R UE MMT 1/2 grade to increase tolerance for ADL and work activities. On going  4. Pt to report be conscious of impaired sitting and standing posture daily to decrease pain. On going  5. Pt to tolerate HEP to improve ROM and independence with ADL's. On going     Long Term Goals: 8 weeks  1.Report decreased in pain at worse less than  <   / =  2  /10  to increase tolerance for functional mobility. On going  2.  Patient will demonstrate improved overall function per FOTO Survey to CI = at least 1% but < 20% impaired, limited or restricted score or less.On going  3.Increased R UE MMT 1 grade to increase tolerance for ADL and work activities.On going  4. Pt to report and demonstrate proper posture in standing and sitting to decrease pain. On going  5. Pt to be Independent with HEP to improve ROM and independence with ADL's.On going  6. Pt to improve passive range of motion flexion 180 deg and ER 75 deg to allow for improvement in IADLs. On going    PLAN     Plan of care Certification: 11/11/2024 to 3/11/25.     Outpatient Physical Therapy 3 times weekly for 20 weeks to include the following interventions: Electrical Stimulation , Manual Therapy, Moist Heat/ Ice, Neuromuscular Re-ed, Patient Education, Self Care, Therapeutic Activities, and Therapeutic Exercise. Mindi Allen, PT, DPT

## 2024-11-22 RX ORDER — MELOXICAM 15 MG/1
15 TABLET ORAL
Qty: 30 TABLET | Refills: 0 | Status: SHIPPED | OUTPATIENT
Start: 2024-11-22

## 2024-11-22 NOTE — PROGRESS NOTES
Postoperative Visit    History of Present Illness:   Mary is 6 weeks s/p right rotator cuff repair (full thickness SS) biceps tenotomy and extensive debridement  (DOS- 10/22/24)  Doing PT   Painful, having spasms    Physical Examination:    Vitals:    12/04/24 0755   PainSc:   4   PainLoc: Shoulder      NAD  right upper extremity:  Incision over the shoulder is healing well with suture in place   AROM: FE 85   PROM: FE 90, ER 20    Assessment/Plan:  59 y.o. female s/p right rotator cuff repair, biceps tenotomy, and extensive debridement (DOS-10/22/24)    Plan  Continue PT - encouraged pre treating with NSAIDs to assist with participation   Ok to DC sling  AROM OK, no lifting  Follow up in 6 weeks       All questions were answered in detail. The patient is in full agreement with the treatment plan and will proceed accordingly.

## 2024-11-26 ENCOUNTER — OFFICE VISIT (OUTPATIENT)
Dept: OBSTETRICS AND GYNECOLOGY | Facility: CLINIC | Age: 59
End: 2024-11-26
Payer: COMMERCIAL

## 2024-11-26 VITALS
DIASTOLIC BLOOD PRESSURE: 80 MMHG | BODY MASS INDEX: 40.66 KG/M2 | WEIGHT: 236.88 LBS | SYSTOLIC BLOOD PRESSURE: 130 MMHG

## 2024-11-26 DIAGNOSIS — Z01.419 WELL WOMAN EXAM: ICD-10-CM

## 2024-11-26 PROCEDURE — 99999 PR PBB SHADOW E&M-EST. PATIENT-LVL III: CPT | Mod: PBBFAC,,, | Performed by: OBSTETRICS & GYNECOLOGY

## 2024-11-26 PROCEDURE — 99396 PREV VISIT EST AGE 40-64: CPT | Mod: S$GLB,,, | Performed by: OBSTETRICS & GYNECOLOGY

## 2024-11-26 PROCEDURE — 3079F DIAST BP 80-89 MM HG: CPT | Mod: CPTII,S$GLB,, | Performed by: OBSTETRICS & GYNECOLOGY

## 2024-11-26 PROCEDURE — 3075F SYST BP GE 130 - 139MM HG: CPT | Mod: CPTII,S$GLB,, | Performed by: OBSTETRICS & GYNECOLOGY

## 2024-11-26 PROCEDURE — 1159F MED LIST DOCD IN RCRD: CPT | Mod: CPTII,S$GLB,, | Performed by: OBSTETRICS & GYNECOLOGY

## 2024-11-26 PROCEDURE — 3044F HG A1C LEVEL LT 7.0%: CPT | Mod: CPTII,S$GLB,, | Performed by: OBSTETRICS & GYNECOLOGY

## 2024-11-26 PROCEDURE — 1160F RVW MEDS BY RX/DR IN RCRD: CPT | Mod: CPTII,S$GLB,, | Performed by: OBSTETRICS & GYNECOLOGY

## 2024-11-26 PROCEDURE — 3008F BODY MASS INDEX DOCD: CPT | Mod: CPTII,S$GLB,, | Performed by: OBSTETRICS & GYNECOLOGY

## 2024-11-26 NOTE — PROGRESS NOTES
History & Physical  Gynecology Problem Visit      SUBJECTIVE:     Chief Complaint: Annual Exam       History of Present Illness:  Annual Exam-Postmenopausal  Ms. Naranjo is a 60 y/o female who presents for annual exam. The patient has no complaints today. The patient is sexually active with her . GYN screening history: last pap: approximate date 2023 and was normal and last mammogram: approximate date 11/2023 and was normal.      The patient has never been taking hormone replacement therapy. Patient denies post-menopausal vaginal bleeding. The patient wears seatbelts: yes. The patient participates in regular exercise: no. Has the patient ever been transfused or tattooed?: not asked. The patient reports that there is not domestic violence in her life.      Review of patient's allergies indicates:  No Known Allergies    Past Medical History:   Diagnosis Date    Arthritis     COVID-19 02/08/2023    Dental bridge present     upper removable partial    Family history of pancreatic cancer     Fibrocystic breast     Migraines     Obesity     Pancreas cyst     Primary hypertension 02/02/2022    Sleep apnea      Past Surgical History:   Procedure Laterality Date    ARTHROSCOPIC REPAIR OF ROTATOR CUFF OF SHOULDER Right 10/22/2024    Procedure: REPAIR, ROTATOR CUFF, ARTHROSCOPIC WITH EXTENSIVE DEBRIDEMENT;  Surgeon: Maite Cruz MD;  Location: [facility] OR;  Service: Orthopedics;  Laterality: Right;  PATEL&NEPHEW JUAN CARLOS NOTIFIED-GG  RN PREOP 09/30/2024-----NEED H/P    BREAST BIOPSY      BREAST CYST ASPIRATION      BREAST SURGERY      Dont remember    COLONOSCOPY N/A 10/07/2015    Procedure: COLONOSCOPY;  Surgeon: Eagle Stack MD;  Location: [facility];  Service: Endoscopy;  Laterality: N/A;    DILATION AND CURETTAGE OF UTERUS      ENDOSCOPIC ULTRASOUND OF UPPER GASTROINTESTINAL TRACT N/A 11/03/2020    Procedure: ULTRASOUND, UPPER GI TRACT, ENDOSCOPIC;  Surgeon: Kei Juarez MD;  Location: [facility] (2ND FLR);   Service: Endoscopy;  Laterality: N/A;  Covid-19 test 10/31/20 at [facility]    ENDOSCOPIC ULTRASOUND OF UPPER GASTROINTESTINAL TRACT N/A 2023    Procedure: ULTRASOUND, UPPER GI TRACT, ENDOSCOPIC;  Surgeon: Burak Garcia MD;  Location: [facility] ([address] [place] [address]);  Service: Endoscopy;  Laterality: N/A;  6/15/23-Instructions via portal-DS   pre call complete  pre call 7/10, pt confirmed - mf    ESOPHAGOGASTRODUODENOSCOPY N/A 2019    Procedure: ESOPHAGOGASTRODUODENOSCOPY (EGD);  Surgeon: Cherrie Sheets MD;  Location: [facility];  Service: Endoscopy;  Laterality: N/A;    EYE SURGERY  2021    Cateract    fibrocystic breast       KNEE ARTHROSCOPY W/ MENISCAL REPAIR      TUBAL LIGATION       OB History          2    Para   2    Term   2            AB        Living   2         SAB        IAB        Ectopic        Multiple        Live Births               Obstetric Comments   Menopause @ age 45  MMG 2017 NEG  H/o abnormal pap, no treatment needed.  Pap neg 2017  c-scope in                Family History   Problem Relation Name Age of Onset    Heart disease Mother Mone Skelton     Cataracts Mother Mone Skelton     Glaucoma Mother Mone Skelton     Cancer Father Papa Skelton         Prostate, lung, colon, brain, gallbladder, skin    Colon cancer Father Papa Skelton     Pancreatic cancer Sister Ervin 54    Breast cancer Sister Yajaira 42        unilateral    Cancer Sister Yajaira Sandifer 40        Breast    Cancer Sister Ervin Skelton         Pancreatic    No Known Problems Brother Papa     Asthma Maternal Aunt Micheline Plunkett     Heart disease Maternal Aunt Micheline Plunkett     Heart disease Maternal Aunt Priscila Morejon     No Known Problems Maternal Grandmother      No Known Problems Maternal Grandfather      No Known Problems Paternal Grandmother      No Known Problems Paternal Grandfather      Cancer Maternal Cousin Quinten         unknown origin    Breast cancer Maternal Cousin       Cancer Maternal Cousin          prostate?    Prostate cancer Maternal Cousin      Cervical cancer Other Esperanza     Cancer Other Esperanza         possible ovarian cancer    COPD Neg Hx      Amblyopia Neg Hx      Blindness Neg Hx      Diabetes Neg Hx      Hypertension Neg Hx      Macular degeneration Neg Hx      Retinal detachment Neg Hx      Strabismus Neg Hx      Stroke Neg Hx      Thyroid disease Neg Hx       Social History     Tobacco Use    Smoking status: Never    Smokeless tobacco: Never   Substance Use Topics    Alcohol use: No    Drug use: No       Current Outpatient Medications   Medication Sig    atorvastatin (LIPITOR) 20 MG tablet Take 1 tablet (20 mg total) by mouth once daily.    cholecalciferol, vitamin D3, 50 mcg (2,000 unit) Chew Take 2,000 Units by mouth once daily. Per patient    ferrous sulfate 140 mg (45 mg iron) TbSR Take 2 tablets by mouth once daily.    meloxicam (MOBIC) 15 MG tablet TAKE 1 TABLET BY MOUTH EVERY DAY    multivit/folic acid/vit K1 (ONE-A-DAY WOMEN'S 50 PLUS ORAL) Take 1 capsule by mouth once daily. Per patient, One-a-Day Women's 50+ Multivitamin Complete    omega-3 fatty acids/fish oil (FISH OIL-OMEGA-3 FATTY ACIDS) 300-1,000 mg capsule Take 1 capsule by mouth once daily.    oxyCODONE (ROXICODONE) 5 MG immediate release tablet Take 1 tablet (5 mg total) by mouth every 4 (four) hours as needed for Pain.    pregabalin (LYRICA) 75 MG capsule Take 1 capsule (75 mg total) by mouth 2 (two) times daily. for 14 days    valsartan-hydrochlorothiazide (DIOVAN-HCT) 160-25 mg per tablet Take 1 tablet by mouth once daily.     No current facility-administered medications for this visit.         Review of Systems:  Review of Systems   Constitutional:  Negative for activity change and fatigue.   HENT:  Negative for congestion, sneezing and sore throat.    Respiratory:  Negative for shortness of breath.    Cardiovascular:  Negative for chest pain.   Gastrointestinal:  Negative for abdominal  pain, nausea and vomiting.   Genitourinary:  Negative for flank pain, menstrual problem, pelvic pain and vaginal discharge.   Musculoskeletal:  Negative for back pain.   Skin:  Negative for rash.   Neurological:  Negative for headaches.        OBJECTIVE:   Vitals:     Vitals:    11/26/24 0832   BP: 130/80   Weight: 107.5 kg (236 lb 14.2 oz)        Physical Exam:  Physical Exam  Vitals and nursing note reviewed. Exam conducted with a chaperone present.   Constitutional:       Appearance: She is well-developed.   Cardiovascular:      Rate and Rhythm: Normal rate.   Pulmonary:      Effort: Pulmonary effort is normal. No respiratory distress.   Chest:   Breasts:     Breasts are symmetrical.   Abdominal:      General: There is no distension.      Palpations: Abdomen is soft.      Tenderness: There is no abdominal tenderness.   Genitourinary:     Vagina: No vaginal discharge.   Skin:     General: Skin is warm and dry.   Neurological:      Mental Status: She is alert and oriented to person, place, and time.           ASSESSMENT:       ICD-10-CM ICD-9-CM    1. Well woman exam  Z01.419 V72.31 Ambulatory referral/consult to Obstetrics / Gynecology         Plan:      Mary was seen today for annual exam.    Diagnoses and all orders for this visit:    Well woman exam  -     Ambulatory referral/consult to Obstetrics / Gynecology  - Pap smear normal 2023  - Mammogram normal 2023    No orders of the defined types were placed in this encounter.      Follow up in about 1 year (around 11/26/2025) for Well Woman/Annual.

## 2024-12-01 NOTE — PROGRESS NOTES
[facility]   Physical Therapy Treatment Note     Name: Mary Naranjo  Clinic Number: [id number]    Therapy Diagnosis:   No diagnosis found.    Physician: Maite Cruz MD    Visit Date: 12/2/2024    Physician Orders: PT Eval and Treat   Medical Diagnosis from Referral: M75.121 (ICD-10-CM) - Complete tear of right rotator cuff   Evaluation Date: 11/11/2024  Authorization Period Expiration: 12/31/24  Plan of Care Expiration: 3/11/25  Visit # / Visits authorized: 4/ 20    Progress Note Due: 12/11/24  FOTO: 1/1    PTA Visit: 1/5     Precautions: Standard, Plan to follow supraspinatus repair protocol (<3 cm in total size). Passive motion may begin at 2 weeks. Active motion at 6 weeks. No biceps resistive activity x 8 weeks. No cuff resistive activity x 12 weeks.   Date of surgery: 10/22/24, 4 weeks and 2 days     OPERATION:   Right Shoulder  1. Arthroscopic  supraspinatus rotator cuff repair   2. Arthroscopic biceps tenotomy   3. Extensive debridement    Time In: 8:00 am  Time Out: 8:52 am  Total Billable Time: 50 minutes    SUBJECTIVE     Pt reports: She still has a lot of pain in the back of the shoulder when she lays on it, but it is better than it was a few weeks ago.  She's been doing the exercises as much as she can at home and they're still really tough.        She was compliant with home exercise program.  Response to previous treatment: No change   Functional change: None    Pain: 7/10  Location: right shoulder      OBJECTIVE     Objective Measures updated at progress report unless specified.   Passive Range of Motion (degrees):   Shoulder Right (11/18/24)   Flexion  85 degrees   Abduction NP due to surgery    ER @ 20 degree shoulder abduction 14 degrees   IR @ 20 degree shoulder abduction WFL      TREATMENT     aMry received the treatments listed below:      therapeutic activities to improve functional performance and functional mobility for 10  minutes, including:  Codman  pendulum fwd/bwd, lat, CW&CCW 3x10 ea  +Standing toe touches 2x10     received therapeutic exercises to develop strength and ROM for 00  minutes including:    neuromuscular re-education activities to improve: Balance, Proprioception, Posture, and muscles motor control for 30 minutes. The following activities were included:  Seated upper traps  stretch 3x30 sec (with heating pack on bilateral UT)  Seated levator scap stretch 3x30 sec (with heating pack on bilateral UT)  AROM wrist flexion 3x10  AROM wrist extension 3x10  Digiflex (blue) squeeze 3x10  Seated Scapular retraction 3x10 (with heating pack on bilateral UT)  Seated shoulder rolls 3x10 (with heating pack on bilateral UT)    received the following manual therapy techniques: soft tissue mobilization were applied to the: R upper trapezius for 10 minutes, including:  Semi-inclined gentle PROM ER 0-14 degrees (goal for week 2-3: 0-30 degree)  Semi-inclined gentle PROM shoulder flexion 0-85 degrees (goal for week 2-3: 0-90 degree)  Semi-inclined gentle PROM Elbow extension    PATIENT EDUCATION AND HOME EXERCISES     Home Exercises Provided and Patient Education Provided     Education provided:   Pt was instructed to follow protocol for this surgery.   Pt educated to perform shoulder rolls, scapular retractions, UT and levator scap stretches at home, along with wrist exercises. Pt also educated to apply a heating pack to R upper trap to further decrease muscle tightness and tone.    Written Home Exercises Provided: Patient instructed to cont prior HEP. Exercises were reviewed and Mary was able to demonstrate them prior to the end of the session.  Mary demonstrated good  understanding of the education provided. See EMR under Patient Instructions for exercises provided during therapy sessions    ASSESSMENT   Ms Hernandez presented to PT today 5 weeks/6 days post op R RTC repair/biceps tenotomy/extensive debridement. She reported slight improvements with posterior  right shoulder discomfort while semi-inclined but still is unable to lay supine.  Continued with exercises for improved right shoulder ROM.  Ms Hernandez displayed moderate mm guarding during PROM exercises, which she was able to somewhat improve following verbal cues.  Introduced standing toe touches for improved right shoulder ROM, which was challenging for Ms Hernandez but she was able to tolerate. Will continue to progress per post op protocol.     Mary Is progressing well towards her goals.   Pt prognosis is Good.     Pt will continue to benefit from skilled outpatient physical therapy to address the deficits listed in the problem list box on initial evaluation, provide pt/family education and to maximize pt's level of independence in the home and community environment.     Pt's spiritual, cultural and educational needs considered and pt agreeable to plan of care and goals.     Anticipated barriers to physical therapy: none     GOALS: Short Term Goals: 4 weeks  1.Report decreased in pain at worse less than  <   / =  5  /10  to increase tolerance for functional mobility. On going  2. Pt to improve R shoulder passive range of motion flexion 160 deg and ER 60 deg to allow for improved functional mobility to allow for improvement in IADLs. On going  3. Increased R UE MMT 1/2 grade to increase tolerance for ADL and work activities. On going  4. Pt to report be conscious of impaired sitting and standing posture daily to decrease pain. On going  5. Pt to tolerate HEP to improve ROM and independence with ADL's. On going     Long Term Goals: 8 weeks  1.Report decreased in pain at worse less than  <   / =  2  /10  to increase tolerance for functional mobility. On going  2.  Patient will demonstrate improved overall function per FOTO Survey to CI = at least 1% but < 20% impaired, limited or restricted score or less.On going  3.Increased R UE MMT 1 grade to increase tolerance for ADL and work activities.On going  4. Pt to  report and demonstrate proper posture in standing and sitting to decrease pain. On going  5. Pt to be Independent with HEP to improve ROM and independence with ADL's.On going  6. Pt to improve passive range of motion flexion 180 deg and ER 75 deg to allow for improvement in IADLs. On going    PLAN     Plan of care Certification: 11/11/2024 to 3/11/25.     Outpatient Physical Therapy 3 times weekly for 20 weeks to include the following interventions: Electrical Stimulation , Manual Therapy, Moist Heat/ Ice, Neuromuscular Re-ed, Patient Education, Self Care, Therapeutic Activities, and Therapeutic Exercise. Dry needling    Pooja Desiree, PTA

## 2024-12-02 ENCOUNTER — CLINICAL SUPPORT (OUTPATIENT)
Dept: REHABILITATION | Facility: HOSPITAL | Age: 59
End: 2024-12-02
Payer: COMMERCIAL

## 2024-12-02 DIAGNOSIS — M25.611 DECREASED ROM OF RIGHT SHOULDER: ICD-10-CM

## 2024-12-02 DIAGNOSIS — M25.511 ACUTE PAIN OF RIGHT SHOULDER: Primary | ICD-10-CM

## 2024-12-02 DIAGNOSIS — R29.898 WEAKNESS OF RIGHT UPPER EXTREMITY: ICD-10-CM

## 2024-12-02 PROCEDURE — 97112 NEUROMUSCULAR REEDUCATION: CPT | Mod: PN,CQ

## 2024-12-02 PROCEDURE — 97530 THERAPEUTIC ACTIVITIES: CPT | Mod: PN,CQ

## 2024-12-02 PROCEDURE — 97140 MANUAL THERAPY 1/> REGIONS: CPT | Mod: PN,CQ

## 2024-12-04 ENCOUNTER — OFFICE VISIT (OUTPATIENT)
Dept: ORTHOPEDICS | Facility: CLINIC | Age: 59
End: 2024-12-04
Payer: COMMERCIAL

## 2024-12-04 ENCOUNTER — CLINICAL SUPPORT (OUTPATIENT)
Dept: REHABILITATION | Facility: HOSPITAL | Age: 59
End: 2024-12-04
Payer: COMMERCIAL

## 2024-12-04 DIAGNOSIS — M25.511 ACUTE PAIN OF RIGHT SHOULDER: Primary | ICD-10-CM

## 2024-12-04 DIAGNOSIS — M75.121 NONTRAUMATIC COMPLETE TEAR OF RIGHT ROTATOR CUFF: Primary | ICD-10-CM

## 2024-12-04 DIAGNOSIS — M25.611 DECREASED ROM OF RIGHT SHOULDER: ICD-10-CM

## 2024-12-04 DIAGNOSIS — R29.898 WEAKNESS OF RIGHT UPPER EXTREMITY: ICD-10-CM

## 2024-12-04 PROCEDURE — 99999 PR PBB SHADOW E&M-EST. PATIENT-LVL III: CPT | Mod: PBBFAC,,, | Performed by: ORTHOPAEDIC SURGERY

## 2024-12-04 PROCEDURE — 1160F RVW MEDS BY RX/DR IN RCRD: CPT | Mod: CPTII,S$GLB,, | Performed by: ORTHOPAEDIC SURGERY

## 2024-12-04 PROCEDURE — 99024 POSTOP FOLLOW-UP VISIT: CPT | Mod: S$GLB,,, | Performed by: ORTHOPAEDIC SURGERY

## 2024-12-04 PROCEDURE — 97140 MANUAL THERAPY 1/> REGIONS: CPT | Mod: PN,CQ

## 2024-12-04 PROCEDURE — 97112 NEUROMUSCULAR REEDUCATION: CPT | Mod: PN,CQ

## 2024-12-04 PROCEDURE — 3044F HG A1C LEVEL LT 7.0%: CPT | Mod: CPTII,S$GLB,, | Performed by: ORTHOPAEDIC SURGERY

## 2024-12-04 PROCEDURE — 1159F MED LIST DOCD IN RCRD: CPT | Mod: CPTII,S$GLB,, | Performed by: ORTHOPAEDIC SURGERY

## 2024-12-04 NOTE — PROGRESS NOTES
[facility]   Physical Therapy Treatment Note     Name: Mary Naranjo  [facility] Number: [id number]    Therapy Diagnosis:   Encounter Diagnoses   Name Primary?    Acute pain of right shoulder Yes    Decreased ROM of right shoulder     Weakness of right upper extremity        Physician: Maite Cruz MD    Visit Date: 12/4/2024    Physician Orders: PT Eval and Treat   Medical Diagnosis from Referral: M75.121 (ICD-10-CM) - Complete tear of right rotator cuff   Evaluation Date: 11/11/2024  Authorization Period Expiration: 12/31/24  Plan of Care Expiration: 3/11/25  Visit # / Visits authorized: 4/ 20    Progress Note Due: 12/11/24  FOTO: 1/1    PTA Visit: 2/5     Precautions: Standard, Plan to follow supraspinatus repair protocol (<3 cm in total size). Passive motion may begin at 2 weeks. Active motion at 6 weeks. No biceps resistive activity x 8 weeks. No cuff resistive activity x 12 weeks.   Date of surgery: 10/22/24, 6 weeks and 1 day post-op     OPERATION:   Right Shoulder  1. Arthroscopic  supraspinatus rotator cuff repair   2. Arthroscopic biceps tenotomy   3. Extensive debridement    Time In: 8:00 am  Time Out: 8:52 am  Total Billable Time: 50 minutes    SUBJECTIVE     Pt reports: She just saw her doctor earlier for a follow up, the incision heeling well. She still has a lot of pain in the back of the shoulder when she lays on it and with movements.    She was compliant with home exercise program.  Response to previous treatment: No change   Functional change: None    Pain: 7/10  Location: right shoulder      OBJECTIVE     Objective Measures updated at progress report unless specified.   Passive Range of Motion (degrees):   Shoulder Right (11/18/24)   Flexion  85 degrees   Abduction NP due to surgery    ER @ 20 degree shoulder abduction 14 degrees   IR @ 20 degree shoulder abduction WFL      TREATMENT     Mary received the treatments listed below:      therapeutic activities to  improve functional performance and functional mobility for 00  minutes, including:  Codman pendulum fwd/bwd, lat, CW&CCW 90 sec each  Standing toe touches 2x10     received therapeutic exercises to develop strength and ROM for 00  minutes including:    neuromuscular re-education activities to improve: Balance, Proprioception, Posture, and muscles motor control for 45 minutes. The following activities were included:    Pulleys shoulder Flexion 3 min x 3 sec hold (facing the door)  Pulleys shoulder Scaption 3 min x 3 sec hold  (facing the door, chair angle at 45 degree toward Left side)  Pendulums circles CW/CCW 90 sec each  Pendulums forward/backward 90 sec   Pendulums lateral 90 sec  Supine shoulder flexion AAROM 0# wand x20  Supine AAROM shoulder ER using 0# wand x20  Supine Serratus Anterior punch 0# wand x20    Seated upper traps  stretch 3x30 sec (with heating pack on bilateral UT)  Seated levator scap stretch 3x30 sec (with heating pack on bilateral UT)  AROM wrist flexion 3x10  AROM wrist extension 3x10  Digiflex (blue) squeeze 3x10  Seated Scapular retraction 3x10 (with heating pack on bilateral UT)  Seated shoulder rolls 3x10 (with heating pack on bilateral UT)    received the following manual therapy techniques: soft tissue mobilization were applied to the: R upper trapezius for 10 minutes, including:  Semi-inclined gentle PROM ER 0-14 degrees (goal for week 2-3: 0-30 degree)  Semi-inclined gentle PROM shoulder flexion 0-85 degrees (goal for week 2-3: 0-90 degree)  Semi-inclined gentle PROM Elbow extension    PATIENT EDUCATION AND HOME EXERCISES     Home Exercises Provided and Patient Education Provided     Education provided:   Pt was instructed to follow protocol for this surgery.   Pt educated to perform shoulder rolls, scapular retractions, UT and levator scap stretches at home, along with wrist exercises. Pt also educated to apply a heating pack to R upper trap to further decrease muscle tightness and  tone.    Written Home Exercises Provided: Patient instructed to cont prior HEP. Exercises were reviewed and Mary was able to demonstrate them prior to the end of the session.  Mary demonstrated good  understanding of the education provided. See EMR under Patient Instructions for exercises provided during therapy sessions    ASSESSMENT   Ms Hernandez presented to PT today 6 weeks and 1 day post op R RTC repair/biceps tenotomy/extensive debridement. She just saw her orthopedic doctor right before she came in, reporting doctor wean off the sling for now, only wear when she goes to crowded area. We added more AAROM exercises like Pulley Flex/Scap and shoulder Flex/ER using non-weighted wand to help improving her ROM. She with poor pain tolerance and required Max v/t cueing/encouragement to perform/complete tasks. She reported slight improvements with posterior right shoulder discomfort while semi-inclined but still is unable to lay supine. Ms Hernandez displayed moderate mm guarding during PROM exercises, which she was able to somewhat improve following verbal cues.  Introduced AAROM exercises for improved right shoulder ROM, which was challenging for Ms Hernandez but she was able to tolerate. Will continue to progress per post op protocol.     Mary Is progressing well towards her goals.   Pt prognosis is Good.     Pt will continue to benefit from skilled outpatient physical therapy to address the deficits listed in the problem list box on initial evaluation, provide pt/family education and to maximize pt's level of independence in the home and community environment.     Pt's spiritual, cultural and educational needs considered and pt agreeable to plan of care and goals.     Anticipated barriers to physical therapy: none     GOALS: Short Term Goals: 4 weeks  1.Report decreased in pain at worse less than  <   / =  5  /10  to increase tolerance for functional mobility. On going  2. Pt to improve R shoulder passive  range of motion flexion 160 deg and ER 60 deg to allow for improved functional mobility to allow for improvement in IADLs. On going  3. Increased R UE MMT 1/2 grade to increase tolerance for ADL and work activities. On going  4. Pt to report be conscious of impaired sitting and standing posture daily to decrease pain. On going  5. Pt to tolerate HEP to improve ROM and independence with ADL's. On going     Long Term Goals: 8 weeks  1.Report decreased in pain at worse less than  <   / =  2  /10  to increase tolerance for functional mobility. On going  2.  Patient will demonstrate improved overall function per FOTO Survey to CI = at least 1% but < 20% impaired, limited or restricted score or less.On going  3.Increased R UE MMT 1 grade to increase tolerance for ADL and work activities.On going  4. Pt to report and demonstrate proper posture in standing and sitting to decrease pain. On going  5. Pt to be Independent with HEP to improve ROM and independence with ADL's.On going  6. Pt to improve passive range of motion flexion 180 deg and ER 75 deg to allow for improvement in IADLs. On going    PLAN     Plan of care Certification: 11/11/2024 to 3/11/25.     Outpatient Physical Therapy 3 times weekly for 20 weeks to include the following interventions: Electrical Stimulation , Manual Therapy, Moist Heat/ Ice, Neuromuscular Re-ed, Patient Education, Self Care, Therapeutic Activities, and Therapeutic Exercise. Dry needling    Jaime To, PTA  12/4/2024

## 2024-12-11 ENCOUNTER — CLINICAL SUPPORT (OUTPATIENT)
Dept: REHABILITATION | Facility: HOSPITAL | Age: 59
End: 2024-12-11
Payer: COMMERCIAL

## 2024-12-11 DIAGNOSIS — M25.611 DECREASED ROM OF RIGHT SHOULDER: ICD-10-CM

## 2024-12-11 DIAGNOSIS — R29.898 WEAKNESS OF RIGHT UPPER EXTREMITY: ICD-10-CM

## 2024-12-11 DIAGNOSIS — M25.511 ACUTE PAIN OF RIGHT SHOULDER: Primary | ICD-10-CM

## 2024-12-11 PROCEDURE — 97112 NEUROMUSCULAR REEDUCATION: CPT | Mod: PN

## 2024-12-11 PROCEDURE — 97530 THERAPEUTIC ACTIVITIES: CPT | Mod: PN

## 2024-12-11 PROCEDURE — 97140 MANUAL THERAPY 1/> REGIONS: CPT | Mod: PN

## 2024-12-11 NOTE — PROGRESS NOTES
[facility]   Physical Therapy Treatment Note     Name: Mary Naranjo  [facility] Number: [id number]    Therapy Diagnosis:   Encounter Diagnoses   Name Primary?    Acute pain of right shoulder Yes    Decreased ROM of right shoulder     Weakness of right upper extremity        Physician: Maite Cruz MD    Visit Date: 12/11/2024    Physician Orders: PT Eval and Treat   Medical Diagnosis from Referral: M75.121 (ICD-10-CM) - Complete tear of right rotator cuff   Evaluation Date: 11/11/2024  Authorization Period Expiration: 12/31/24  Plan of Care Expiration: 3/11/25  Visit # / Visits authorized: 4/ 20    Progress Note Due: 12/11/24  FOTO: 1/1    PTA Visit: 1/5     Precautions: Standard, Plan to follow supraspinatus repair protocol (<3 cm in total size). Passive motion may begin at 2 weeks. Active motion at 6 weeks. No biceps resistive activity x 8 weeks. No cuff resistive activity x 12 weeks.   Date of surgery: 10/22/24, 7 weeks and 1 days     OPERATION:   Right Shoulder  1. Arthroscopic  supraspinatus rotator cuff repair   2. Arthroscopic biceps tenotomy   3. Extensive debridement    Time In: 9:00 am  Time Out: 9:54 am  Total Billable Time: 54 minutes    SUBJECTIVE     Pt reports: that she does not have much pain today and that she did not have to take any pain meds this morning.    She was compliant with home exercise program.  Response to previous treatment: No change   Functional change: None    Pain: 3/10  Location: right shoulder      OBJECTIVE     Objective Measures updated at progress report unless specified.   Passive Range of Motion (degrees):   Shoulder Right (11/18/24)   Flexion  120 degrees   Abduction NP due to surgery    ER @ 20 degree shoulder abduction 18 degrees   IR @ 20 degree shoulder abduction To abdomen      TREATMENT   Exercises performed: in bold     Mary received the treatments listed below:      therapeutic activities to improve functional performance and  functional mobility for 10  minutes, including:  Codman pendulum fwd/bwd, lat, CW&CCW 3x10 ea  +Standing toe touches 2x10     received therapeutic exercises to develop strength and ROM for 00  minutes including:    neuromuscular re-education activities to improve: Balance, Proprioception, Posture, and muscles motor control for 30 minutes. The following activities were included:  Pulleys shoulder Flexion 3 min x 3 sec hold (facing the door)  Pulleys shoulder Scaption 3 min x 3 sec hold  (facing the door, chair angle at 45 degree toward Left side)  Supine shoulder flexion AAROM 0# wand x20  Supine AAROM shoulder ER using 0# wand x30  Supine Serratus Anterior punch 0# wand x10    Seated upper traps  stretch 3x30 sec (with heating pack on bilateral UT)  Seated levator scap stretch 3x30 sec (with heating pack on bilateral UT)  AROM wrist flexion 3x10  AROM wrist extension 3x10  Digiflex (blue) squeeze 3x10  Seated Scapular retraction 3x10 (with heating pack on bilateral UT)  Seated shoulder rolls 3x10 (with heating pack on bilateral UT)    received the following manual therapy techniques: soft tissue mobilization were applied to the: R upper trapezius for 14 minutes, including:  Semi-inclined gentle PROM ER   Semi-inclined gentle PROM shoulder flexion   Semi-inclined gentle PROM Elbow extension    PATIENT EDUCATION AND HOME EXERCISES     Home Exercises Provided and Patient Education Provided     Education provided:   Pt was instructed to follow protocol for this surgery.   Pt educated to perform shoulder rolls, scapular retractions, UT and levator scap stretches at home, along with wrist exercises. Pt also educated to apply a heating pack to R upper trap to further decrease muscle tightness and tone.    Written Home Exercises Provided: Patient instructed to cont prior HEP. Exercises were reviewed and Mary was able to demonstrate them prior to the end of the session.  Mary demonstrated good  understanding of the  education provided. See EMR under Patient Instructions for exercises provided during therapy sessions    ASSESSMENT   Ms Hernandez presented to PT today 7 weeks and 1 day post op R RTC repair/biceps tenotomy/extensive debridement. She is no longer wearing the shoulder sling and reports minimal pain at rest (3/10). Continued with AAROM exercises including Pulley Flex/Scap and shoulder Flex/ER using non-weighted wand to help improving her ROM. She reported less discomfort or pressure in the shoulder with full supine position and was able to tolerate PROM in that position. Ms Hernandez displayed moderate mm guarding during PROM exercises, which improved with verbal cues for slow diaphragmatic breathing. Will continue to progress per post op protocol.     Mary Is progressing well towards her goals.   Pt prognosis is Good.     Pt will continue to benefit from skilled outpatient physical therapy to address the deficits listed in the problem list box on initial evaluation, provide pt/family education and to maximize pt's level of independence in the home and community environment.     Pt's spiritual, cultural and educational needs considered and pt agreeable to plan of care and goals.     Anticipated barriers to physical therapy: none     GOALS: Short Term Goals: 4 weeks  1.Report decreased in pain at worse less than  <   / =  5  /10  to increase tolerance for functional mobility. On going  2. Pt to improve R shoulder passive range of motion flexion 160 deg and ER 60 deg to allow for improved functional mobility to allow for improvement in IADLs. On going  3. Increased R UE MMT 1/2 grade to increase tolerance for ADL and work activities. On going  4. Pt to report be conscious of impaired sitting and standing posture daily to decrease pain. On going  5. Pt to tolerate HEP to improve ROM and independence with ADL's. On going     Long Term Goals: 8 weeks  1.Report decreased in pain at worse less than  <   / =  2  /10  to  increase tolerance for functional mobility. On going  2.  Patient will demonstrate improved overall function per FOTO Survey to CI = at least 1% but < 20% impaired, limited or restricted score or less.On going  3.Increased R UE MMT 1 grade to increase tolerance for ADL and work activities.On going  4. Pt to report and demonstrate proper posture in standing and sitting to decrease pain. On going  5. Pt to be Independent with HEP to improve ROM and independence with ADL's.On going  6. Pt to improve passive range of motion flexion 180 deg and ER 75 deg to allow for improvement in IADLs. On going    PLAN     Plan of care Certification: 11/11/2024 to 3/11/25.     Outpatient Physical Therapy 3 times weekly for 20 weeks to include the following interventions: Electrical Stimulation , Manual Therapy, Moist Heat/ Ice, Neuromuscular Re-ed, Patient Education, Self Care, Therapeutic Activities, and Therapeutic Exercise. Dry needling    Estyanna Allen, PT, DPT

## 2024-12-12 ENCOUNTER — CLINICAL SUPPORT (OUTPATIENT)
Dept: REHABILITATION | Facility: HOSPITAL | Age: 59
End: 2024-12-12
Payer: COMMERCIAL

## 2024-12-12 DIAGNOSIS — R29.898 WEAKNESS OF RIGHT UPPER EXTREMITY: ICD-10-CM

## 2024-12-12 DIAGNOSIS — M25.611 DECREASED ROM OF RIGHT SHOULDER: ICD-10-CM

## 2024-12-12 DIAGNOSIS — M25.511 ACUTE PAIN OF RIGHT SHOULDER: Primary | ICD-10-CM

## 2024-12-12 PROCEDURE — 97140 MANUAL THERAPY 1/> REGIONS: CPT | Mod: PN

## 2024-12-12 PROCEDURE — 97530 THERAPEUTIC ACTIVITIES: CPT | Mod: PN

## 2024-12-12 PROCEDURE — 97112 NEUROMUSCULAR REEDUCATION: CPT | Mod: PN

## 2024-12-12 NOTE — PROGRESS NOTES
[facility]   Physical Therapy Treatment Note     Name: Mary Naranjo  Clinic Number: [id number]    Therapy Diagnosis:   Encounter Diagnoses   Name Primary?    Acute pain of right shoulder Yes    Decreased ROM of right shoulder     Weakness of right upper extremity          Physician: Maite Cruz MD    Visit Date: 12/12/2024    Physician Orders: PT Eval and Treat   Medical Diagnosis from Referral: M75.121 (ICD-10-CM) - Complete tear of right rotator cuff   Evaluation Date: 11/11/2024  Authorization Period Expiration: 12/31/24  Plan of Care Expiration: 3/11/25  Visit # / Visits authorized: 7/ 20    Progress Note Due: 1/12/24  FOTO: 1/1    PTA Visit: 1/5     Precautions: Standard, Plan to follow supraspinatus repair protocol (<3 cm in total size). Passive motion may begin at 2 weeks. Active motion at 6 weeks. No biceps resistive activity x 8 weeks. No cuff resistive activity x 12 weeks.   Date of surgery: 10/22/24, 7 weeks and 2 days     OPERATION:   Right Shoulder  1. Arthroscopic  supraspinatus rotator cuff repair   2. Arthroscopic biceps tenotomy   3. Extensive debridement    Time In: 7:00 am  Time Out: 7:55 am  Total Billable Time: 55 minutes    SUBJECTIVE     Pt reports: that she had flu. She states she shows up to therapy with R shoulder pain with 3-4/10 and she feels after therapy pain is about 7-8/10    She was compliant with home exercise program.  Response to previous treatment: No change   Functional change: None    Pain: 3-4/10  Location: right shoulder      OBJECTIVE     Objective Measures updated at progress report unless specified.   Passive Range of Motion (degrees):   Shoulder Right (11/18/24)   Flexion  120 degrees   Abduction NP due to surgery    ER @ 20 degree shoulder abduction    IR @ 20 degree shoulder abduction      TREATMENT   Exercises performed: in bold     Mary received the treatments listed below:      therapeutic activities to improve functional  performance and functional mobility for 10  minutes, including:  Codman pendulum fwd/bwd, lat, CW&CCW 3x10 ea  Standing shoulder flexion stretch walk out 5x30 sec     received therapeutic exercises to develop strength and ROM for 00  minutes including:    neuromuscular re-education activities to improve: Balance, Proprioception, Posture, and muscles motor control for 30 minutes. The following activities were included:  Pulleys shoulder Flexion 3 min x 3 sec hold (facing the door)  Pulleys shoulder Scaption 3 min x 3 sec hold  (facing the door, chair angle at 45 degree toward Left side)  Supine shoulder flexion AAROM 0# wand x20  Supine AAROM shoulder ER using 0# wand x30  Supine Serratus Anterior punch 0# wand x10  Seated table slide in scaption plane AAROM 10x     Seated upper traps  stretch 3x30 sec (with heating pack on bilateral UT)  Seated levator scap stretch 3x30 sec (with heating pack on bilateral UT)  AROM wrist flexion 3x10  AROM wrist extension 3x10  Digiflex (blue) squeeze 3x10  Seated Scapular retraction 3x10 (with heating pack on bilateral UT)  Seated shoulder rolls 3x10 (with heating pack on bilateral UT)    received the following manual therapy techniques: soft tissue mobilization were applied to the: R upper trapezius for 14 minutes, including:  Semi-inclined gentle PROM ER   Semi-inclined gentle PROM shoulder flexion   Semi-inclined gentle PROM Elbow extension    PATIENT EDUCATION AND HOME EXERCISES     Home Exercises Provided and Patient Education Provided     Education provided:   Pt was instructed to follow protocol for this surgery.   Pt educated to perform shoulder rolls, scapular retractions, UT and levator scap stretches at home, along with wrist exercises. Pt also educated to apply a heating pack to R upper trap to further decrease muscle tightness and tone.    Written Home Exercises Provided: Patient instructed to cont prior HEP. Exercises were reviewed and Mary was able to demonstrate  them prior to the end of the session.  Mary demonstrated good  understanding of the education provided. See EMR under Patient Instructions for exercises provided during therapy sessions    ASSESSMENT   Ms Hernandez presented to PT today 7 weeks and 2 day post op R RTC repair/biceps tenotomy/extensive debridement. We cont with same exericses as last visit. Addition standing shoulder flexion walk out and  Seated table slide AAROM.  Continued with AAROM exercises including Pulley Flex/Scap and shoulder Flex/ER using non-weighted wand to help improving her ROM. She reported less discomfort or pressure in the shoulder with full supine position and was able to tolerate PROM in that position.Pt required mod v/c's to avoid muscles guarding during PROM. Pt was able to achieve 120 deg of R shoulder flexion PROM today.   Will continue to progress per post op protocol.     Mary Is progressing well towards her goals.   Pt prognosis is Good.     Pt will continue to benefit from skilled outpatient physical therapy to address the deficits listed in the problem list box on initial evaluation, provide pt/family education and to maximize pt's level of independence in the home and community environment.     Pt's spiritual, cultural and educational needs considered and pt agreeable to plan of care and goals.     Anticipated barriers to physical therapy: none     GOALS: Short Term Goals: 4 weeks  1.Report decreased in pain at worse less than  <   / =  5  /10  to increase tolerance for functional mobility. On going  2. Pt to improve R shoulder passive range of motion flexion 160 deg and ER 60 deg to allow for improved functional mobility to allow for improvement in IADLs. On going  3. Increased R UE MMT 1/2 grade to increase tolerance for ADL and work activities. On going  4. Pt to report be conscious of impaired sitting and standing posture daily to decrease pain. On going  5. Pt to tolerate HEP to improve ROM and independence with  ADL's. On going     Long Term Goals: 8 weeks  1.Report decreased in pain at worse less than  <   / =  2  /10  to increase tolerance for functional mobility. On going  2.  Patient will demonstrate improved overall function per FOTO Survey to CI = at least 1% but < 20% impaired, limited or restricted score or less.On going  3.Increased R UE MMT 1 grade to increase tolerance for ADL and work activities.On going  4. Pt to report and demonstrate proper posture in standing and sitting to decrease pain. On going  5. Pt to be Independent with HEP to improve ROM and independence with ADL's.On going  6. Pt to improve passive range of motion flexion 180 deg and ER 75 deg to allow for improvement in IADLs. On going    PLAN     Plan of care Certification: 11/11/2024 to 3/11/25.     Outpatient Physical Therapy 3 times weekly for 20 weeks to include the following interventions: Electrical Stimulation , Manual Therapy, Moist Heat/ Ice, Neuromuscular Re-ed, Patient Education, Self Care, Therapeutic Activities, and Therapeutic Exercise. Mindi Weaver, PT, DPT

## 2024-12-16 ENCOUNTER — HOSPITAL ENCOUNTER (OUTPATIENT)
Dept: RADIOLOGY | Facility: HOSPITAL | Age: 59
Discharge: HOME | End: 2024-12-16
Attending: FAMILY MEDICINE
Payer: COMMERCIAL

## 2024-12-16 DIAGNOSIS — Z12.31 ENCOUNTER FOR SCREENING MAMMOGRAM FOR MALIGNANT NEOPLASM OF BREAST: ICD-10-CM

## 2024-12-16 PROCEDURE — 77067 SCR MAMMO BI INCL CAD: CPT | Mod: 26,,, | Performed by: RADIOLOGY

## 2024-12-16 PROCEDURE — 77063 BREAST TOMOSYNTHESIS BI: CPT | Mod: TC

## 2024-12-16 PROCEDURE — 77063 BREAST TOMOSYNTHESIS BI: CPT | Mod: 26,,, | Performed by: RADIOLOGY

## 2024-12-17 ENCOUNTER — CLINICAL SUPPORT (OUTPATIENT)
Dept: REHABILITATION | Facility: HOSPITAL | Age: 59
End: 2024-12-17
Payer: COMMERCIAL

## 2024-12-17 DIAGNOSIS — M25.611 DECREASED ROM OF RIGHT SHOULDER: ICD-10-CM

## 2024-12-17 DIAGNOSIS — R29.898 WEAKNESS OF RIGHT UPPER EXTREMITY: ICD-10-CM

## 2024-12-17 DIAGNOSIS — M25.511 ACUTE PAIN OF RIGHT SHOULDER: Primary | ICD-10-CM

## 2024-12-17 PROCEDURE — 97112 NEUROMUSCULAR REEDUCATION: CPT | Mod: PN

## 2024-12-17 PROCEDURE — 97530 THERAPEUTIC ACTIVITIES: CPT | Mod: PN

## 2024-12-17 NOTE — PROGRESS NOTES
[facility]   Physical Therapy Treatment Note     Name: Mary Naranjo  [facility] Number: [id number]    Therapy Diagnosis:   Encounter Diagnoses   Name Primary?    Acute pain of right shoulder Yes    Decreased ROM of right shoulder     Weakness of right upper extremity      Physician: Maite Cruz MD    Visit Date: 12/17/2024    Physician Orders: PT Eval and Treat   Medical Diagnosis from Referral: M75.121 (ICD-10-CM) - Complete tear of right rotator cuff   Evaluation Date: 11/11/2024  Authorization Period Expiration: 12/31/24  Plan of Care Expiration: 3/11/25  Visit # / Visits authorized: 7/ 20    Progress Note Due: 1/12/24  FOTO: 1/1    PTA Visit: 1/5     Precautions: Standard, Plan to follow supraspinatus repair protocol (<3 cm in total size). Passive motion may begin at 2 weeks. Active motion at 6 weeks. No biceps resistive activity x 8 weeks. No cuff resistive activity x 12 weeks.   Date of surgery: 10/22/24, 8 weeks     OPERATION:   Right Shoulder  1. Arthroscopic  supraspinatus rotator cuff repair   2. Arthroscopic biceps tenotomy   3. Extensive debridement    Time In: 8:00 am  Time Out: 8:32 am  Total Billable Time: 32 minutes    SUBJECTIVE     Pt reports: that she went to get a breast exam done yesterday. Even though she told them she is post op shoulder RTC repair, it was still painful the way they administered the exam which had her in a lot of pain and could not sleep last night. She states that her pain is now a 4/10.     She was compliant with home exercise program.  Response to previous treatment: No change   Functional change: None    Pain: 4/10  Location: right shoulder      OBJECTIVE     Objective Measures updated at progress report unless specified.   Passive Range of Motion (degrees):   Shoulder Right (11/18/24)   Flexion  120 degrees   Abduction NP due to surgery    ER @ 20 degree shoulder abduction    IR @ 20 degree shoulder abduction      TREATMENT   Exercises  performed: in antonio     Mary received the treatments listed below:      therapeutic activities to improve functional performance and functional mobility for 10  minutes, including:  Codman pendulum fwd/bwd, lat, CW&CCW 3x10 ea  Standing shoulder flexion stretch walk out 5x30 sec     received therapeutic exercises to develop strength and ROM for 00  minutes including:    neuromuscular re-education activities to improve: Balance, Proprioception, Posture, and muscles motor control for 22 minutes. The following activities were included:  Pulleys shoulder Flexion 3 min x 3 sec hold (facing the door)  Pulleys shoulder Scaption 3 min x 3 sec hold  (facing the door, chair angle at 45 degree toward Left side)  Supine shoulder flexion AAROM 0# wand x20  Supine AAROM shoulder ER using 0# wand x30  Supine Serratus Anterior punch 0# wand x10  Seated table slide in scaption plane AAROM 10x     Seated upper traps  stretch 3x30 sec (with heating pack on bilateral UT)  Seated levator scap stretch 3x30 sec (with heating pack on bilateral UT)  AROM wrist flexion 3x10  AROM wrist extension 3x10  Digiflex (blue) squeeze 3x10  Seated Scapular retraction 3x10 (with heating pack on bilateral UT)  Seated shoulder rolls 3x10 (with heating pack on bilateral UT)    received the following manual therapy techniques: soft tissue mobilization were applied to the: R upper trapezius for 00 minutes, including:  Semi-inclined gentle PROM ER   Semi-inclined gentle PROM shoulder flexion   Semi-inclined gentle PROM Elbow extension    PATIENT EDUCATION AND HOME EXERCISES     Home Exercises Provided and Patient Education Provided     Education provided:   Pt was instructed to follow protocol for this surgery.   Pt educated to perform shoulder rolls, scapular retractions, UT and levator scap stretches at home, along with wrist exercises. Pt also educated to apply a heating pack to R upper trap to further decrease muscle tightness and tone.    Written Home  Exercises Provided: Patient instructed to cont prior HEP. Exercises were reviewed and Mary was able to demonstrate them prior to the end of the session.  Mary demonstrated good  understanding of the education provided. See EMR under Patient Instructions for exercises provided during therapy sessions    ASSESSMENT   Ms Hernandez presented to PT today 8 weeks post op R RTC repair/biceps tenotomy/extensive debridement. We cont with some of the same exericses as last visit. Pt concluded today's session early due to being fatigued from not having much sleep last night and increased shoulder pain after her breast exam yesterday. She tolerated the exercises that she was able to do with minimal to no discomfort. Will continue to progress per post op protocol.     Mary Is progressing well towards her goals.   Pt prognosis is Good.     Pt will continue to benefit from skilled outpatient physical therapy to address the deficits listed in the problem list box on initial evaluation, provide pt/family education and to maximize pt's level of independence in the home and community environment.     Pt's spiritual, cultural and educational needs considered and pt agreeable to plan of care and goals.     Anticipated barriers to physical therapy: none     GOALS: Short Term Goals: 4 weeks  1.Report decreased in pain at worse less than  <   / =  5  /10  to increase tolerance for functional mobility. On going  2. Pt to improve R shoulder passive range of motion flexion 160 deg and ER 60 deg to allow for improved functional mobility to allow for improvement in IADLs. On going  3. Increased R UE MMT 1/2 grade to increase tolerance for ADL and work activities. On going  4. Pt to report be conscious of impaired sitting and standing posture daily to decrease pain. On going  5. Pt to tolerate HEP to improve ROM and independence with ADL's. On going     Long Term Goals: 8 weeks  1.Report decreased in pain at worse less than  <   / =  2   /10  to increase tolerance for functional mobility. On going  2.  Patient will demonstrate improved overall function per FOTO Survey to CI = at least 1% but < 20% impaired, limited or restricted score or less.On going  3.Increased R UE MMT 1 grade to increase tolerance for ADL and work activities.On going  4. Pt to report and demonstrate proper posture in standing and sitting to decrease pain. On going  5. Pt to be Independent with HEP to improve ROM and independence with ADL's.On going  6. Pt to improve passive range of motion flexion 180 deg and ER 75 deg to allow for improvement in IADLs. On going    PLAN     Plan of care Certification: 11/11/2024 to 3/11/25.     Outpatient Physical Therapy 3 times weekly for 20 weeks to include the following interventions: Electrical Stimulation , Manual Therapy, Moist Heat/ Ice, Neuromuscular Re-ed, Patient Education, Self Care, Therapeutic Activities, and Therapeutic Exercise. Dry needling    Estyanna Allen, PT, DPT

## 2024-12-19 ENCOUNTER — CLINICAL SUPPORT (OUTPATIENT)
Dept: REHABILITATION | Facility: HOSPITAL | Age: 59
End: 2024-12-19
Payer: COMMERCIAL

## 2024-12-19 DIAGNOSIS — M25.611 DECREASED ROM OF RIGHT SHOULDER: ICD-10-CM

## 2024-12-19 DIAGNOSIS — R29.898 WEAKNESS OF RIGHT UPPER EXTREMITY: ICD-10-CM

## 2024-12-19 DIAGNOSIS — M25.511 ACUTE PAIN OF RIGHT SHOULDER: Primary | ICD-10-CM

## 2024-12-19 PROCEDURE — 97112 NEUROMUSCULAR REEDUCATION: CPT | Mod: PN

## 2024-12-19 PROCEDURE — 97530 THERAPEUTIC ACTIVITIES: CPT | Mod: PN

## 2024-12-19 PROCEDURE — 97140 MANUAL THERAPY 1/> REGIONS: CPT | Mod: PN

## 2024-12-19 NOTE — PROGRESS NOTES
[facility]   Physical Therapy Treatment Note     Name: Mary Naranjo  [facility] Number: [id number]    Therapy Diagnosis:   Encounter Diagnoses   Name Primary?    Acute pain of right shoulder Yes    Decreased ROM of right shoulder     Weakness of right upper extremity      Physician: Maite Cruz MD    Visit Date: 12/19/2024    Physician Orders: PT Eval and Treat   Medical Diagnosis from Referral: M75.121 (ICD-10-CM) - Complete tear of right rotator cuff   Evaluation Date: 11/11/2024  Authorization Period Expiration: 12/31/24  Plan of Care Expiration: 3/11/25  Visit # / Visits authorized: 7/ 20    Progress Note Due: 1/12/24  FOTO: 1/1    PTA Visit: 1/5     Precautions: Standard, Plan to follow supraspinatus repair protocol (<3 cm in total size). Passive motion may begin at 2 weeks. Active motion at 6 weeks. No biceps resistive activity x 8 weeks. No cuff resistive activity x 12 weeks.   Date of surgery: 10/22/24, 8 weeks and 2 days      OPERATION:   Right Shoulder  1. Arthroscopic  supraspinatus rotator cuff repair   2. Arthroscopic biceps tenotomy   3. Extensive debridement    Time In: 7:00 am  Time Out: 7:54 am  Total Billable Time: 54 minutes    SUBJECTIVE     Pt reports: that she is feeling much better today and that her shoulder pain has decreased to a 2/10.    She was compliant with home exercise program.  Response to previous treatment: No change   Functional change: None    Pain: 2/10  Location: right shoulder      OBJECTIVE     Objective Measures updated at progress report unless specified.   Passive Range of Motion (degrees):   Shoulder Right (11/18/24)   Flexion  120 degrees   Abduction NP due to surgery    ER @ 20 degree shoulder abduction 23 degrees   IR @ 20 degree shoulder abduction To abdomen      TREATMENT   Exercises performed: in bold     Mary received the treatments listed below:      therapeutic activities to improve functional performance and functional mobility  for 8  minutes, including:  Codman pendulum fwd/bwd, lat, CW&CCW 3x10 ea  Standing shoulder flexion stretch walk out 5x30 sec     received therapeutic exercises to develop strength and ROM for 00  minutes including:    neuromuscular re-education activities to improve: Balance, Proprioception, Posture, and muscles motor control for 35 minutes. The following activities were included:  Pulleys shoulder Flexion 3 min x 3 sec hold (facing the door)  Pulleys shoulder Scaption 3 min x 3 sec hold  (facing the door, chair angle at 45 degree toward Left side)  Supine shoulder flexion AAROM 0# wand x20  Supine AAROM shoulder ER using 0# wand x30  Supine Serratus Anterior punch 0# wand x10  Seated table slide in scaption plane AAROM 10x     Seated upper traps  stretch 3x30 sec (with heating pack on bilateral UT)  Seated levator scap stretch 3x30 sec (with heating pack on bilateral UT)  AROM wrist flexion 3x10  AROM wrist extension 3x10  Digiflex (blue) squeeze 3x10  Seated Scapular retraction 3x10 (with heating pack on bilateral UT)  Seated shoulder rolls 3x10 (with heating pack on bilateral UT)  +Shoulder isometrics (FLEX, EXT, ER, IR) 10x5 sec     received the following manual therapy techniques: soft tissue mobilization were applied to the: R upper trapezius for 10 minutes, including:  Semi-inclined gentle PROM ER   Semi-inclined gentle PROM shoulder flexion   Semi-inclined gentle PROM Elbow extension    PATIENT EDUCATION AND HOME EXERCISES     Home Exercises Provided and Patient Education Provided     Education provided:   Pt was instructed to follow protocol for this surgery.   Pt educated to perform shoulder rolls, scapular retractions, UT and levator scap stretches at home, along with wrist exercises. Pt also educated to apply a heating pack to R upper trap to further decrease muscle tightness and tone.    Written Home Exercises Provided: Patient instructed to cont prior HEP. Exercises were reviewed and Mary was able  to demonstrate them prior to the end of the session.  Mary demonstrated good  understanding of the education provided. See EMR under Patient Instructions for exercises provided during therapy sessions    ASSESSMENT   Ms. Hernandez presented to PT today, 8 weeks and 2 days post-op R RTC repair, biceps tenotomy, and extensive debridement. Pt reported 2/10 pain. Therefore, shoulder isometrics were initiated to begin strengthening the R shoulder. Also cont with some of the same exercises as the last visit. She tolerated the exercises well with minimal to no discomfort, though muscle fatigue was noted during shoulder isometrics. Will continue to progress per post-op protocol.    Mary Is progressing well towards her goals.   Pt prognosis is Good.     Pt will continue to benefit from skilled outpatient physical therapy to address the deficits listed in the problem list box on initial evaluation, provide pt/family education and to maximize pt's level of independence in the home and community environment.     Pt's spiritual, cultural and educational needs considered and pt agreeable to plan of care and goals.     Anticipated barriers to physical therapy: none     GOALS: Short Term Goals: 4 weeks  1.Report decreased in pain at worse less than  <   / =  5  /10  to increase tolerance for functional mobility. On going  2. Pt to improve R shoulder passive range of motion flexion 160 deg and ER 60 deg to allow for improved functional mobility to allow for improvement in IADLs. On going  3. Increased R UE MMT 1/2 grade to increase tolerance for ADL and work activities. On going  4. Pt to report be conscious of impaired sitting and standing posture daily to decrease pain. On going  5. Pt to tolerate HEP to improve ROM and independence with ADL's. On going     Long Term Goals: 8 weeks  1.Report decreased in pain at worse less than  <   / =  2  /10  to increase tolerance for functional mobility. On going  2.  Patient will  demonstrate improved overall function per FOTO Survey to CI = at least 1% but < 20% impaired, limited or restricted score or less.On going  3.Increased R UE MMT 1 grade to increase tolerance for ADL and work activities.On going  4. Pt to report and demonstrate proper posture in standing and sitting to decrease pain. On going  5. Pt to be Independent with HEP to improve ROM and independence with ADL's.On going  6. Pt to improve passive range of motion flexion 180 deg and ER 75 deg to allow for improvement in IADLs. On going    PLAN     Plan of care Certification: 11/11/2024 to 3/11/25.     Outpatient Physical Therapy 3 times weekly for 20 weeks to include the following interventions: Electrical Stimulation , Manual Therapy, Moist Heat/ Ice, Neuromuscular Re-ed, Patient Education, Self Care, Therapeutic Activities, and Therapeutic Exercise. Dry billy    Estyanna Allen, PT, DPT

## 2024-12-26 ENCOUNTER — CLINICAL SUPPORT (OUTPATIENT)
Dept: REHABILITATION | Facility: HOSPITAL | Age: 59
End: 2024-12-26
Payer: COMMERCIAL

## 2024-12-26 DIAGNOSIS — M25.611 DECREASED ROM OF RIGHT SHOULDER: ICD-10-CM

## 2024-12-26 DIAGNOSIS — M25.511 ACUTE PAIN OF RIGHT SHOULDER: Primary | ICD-10-CM

## 2024-12-26 DIAGNOSIS — R29.898 WEAKNESS OF RIGHT UPPER EXTREMITY: ICD-10-CM

## 2024-12-26 PROCEDURE — 97530 THERAPEUTIC ACTIVITIES: CPT | Mod: PN,CQ

## 2024-12-26 PROCEDURE — 97112 NEUROMUSCULAR REEDUCATION: CPT | Mod: PN,CQ

## 2024-12-26 NOTE — PROGRESS NOTES
[facility]   Physical Therapy Treatment Note     Name: Mary Naranjo  [facility] Number: [id number]    Therapy Diagnosis:   Encounter Diagnoses   Name Primary?    Acute pain of right shoulder Yes    Decreased ROM of right shoulder     Weakness of right upper extremity      Physician: Maite Cruz MD    Visit Date: 12/26/2024    Physician Orders: PT Eval and Treat   Medical Diagnosis from Referral: M75.121 (ICD-10-CM) - Complete tear of right rotator cuff   Evaluation Date: 11/11/2024  Authorization Period Expiration: 12/31/24  Plan of Care Expiration: 3/11/25  Visit # / Visits authorized: 7/ 20    Progress Note Due: 1/12/24  FOTO: 1/1    PTA Visit: 1/5     Precautions: Standard, Plan to follow supraspinatus repair protocol (<3 cm in total size). Passive motion may begin at 2 weeks. Active motion at 6 weeks. No biceps resistive activity x 8 weeks. No cuff resistive activity x 12 weeks.   Date of surgery: 10/22/24, 9 weeks and 2 days      OPERATION:   Right Shoulder  1. Arthroscopic  supraspinatus rotator cuff repair   2. Arthroscopic biceps tenotomy   3. Extensive debridement    Time In: 8:00 am  Time Out: 8:54 am  Total Billable Time: 54 minutes    SUBJECTIVE     Pt reports: the pain was not bad yesterday and that she did not do anything, her shoulder started to hurt bad this morning after she tried to shower and put her shirt on. It's 10+/10 now, throbbing pain and discomfort.     She was compliant with home exercise program.  Response to previous treatment: No change   Functional change: None    Pain: 10+/10  Location: right shoulder      OBJECTIVE     Objective Measures updated at progress report unless specified.   Passive Range of Motion (degrees):   Shoulder Right (11/18/24)   Flexion  120 degrees   Abduction NP due to surgery    ER @ 20 degree shoulder abduction 23 degrees   IR @ 20 degree shoulder abduction To abdomen      TREATMENT   Exercises performed: in bold     Mary  "received the treatments listed below:      therapeutic activities to improve functional performance and functional mobility for 12  minutes, including:  Codman pendulum fwd/bwd, lat, CW&CCW 3x10 ea  Pulleys shoulder Flexion 3 min x 3 sec hold (facing the door)  Pulleys shoulder Scaption 3 min x 3 sec hold  (facing the door, chair angle at 45 degree toward Left side)  Standing shoulder flexion stretch walk out 5x30 sec     received therapeutic exercises to develop strength and ROM for 00  minutes including:    neuromuscular re-education activities to improve: Balance, Proprioception, Posture, and muscles motor control for 38 minutes. The following activities were included:  Supine shoulder flexion AAROM 0# wand 3x10  +Supine shoulder Chest press AAROM 0# wand 2x10  Supine AAROM shoulder ER using 0# wand x30  Supine Serratus Anterior punch 0# wand x10  Seated table slide in Flexion plane AAROM 1 min x 3 sec hold   Seated table slide in scaption plane AAROM 1 min x 2 x 3 sec hold   Standing Wall slides flexion AAROM 2x10x3"    Seated Scapular retraction 3x10 (with ice pack on Right UT)  Seated shoulder rolls 3x10 (with ice pack on Right UT)  Shoulder isometrics (FLEX, EXT, ER, IR) 10x5 sec (hurt with ER)  AROM wrist flexion 3x10  AROM wrist extension 3x10  Digiflex (blue) squeeze 3x10    Seated upper traps  stretch 3x30 sec (with heating pack on bilateral UT) (pt perform at home)  Seated levator scap stretch 3x30 sec (with heating pack on bilateral UT) (pt perform at home)    received the following manual therapy techniques: soft tissue mobilization were applied to the: R upper trapezius for 5 minutes, including:  Semi-inclined gentle PROM ER   Semi-inclined gentle PROM shoulder flexion   Semi-inclined gentle PROM Elbow extension    PATIENT EDUCATION AND HOME EXERCISES     Home Exercises Provided and Patient Education Provided     Education provided:   Pt was instructed to follow protocol for this surgery.   Pt " educated to perform shoulder rolls, scapular retractions, UT and levator scap stretches at home, along with wrist exercises. Pt also educated to apply a heating pack to R upper trap to further decrease muscle tightness and tone.    Written Home Exercises Provided: Patient instructed to cont prior HEP. Exercises were reviewed and Mary was able to demonstrate them prior to the end of the session.  Mary demonstrated good  understanding of the education provided. See EMR under Patient Instructions for exercises provided during therapy sessions    ASSESSMENT   Ms. Hernandez presented to PT today, 9 weeks and 2 days post-op R RTC repair, biceps tenotomy, and extensive debridement. Pt reported the pain was not bad yesterday and that she did not do anything, her shoulder started to hurt bad this morning after she tried to shower and put her shirt on. It's 10+/10 now, throbbing pain and discomfort. Noted min swollen to Right UT anterior, ice pad received during shoulder ex today. Min pain with shoulder isometrics ER today after 5 reps. Also cont with some of the same exercises as the last visit. She tolerated the exercises well with minimal to no discomfort, though muscle fatigue was noted during shoulder isometrics. Instructed her to cont to work on HEPs, she verbalized understanding. Will continue to progress per post-op protocol.    Mary Is progressing well towards her goals.   Pt prognosis is Good.     Pt will continue to benefit from skilled outpatient physical therapy to address the deficits listed in the problem list box on initial evaluation, provide pt/family education and to maximize pt's level of independence in the home and community environment.     Pt's spiritual, cultural and educational needs considered and pt agreeable to plan of care and goals.     Anticipated barriers to physical therapy: none     GOALS: Short Term Goals: 4 weeks  1.Report decreased in pain at worse less than  <   / =  5  /10  to  increase tolerance for functional mobility. On going  2. Pt to improve R shoulder passive range of motion flexion 160 deg and ER 60 deg to allow for improved functional mobility to allow for improvement in IADLs. On going  3. Increased R UE MMT 1/2 grade to increase tolerance for ADL and work activities. On going  4. Pt to report be conscious of impaired sitting and standing posture daily to decrease pain. On going  5. Pt to tolerate HEP to improve ROM and independence with ADL's. On going     Long Term Goals: 8 weeks  1.Report decreased in pain at worse less than  <   / =  2  /10  to increase tolerance for functional mobility. On going  2.  Patient will demonstrate improved overall function per FOTO Survey to CI = at least 1% but < 20% impaired, limited or restricted score or less.On going  3.Increased R UE MMT 1 grade to increase tolerance for ADL and work activities.On going  4. Pt to report and demonstrate proper posture in standing and sitting to decrease pain. On going  5. Pt to be Independent with HEP to improve ROM and independence with ADL's.On going  6. Pt to improve passive range of motion flexion 180 deg and ER 75 deg to allow for improvement in IADLs. On going    PLAN     Plan of care Certification: 11/11/2024 to 3/11/25.     Outpatient Physical Therapy 3 times weekly for 20 weeks to include the following interventions: Electrical Stimulation , Manual Therapy, Moist Heat/ Ice, Neuromuscular Re-ed, Patient Education, Self Care, Therapeutic Activities, and Therapeutic Exercise. Dry needling    Jaime To, PTA  12/26/2024

## 2025-01-06 NOTE — PROGRESS NOTES
"Date:  1/7/2025    ?  Referring Provider:   La Askew MD    Copies of Letters to the Following:   La Askew MD    Chief Complaint:  I saw Mary Naranjo at the [facility] for neuro-ophthalmic evaluation.   She is a 59 y.o. female with a history of HTN, HLD, obesity, prediabetes, iron deficiency anemia who presents for evaluation of left sided vision changes.    History:     HPI    Referred: Dr. Askew    58 y/o female present to Neuro-ophthalmic evaluation for visual   disturbance with testing. She present to clinic for concerns of blurry   peripheral vision of both eyes OD>OS. She states visual decreased after   cataract extraction OD>OS, she  feels a "glare and water-like film" over   vision. Glare is still a bothersome. She has difficulty focusing. History   of migraines. She has eye allergies and dry eyes, not self treating. No   headaches, diplopia, no floaters, no flashes of lights or ocular pain   reported.    Eyemeds  No gtts  Last edited by Williams Yang on 1/7/2025  8:49 AM.          5/6/2024 neuro  "Patient is a 59 y.o. female with past medical history of hypertension, Bell's palsy on the right, migraine headaches presented to the clinic for evaluation of left-sided numbness.  History obtained from patient and chart review.     Patient states that she has been suffering from tingling on the left side for the last few years.  States the pain is in the lower back going down her left leg in the back.  Has gait imbalance but denies falls.  Denies burning pain but does have numbness when she sits on for a long time.  She states that tingling has been progressing over the last 2 years but numbness has remained the same.  She is placed on gabapentin 100 mg nightly which she takes as needed for pain with no relief.  Since she was also completed physical therapy which helped partially.  Looking into her chart, patient presented to the ED in December 2022 for visual symptoms, " "left-sided weakness and numbness.  Upon further history taking, she does remember that the left-sided numbness started suddenly at this time and also involves her left arm and left side face.  She does confirmed that she had Bell's palsy which was on the right side.  Patient at that time had received vessel imaging as well as MRI brain which were both unremarkable.  She has continued to have numbness and tingling pain in the left side of the face as well as arm and leg.  No noticeable facial droop.  Her left arm does feel heavier and number at times.  She also reports that her left-sided peripheral vision has been a problem since and she has tried multiple glasses prescriptions with no improvement."  ?  Current Outpatient Medications   Medication Sig Dispense Refill    atorvastatin (LIPITOR) 20 MG tablet Take 1 tablet (20 mg total) by mouth once daily. 90 tablet 3    cholecalciferol, vitamin D3, 50 mcg (2,000 unit) Chew Take 2,000 Units by mouth once daily. Per patient      ferrous sulfate 140 mg (45 mg iron) TbSR Take 2 tablets by mouth once daily.      meloxicam (MOBIC) 15 MG tablet TAKE 1 TABLET BY MOUTH EVERY DAY 30 tablet 0    multivit/folic acid/vit K1 (ONE-A-DAY WOMEN'S 50 PLUS ORAL) Take 1 capsule by mouth once daily. Per patient, One-a-Day Women's 50+ Multivitamin Complete      omega-3 fatty acids/fish oil (FISH OIL-OMEGA-3 FATTY ACIDS) 300-1,000 mg capsule Take 1 capsule by mouth once daily.      oxyCODONE (ROXICODONE) 5 MG immediate release tablet Take 1 tablet (5 mg total) by mouth every 4 (four) hours as needed for Pain. 25 tablet 0    valsartan-hydrochlorothiazide (DIOVAN-HCT) 160-25 mg per tablet Take 1 tablet by mouth once daily. 90 tablet 3    pregabalin (LYRICA) 75 MG capsule Take 1 capsule (75 mg total) by mouth 2 (two) times daily. for 14 days 28 capsule 0     No current facility-administered medications for this visit.     Review of patient's allergies indicates:  No Known Allergies  Past " Medical History:   Diagnosis Date    Arthritis     COVID-19 02/08/2023    Dental bridge present     upper removable partial    Family history of pancreatic cancer     Fibrocystic breast     Migraines     Obesity     Pancreas cyst     Primary hypertension 02/02/2022    Sleep apnea      Past Surgical History:   Procedure Laterality Date    ARTHROSCOPIC REPAIR OF ROTATOR CUFF OF SHOULDER Right 10/22/2024    Procedure: REPAIR, ROTATOR CUFF, ARTHROSCOPIC WITH EXTENSIVE DEBRIDEMENT;  Surgeon: Maite Cruz MD;  Location: [facility] OR;  Service: Orthopedics;  Laterality: Right;  PATEL&NEPHRHONDA JUAN CARLOS NOTIFIED-GG  RN PREOP 09/30/2024-----NEED H/P    BREAST BIOPSY      BREAST CYST ASPIRATION      BREAST SURGERY      Dont remember    COLONOSCOPY N/A 10/07/2015    Procedure: COLONOSCOPY;  Surgeon: Eagle Stack MD;  Location: [facility] ENDO;  Service: Endoscopy;  Laterality: N/A;    DILATION AND CURETTAGE OF UTERUS      ENDOSCOPIC ULTRASOUND OF UPPER GASTROINTESTINAL TRACT N/A 11/03/2020    Procedure: ULTRASOUND, UPPER GI TRACT, ENDOSCOPIC;  Surgeon: Kei Juarez MD;  [facility]  Service: Endoscopy;  Laterality: N/A;  Covid-19 test 10/31/20 at [facility]    ENDOSCOPIC ULTRASOUND OF UPPER GASTROINTESTINAL TRACT N/A 07/14/2023    Procedure: ULTRASOUND, UPPER GI TRACT, ENDOSCOPIC;  Surgeon: Burak Garcia MD;  Location: [facility] ENDO (2ND FLR);  Service: Endoscopy;  Laterality: N/A;  6/15/23-Instructions via portal-DS  6/26 pre call complete  pre call 7/10, pt confirmed - mf    ESOPHAGOGASTRODUODENOSCOPY N/A 07/19/2019    Procedure: ESOPHAGOGASTRODUODENOSCOPY (EGD);  Surgeon: Cherrie Sheets MD;  Location: [facility] ENDO;  Service: Endoscopy;  Laterality: N/A;    EYE SURGERY  December 2021    Cateract    fibrocystic breast       KNEE ARTHROSCOPY W/ MENISCAL REPAIR      TUBAL LIGATION       Family History   Problem Relation Name Age of Onset    Heart disease Mother Mone Skelton     Cataracts Mother Mone Skelton      Glaucoma Mother Mone Skelton     Cancer Father Papa Skelton         Prostate, lung, colon, brain, gallbladder, skin    Colon cancer Father Papa Skelton     Pancreatic cancer Sister Ervin 54    Breast cancer Sister Yajaira 42        unilateral    Cancer Sister Helen Sandifer 40        Breast    Cancer Sister Ervin Skelton         Pancreatic    No Known Problems Brother Papa     Asthma Maternal Aunt Micheline Plunkett     Heart disease Maternal Aunt Micheline Dimitrios     Heart disease Maternal Aunt Priscila Morejon     No Known Problems Maternal Grandmother      No Known Problems Maternal Grandfather      No Known Problems Paternal Grandmother      No Known Problems Paternal Grandfather      Cancer Maternal Cousin Quinten         unknown origin    Breast cancer Maternal Cousin      Cancer Maternal Cousin          prostate?    Prostate cancer Maternal Cousin      Cervical cancer Other Esperanza     Cancer Other Esperanza         possible ovarian cancer    COPD Neg Hx      Amblyopia Neg Hx      Blindness Neg Hx      Diabetes Neg Hx      Hypertension Neg Hx      Macular degeneration Neg Hx      Retinal detachment Neg Hx      Strabismus Neg Hx      Stroke Neg Hx      Thyroid disease Neg Hx       Social History     Socioeconomic History    Marital status:    Occupational History    Occupation: administrative   Tobacco Use    Smoking status: Never    Smokeless tobacco: Never   Substance and Sexual Activity    Alcohol use: No    Drug use: No    Sexual activity: Not Currently     Social Drivers of Health     Financial Resource Strain: Medium Risk (11/4/2024)    Received from [facility]    Overall Financial Resource Strain (CARDIA)     Difficulty of Paying Living Expenses: Somewhat hard   Food Insecurity: Unknown (11/4/2024)    Received from [facility]    Hunger Vital Sign     Worried About Running Out of Food in the Last Year: Never true   Transportation Needs: No  Transportation Needs (11/4/2024)    Received from [facility]] Transportation     Lack of Transportation (Medical): No     Lack of Transportation (Non-Medical): No       Examination:  She was well-appearing. She was alert and oriented. Attention span and concentration were normal. Speech, language, memory, and general knowledge were intact.      Her distance visual acuity with correction was 20/20-2 in the right eye and 20/25  in the left eye.  Her near visual acuity with correction was J3 PH J2 in the right eye and J2 PH J1 in the left eye.      She perceived 8/8 OD and 8/8 OS Ishihara color plates correctly. Pupils were brisk to light without an afferent defect. Ocular ductions were full. Orthophoric in primary, right, and left gaze by cross cover. There was no nystagmus. Saccades and pursuits were normal. Lids were symmetric.     Optic discs appeared normal without swelling or pallor. Pupillary dilation was not necessary for visualization of the optic disc today.     Laboratories Reviewed:     N/a  ?  Neuroimaging Reviewed:   6/27/2024 MRI brain wo contrast  Intracranial Compartment:     Ventricles are normal in size for age without evidence of hydrocephalus.     The brain appears within normal limits.  No evidence of recent or remote major vascular distribution infarct. No evidence of recent or remote hemorrhage.  No significant intracranial mass effect or midline shift.     No extra-axial blood or fluid collections.     Normal vascular flow voids are preserved.     Skull/Extracranial Contents (limited evaluation):     Bone marrow signal intensity is unremarkable.     Bilateral pseudophakia.     Impression:     No evidence of acute intracranial pathology.     6/27/2024 CTA head and neck  The ventricles are normal in size without evidence of hydrocephalus.     The brain appears within normal limits. No parenchymal mass, hemorrhage, edema or major vascular distribution infarct.   No enhancing lesions.     No extra-axial blood or fluid collections.     The cranium appears intact. Mastoid air cells and paranasal sinuses are essentially clear.     CTA:     The aortic arch demonstrates no significant stenosis at the major branch vessel origins.     The right common carotid artery is normal in caliber. No significant stenosis at the carotid bifurcation.The right internal carotid artery is normal in caliber.     The left common carotid artery is normal in caliber. No significant stenosis at the carotid bifurcation.The left internal carotid artery is normal in caliber.     The right vertebral artery is normal in caliber.     The left vertebral artery is normal in caliber.     Basilar artery is within normal limits without focal abnormality.     The proximal anterior, middle, and posterior cerebral arteries are within normal limits.  No evidence of significant stenosis, focal occlusion, or intracranial aneurysm.     Impression:     No evidence of acute hemorrhage or major vascular distribution infarct.     CT arteriogram appears within normal limits.  No evidence of high-grade stenosis or intracranial large vessel occlusion.  ?  Ocular Imaging, Photos, Records Reviewed:     OCT RNFL Today 1/7/2025:   Right Eye - Average RNFL 81 borderline superior and temporal thinning   Left Eye - Average RNFL 77 borderline superior and temporal thinning     Normal macular architecture OU    Visual Field Test 24-2 OU Today 1/7/2025: Right Eye - fixation losses 3/132, false positives 0%, false negatives 12%, MD -4.41dB, Impression OD: some mild scattered defects. Left Eye - fixation losses 0/11, false positives 0%, false negatives 11%, MD -3.12dB, Impression OS: mild nasal defects.  ?  Impression:  Mary Naranjo has history of HTN, HLD, obesity, prediabetes, iron deficiency anemia who presents for evaluation of left sided vision changes. They report blurry peripheral vision of both eyes OD>OS. She states vision  "decreased after cataract extraction OD>OS, she feels a "glare and water-like film" over vision. Glare is still bothersome. She has difficulty focusing. Neuro-ophthalmologic examination was notable for good visual acuities OD>OS, full color vision, normal ocular motility and alignment. OCT with symmetric and mild superior and temporal thinning, nonspecific, likely tilted discs. Formal visual fields were without any concerning pattern of visual field defects.    She has posterior capsular opacification OU, OD>OS. We will arrange for PCO eval for YAG with Dr. Tian.  ?  Plan:  1. Dr. Tian for PCO/YAG eval OU  2. Follow up in neurology clinic as planned  3. Follow up with optometry/ophthalmology for yearly routine eye exams and refraction needs    Follow-up:  I will see her in follow-up on an as needed basis.    ?  ?  Visit Checklist (as applicable):  1. Status of new and prior symptoms discussed? yes  2. Neuroimaging reviewed/ ordered as appropriate? yes  3. Ocular imaging and photos reviewed/ ordered as appropriate? yes  4. Plan for work-up and treatment discussed with patient? yes  5. Potential medication side-effects and monitoring plan discussed? N/a  6. Review of outside medical records was performed and pertinent details are summarized in the HPI above? N/a    Time spent on this encounter: 60 minutes. This includes face to face time and non-face to face time preparing to see the patient (eg, review of tests), obtaining and/or reviewing separately obtained history, documenting clinical information in the electronic or other health record, independently interpreting results and communicating results to the patient/family/caregiver, or care coordinator.      LUIS Monsivais  Neuro-Ophthalmology Consultant  "

## 2025-01-07 ENCOUNTER — CLINICAL SUPPORT (OUTPATIENT)
Dept: OPHTHALMOLOGY | Facility: CLINIC | Age: 60
End: 2025-01-07
Payer: COMMERCIAL

## 2025-01-07 ENCOUNTER — OFFICE VISIT (OUTPATIENT)
Dept: OPHTHALMOLOGY | Facility: CLINIC | Age: 60
End: 2025-01-07
Payer: COMMERCIAL

## 2025-01-07 ENCOUNTER — TELEPHONE (OUTPATIENT)
Dept: OPHTHALMOLOGY | Facility: CLINIC | Age: 60
End: 2025-01-07

## 2025-01-07 DIAGNOSIS — H26.493 PCO (POSTERIOR CAPSULAR OPACIFICATION), BILATERAL: Primary | ICD-10-CM

## 2025-01-07 DIAGNOSIS — H53.15 VISUAL DISTORTIONS OF SHAPE AND SIZE: ICD-10-CM

## 2025-01-07 PROCEDURE — 1159F MED LIST DOCD IN RCRD: CPT | Mod: CPTII,S$GLB,, | Performed by: STUDENT IN AN ORGANIZED HEALTH CARE EDUCATION/TRAINING PROGRAM

## 2025-01-07 PROCEDURE — 99999 PR PBB SHADOW E&M-EST. PATIENT-LVL III: CPT | Mod: PBBFAC,,, | Performed by: STUDENT IN AN ORGANIZED HEALTH CARE EDUCATION/TRAINING PROGRAM

## 2025-01-07 PROCEDURE — 92083 EXTENDED VISUAL FIELD XM: CPT | Mod: S$GLB,,, | Performed by: STUDENT IN AN ORGANIZED HEALTH CARE EDUCATION/TRAINING PROGRAM

## 2025-01-07 PROCEDURE — 92133 CPTRZD OPH DX IMG PST SGM ON: CPT | Mod: S$GLB,,, | Performed by: STUDENT IN AN ORGANIZED HEALTH CARE EDUCATION/TRAINING PROGRAM

## 2025-01-07 PROCEDURE — 99205 OFFICE O/P NEW HI 60 MIN: CPT | Mod: S$GLB,,, | Performed by: STUDENT IN AN ORGANIZED HEALTH CARE EDUCATION/TRAINING PROGRAM

## 2025-01-07 NOTE — PROGRESS NOTES
oct done ou     24-2  sf done ou     Rel & Fix = good ou     Coop=      good     Patient has no allergies to latex or adhesives at this time    Jthomas    No mrx    Used age correction lens for test

## 2025-01-07 NOTE — LETTER
"     [facility] - [facility]  [address]  [facility] [address]  Phone: [phone]  Fax: [phone]   January 7, 2025    La Askew MD  [address]  [place] [address]    Patient: Mary Naranjo   MR Number: [id number]   YOB: 1965   Date of Visit: 1/7/2025       Dear Dr. Askew :    Thank you for referring Mary Naranjo to me for evaluation. Here is my assessment and plan of care:    Impression:  Mary Naranjo has history of HTN, HLD, obesity, prediabetes, iron deficiency anemia who presents for evaluation of left sided vision changes. They report blurry peripheral vision of both eyes OD>OS. She states vision decreased after cataract extraction OD>OS, she feels a "glare and water-like film" over vision. Glare is still bothersome. She has difficulty focusing. Neuro-ophthalmologic examination was notable for good visual acuities OD>OS, full color vision, normal ocular motility and alignment. OCT with symmetric and mild superior and temporal thinning, nonspecific, likely tilted discs. Formal visual fields were without any concerning pattern of visual field defects.    She has posterior capsular opacification OU, OD>OS. We will arrange for PCO eval for YAG with Dr. Tian.     Plan:  1. Dr. Tian for PCO/YAG eval OU  2. Follow up in neurology clinic as planned  3. Follow up with optometry/ophthalmology for yearly routine eye exams and refraction needs    Follow-up:  I will see her in follow-up on an as needed basis.    If you have questions, please do not hesitate to call me. I look forward to following Ms. Mary Naranjo along with you.    Sincerely,        Juliet Spear MD       CC  No Recipients       "

## 2025-01-07 NOTE — TELEPHONE ENCOUNTER
----- Message from Tech Williams sent at 1/7/2025 10:21 AM CST -----  Pt schedule pt for Yag laser OD>OS

## 2025-01-08 NOTE — PROGRESS NOTES
Postoperative Visit    History of Present Illness:   Mary is 12 weeks s/p right rotator cuff repair (full thickness SS) biceps tenotomy and extensive debridement  (DOS- 10/22/24)  Doing PT   Having a little pain - rates 3/10   Starting to work on strength     Physical Examination:    Vitals:    01/15/25 0823   PainSc:   3   PainLoc: Shoulder     NAD  right upper extremity:  Incision over the shoulder is healing well with suture in place   AROM: FE 90  PROM: , ER 30  Good strength with cuff testing    Assessment/Plan:  59 y.o. female s/p right rotator cuff repair, biceps tenotomy, and extensive debridement (DOS-10/22/24)    Plan  Continue PT - focus on stretching, ROM  Injection of the right subacromial space  and glenohumeral  performed, please see procedure note for more details.  Prior to the injection risks and benefits of corticosteroid injection were discussed with the patient including pain, infection, bleeding, skin color changes, swelling, steroid flare. We discussed that over time injections can result in chondral damage, acceleration of arthritis formation, damage to tendons and damage to joints.  The patient consented for the procedure.  Post-injection instructions were given to the patient in writing.  No restrictions   Follow up in 4 months    All questions were answered in detail. The patient is in full agreement with the treatment plan and will proceed accordingly.       Work Status: Full duty   Date of release to work: 1/27/25

## 2025-01-09 ENCOUNTER — CLINICAL SUPPORT (OUTPATIENT)
Dept: REHABILITATION | Facility: HOSPITAL | Age: 60
End: 2025-01-09
Payer: COMMERCIAL

## 2025-01-09 DIAGNOSIS — M25.611 DECREASED ROM OF RIGHT SHOULDER: ICD-10-CM

## 2025-01-09 DIAGNOSIS — M25.511 ACUTE PAIN OF RIGHT SHOULDER: Primary | ICD-10-CM

## 2025-01-09 DIAGNOSIS — R29.898 WEAKNESS OF RIGHT UPPER EXTREMITY: ICD-10-CM

## 2025-01-09 PROCEDURE — 97112 NEUROMUSCULAR REEDUCATION: CPT | Mod: PN,CQ

## 2025-01-09 PROCEDURE — 97530 THERAPEUTIC ACTIVITIES: CPT | Mod: PN,CQ

## 2025-01-09 NOTE — PROGRESS NOTES
[facility]   Physical Therapy Treatment Note     Name: Mary Naranjo  [facility] Number: [id number]    Therapy Diagnosis:   Encounter Diagnoses   Name Primary?    Acute pain of right shoulder Yes    Decreased ROM of right shoulder     Weakness of right upper extremity      Physician: Maite Cruz MD    Visit Date: 1/9/2025    Physician Orders: PT Eval and Treat   Medical Diagnosis from Referral: M75.121 (ICD-10-CM) - Complete tear of right rotator cuff   Evaluation Date: 11/11/2024  Authorization Period Expiration: 12/31/25  Plan of Care Expiration: 3/11/25  Visit # / Visits authorized: 2/ 16    Progress Note Due: 1/12/24 (Reassessment with PT next visit)  FOTO: 1/1    PTA Visit: 2/5     Precautions: Standard, Plan to follow supraspinatus repair protocol (<3 cm in total size). Passive motion may begin at 2 weeks. Active motion at 6 weeks. No biceps resistive activity x 8 weeks. No cuff resistive activity x 12 weeks.   Date of surgery: 10/22/24, 11 weeks and 2 days      OPERATION:   Right Shoulder  1. Arthroscopic  supraspinatus rotator cuff repair   2. Arthroscopic biceps tenotomy   3. Extensive debridement    Time In: 8:00 am  Time Out: 8:54 am  Total Billable Time: 54 minutes    SUBJECTIVE     Pt reports: pain today after she she tried to put on her coat. Aching, throbbing pain all the time.    She was compliant with home exercise program.  Response to previous treatment: No change   Functional change: None    Pain: 10/10  Location: right shoulder      OBJECTIVE     Objective Measures updated at progress report unless specified.   Passive Range of Motion (degrees):   Shoulder Right (11/18/24)   Flexion  120 degrees   Abduction NP due to surgery    ER @ 20 degree shoulder abduction 23 degrees   IR @ 20 degree shoulder abduction To abdomen      TREATMENT   Exercises performed: in bold     Mary received the treatments listed below:      therapeutic activities to improve functional  "performance and functional mobility for 12  minutes, including:  [place]odman pendulum fwd/bwd, lat, CW&CCW 3x10 ea  Pulleys shoulder Flexion 3 min x 3 sec hold (facing the door)  Pulleys shoulder Scaption 3 min x 3 sec hold  (facing the door, chair angle at 45 degree toward Left side)  Standing shoulder flexion stretch walk out 5x30 sec     received therapeutic exercises to develop strength and ROM for 00  minutes including:    neuromuscular re-education activities to improve: Balance, Proprioception, Posture, and muscles motor control for 38 minutes. The following activities were included:  Supine shoulder flexion AAROM 0# wand 3x10  Supine shoulder Chest press AAROM 0# wand 2x10  Supine AAROM shoulder ER using 0# wand x30  Supine Serratus Anterior punch 0# wand x10  Seated table slide in Flexion plane AAROM 1 min x 3 sec hold   Seated table slide in scaption plane AAROM 1 min x 2 x 3 sec hold   Standing Wall slides flexion AAROM 2x10x3"  +Standing Wall [place] with pillow case CW/CCW 3x30" each    Seated Scapular retraction 3x10 (with ice pack on Right UT)  Seated shoulder rolls 3x10 (with ice pack on Right UT)  Shoulder isometrics (FLEX, EXT, ER, IR) 10x5 sec (hurt with ER)  AROM wrist flexion 3x10  AROM wrist extension 3x10  Digiflex (blue) squeeze 3x10    Seated upper traps  stretch 3x30 sec (with heating pack on bilateral UT) (pt perform at home)  Seated levator scap stretch 3x30 sec (with heating pack on bilateral UT) (pt perform at home)    received the following manual therapy techniques: soft tissue mobilization were applied to the: R upper trapezius for 5 minutes, including:  Semi-inclined gentle PROM ER   Semi-inclined gentle PROM shoulder flexion   Semi-inclined gentle PROM Elbow extension    PATIENT EDUCATION AND HOME EXERCISES     Home Exercises Provided and Patient Education Provided     Education provided:   Pt was instructed to follow protocol for this surgery.   Pt educated to perform shoulder rolls, " scapular retractions, UT and levator scap stretches at home, along with wrist exercises. Pt also educated to apply a heating pack to R upper trap to further decrease muscle tightness and tone.    Written Home Exercises Provided: Patient instructed to cont prior HEP. Exercises were reviewed and Mary was able to demonstrate them prior to the end of the session.  Mary demonstrated good  understanding of the education provided. See EMR under Patient Instructions for exercises provided during therapy sessions    ASSESSMENT   Ms. Hernandez presented to PT today, 11 weeks and 2 days post-op R RTC repair, biceps tenotomy, and extensive debridement. Pt reported pain today after she she tried to put on her coat. Aching, throbbing pain all the time. Cont with some of the same exercises as the last visit. She tolerated the exercises well with minimal to no discomfort, though muscle fatigue was noted during shoulder isometrics. Instructed her to cont to work on HEPs, she verbalized understanding. Will continue to progress per post-op protocol.    Mary Is progressing well towards her goals.   Pt prognosis is Good.     Pt will continue to benefit from skilled outpatient physical therapy to address the deficits listed in the problem list box on initial evaluation, provide pt/family education and to maximize pt's level of independence in the home and community environment.     Pt's spiritual, cultural and educational needs considered and pt agreeable to plan of care and goals.     Anticipated barriers to physical therapy: none     GOALS: Short Term Goals: 4 weeks  1.Report decreased in pain at worse less than  <   / =  5  /10  to increase tolerance for functional mobility. On going  2. Pt to improve R shoulder passive range of motion flexion 160 deg and ER 60 deg to allow for improved functional mobility to allow for improvement in IADLs. On going  3. Increased R UE MMT 1/2 grade to increase tolerance for ADL and work  activities. On going  4. Pt to report be conscious of impaired sitting and standing posture daily to decrease pain. On going  5. Pt to tolerate HEP to improve ROM and independence with ADL's. On going     Long Term Goals: 8 weeks  1.Report decreased in pain at worse less than  <   / =  2  /10  to increase tolerance for functional mobility. On going  2.  Patient will demonstrate improved overall function per FOTO Survey to CI = at least 1% but < 20% impaired, limited or restricted score or less.On going  3.Increased R UE MMT 1 grade to increase tolerance for ADL and work activities.On going  4. Pt to report and demonstrate proper posture in standing and sitting to decrease pain. On going  5. Pt to be Independent with HEP to improve ROM and independence with ADL's.On going  6. Pt to improve passive range of motion flexion 180 deg and ER 75 deg to allow for improvement in IADLs. On going    PLAN     Plan of care Certification: 11/11/2024 to 3/11/25.     Outpatient Physical Therapy 3 times weekly for 20 weeks to include the following interventions: Electrical Stimulation , Manual Therapy, Moist Heat/ Ice, Neuromuscular Re-ed, Patient Education, Self Care, Therapeutic Activities, and Therapeutic Exercise. Dry needling    Jaime To, PTA  1/9/2025

## 2025-01-13 ENCOUNTER — CLINICAL SUPPORT (OUTPATIENT)
Dept: REHABILITATION | Facility: HOSPITAL | Age: 60
End: 2025-01-13
Payer: COMMERCIAL

## 2025-01-13 DIAGNOSIS — M25.611 DECREASED ROM OF RIGHT SHOULDER: ICD-10-CM

## 2025-01-13 DIAGNOSIS — R29.898 WEAKNESS OF RIGHT UPPER EXTREMITY: ICD-10-CM

## 2025-01-13 DIAGNOSIS — M25.511 ACUTE PAIN OF RIGHT SHOULDER: Primary | ICD-10-CM

## 2025-01-13 PROCEDURE — 97112 NEUROMUSCULAR REEDUCATION: CPT | Mod: PN

## 2025-01-13 PROCEDURE — 97530 THERAPEUTIC ACTIVITIES: CPT | Mod: PN

## 2025-01-13 PROCEDURE — 97140 MANUAL THERAPY 1/> REGIONS: CPT | Mod: PN

## 2025-01-13 NOTE — PROGRESS NOTES
[facility]   Physical Therapy Treatment Note     Name: Mary Naranjo  [facility] Number: [id number]    Therapy Diagnosis:   Encounter Diagnoses   Name Primary?    Acute pain of right shoulder Yes    Decreased ROM of right shoulder     Weakness of right upper extremity      Physician: Maite Cruz MD    Visit Date: 1/13/2025    Physician Orders: PT Eval and Treat   Medical Diagnosis from Referral: M75.121 (ICD-10-CM) - Complete tear of right rotator cuff   Evaluation Date: 11/11/2024  Authorization Period Expiration: 12/31/25  Plan of Care Expiration: 3/11/25  Visit # / Visits authorized: 1/ 16    Progress Note Due: 2/12/24   FOTO: 1/1    PTA Visit: 1/5     Precautions: Standard, Plan to follow supraspinatus repair protocol (<3 cm in total size). Passive motion may begin at 2 weeks. Active motion at 6 weeks. No biceps resistive activity x 8 weeks. No cuff resistive activity x 12 weeks.   Date of surgery: 10/22/24, 11 weeks and 6 days      OPERATION:   Right Shoulder  1. Arthroscopic  supraspinatus rotator cuff repair   2. Arthroscopic biceps tenotomy   3. Extensive debridement    Time In: 3:00 am  Time Out: 4:00 am  Total Billable Time: 60 minutes    SUBJECTIVE     Pt reports: she continues to have difficulty putting her coat on and requires assist from her family. She states she has started cooking again. Pt also reports dropping a pan while cooking, which she believes may have been due to weakness.    She was compliant with home exercise program.  Response to previous treatment: No change   Functional change: None    Pain: 5/10  Location: right shoulder      OBJECTIVE     Objective Measures updated at progress report unless specified.   Passive Range of Motion (degrees):   Shoulder Right (1/13/24)   Flexion  120 degrees   Abduction 95 degrees   ER @ 20 degree shoulder abduction NT   IR @ 20 degree shoulder abduction To abdomen      TREATMENT   Exercises performed: in bold     Mary  "received the treatments listed below:      therapeutic activities to improve functional performance and functional mobility for 10  minutes, including:  Codman pendulum fwd/bwd, lat, CW&CCW 3x10 ea  Pulleys shoulder Flexion 3 min x 3 sec hold (facing the door)  Pulleys shoulder Scaption 3 min x 3 sec hold  (facing the door, chair angle at 45 degree toward Left side)  [place] SL shoulder flexion 3x10  Standing shoulder flexion stretch walk out 5x30 sec     received therapeutic exercises to develop strength and ROM for 00  minutes including:    neuromuscular re-education activities to improve: Balance, Proprioception, Posture, and muscles motor control for 38 minutes. The following activities were included:  Supine shoulder flexion AAROM 0# wand 3x10  Supine shoulder Chest press AAROM 0# wand 2x10  Supine AAROM shoulder ER using 0# wand x30  Supine Serratus Anterior punch 0# wand x10  Seated table slide in Flexion plane AAROM 1 min x 3 sec hold   Seated table slide in scaption plane AAROM 1 min x 2 x 3 sec hold   Standing Wall slides flexion AAROM 2x10x3"  +Standing Wall [place] with pillow case CW/CCW 3x30" each    Seated Scapular retraction 3x10 (with ice pack on Right UT)  Seated shoulder rolls 3x10 (with ice pack on Right UT)  Shoulder isometrics (FLEX, EXT, ER, IR) 10x5 sec (hurt with ER)    Added on 1/13/25:  +SL shoulder ER, 1#, 2x10  +prone shoulder rows 2x10  +standing shoulder rows YTB 2x10  +standing shoulder extension YTB 2x10    Seated upper traps  stretch 3x30 sec (with heating pack on bilateral UT) (pt perform at home)  Seated levator scap stretch 3x30 sec (with heating pack on bilateral UT) (pt perform at home)    received the following manual therapy techniques: soft tissue mobilization were applied to the: R upper trapezius for 10 minutes, including:   PROM shoulder ER, flexion, and ABD    PATIENT EDUCATION AND HOME EXERCISES     Home Exercises Provided and Patient Education Provided     Education " provided:   Pt was instructed to follow protocol for this surgery.   Pt educated to perform shoulder rolls, scapular retractions, UT and levator scap stretches at home, along with wrist exercises. Pt also educated to apply a heating pack to R upper trap to further decrease muscle tightness and tone.    Written Home Exercises Provided: Patient instructed to cont prior HEP. Exercises were reviewed and Mary was able to demonstrate them prior to the end of the session.  Mary demonstrated good  understanding of the education provided. See EMR under Patient Instructions for exercises provided during therapy sessions    ASSESSMENT     Ms. Hernandez presented to PT today, 11 weeks and 6 days post-op R RTC repair, biceps tenotomy, and extensive debridement. Pt was re-assessed today. Pt has met 2/5 STGs and 0/6 LTGs. Pt presents to the clinic with moderate symptom irritability pre-session and slightly reduced irritability post-session.  Manual therapy was performed on the R shoulder to improve joint play and facilitate optimal movement during corrective exercises.  Pt demonstrates continued R UT tightness, decreased R shoulder ROM, and decreased postural muscle strength, resulting in intermittent VC and TC for proper posture during interventions. Introduced new rotator cuff, deltoid, and postural muscles strengthening exercises today, which she tolerated fairly well without increase in pain.   Pt demonstrated improvements in R UT flexibility with moist heat and stretching exercises, R shoulder ROM, and postural awareness within the session. Will continue to progress per post-op protocol.    Mary Is progressing well towards her goals.   Pt prognosis is Good.     Pt will continue to benefit from skilled outpatient physical therapy to address the deficits listed in the problem list box on initial evaluation, provide pt/family education and to maximize pt's level of independence in the home and community environment.      Pt's spiritual, cultural and educational needs considered and pt agreeable to plan of care and goals.     Anticipated barriers to physical therapy: none     GOALS: Short Term Goals: 4 weeks  1.Report decreased in pain at worse less than  <   / =  5  /10  to increase tolerance for functional mobility. MET 1/13/25  2. Pt to improve R shoulder passive range of motion flexion 160 deg and ER 60 deg to allow for improved functional mobility to allow for improvement in IADLs. On going  3. Increased R UE MMT 1/2 grade to increase tolerance for ADL and work activities. On going  4. Pt to report be conscious of impaired sitting and standing posture daily to decrease pain. On going   5. Pt to tolerate HEP to improve ROM and independence with ADL's. MET 1/13/25     Long Term Goals: 8 weeks  1.Report decreased in pain at worse less than  <   / =  2  /10  to increase tolerance for functional mobility. On going  2.  Patient will demonstrate improved overall function per FOTO Survey to CI = at least 1% but < 20% impaired, limited or restricted score or less.On going  3.Increased R UE MMT 1 grade to increase tolerance for ADL and work activities.On going  4. Pt to report and demonstrate proper posture in standing and sitting to decrease pain. On going  5. Pt to be Independent with HEP to improve ROM and independence with ADL's.On going  6. Pt to improve passive range of motion flexion 180 deg and ER 75 deg to allow for improvement in IADLs. On going    PLAN     Plan of care Certification: 11/11/2024 to 3/11/25.     Outpatient Physical Therapy 3 times weekly for 20 weeks to include the following interventions: Electrical Stimulation , Manual Therapy, Moist Heat/ Ice, Neuromuscular Re-ed, Patient Education, Self Care, Therapeutic Activities, and Therapeutic Exercise. Dry needling    Brandon Allen, PT, DPT

## 2025-01-15 ENCOUNTER — OFFICE VISIT (OUTPATIENT)
Dept: ORTHOPEDICS | Facility: CLINIC | Age: 60
End: 2025-01-15
Payer: COMMERCIAL

## 2025-01-15 DIAGNOSIS — M75.121 NONTRAUMATIC COMPLETE TEAR OF RIGHT ROTATOR CUFF: Primary | ICD-10-CM

## 2025-01-15 PROCEDURE — 99024 POSTOP FOLLOW-UP VISIT: CPT | Mod: S$GLB,,, | Performed by: ORTHOPAEDIC SURGERY

## 2025-01-15 PROCEDURE — 99999 PR PBB SHADOW E&M-EST. PATIENT-LVL III: CPT | Mod: PBBFAC,,, | Performed by: ORTHOPAEDIC SURGERY

## 2025-01-15 PROCEDURE — 1159F MED LIST DOCD IN RCRD: CPT | Mod: CPTII,S$GLB,, | Performed by: ORTHOPAEDIC SURGERY

## 2025-01-15 PROCEDURE — 1160F RVW MEDS BY RX/DR IN RCRD: CPT | Mod: CPTII,S$GLB,, | Performed by: ORTHOPAEDIC SURGERY

## 2025-01-15 RX ORDER — TRIAMCINOLONE ACETONIDE 40 MG/ML
80 INJECTION, SUSPENSION INTRA-ARTICULAR; INTRAMUSCULAR
Status: DISCONTINUED | OUTPATIENT
Start: 2025-01-15 | End: 2025-01-15 | Stop reason: HOSPADM

## 2025-01-15 RX ADMIN — TRIAMCINOLONE ACETONIDE 80 MG: 40 INJECTION, SUSPENSION INTRA-ARTICULAR; INTRAMUSCULAR at 08:01

## 2025-01-15 NOTE — PROCEDURES
Large Joint Aspiration/Injection: R glenohumeral    Date/Time: 1/15/2025 8:15 AM    Performed by: Maite Cruz MD  Authorized by: Maite Cruz MD    Consent Done?:  Yes (Verbal)  Indications:  Pain  Timeout: prior to procedure the correct patient, procedure, and site was verified    Prep: patient was prepped and draped in usual sterile fashion      Local anesthesia used?: Yes    Local anesthetic:  Topical anesthetic    Details:  Needle Size:  22 G  Approach:  Posterior  Location:  Shoulder  Site:  R glenohumeral  Medications:  80 mg triamcinolone acetonide 40 mg/mL  Patient tolerance:  Patient tolerated the procedure well with no immediate complications

## 2025-01-16 ENCOUNTER — CLINICAL SUPPORT (OUTPATIENT)
Dept: REHABILITATION | Facility: HOSPITAL | Age: 60
End: 2025-01-16
Payer: COMMERCIAL

## 2025-01-16 DIAGNOSIS — R29.898 WEAKNESS OF RIGHT UPPER EXTREMITY: ICD-10-CM

## 2025-01-16 DIAGNOSIS — M25.511 ACUTE PAIN OF RIGHT SHOULDER: Primary | ICD-10-CM

## 2025-01-16 DIAGNOSIS — M25.611 DECREASED ROM OF RIGHT SHOULDER: ICD-10-CM

## 2025-01-16 PROCEDURE — 97112 NEUROMUSCULAR REEDUCATION: CPT | Mod: PN

## 2025-01-16 PROCEDURE — 97530 THERAPEUTIC ACTIVITIES: CPT | Mod: PN

## 2025-01-16 PROCEDURE — 97140 MANUAL THERAPY 1/> REGIONS: CPT | Mod: PN

## 2025-01-16 NOTE — PROGRESS NOTES
[facility]   Physical Therapy Treatment Note     Name: Mary Naranjo  [facility] Number: [id number]    Therapy Diagnosis:   Encounter Diagnoses   Name Primary?    Acute pain of right shoulder Yes    Decreased ROM of right shoulder     Weakness of right upper extremity      Physician: Maite Cruz MD    Visit Date: 1/16/2025    Physician Orders: PT Eval and Treat   Medical Diagnosis from Referral: M75.121 (ICD-10-CM) - Complete tear of right rotator cuff   Evaluation Date: 11/11/2024  Authorization Period Expiration: 12/31/25  Plan of Care Expiration: 3/11/25  Visit # / Visits authorized: 1/ 16    Progress Note Due: 2/12/24   FOTO: 1/1    PTA Visit: 1/5     Precautions: Standard, Plan to follow supraspinatus repair protocol (<3 cm in total size). Passive motion may begin at 2 weeks. Active motion at 6 weeks. No biceps resistive activity x 8 weeks. No cuff resistive activity x 12 weeks.   Date of surgery: 10/22/24, 12 weeks and 2 days      OPERATION:   Right Shoulder  1. Arthroscopic  supraspinatus rotator cuff repair   2. Arthroscopic biceps tenotomy   3. Extensive debridement    Time In: 7:05 am  Time Out: 8:00 am  Total Billable Time: 55 minutes    SUBJECTIVE     Pt reports: she saw her surgeon Dr. Lambert yesterday. She states that the surgeon told her ROM is looking good. She also states that she received a double dose corticosteroid injection  in her shoulder to help with pain. She states that she thinks the injection is working because she does not have any pain this morning, just feels a nagging sensation.    She was compliant with home exercise program.  Response to previous treatment: good  Functional change: improved ROM    Pain: 0/10  Location: right shoulder      OBJECTIVE     Objective Measures updated at progress report unless specified.     Passive Range of Motion (degrees): All measured in supine   Shoulder Right (1/16/24)   Flexion  135 degrees   Abduction 98 degrees   ER  "@ 20 degree shoulder abduction 32 degrees   IR @ 20 degree shoulder abduction To abdomen      TREATMENT   Exercises performed: in bold     Mary received the treatments listed below:      therapeutic activities to improve functional performance and functional mobility for 10  minutes, including:  Codman pendulum fwd/bwd, lat, CW&CCW 3x10 ea  Pulleys shoulder Flexion 3 min x 3 sec hold (facing the door)  Pulleys shoulder Scaption 3 min x 3 sec hold  (facing the door, chair angle at 45 degree toward Left side)  [place] SL shoulder flexion 3x10  Standing shoulder flexion stretch walk out 5x30 sec     received therapeutic exercises to develop strength and ROM for 00  minutes including:    neuromuscular re-education activities to improve: Balance, Proprioception, Posture, and muscles motor control for 35 minutes. The following activities were included:  Supine shoulder flexion AAROM 0# wand 3x10  Supine shoulder Chest press AAROM 0# wand 2x10  Supine AAROM shoulder ER using 0# wand x30  Supine Serratus Anterior punch 0# wand x10  Seated table slide in Flexion plane AAROM 1 min x 3 sec hold   Seated table slide in scaption plane AAROM 1 min x 2 x 3 sec hold   Standing Wall slides flexion AAROM 2x10x3"  +Standing Wall [place] with pillow case CW/CCW 3x30" each    Seated Scapular retraction 3x10 (with ice pack on Right UT)  Seated shoulder rolls 3x10 (with ice pack on Right UT)  Shoulder isometrics (FLEX, EXT, ER, IR) 10x5 sec (hurt with ER)    Added on 1/13/25:  +SL shoulder ER, 1#, 2x10  +prone shoulder rows 2x10  +standing shoulder rows YTB 2x10  +standing shoulder extension YTB 2x10    Seated upper traps  stretch 3x30 sec (with heating pack on bilateral UT) (pt perform at home)  Seated levator scap stretch 3x30 sec (with heating pack on bilateral UT) (pt perform at home)    received the following manual therapy techniques: soft tissue mobilization were applied to the: R upper trapezius for 10 minutes, including:   PROM " shoulder ER, flexion, and ABD    PATIENT EDUCATION AND HOME EXERCISES     Home Exercises Provided and Patient Education Provided     Education provided:   Pt was instructed to follow protocol for this surgery.   Pt educated to perform shoulder rolls, scapular retractions, UT and levator scap stretches at home, along with wrist exercises. Pt also educated to apply a heating pack to R upper trap to further decrease muscle tightness and tone.    Written Home Exercises Provided: Patient instructed to cont prior HEP. Exercises were reviewed and Mary was able to demonstrate them prior to the end of the session.  Mary demonstrated good  understanding of the education provided. See EMR under Patient Instructions for exercises provided during therapy sessions    ASSESSMENT     Ms. Hernandez presented to PT today, 12 weeks and 2 days post-op R RTC repair, biceps tenotomy, and extensive debridement. Pt presents to the clinic with no symptom irritability pre-session after receiving a double dose corticosteroid injection in the R shoulder at her follow up appointment with her surgeon and no irritability post-session.  Manual therapy was performed on the R shoulder to improve joint play and facilitate optimal movement during corrective exercises.  Pt demonstrates continued R UT tightness, decreased R shoulder ROM, and decreased postural muscle strength, decreased R deltoid and RTC muscles strength, resulting in difficulty with overhead activities.  Pt demonstrated improvements in R shoulder ROM and postural awareness within the session. Will continue to progress per post-op protocol.    Mary Is progressing well towards her goals.   Pt prognosis is Good.     Pt will continue to benefit from skilled outpatient physical therapy to address the deficits listed in the problem list box on initial evaluation, provide pt/family education and to maximize pt's level of independence in the home and community environment.     Pt's  spiritual, cultural and educational needs considered and pt agreeable to plan of care and goals.     Anticipated barriers to physical therapy: none     GOALS: Short Term Goals: 4 weeks  1.Report decreased in pain at worse less than  <   / =  5  /10  to increase tolerance for functional mobility. MET 1/13/25  2. Pt to improve R shoulder passive range of motion flexion 160 deg and ER 60 deg to allow for improved functional mobility to allow for improvement in IADLs. On going  3. Increased R UE MMT 1/2 grade to increase tolerance for ADL and work activities. On going  4. Pt to report be conscious of impaired sitting and standing posture daily to decrease pain. On going   5. Pt to tolerate HEP to improve ROM and independence with ADL's. MET 1/13/25     Long Term Goals: 8 weeks  1.Report decreased in pain at worse less than  <   / =  2  /10  to increase tolerance for functional mobility. On going  2.  Patient will demonstrate improved overall function per FOTO Survey to CI = at least 1% but < 20% impaired, limited or restricted score or less.On going  3.Increased R UE MMT 1 grade to increase tolerance for ADL and work activities.On going  4. Pt to report and demonstrate proper posture in standing and sitting to decrease pain. On going  5. Pt to be Independent with HEP to improve ROM and independence with ADL's.On going  6. Pt to improve passive range of motion flexion 180 deg and ER 75 deg to allow for improvement in IADLs. On going    PLAN     Plan of care Certification: 11/11/2024 to 3/11/25.     Outpatient Physical Therapy 3 times weekly for 20 weeks to include the following interventions: Electrical Stimulation , Manual Therapy, Moist Heat/ Ice, Neuromuscular Re-ed, Patient Education, Self Care, Therapeutic Activities, and Therapeutic Exercise. Mindi Allen, PT, DPT

## 2025-01-27 ENCOUNTER — OFFICE VISIT (OUTPATIENT)
Dept: FAMILY MEDICINE | Facility: CLINIC | Age: 60
End: 2025-01-27
Payer: COMMERCIAL

## 2025-01-27 VITALS
WEIGHT: 233.69 LBS | HEART RATE: 84 BPM | RESPIRATION RATE: 18 BRPM | SYSTOLIC BLOOD PRESSURE: 126 MMHG | OXYGEN SATURATION: 98 % | TEMPERATURE: 99 F | HEIGHT: 64 IN | BODY MASS INDEX: 39.9 KG/M2 | DIASTOLIC BLOOD PRESSURE: 70 MMHG

## 2025-01-27 DIAGNOSIS — E66.01 CLASS 3 SEVERE OBESITY DUE TO EXCESS CALORIES WITH SERIOUS COMORBIDITY AND BODY MASS INDEX (BMI) OF 40.0 TO 44.9 IN ADULT: Chronic | ICD-10-CM

## 2025-01-27 DIAGNOSIS — I10 PRIMARY HYPERTENSION: Chronic | ICD-10-CM

## 2025-01-27 DIAGNOSIS — E78.49 OTHER HYPERLIPIDEMIA: Chronic | ICD-10-CM

## 2025-01-27 DIAGNOSIS — E66.813 CLASS 3 SEVERE OBESITY DUE TO EXCESS CALORIES WITH SERIOUS COMORBIDITY AND BODY MASS INDEX (BMI) OF 40.0 TO 44.9 IN ADULT: Chronic | ICD-10-CM

## 2025-01-27 DIAGNOSIS — J98.8 RESPIRATORY INFECTION: Primary | ICD-10-CM

## 2025-01-27 LAB
CTP QC/QA: YES
POC MOLECULAR INFLUENZA A AGN: NEGATIVE
POC MOLECULAR INFLUENZA B AGN: NEGATIVE

## 2025-01-27 PROCEDURE — 1160F RVW MEDS BY RX/DR IN RCRD: CPT | Mod: CPTII,S$GLB,, | Performed by: FAMILY MEDICINE

## 2025-01-27 PROCEDURE — 3078F DIAST BP <80 MM HG: CPT | Mod: CPTII,S$GLB,, | Performed by: FAMILY MEDICINE

## 2025-01-27 PROCEDURE — 3008F BODY MASS INDEX DOCD: CPT | Mod: CPTII,S$GLB,, | Performed by: FAMILY MEDICINE

## 2025-01-27 PROCEDURE — 1159F MED LIST DOCD IN RCRD: CPT | Mod: CPTII,S$GLB,, | Performed by: FAMILY MEDICINE

## 2025-01-27 PROCEDURE — 3074F SYST BP LT 130 MM HG: CPT | Mod: CPTII,S$GLB,, | Performed by: FAMILY MEDICINE

## 2025-01-27 PROCEDURE — 87635 SARS-COV-2 COVID-19 AMP PRB: CPT | Performed by: FAMILY MEDICINE

## 2025-01-27 PROCEDURE — 87502 INFLUENZA DNA AMP PROBE: CPT | Mod: QW,S$GLB,, | Performed by: FAMILY MEDICINE

## 2025-01-27 PROCEDURE — 99999 PR PBB SHADOW E&M-EST. PATIENT-LVL V: CPT | Mod: PBBFAC,,, | Performed by: FAMILY MEDICINE

## 2025-01-27 PROCEDURE — 99214 OFFICE O/P EST MOD 30 MIN: CPT | Mod: S$GLB,,, | Performed by: FAMILY MEDICINE

## 2025-01-27 PROCEDURE — G2211 COMPLEX E/M VISIT ADD ON: HCPCS | Mod: S$GLB,,, | Performed by: FAMILY MEDICINE

## 2025-01-27 RX ORDER — BROMPHENIRAMINE MALEATE, PSEUDOEPHEDRINE HYDROCHLORIDE, AND DEXTROMETHORPHAN HYDROBROMIDE 2; 30; 10 MG/5ML; MG/5ML; MG/5ML
10 SYRUP ORAL
Qty: 473 ML | Refills: 0 | Status: SHIPPED | OUTPATIENT
Start: 2025-01-27 | End: 2025-02-06

## 2025-01-27 RX ORDER — IPRATROPIUM BROMIDE 42 UG/1
2 SPRAY, METERED NASAL 4 TIMES DAILY
Qty: 15 ML | Refills: 0 | Status: SHIPPED | OUTPATIENT
Start: 2025-01-27

## 2025-01-27 RX ORDER — ALBUTEROL SULFATE 90 UG/1
2 INHALANT RESPIRATORY (INHALATION) EVERY 4 HOURS PRN
Qty: 18 G | Refills: 0 | Status: SHIPPED | OUTPATIENT
Start: 2025-01-27 | End: 2026-01-27

## 2025-01-27 NOTE — LETTER
January 27, 2025      [address]  [place] [address]  Phone: [phone]       Patient: Mary Naranjo   YOB: 1965  Date of Visit: 01/27/2025    To Whom It May Concern:    Mary Naranjo  was at [facility] on 01/27/2025. The patient may return to work/school on 01/29/2025. If you have any questions or concerns, or if I can be of further assistance, please do not hesitate to contact me.    Sincerely,    Ramon Olivares Jr., MD  [facility]  [address]  [address]  [place] [address]  Dept: [phone]

## 2025-01-27 NOTE — PATIENT INSTRUCTIONS
Video on how to use inhaler:  https://www.youtube.com/watch?v=Eea1z4DIjO6.    Video on how to use nasal spray:  https://www.youtube.com/watch?v=otJda7STAEQ      To help ease a cough at home, can try:  -Drinking water, warm broths, and teas to stay hydrated  -Drinking lemon and honey (not suitable before the age of 12 months)  -Avoiding irritants such as pollen, smoke, and dust  -Using a humidifier  -Breathing in steam from a hot shower or bath to open the airways

## 2025-01-28 LAB — SARS-COV-2 RNA RESP QL NAA+PROBE: NOT DETECTED

## 2025-01-28 NOTE — PROGRESS NOTES
"  Patient Name: Mary Naranjo    : 1965  MRN: [id number]      Subjective:     Patient ID: Mary is a 59 y.o. female    Chief Complaint:  Nasal Congestion (Fever, chills, no taste,no smell,body aches, wheezing,chest pain, cough ,runny nose/ eyes)    History of Present Illness    Mary presents today with chest pain and respiratory symptoms.    She reports respiratory symptoms that began on Friday, including chest pain with coughing, runny nose, sore throat, and headache. She also experiences chills and low-grade fever below 100°F. She has not eaten since yesterday morning around 11:30-12:00, only consuming juice.    ROS:  General: +fever, +chills, -fatigue, -weight gain, -weight loss  Eyes: -vision changes, -redness, -discharge  ENT: -ear pain, -nasal congestion, +sore throat, +runny nose  Cardiovascular: +chest pain, -palpitations, -lower extremity edema  Respiratory: +cough, -shortness of breath  Gastrointestinal: -abdominal pain, -nausea, -vomiting, -diarrhea, -constipation, -blood in stool  Genitourinary: -dysuria, -hematuria, -frequency  Musculoskeletal: -joint pain, -muscle pain  Skin: -rash, -lesion  Neurological: +headache, -dizziness, -numbness, -tingling  Psychiatric: -anxiety, -depression, -sleep difficulty         Objective:   /70 (BP Location: Left arm, Patient Position: Sitting)   Pulse 84   Temp 98.7 °F (37.1 °C) (Oral)   Resp 18   Ht 5' 4" (1.626 m)   Wt 106 kg (233 lb 11 oz)   LMP 01/15/2018   SpO2 98%   BMI 40.11 kg/m²     Physical Exam  Vitals reviewed.   Constitutional:       General: She is not in acute distress.     Appearance: She is well-developed. She is obese. She is ill-appearing.   HENT:      Head: Normocephalic and atraumatic.      Right Ear: Ear canal and external ear normal.      Left Ear: Ear canal and external ear normal.      Nose: Mucosal edema, congestion and rhinorrhea present.      Right Sinus: No maxillary sinus tenderness.      Left Sinus: No maxillary " sinus tenderness.      Mouth/Throat:      Pharynx: Posterior oropharyngeal erythema present. No oropharyngeal exudate.   Eyes:      General: Lids are normal.      Pupils: Pupils are equal, round, and reactive to light.   Neck:      Thyroid: No thyroid mass.      Trachea: Trachea normal. No tracheal tenderness.   Cardiovascular:      Rate and Rhythm: Normal rate and regular rhythm.   Pulmonary:      Effort: Pulmonary effort is normal. No respiratory distress.      Breath sounds: Wheezing present. No rales.   Musculoskeletal:      Cervical back: Normal range of motion.   Lymphadenopathy:      Cervical: No cervical adenopathy.   Psychiatric:         Behavior: Behavior normal.          Assessment        ICD-10-CM ICD-9-CM   1. Respiratory infection  J98.8 519.8   2. Class 3 severe obesity due to excess calories with serious comorbidity and body mass index (BMI) of 40.0 to 44.9 in adult  E66.813 278.01    E66.01 V85.41    Z68.41    3. Primary hypertension  I10 401.9   4. Other hyperlipidemia  E78.49 272.4         Plan:   Assessment & Plan    IMPRESSION:  Evaluated patient presenting with chest pain, chills, fever, sore throat, headache, and runny nose  Performed physical exam including lung auscultation  Reviewed chest XR, which showed no signs of pneumonia  Diagnosed probable viral infection pending COVID-19 test results    SUSPECTED VIRAL INFECTION:  Assessed the patient's condition as likely a viral infection.  Performed rapid flu test, which came back negative.  Ordered COVID-19 test.  Conducted physical exam, auscultating the patient's chest and lungs.  Performed chest XR, which showed no signs of pneumonia.  Discussed typical duration of respiratory infections (1-2 weeks).  Prescribed albuterol inhaler for chest tightness and dyspnea.  Prescribed Bromfed DM syrup to decrease secretions and coughing.  Prescribed Atrovent nasal spray to reduce nasal secretions.  Demonstrated proper use of albuterol inhaler via  instructional video.  Explained correct technique for using nasal spray, including head position and spray angle.  Explained that after respiratory infection is better, may continue to have a dry cough for 2-4 weeks, and sometimes longer.  Cough lasting more than 3 weeks may need further evaluation.  To help ease a cough at home, can try:  -Drinking water, warm broths, and teas to stay hydrated  -Drinking lemon and honey (not suitable before the age of 12 months)  -Avoiding irritants such as pollen, smoke, and dust  -Using a humidifier  -Breathing in steam from a hot shower or bath to open the airways    OBESITY:  The patient's BMI has been recorded in the chart. The patient has been provided educational materials regarding the benefits of attaining and maintaining a normal weight. We will continue to address and follow this issue during follow up visits.     HYPERTENSION:   Continue valsartan-hydrochlorothiazide.      HYPERLIPIDEMIA:   Continue atorvastatin.      OTHER INSTRUCTIONS:  Advised on home care and symptom management.  Provided work note for the patient.  Mary to continue drinking fluids.      1. Respiratory infection  -     X-Ray Chest PA And Lateral; Future; Expected date: 01/27/2025  -     POCT Influenza A/B Molecular  -     COVID-19 Routine Screening  -     albuterol (VENTOLIN HFA) 90 mcg/actuation inhaler; Inhale 2 puffs into the lungs every 4 (four) hours as needed for Wheezing or Shortness of Breath. Rescue  Dispense: 18 g; Refill: 0  -     brompheniramine-pseudoeph-DM (BROMFED DM) 2-30-10 mg/5 mL Syrp; Take 10 mLs by mouth every 4 to 6 hours as needed (cough). Maximum of 4 doses in 24 hours  Dispense: 473 mL; Refill: 0  -     ipratropium (ATROVENT) 42 mcg (0.06 %) nasal spray; 2 sprays by Each Nostril route 4 (four) times daily.  Dispense: 15 mL; Refill: 0    2. Class 3 severe obesity due to excess calories with serious comorbidity and body mass index (BMI) of 40.0 to 44.9 in  "adult  Overview:  Wt Readings from Last 3 Encounters:   01/27/25 0923 106 kg (233 lb 11 oz)   11/26/24 0832 107.5 kg (236 lb 14.2 oz)   10/17/24 1140 103 kg (227 lb 1.6 oz)      Estimated body mass index is 40.11 kg/m² as calculated from the following:    Height as of this encounter: 5' 4" (1.626 m).    Weight as of this encounter: 106 kg (233 lb 11 oz).  Ideal body weight: 54.7 kg (120 lb 9.5 oz)        3. Primary hypertension    4. Other hyperlipidemia  Overview:  Lab Results   Component Value Date    CHOL 144 08/15/2024    CHOL 208 (H) 02/08/2024    CHOL 192 08/04/2023     Lab Results   Component Value Date    HDL 50 08/15/2024    HDL 48 02/08/2024    HDL 49 08/04/2023     Lab Results   Component Value Date    LDLCALC 78.4 08/15/2024    LDLCALC 144.8 02/08/2024    LDLCALC 131.2 08/04/2023     Lab Results   Component Value Date    TRIG 78 08/15/2024    TRIG 76 02/08/2024    TRIG 59 08/04/2023     Lab Results   Component Value Date    CHOLHDL 34.7 08/15/2024    CHOLHDL 23.1 02/08/2024    CHOLHDL 25.5 08/04/2023     The 10-year ASCVD risk score (Harman ROBERTS, et al., 2019) is: 4.1%    Values used to calculate the score:      Age: 59 years      Sex: Female      Is Non- : Yes      Diabetic: No      Tobacco smoker: No      Systolic Blood Pressure: 122 mmHg      Is BP treated: Yes      HDL Cholesterol: 50 mg/dL      Total Cholesterol: 144 mg/dL                -Ramon Olivares Jr., MD, AAHIVS      This note was generated with the assistance of ambient listening technology. Verbal consent was obtained by the patient and accompanying visitor(s) for the recording of patient appointment to facilitate this note. I attest to having reviewed and edited the generated note for accuracy, though some syntax or spelling errors may persist. Please contact the author of this note for any clarification.      Patient Instructions   Video on how to use inhaler:  https://www.[employer].com/watch?v=Npm3c0ZMfJ9.    Video " on how to use nasal spray:  https://www.youtube.com/watch?v=gpGzk3NPHAW      To help ease a cough at home, can try:  -Drinking water, warm broths, and teas to stay hydrated  -Drinking lemon and honey (not suitable before the age of 12 months)  -Avoiding irritants such as pollen, smoke, and dust  -Using a humidifier  -Breathing in steam from a hot shower or bath to open the airways        No follow-ups on file.   Future Appointments   Date Time Provider Department Center   2/6/2025 11:00 AM To, Jaime, PTA [facility] OP [facility]   2/10/2025  3:00 PM To, Jaime, PTA [facility]   2/13/2025  4:00 PM To, Jaime, PTA [facility]   2/14/2025  8:45 AM LAB, [facility] DRAW STATION [facility] LAB [facility]   2/17/2025  3:00 PM Abisai Allenhere, PT [facility]   2/19/2025  3:00 PM To, Jaime, PTA [facility]   2/21/2025  8:40 AM Ramon Olivares Jr., MD [facility]   2/24/2025  3:00 PM Isidore, Esthere, PT [facility]   2/26/2025  4:00 PM Guanacore Esthere, PT [facility]   3/21/2025  9:45 AM Gayatri Tian MD [facility]   5/5/2025  7:45 AM Maite Cruz MD [facility]

## 2025-01-29 ENCOUNTER — HOSPITAL ENCOUNTER (EMERGENCY)
Facility: HOSPITAL | Age: 60
Discharge: HOME | End: 2025-01-29
Attending: EMERGENCY MEDICINE
Payer: COMMERCIAL

## 2025-01-29 ENCOUNTER — TELEPHONE (OUTPATIENT)
Dept: FAMILY MEDICINE | Facility: CLINIC | Age: 60
End: 2025-01-29
Payer: COMMERCIAL

## 2025-01-29 VITALS
WEIGHT: 230 LBS | RESPIRATION RATE: 18 BRPM | SYSTOLIC BLOOD PRESSURE: 151 MMHG | TEMPERATURE: 98 F | HEIGHT: 64 IN | HEART RATE: 73 BPM | BODY MASS INDEX: 39.27 KG/M2 | OXYGEN SATURATION: 99 % | DIASTOLIC BLOOD PRESSURE: 74 MMHG

## 2025-01-29 DIAGNOSIS — R00.2 PALPITATIONS: ICD-10-CM

## 2025-01-29 DIAGNOSIS — R06.02 SOB (SHORTNESS OF BREATH): ICD-10-CM

## 2025-01-29 DIAGNOSIS — J06.9 UPPER RESPIRATORY TRACT INFECTION, UNSPECIFIED TYPE: Primary | ICD-10-CM

## 2025-01-29 LAB
ALBUMIN SERPL BCP-MCNC: 4.2 G/DL (ref 3.5–5.2)
ALP SERPL-CCNC: 126 U/L (ref 40–150)
ALT SERPL W/O P-5'-P-CCNC: 45 U/L (ref 10–44)
ANION GAP SERPL CALC-SCNC: 16 MMOL/L (ref 8–16)
AST SERPL-CCNC: 25 U/L (ref 10–40)
BASOPHILS # BLD AUTO: 0.03 K/UL (ref 0–0.2)
BASOPHILS NFR BLD: 0.4 % (ref 0–1.9)
BILIRUB SERPL-MCNC: 0.6 MG/DL (ref 0.1–1)
BNP SERPL-MCNC: <10 PG/ML (ref 0–99)
BUN SERPL-MCNC: 21 MG/DL (ref 6–20)
CALCIUM SERPL-MCNC: 9.8 MG/DL (ref 8.7–10.5)
CHLORIDE SERPL-SCNC: 101 MMOL/L (ref 95–110)
CO2 SERPL-SCNC: 20 MMOL/L (ref 23–29)
CREAT SERPL-MCNC: 1 MG/DL (ref 0.5–1.4)
DIFFERENTIAL METHOD BLD: ABNORMAL
EOSINOPHIL # BLD AUTO: 0.1 K/UL (ref 0–0.5)
EOSINOPHIL NFR BLD: 1.8 % (ref 0–8)
ERYTHROCYTE [DISTWIDTH] IN BLOOD BY AUTOMATED COUNT: 15.4 % (ref 11.5–14.5)
EST. GFR  (NO RACE VARIABLE): >60 ML/MIN/1.73 M^2
GLUCOSE SERPL-MCNC: 91 MG/DL (ref 70–110)
HCT VFR BLD AUTO: 40.2 % (ref 37–48.5)
HGB BLD-MCNC: 12.9 G/DL (ref 12–16)
IMM GRANULOCYTES # BLD AUTO: 0.02 K/UL (ref 0–0.04)
IMM GRANULOCYTES NFR BLD AUTO: 0.3 % (ref 0–0.5)
LYMPHOCYTES # BLD AUTO: 3 K/UL (ref 1–4.8)
LYMPHOCYTES NFR BLD: 44.4 % (ref 18–48)
MCH RBC QN AUTO: 25.5 PG (ref 27–31)
MCHC RBC AUTO-ENTMCNC: 32.1 G/DL (ref 32–36)
MCV RBC AUTO: 80 FL (ref 82–98)
MONOCYTES # BLD AUTO: 0.6 K/UL (ref 0.3–1)
MONOCYTES NFR BLD: 9.3 % (ref 4–15)
NEUTROPHILS # BLD AUTO: 3 K/UL (ref 1.8–7.7)
NEUTROPHILS NFR BLD: 43.8 % (ref 38–73)
NRBC BLD-RTO: 0 /100 WBC
PLATELET # BLD AUTO: 257 K/UL (ref 150–450)
PMV BLD AUTO: 9.7 FL (ref 9.2–12.9)
POTASSIUM SERPL-SCNC: 3.7 MMOL/L (ref 3.5–5.1)
PROT SERPL-MCNC: 8.4 G/DL (ref 6–8.4)
RBC # BLD AUTO: 5.05 M/UL (ref 4–5.4)
SODIUM SERPL-SCNC: 137 MMOL/L (ref 136–145)
TROPONIN I SERPL DL<=0.01 NG/ML-MCNC: <0.006 NG/ML (ref 0–0.03)
WBC # BLD AUTO: 6.78 K/UL (ref 3.9–12.7)

## 2025-01-29 PROCEDURE — 85025 COMPLETE CBC W/AUTO DIFF WBC: CPT

## 2025-01-29 PROCEDURE — 99285 EMERGENCY DEPT VISIT HI MDM: CPT | Mod: 25

## 2025-01-29 PROCEDURE — 93010 ELECTROCARDIOGRAM REPORT: CPT | Mod: ,,, | Performed by: INTERNAL MEDICINE

## 2025-01-29 PROCEDURE — 83880 ASSAY OF NATRIURETIC PEPTIDE: CPT

## 2025-01-29 PROCEDURE — 80053 COMPREHEN METABOLIC PANEL: CPT

## 2025-01-29 PROCEDURE — 84484 ASSAY OF TROPONIN QUANT: CPT

## 2025-01-29 PROCEDURE — 93005 ELECTROCARDIOGRAM TRACING: CPT

## 2025-01-29 RX ORDER — GUAIFENESIN 600 MG/1
1200 TABLET, EXTENDED RELEASE ORAL 2 TIMES DAILY
Qty: 40 TABLET | Refills: 0 | Status: SHIPPED | OUTPATIENT
Start: 2025-01-29 | End: 2025-02-24 | Stop reason: ALTCHOICE

## 2025-01-29 RX ORDER — BENZONATATE 100 MG/1
100 CAPSULE ORAL 3 TIMES DAILY PRN
Qty: 30 CAPSULE | Refills: 0 | Status: SHIPPED | OUTPATIENT
Start: 2025-01-29 | End: 2025-02-24 | Stop reason: ALTCHOICE

## 2025-01-29 NOTE — Clinical Note
"Mary Jaytristan Naranjo was seen and treated in our emergency department on 1/29/2025.  She may return to work on 02/01/2025.       If you have any questions or concerns, please don't hesitate to call.      Kei Hunter MD"

## 2025-01-30 LAB
OHS QRS DURATION: 82 MS
OHS QTC CALCULATION: 438 MS

## 2025-01-30 NOTE — DISCHARGE INSTRUCTIONS
Follow up with the primary care provider for re-evaluation within the next 2-3 days.    Seek medical care if symptoms worsen or any concerns.

## 2025-01-30 NOTE — ED NOTES
Patient reports palpitations and shortness of breath 1 hour prior to ED arrival. Denies any chest pain, nausea or vomiting. Patient has recently been diagnosed with a respiratory infection and has been using an inhaler.

## 2025-01-30 NOTE — ED PROVIDER NOTES
Encounter Date: 1/29/2025    SCRIBE #1 NOTE: Treasure BENNETT, am scribing for, and in the presence of,  Kei Hunter MD. Other sections scribed: HPI,ROS,PE.       History     Chief Complaint   Patient presents with    Palpitations     Palpitations and SOB x 1 hr. Recently diagnosed with respiratory infection and started on inhalers.      51-year-old female history of hyperlipidemia, hypertension. Patient is seen by primary care on 01/27/2025. The patient was diagnosed with a nonspecific respiratory infection and prescribed albuterol,n Bromfed DM, Atrovent nasal spray. Patient reports to ED for evaluation of palpitations ongoing for 1 hour PTA. She states her HR was 115 BPM, after calling PCP she was advised to come to ED. She reports no associated symptoms. Patient denies any chest pain, SOB, nausea, or vomiting. Patient states that she does still have rhinorrhea from respiratory infection. She states she has taken medication as prescribed. She denies any any alcohol or drug use.     The history is provided by the patient. No  was used.     Review of patient's allergies indicates:  No Known Allergies  Past Medical History:   Diagnosis Date    Arthritis     COVID-19 02/08/2023    Dental bridge present     upper removable partial    Family history of pancreatic cancer     Fibrocystic breast     Migraines     Obesity     Pancreas cyst     Primary hypertension 02/02/2022    Sleep apnea      Past Surgical History:   Procedure Laterality Date    ARTHROSCOPIC REPAIR OF ROTATOR CUFF OF SHOULDER Right 10/22/2024    Procedure: REPAIR, ROTATOR CUFF, ARTHROSCOPIC WITH EXTENSIVE DEBRIDEMENT;  Surgeon: Maite Cruz MD;  Location: [facility];  Service: Orthopedics;  Laterality: Right;  PATEL&NEPHRHONDA RANGEL NOTIFIED-GG  RN PREOP 09/30/2024-----NEED H/P    BREAST BIOPSY      BREAST CYST ASPIRATION      BREAST SURGERY      Dont remember    COLONOSCOPY N/A 10/07/2015    Procedure: COLONOSCOPY;  Surgeon: Eagle  CHERIE Stack MD;  Location: [facility];  Service: Endoscopy;  Laterality: N/A;    DILATION AND CURETTAGE OF UTERUS      ENDOSCOPIC ULTRASOUND OF UPPER GASTROINTESTINAL TRACT N/A 11/03/2020    Procedure: ULTRASOUND, UPPER GI TRACT, ENDOSCOPIC;  Surgeon: Kei Juarez MD;  Location: [facility] (2ND FLR);  Service: Endoscopy;  Laterality: N/A;  Covid-19 test 10/31/20 at [facility]    ENDOSCOPIC ULTRASOUND OF UPPER GASTROINTESTINAL TRACT N/A 07/14/2023    Procedure: ULTRASOUND, UPPER GI TRACT, ENDOSCOPIC;  Surgeon: Burak Garcia MD;  Location: [facility] (2ND FLR);  Service: Endoscopy;  Laterality: N/A;  6/15/23-Instructions via portal-DS  6/26 pre call complete  pre call 7/10, pt confirmed - mf    ESOPHAGOGASTRODUODENOSCOPY N/A 07/19/2019    Procedure: ESOPHAGOGASTRODUODENOSCOPY (EGD);  Surgeon: Cherrie Sheets MD;  Location: [facility];  Service: Endoscopy;  Laterality: N/A;    EYE SURGERY  December 2021    Cateract    fibrocystic breast       KNEE ARTHROSCOPY W/ MENISCAL REPAIR      TUBAL LIGATION       Family History   Problem Relation Name Age of Onset    Heart disease Mother Mone Skelton     Cataracts Mother Mone Skelton     Glaucoma Mother Mone Skelton     Cancer Father Papa Skelton         Prostate, lung, colon, brain, gallbladder, skin    Colon cancer Father Papa Skelton     Pancreatic cancer Sister Ervin 54    Breast cancer Sister Yajaira 42        unilateral    Cancer Sister Yajaira Sandifer 40        Breast    Cancer Sister Ervin Skelton         Pancreatic    No Known Problems Brother Papa     Asthma Maternal Aunt Micheline Plunkett     Heart disease Maternal Aunt Micheline Plunkett     Heart disease Maternal Aunt Priscila Morejon     No Known Problems Maternal Grandmother      No Known Problems Maternal Grandfather      No Known Problems Paternal Grandmother      No Known Problems Paternal Grandfather      Cancer Maternal Cousin Quinten         unknown origin    Breast cancer Maternal Cousin      Cancer  Maternal Cousin          prostate?    Prostate cancer Maternal Cousin      Cervical cancer Other Esperanza     Cancer Other Esperanza         possible ovarian cancer    COPD Neg Hx      Amblyopia Neg Hx      Blindness Neg Hx      Diabetes Neg Hx      Hypertension Neg Hx      Macular degeneration Neg Hx      Retinal detachment Neg Hx      Strabismus Neg Hx      Stroke Neg Hx      Thyroid disease Neg Hx       Social History     Tobacco Use    Smoking status: Never    Smokeless tobacco: Never   Substance Use Topics    Alcohol use: No    Drug use: No     Review of Systems   Constitutional:  Negative for fever.   HENT:  Positive for rhinorrhea.    Respiratory:  Negative for shortness of breath.    Cardiovascular:  Positive for palpitations. Negative for chest pain.   Gastrointestinal:  Negative for abdominal pain and vomiting.   Skin:  Negative for rash.       Physical Exam     Initial Vitals [01/29/25 1703]   BP Pulse Resp Temp SpO2   137/89 110 20 97.9 °F (36.6 °C) 96 %      MAP       --         Physical Exam    Nursing note and vitals reviewed.  Constitutional: She appears well-developed and well-nourished. She does not appear ill. No distress.   HENT:   Head: Normocephalic.   Eyes: EOM are normal.   Neck:   Normal range of motion.  Cardiovascular:  Normal rate, regular rhythm and normal heart sounds.           No murmur heard.  Pulmonary/Chest: Breath sounds normal. No respiratory distress. She has no wheezes.   Abdominal: Abdomen is soft. There is no abdominal tenderness.   Musculoskeletal:         General: No edema.      Cervical back: Normal range of motion.      Right lower leg: No edema.      Left lower leg: No edema.     Neurological: She is alert. GCS eye subscore is 4. GCS verbal subscore is 5. GCS motor subscore is 6.   Skin: Skin is warm.         ED Course   Procedures  Labs Reviewed   CBC W/ AUTO DIFFERENTIAL - Abnormal       Result Value    WBC 6.78      RBC 5.05      Hemoglobin 12.9      Hematocrit 40.2       MCV 80 (*)     MCH 25.5 (*)     MCHC 32.1      RDW 15.4 (*)     Platelets 257      MPV 9.7      Immature Granulocytes 0.3      Gran # (ANC) 3.0      Immature Grans (Abs) 0.02      Lymph # 3.0      Mono # 0.6      Eos # 0.1      Baso # 0.03      nRBC 0      Gran % 43.8      Lymph % 44.4      Mono % 9.3      Eosinophil % 1.8      Basophil % 0.4      Differential Method Automated     COMPREHENSIVE METABOLIC PANEL - Abnormal    Sodium 137      Potassium 3.7      Chloride 101      CO2 20 (*)     Glucose 91      BUN 21 (*)     Creatinine 1.0      Calcium 9.8      Total Protein 8.4      Albumin 4.2      Total Bilirubin 0.6      Alkaline Phosphatase 126      AST 25      ALT 45 (*)     eGFR >60      Anion Gap 16     TROPONIN I    Troponin I <0.006     B-TYPE NATRIURETIC PEPTIDE    BNP <10            Imaging Results              X-Ray Chest AP Portable (Final result)  Result time 01/29/25 18:46:05      Final result by Blanca Aaron MD (01/29/25 18:46:05)                   Impression:      No acute intrathoracic abnormality detected.      Electronically signed by: Blanca Aaron  Date:    01/29/2025  Time:    18:46               Narrative:    EXAMINATION:  AP PORTABLE CHEST    CLINICAL HISTORY:  Shortness of breath    TECHNIQUE:  AP portable chest radiograph was submitted.    COMPARISON:  01/27/2025    FINDINGS:  AP portable chest radiograph demonstrates a cardiac silhouette within normal limits.  There is no focal consolidation, pneumothorax, or pleural effusion. Spondylitic changes are present.                                       Medications - No data to display  Medical Decision Making  59-year-old female presenting with complaint of reoccurring palpitations over the last few hours.  Patient denies any chest pain or dyspnea.  The patient was recently diagnosed with a URI.  Patient appears to be sinus rhythm on ECG and the cardiac monitor.  The patient was maintained on the cardiac monitor during the entirety of  the patient's ER visit.  No arrhythmia noted.  Duration again appears to be sinus rhythm with no PACs or PVCs.  Chest x-ray shows no infiltrates or cardiomegaly.  Suspect symptoms secondary to albuterol use, phenylephrine along with recent respiratory illness.  Discussed decreasing use of the pseudoephedrine (Bromfed).  Discussed using the albuterol only with necessary as this could likely worsen or palpitations.  The patient stated understanding.  Patient was prescribed Tessalon Perles.  Otherwise patient is non ill-appearing at this time.  Follow up with primary care.        Medical Decision Making:     A. Problem List:  1. Palpitations  2. URI     B. Differential diagnosis:    URI, pneumonia, pulmonary edema, ACS, arrhythmia       E. Review of Previous External Medical Record:  Recent primary care records reviewed.        ECG:  Please check workup area for ECG interpretation.    Part of the note was done using electronic dictation services.           Amount and/or Complexity of Data Reviewed  Labs: ordered. Decision-making details documented in ED Course.  Radiology: ordered. Decision-making details documented in ED Course.  ECG/medicine tests: ordered and independent interpretation performed. Decision-making details documented in ED Course.    Risk  OTC drugs.  Prescription drug management.            Scribe Attestation:   Scribe #1: I performed the above scribed service and the documentation accurately describes the services I performed. I attest to the accuracy of the note.        ED Course as of 01/30/25 1338   Wed Jan 29, 2025 2130 EKG 12-lead  Time 5:01 p.m.     Rate 113, sinus, regular rhythm, left axis deviation.   QRS 82 .  No ST elevation or depression no T-wave inversion no hyperacute T-waves.      Sinus tachycardia. [JM]   2258 Potassium: 3.7 [JM]      ED Course User Index  [KIRSTEN] Kei Hunter MD                       I, Kei Hunter, personally performed the services described in  this documentation. All medical record entries made by the scribe were at my direction and in my presence. I have reviewed the chart and agree that the record reflects my personal performance and is accurate and complete.      DISCLAIMER: This note was prepared with [employer] voice recognition transcription software. Garbled syntax, mangled pronouns, and other bizarre constructions may be attributed to that software system.       Clinical Impression:  Final diagnoses:  [R00.2] Palpitations  [R06.02] SOB (shortness of breath)  [J06.9] Upper respiratory tract infection, unspecified type (Primary)          ED Disposition Condition    Discharge Stable          ED Prescriptions       Medication Sig Dispense Start Date End Date Auth. Provider    benzonatate (TESSALON) 100 MG capsule Take 1 capsule (100 mg total) by mouth 3 (three) times daily as needed. 30 capsule 1/29/2025 2/8/2025 Kei Hunter MD    guaiFENesin (MUCINEX) 600 mg 12 hr tablet Take 2 tablets (1,200 mg total) by mouth 2 (two) times daily. for 10 days 40 tablet 1/29/2025 2/8/2025 Kei Hunter MD          Follow-up Information       Follow up With Specialties Details Why Contact Info    Ramon Olivares Jr., MD Family Medicine Schedule an appointment as soon as possible for a visit in 3 days Primary care [address] [facility] [address]  [phone]      [facility] Emergency Medicine  If symptoms worsen [address] [facility] [place] [id number] [phone]             Kei Hunter MD  01/30/25 0824

## 2025-02-06 ENCOUNTER — CLINICAL SUPPORT (OUTPATIENT)
Dept: REHABILITATION | Facility: HOSPITAL | Age: 60
End: 2025-02-06
Payer: COMMERCIAL

## 2025-02-06 DIAGNOSIS — R29.898 WEAKNESS OF RIGHT UPPER EXTREMITY: ICD-10-CM

## 2025-02-06 DIAGNOSIS — M25.611 DECREASED ROM OF RIGHT SHOULDER: ICD-10-CM

## 2025-02-06 DIAGNOSIS — M25.511 ACUTE PAIN OF RIGHT SHOULDER: Primary | ICD-10-CM

## 2025-02-06 PROCEDURE — 97112 NEUROMUSCULAR REEDUCATION: CPT | Mod: PN,CQ

## 2025-02-06 PROCEDURE — 97530 THERAPEUTIC ACTIVITIES: CPT | Mod: PN,CQ

## 2025-02-06 NOTE — PROGRESS NOTES
[facility]   Physical Therapy Treatment Note     Name: Mary Naranjo  [facility] Number: [id number]    Therapy Diagnosis:   Encounter Diagnoses   Name Primary?    Acute pain of right shoulder Yes    Decreased ROM of right shoulder     Weakness of right upper extremity      Physician: Maite Cruz MD    Visit Date: 2/6/2025    Physician Orders: PT Eval and Treat   Medical Diagnosis from Referral: M75.121 (ICD-10-CM) - Complete tear of right rotator cuff   Evaluation Date: 11/11/2024  Authorization Period Expiration: 12/31/25  Plan of Care Expiration: 3/11/25  Visit # / Visits authorized: 4/ 16    Progress Note Due: 2/12/24   FOTO: 1/1    PTA Visit: 1/5     Precautions: Standard, Plan to follow supraspinatus repair protocol (<3 cm in total size). Passive motion may begin at 2 weeks. Active motion at 6 weeks. No biceps resistive activity x 8 weeks. No cuff resistive activity x 12 weeks.   Date of surgery: 10/22/24, 15 weeks and 2 days      OPERATION:   Right Shoulder  1. Arthroscopic  supraspinatus rotator cuff repair   2. Arthroscopic biceps tenotomy   3. Extensive debridement    Time In: 11:00 am  Time Out: 11:55 am  Total Billable Time: 55 minutes    SUBJECTIVE     Pt reports: doing ok today, the pain is not bad. She says she missed last few visits due to having Upper respiratory tract infection and just starts feeling better.    She was compliant with home exercise program.  Response to previous treatment: good  Functional change: improved ROM    Pain: 0/10 (not rate)  Location: right shoulder      OBJECTIVE     Objective Measures updated at progress report unless specified.     TREATMENT   Exercises performed: in bold     Mary received the treatments listed below:      therapeutic activities to improve functional performance and functional mobility for 10  minutes, including:  Codman pendulum fwd/bwd, lat, CW&CCW 3x10 ea  Pulleys shoulder Flexion 3 min x 3 sec hold (facing the  "door)  Pulleys shoulder Scaption 3 min x 3 sec hold  (facing the door, chair angle at 45 degree toward Left side)  [place] SL shoulder flexion 3x10  Standing shoulder flexion stretch walk out 5x30 sec     neuromuscular re-education activities to improve: Balance, Proprioception, Posture, and muscles motor control for 40 minutes. The following activities were included:  Supine shoulder flexion AAROM 0# wand 2x10  Supine shoulder Chest press AAROM 0# wand 2x10  Supine AAROM shoulder ER using 0# wand x30  Supine Serratus Anterior punch 0# wand x10  Seated table slide in Flexion plane AAROM 1 min x 3 sec hold   Seated table slide in scaption plane AAROM 1 min x 2 x 3 sec hold   Standing Wall slides flexion AAROM 2x10x3"  Standing Wall [place] with pillow case CW/CCW 3x30" each  Shoulder isometrics (FLEX, EXT, ER, IR) 10x5 sec (hurt with ER)  SL shoulder ER, 1#, 2x10  Prone shoulder rows 2x10  standing shoulder rows YTB 2x10  Standing shoulder extension YTB 2x10    received the following manual therapy techniques: soft tissue mobilization were applied to the: R upper trapezius for 5 minutes, including:   PROM shoulder ER, flexion, and ABD    received therapeutic exercises to develop strength and ROM for 00  minutes including:    PATIENT EDUCATION AND HOME EXERCISES     Home Exercises Provided and Patient Education Provided     Education provided:   Pt was instructed to follow protocol for this surgery.   Pt educated to perform shoulder rolls, scapular retractions, UT and levator scap stretches at home, along with wrist exercises. Pt also educated to apply a heating pack to R upper trap to further decrease muscle tightness and tone.    Written Home Exercises Provided: Patient instructed to cont prior HEP. Exercises were reviewed and Mary was able to demonstrate them prior to the end of the session.  Mary demonstrated good  understanding of the education provided. See EMR under Patient Instructions for exercises " provided during therapy sessions    ASSESSMENT     Ms. Hernandez presented to PT today, 15 weeks and 2 days post-op R RTC repair, biceps tenotomy, and extensive debridement. We cont with the same exercises from last visit, she tolerated well. Pt demonstrates continued R UT tightness, decreased R shoulder ROM, and decreased postural muscle strength, decreased R deltoid and RTC muscles strength, resulting in difficulty with overhead activities. Demonstrating improvements in R shoulder ROM and postural awareness within the session. Min cueing for proper form. Advised her to cont to work on HEP for Right Shoulder ROM, she verbalized good understanding. Will continue to progress per post-op protocol.    Mary Is progressing well towards her goals.   Pt prognosis is Good.     Pt will continue to benefit from skilled outpatient physical therapy to address the deficits listed in the problem list box on initial evaluation, provide pt/family education and to maximize pt's level of independence in the home and community environment.     Pt's spiritual, cultural and educational needs considered and pt agreeable to plan of care and goals.     Anticipated barriers to physical therapy: none     GOALS: Short Term Goals: 4 weeks  1.Report decreased in pain at worse less than  <   / =  5  /10  to increase tolerance for functional mobility. MET 1/13/25  2. Pt to improve R shoulder passive range of motion flexion 160 deg and ER 60 deg to allow for improved functional mobility to allow for improvement in IADLs. On going  3. Increased R UE MMT 1/2 grade to increase tolerance for ADL and work activities. On going  4. Pt to report be conscious of impaired sitting and standing posture daily to decrease pain. On going   5. Pt to tolerate HEP to improve ROM and independence with ADL's. MET 1/13/25     Long Term Goals: 8 weeks  1.Report decreased in pain at worse less than  <   / =  2  /10  to increase tolerance for functional mobility. On  going  2.  Patient will demonstrate improved overall function per FOTO Survey to CI = at least 1% but < 20% impaired, limited or restricted score or less.On going  3.Increased R UE MMT 1 grade to increase tolerance for ADL and work activities.On going  4. Pt to report and demonstrate proper posture in standing and sitting to decrease pain. On going  5. Pt to be Independent with HEP to improve ROM and independence with ADL's.On going  6. Pt to improve passive range of motion flexion 180 deg and ER 75 deg to allow for improvement in IADLs. On going    PLAN     Plan of care Certification: 11/11/2024 to 3/11/25.     Outpatient Physical Therapy 3 times weekly for 20 weeks to include the following interventions: Electrical Stimulation , Manual Therapy, Moist Heat/ Ice, Neuromuscular Re-ed, Patient Education, Self Care, Therapeutic Activities, and Therapeutic Exercise. Dry needling    Jaime To, PTA  2/6/2025

## 2025-02-10 ENCOUNTER — CLINICAL SUPPORT (OUTPATIENT)
Dept: REHABILITATION | Facility: HOSPITAL | Age: 60
End: 2025-02-10
Payer: COMMERCIAL

## 2025-02-10 DIAGNOSIS — R29.898 WEAKNESS OF RIGHT UPPER EXTREMITY: ICD-10-CM

## 2025-02-10 DIAGNOSIS — M25.611 DECREASED ROM OF RIGHT SHOULDER: ICD-10-CM

## 2025-02-10 DIAGNOSIS — M25.511 ACUTE PAIN OF RIGHT SHOULDER: Primary | ICD-10-CM

## 2025-02-10 PROCEDURE — 97112 NEUROMUSCULAR REEDUCATION: CPT | Mod: PN,CQ

## 2025-02-10 PROCEDURE — 97530 THERAPEUTIC ACTIVITIES: CPT | Mod: PN,CQ

## 2025-02-10 NOTE — PROGRESS NOTES
[facility]   Physical Therapy Treatment Note     Name: Mary Naranjo  [facility] Number: [id number]    Therapy Diagnosis:   Encounter Diagnoses   Name Primary?    Acute pain of right shoulder Yes    Decreased ROM of right shoulder     Weakness of right upper extremity      Physician: Maite Cruz MD    Visit Date: 2/10/2025    Physician Orders: PT Eval and Treat   Medical Diagnosis from Referral: M75.121 (ICD-10-CM) - Complete tear of right rotator cuff   Evaluation Date: 11/11/2024  Authorization Period Expiration: 12/31/25  Plan of Care Expiration: 3/11/25  Visit # / Visits authorized: 5/ 16    Progress Note Due: 2/12/24   FOTO: 1/1    PTA Visit: 2/5     Precautions: Standard, Plan to follow supraspinatus repair protocol (<3 cm in total size). Passive motion may begin at 2 weeks. Active motion at 6 weeks. No biceps resistive activity x 8 weeks. No cuff resistive activity x 12 weeks.   Date of surgery: 10/22/24, 15 weeks and 6 days      OPERATION:   Right Shoulder  1. Arthroscopic  supraspinatus rotator cuff repair   2. Arthroscopic biceps tenotomy   3. Extensive debridement    Time In: 3:00 pm   Time Out: 3:55 pm  Total Billable Time: 55 minutes    SUBJECTIVE     Pt reports: doing ok today,a little sore.    She was compliant with home exercise program.  Response to previous treatment: good  Functional change: improved ROM    Pain: 0/10 (not rate)  Location: right shoulder      OBJECTIVE     Objective Measures updated at progress report unless specified.     TREATMENT   Exercises performed: in bold     Mary received the treatments listed below:      therapeutic activities to improve functional performance and functional mobility for 12  minutes, including:  Codman pendulum fwd/bwd, lat, CW&CCW 3x10 ea  Pulleys shoulder Flexion 3 min x 3 sec hold (facing the door)  Pulleys shoulder Scaption 3 min x 3 sec hold  (facing the door, chair angle at 45 degree toward Left side)  [place] SL  "shoulder flexion 3x10  Standing shoulder flexion stretch 5x10 sec     neuromuscular re-education activities to improve: Balance, Proprioception, Posture, and muscles motor control for 38 minutes. The following activities were included:  Supine shoulder flexion AAROM 2# wand 2x10  Supine shoulder Chest press AAROM 2# wand 2x10  Supine AAROM shoulder ER using 2# wand x30  Supine Serratus Anterior punch 2# wand x10  Seated table slide in Flexion plane AAROM 1 min x 3 sec hold   Seated table slide in scaption plane AAROM 1 min x 2 x 3 sec hold   Standing Wall slides flexion AAROM 2x10x3"  Standing Wall [place] with green medicine ball CW/CCW 3x30" each  Shoulder isometrics (FLEX, EXT, ER, IR) 10x5 sec (hurt with ER)  SL shoulder ER, 1#, 2x10  Prone shoulder rows 1# 2x10  standing shoulder rows RTB 2x10  Standing shoulder extension RTB 2x10    received the following manual therapy techniques: soft tissue mobilization were applied to the: R upper trapezius for 5 minutes, including:   PROM shoulder ER, flexion, and ABD    received therapeutic exercises to develop strength and ROM for 00  minutes including:    PATIENT EDUCATION AND HOME EXERCISES     Home Exercises Provided and Patient Education Provided     Education provided:   Pt was instructed to follow protocol for this surgery.   Pt educated to perform shoulder rolls, scapular retractions, UT and levator scap stretches at home, along with wrist exercises. Pt also educated to apply a heating pack to R upper trap to further decrease muscle tightness and tone.    Written Home Exercises Provided: Patient instructed to cont prior HEP. Exercises were reviewed and Mary was able to demonstrate them prior to the end of the session.  Mary demonstrated good  understanding of the education provided. See EMR under Patient Instructions for exercises provided during therapy sessions    ASSESSMENT     Ms. Hernandez presented to PT today, 15 weeks and 6 days post-op R RTC repair, " biceps tenotomy, and extensive debridement. We cont with the same exercises from last visit, she tolerated well. Min cueing for proper form. Advised her to cont to work on HEP for Right Shoulder ROM, she verbalized good understanding. Will continue to progress per post-op protocol.    Mary Is progressing well towards her goals.   Pt prognosis is Good.     Pt will continue to benefit from skilled outpatient physical therapy to address the deficits listed in the problem list box on initial evaluation, provide pt/family education and to maximize pt's level of independence in the home and community environment.     Pt's spiritual, cultural and educational needs considered and pt agreeable to plan of care and goals.     Anticipated barriers to physical therapy: none     GOALS: Short Term Goals: 4 weeks  1.Report decreased in pain at worse less than  <   / =  5  /10  to increase tolerance for functional mobility. MET 1/13/25  2. Pt to improve R shoulder passive range of motion flexion 160 deg and ER 60 deg to allow for improved functional mobility to allow for improvement in IADLs. On going  3. Increased R UE MMT 1/2 grade to increase tolerance for ADL and work activities. On going  4. Pt to report be conscious of impaired sitting and standing posture daily to decrease pain. On going   5. Pt to tolerate HEP to improve ROM and independence with ADL's. MET 1/13/25     Long Term Goals: 8 weeks  1.Report decreased in pain at worse less than  <   / =  2  /10  to increase tolerance for functional mobility. On going  2.  Patient will demonstrate improved overall function per FOTO Survey to CI = at least 1% but < 20% impaired, limited or restricted score or less.On going  3.Increased R UE MMT 1 grade to increase tolerance for ADL and work activities.On going  4. Pt to report and demonstrate proper posture in standing and sitting to decrease pain. On going  5. Pt to be Independent with HEP to improve ROM and independence with  ADL's.On going  6. Pt to improve passive range of motion flexion 180 deg and ER 75 deg to allow for improvement in IADLs. On going    PLAN     Plan of care Certification: 11/11/2024 to 3/11/25.     Outpatient Physical Therapy 3 times weekly for 20 weeks to include the following interventions: Electrical Stimulation , Manual Therapy, Moist Heat/ Ice, Neuromuscular Re-ed, Patient Education, Self Care, Therapeutic Activities, and Therapeutic Exercise. Dry needling    Jaime To, PTA  2/6/2025

## 2025-02-13 ENCOUNTER — CLINICAL SUPPORT (OUTPATIENT)
Dept: REHABILITATION | Facility: HOSPITAL | Age: 60
End: 2025-02-13
Payer: COMMERCIAL

## 2025-02-13 DIAGNOSIS — R29.898 WEAKNESS OF RIGHT UPPER EXTREMITY: ICD-10-CM

## 2025-02-13 DIAGNOSIS — M25.511 ACUTE PAIN OF RIGHT SHOULDER: Primary | ICD-10-CM

## 2025-02-13 DIAGNOSIS — M25.611 DECREASED ROM OF RIGHT SHOULDER: ICD-10-CM

## 2025-02-13 PROCEDURE — 97530 THERAPEUTIC ACTIVITIES: CPT | Mod: PN,CQ

## 2025-02-13 PROCEDURE — 97112 NEUROMUSCULAR REEDUCATION: CPT | Mod: PN,CQ

## 2025-02-13 NOTE — PROGRESS NOTES
[facility]   Physical Therapy Treatment Note     Name: Mary Naranjo  [facility] Number: [id number]    Therapy Diagnosis:   Encounter Diagnoses   Name Primary?    Acute pain of right shoulder Yes    Decreased ROM of right shoulder     Weakness of right upper extremity      Physician: Maite Cruz MD    Visit Date: 2/13/2025    Physician Orders: PT Eval and Treat   Medical Diagnosis from Referral: M75.121 (ICD-10-CM) - Complete tear of right rotator cuff   Evaluation Date: 11/11/2024  Authorization Period Expiration: 12/31/25  Plan of Care Expiration: 3/11/25  Visit # / Visits authorized: 6/ 16    Progress Note Due: 2/12/24 (Reassessment with PT next visit)  FOTO: 1/1    PTA Visit: 3/5     Precautions: Standard, Plan to follow supraspinatus repair protocol (<3 cm in total size). Passive motion may begin at 2 weeks. Active motion at 6 weeks. No biceps resistive activity x 8 weeks. No cuff resistive activity x 12 weeks.   Date of surgery: 10/22/24, 3 months and 3 weeks post-op       OPERATION:   Right Shoulder  1. Arthroscopic  supraspinatus rotator cuff repair   2. Arthroscopic biceps tenotomy   3. Extensive debridement    Time In: 4:00 pm   Time Out: 4:40 pm  Total Billable Time: 40 minutes    SUBJECTIVE     Pt reports: doing ok today, a little sore.    She was compliant with home exercise program.  Response to previous treatment: good  Functional change: improved ROM    Pain: 0/10 (not rate)  Location: right shoulder      OBJECTIVE     Objective Measures updated at progress report unless specified.     TREATMENT   Exercises performed: in bold     Mary received the treatments listed below:      therapeutic activities to improve functional performance and functional mobility for 10  minutes, including:  Codman pendulum fwd/bwd, lat, CW&CCW 3x10 ea (pt perform at home)  Pulleys shoulder Flexion 3 min x 3 sec hold (facing the door)  Pulleys shoulder Scaption 3 min x 3 sec hold  (facing  "the door, chair angle at 45 degree toward Left side)  [place] SL shoulder flexion 3x10  Standing shoulder flexion stretch 5x10 sec     neuromuscular re-education activities to improve: Balance, Proprioception, Posture, and muscles motor control for 25 minutes. The following activities were included:  Supine shoulder flexion AAROM 2# wand 2x10  Supine shoulder Chest press AAROM 2# wand 2x10  Supine AAROM shoulder ER using 2# wand x30  Supine Serratus Anterior punch 2# wand 2x10  Standing Wall slides flexion AAROM 2x10x3"  Standing Wall [place] with green medicine ball CW/CCW 3x30" each  SL shoulder ER, 1#, 2x10  +SL Shoulder Abduction 1#, 2x10  +Prone Y/T/I/Rows 1# 2x10 (min pain with T)  standing shoulder rows RTB 2x10x3"  Standing shoulder extension RTB 2x10x3"    Consider to add:  IR stretch  No Money  Horizontal Abduction    Patient performs at home:  Seated table slide in Flexion plane AAROM 1 min x 3 sec hold   Seated table slide in scaption plane AAROM 1 min x 2 x 3 sec hold   Shoulder isometrics (FLEX, EXT, ER, IR) 10x5 sec (hurt with ER)    received the following manual therapy techniques: soft tissue mobilization were applied to the: R upper trapezius for 5 minutes, including:   PROM shoulder ER, flexion, and ABD    received therapeutic exercises to develop strength and ROM for 00  minutes including:    PATIENT EDUCATION AND HOME EXERCISES     Home Exercises Provided and Patient Education Provided     Education provided:   Pt was instructed to follow protocol for this surgery.   Pt educated to perform shoulder rolls, scapular retractions, UT and levator scap stretches at home, along with wrist exercises. Pt also educated to apply a heating pack to R upper trap to further decrease muscle tightness and tone.    Written Home Exercises Provided: Patient instructed to cont prior HEP. Exercises were reviewed and Mary was able to demonstrate them prior to the end of the session.  Mary demonstrated good  " understanding of the education provided. See EMR under Patient Instructions for exercises provided during therapy sessions    ASSESSMENT     Ms. Hernandez presented to PT today, 3 months and 3 weeks post-op R RTC repair, biceps tenotomy, and extensive debridement. We cont with the same exercises from last visit, added SL Shoulder Abduction and  Prone Y/T/I this visit, she tolerated well. Min cueing for proper form. Advised her to cont to work on HEP for Right Shoulder ROM, she verbalized good understanding. Will continue to progress per post-op protocol.    Mary Is progressing well towards her goals.   Pt prognosis is Good.     Pt will continue to benefit from skilled outpatient physical therapy to address the deficits listed in the problem list box on initial evaluation, provide pt/family education and to maximize pt's level of independence in the home and community environment.     Pt's spiritual, cultural and educational needs considered and pt agreeable to plan of care and goals.     Anticipated barriers to physical therapy: none     GOALS: Short Term Goals: 4 weeks  1.Report decreased in pain at worse less than  <   / =  5  /10  to increase tolerance for functional mobility. MET 1/13/25  2. Pt to improve R shoulder passive range of motion flexion 160 deg and ER 60 deg to allow for improved functional mobility to allow for improvement in IADLs. On going  3. Increased R UE MMT 1/2 grade to increase tolerance for ADL and work activities. On going  4. Pt to report be conscious of impaired sitting and standing posture daily to decrease pain. On going   5. Pt to tolerate HEP to improve ROM and independence with ADL's. MET 1/13/25     Long Term Goals: 8 weeks  1.Report decreased in pain at worse less than  <   / =  2  /10  to increase tolerance for functional mobility. On going  2.  Patient will demonstrate improved overall function per FOTO Survey to CI = at least 1% but < 20% impaired, limited or restricted score  or less.On going  3.Increased R UE MMT 1 grade to increase tolerance for ADL and work activities.On going  4. Pt to report and demonstrate proper posture in standing and sitting to decrease pain. On going  5. Pt to be Independent with HEP to improve ROM and independence with ADL's.On going  6. Pt to improve passive range of motion flexion 180 deg and ER 75 deg to allow for improvement in IADLs. On going    PLAN     Plan of care Certification: 11/11/2024 to 3/11/25.     Outpatient Physical Therapy 3 times weekly for 20 weeks to include the following interventions: Electrical Stimulation , Manual Therapy, Moist Heat/ Ice, Neuromuscular Re-ed, Patient Education, Self Care, Therapeutic Activities, and Therapeutic Exercise. Dry needling    Jaime To, PTA  2/13/2025

## 2025-02-14 ENCOUNTER — LAB VISIT (OUTPATIENT)
Dept: LAB | Facility: HOSPITAL | Age: 60
End: 2025-02-14
Attending: FAMILY MEDICINE
Payer: COMMERCIAL

## 2025-02-14 DIAGNOSIS — R73.03 PREDIABETES: Chronic | ICD-10-CM

## 2025-02-14 DIAGNOSIS — I10 PRIMARY HYPERTENSION: Chronic | ICD-10-CM

## 2025-02-14 DIAGNOSIS — E78.49 OTHER HYPERLIPIDEMIA: Chronic | ICD-10-CM

## 2025-02-14 DIAGNOSIS — D50.9 IRON DEFICIENCY ANEMIA, UNSPECIFIED IRON DEFICIENCY ANEMIA TYPE: ICD-10-CM

## 2025-02-14 LAB
ALBUMIN SERPL BCP-MCNC: 3.9 G/DL (ref 3.5–5.2)
ALP SERPL-CCNC: 104 U/L (ref 40–150)
ALT SERPL W/O P-5'-P-CCNC: 43 U/L (ref 10–44)
ANION GAP SERPL CALC-SCNC: 9 MMOL/L (ref 8–16)
AST SERPL-CCNC: 26 U/L (ref 10–40)
BASOPHILS # BLD AUTO: 0.05 K/UL (ref 0–0.2)
BASOPHILS NFR BLD: 0.7 % (ref 0–1.9)
BILIRUB SERPL-MCNC: 0.8 MG/DL (ref 0.1–1)
BUN SERPL-MCNC: 15 MG/DL (ref 6–20)
CALCIUM SERPL-MCNC: 9.7 MG/DL (ref 8.7–10.5)
CHLORIDE SERPL-SCNC: 106 MMOL/L (ref 95–110)
CHOLEST SERPL-MCNC: 143 MG/DL (ref 120–199)
CHOLEST/HDLC SERPL: 2.9 {RATIO} (ref 2–5)
CO2 SERPL-SCNC: 27 MMOL/L (ref 23–29)
CREAT SERPL-MCNC: 0.8 MG/DL (ref 0.5–1.4)
DIFFERENTIAL METHOD BLD: ABNORMAL
EOSINOPHIL # BLD AUTO: 0.5 K/UL (ref 0–0.5)
EOSINOPHIL NFR BLD: 6.4 % (ref 0–8)
ERYTHROCYTE [DISTWIDTH] IN BLOOD BY AUTOMATED COUNT: 15.8 % (ref 11.5–14.5)
EST. GFR  (NO RACE VARIABLE): >60 ML/MIN/1.73 M^2
ESTIMATED AVG GLUCOSE: 120 MG/DL (ref 68–131)
FERRITIN SERPL-MCNC: 244 NG/ML (ref 20–300)
GLUCOSE SERPL-MCNC: 92 MG/DL (ref 70–110)
HBA1C MFR BLD: 5.8 % (ref 4–5.6)
HCT VFR BLD AUTO: 36.4 % (ref 37–48.5)
HDLC SERPL-MCNC: 50 MG/DL (ref 40–75)
HDLC SERPL: 35 % (ref 20–50)
HGB BLD-MCNC: 11 G/DL (ref 12–16)
IMM GRANULOCYTES # BLD AUTO: 0.02 K/UL (ref 0–0.04)
IMM GRANULOCYTES NFR BLD AUTO: 0.3 % (ref 0–0.5)
IRON SERPL-MCNC: 48 UG/DL (ref 30–160)
LDLC SERPL CALC-MCNC: 80.6 MG/DL (ref 63–159)
LYMPHOCYTES # BLD AUTO: 2.8 K/UL (ref 1–4.8)
LYMPHOCYTES NFR BLD: 38 % (ref 18–48)
MCH RBC QN AUTO: 24.6 PG (ref 27–31)
MCHC RBC AUTO-ENTMCNC: 30.2 G/DL (ref 32–36)
MCV RBC AUTO: 81 FL (ref 82–98)
MONOCYTES # BLD AUTO: 0.6 K/UL (ref 0.3–1)
MONOCYTES NFR BLD: 8.6 % (ref 4–15)
NEUTROPHILS # BLD AUTO: 3.4 K/UL (ref 1.8–7.7)
NEUTROPHILS NFR BLD: 46 % (ref 38–73)
NONHDLC SERPL-MCNC: 93 MG/DL
NRBC BLD-RTO: 0 /100 WBC
PLATELET # BLD AUTO: 332 K/UL (ref 150–450)
PMV BLD AUTO: 10.1 FL (ref 9.2–12.9)
POTASSIUM SERPL-SCNC: 3.9 MMOL/L (ref 3.5–5.1)
PROT SERPL-MCNC: 7.5 G/DL (ref 6–8.4)
RBC # BLD AUTO: 4.48 M/UL (ref 4–5.4)
SATURATED IRON: 12 % (ref 20–50)
SODIUM SERPL-SCNC: 142 MMOL/L (ref 136–145)
TOTAL IRON BINDING CAPACITY: 398 UG/DL (ref 250–450)
TRANSFERRIN SERPL-MCNC: 269 MG/DL (ref 200–375)
TRIGL SERPL-MCNC: 62 MG/DL (ref 30–150)
WBC # BLD AUTO: 7.32 K/UL (ref 3.9–12.7)

## 2025-02-14 PROCEDURE — 80053 COMPREHEN METABOLIC PANEL: CPT | Performed by: FAMILY MEDICINE

## 2025-02-14 PROCEDURE — 85025 COMPLETE CBC W/AUTO DIFF WBC: CPT | Performed by: FAMILY MEDICINE

## 2025-02-14 PROCEDURE — 82728 ASSAY OF FERRITIN: CPT | Performed by: FAMILY MEDICINE

## 2025-02-14 PROCEDURE — 36415 COLL VENOUS BLD VENIPUNCTURE: CPT | Mod: PN | Performed by: FAMILY MEDICINE

## 2025-02-14 PROCEDURE — 83036 HEMOGLOBIN GLYCOSYLATED A1C: CPT | Performed by: FAMILY MEDICINE

## 2025-02-14 PROCEDURE — 80061 LIPID PANEL: CPT | Performed by: FAMILY MEDICINE

## 2025-02-14 PROCEDURE — 83540 ASSAY OF IRON: CPT | Performed by: FAMILY MEDICINE

## 2025-02-16 ENCOUNTER — RESULTS FOLLOW-UP (OUTPATIENT)
Dept: FAMILY MEDICINE | Facility: CLINIC | Age: 60
End: 2025-02-16

## 2025-02-18 DIAGNOSIS — J98.8 RESPIRATORY INFECTION: ICD-10-CM

## 2025-02-18 NOTE — TELEPHONE ENCOUNTER
Please see the attached refill request.   Lab Facility: [id number] Billing Type: Third-Party Bill Bill For Surgical Tray: no Expected Date Of Service: 06/06/2019 Performing Laboratory: [id number]

## 2025-02-18 NOTE — TELEPHONE ENCOUNTER
No care due was identified.  [facility] Embedded Care Due Messages. Reference number: [id number].   2/18/2025 1:33:28 PM CST

## 2025-02-19 ENCOUNTER — CLINICAL SUPPORT (OUTPATIENT)
Dept: REHABILITATION | Facility: HOSPITAL | Age: 60
End: 2025-02-19
Payer: COMMERCIAL

## 2025-02-19 DIAGNOSIS — R29.898 WEAKNESS OF RIGHT UPPER EXTREMITY: ICD-10-CM

## 2025-02-19 DIAGNOSIS — M25.511 ACUTE PAIN OF RIGHT SHOULDER: Primary | ICD-10-CM

## 2025-02-19 DIAGNOSIS — M25.611 DECREASED ROM OF RIGHT SHOULDER: ICD-10-CM

## 2025-02-19 PROCEDURE — 97112 NEUROMUSCULAR REEDUCATION: CPT | Mod: PN,CQ

## 2025-02-19 PROCEDURE — 97530 THERAPEUTIC ACTIVITIES: CPT | Mod: PN,CQ

## 2025-02-19 NOTE — PROGRESS NOTES
[facility]   Physical Therapy Treatment Note     Name: Mary Naranjo  [facility] Number: [id number]    Therapy Diagnosis:   Encounter Diagnoses   Name Primary?    Acute pain of right shoulder Yes    Decreased ROM of right shoulder     Weakness of right upper extremity      Physician: Maite Cruz MD    Visit Date: 2/19/2025    Physician Orders: PT Eval and Treat   Medical Diagnosis from Referral: M75.121 (ICD-10-CM) - Complete tear of right rotator cuff   Evaluation Date: 11/11/2024  Authorization Period Expiration: 12/31/25  Plan of Care Expiration: 3/11/25  Visit # / Visits authorized: 7/ 16    Progress Note Due: 2/12/24 (Reassessment with PT next visit)  FOTO: 1/1    PTA Visit: 4/5     Precautions: Standard, Plan to follow supraspinatus repair protocol (<3 cm in total size). Passive motion may begin at 2 weeks. Active motion at 6 weeks. No biceps resistive activity x 8 weeks. No cuff resistive activity x 12 weeks.   Date of surgery: 10/22/24, 3 months and 4 weeks post-op       OPERATION:   Right Shoulder  1. Arthroscopic  supraspinatus rotator cuff repair   2. Arthroscopic biceps tenotomy   3. Extensive debridement    Time In: 3:00 pm   Time Out: 3:55 pm  Total Billable Time: 55 minutes    SUBJECTIVE     Pt reports: doing ok today, a little sore.    She was NOT compliant with home exercise program.  Response to previous treatment: good  Functional change: improved ROM    Pain: 0/10 (not rate)  Location: right shoulder      OBJECTIVE     Objective Measures updated at progress report unless specified.     TREATMENT     Exercises performed: in bold     Mary received the treatments listed below:      therapeutic activities to improve functional performance and functional mobility for 8  minutes, including:  UBE 8 min (4 min fwd and 4 min bkwd)    Pt performs at home:  [place] pendulum fwd/bwd, lat, CW&CCW 3x10 ea (pt perform at home)  Pulleys shoulder Flexion 3 min x 3 sec hold (facing  "the door)  Pulleys shoulder Scaption 3 min x 3 sec hold  (facing the door, chair angle at 45 degree toward Left side)  [place] SL shoulder flexion 3x10  Standing shoulder flexion stretch 5x10 sec     neuromuscular re-education activities to improve: Balance, Proprioception, Posture, and muscles motor control for 42 minutes. The following activities were included:  Supine shoulder flexion AAROM +3# wand 2x10  Supine shoulder Chest press AAROM +3# wand 2x10  Supine AAROM shoulder ER using +3# wand 20x  Supine Serratus Anterior punch +3# wand 2x10  Standing Wall slides flexion AAROM 2x10x3"  Standing Wall [place] with green medicine ball CW/CCW 3x30" each  SL shoulder ER, 2#, 2x10  SL Shoulder Abduction 1#, 2x10  Prone Y/T/I/Rows 2# 2x10 free weight on Y, may try 1# next visit)  standing shoulder rows RTB 2x10x3"  Standing shoulder extension RTB 2x10x3"  +Standing Scaption 2x10 (difficulty due to limited of ROM)    Consider to add:  IR stretch  No Money  Horizontal Abduction    received the following manual therapy techniques: soft tissue mobilization were applied to the: R upper trapezius for 5 minutes, including:   PROM shoulder ER, flexion, and ABD    received therapeutic exercises to develop strength and ROM for 00  minutes including:    PATIENT EDUCATION AND HOME EXERCISES     Home Exercises Provided and Patient Education Provided     Education provided:   Pt was instructed to follow protocol for this surgery.   Pt educated to perform shoulder rolls, scapular retractions, UT and levator scap stretches at home, along with wrist exercises. Pt also educated to apply a heating pack to R upper trap to further decrease muscle tightness and tone.    Written Home Exercises Provided: Patient instructed to cont prior HEP. Exercises were reviewed and Mary was able to demonstrate them prior to the end of the session.  Mary demonstrated good  understanding of the education provided. See EMR under Patient Instructions for " exercises provided during therapy sessions    ASSESSMENT     Ms. Hernandez presented to PT today, 3 months and 4 weeks post-op R RTC repair, biceps tenotomy, and extensive debridement. We cont with the same exercises from last visit, added No Money and shoulder scaption ex this visit, she tolerated well. Noted pt still with limit of shoulder scaption AROM during shoulder scaption ex, instructed her to cont to work on shoulder Flex/Scaption/ER/IR ROM ex, she verbalized understanding. Min cueing for proper form. Will continue to progress per post-op protocol.    Mary Is progressing well towards her goals.   Pt prognosis is Good.     Pt will continue to benefit from skilled outpatient physical therapy to address the deficits listed in the problem list box on initial evaluation, provide pt/family education and to maximize pt's level of independence in the home and community environment.     Pt's spiritual, cultural and educational needs considered and pt agreeable to plan of care and goals.     Anticipated barriers to physical therapy: none     GOALS: Short Term Goals: 4 weeks  1.Report decreased in pain at worse less than  <   / =  5  /10  to increase tolerance for functional mobility. MET 1/13/25  2. Pt to improve R shoulder passive range of motion flexion 160 deg and ER 60 deg to allow for improved functional mobility to allow for improvement in IADLs. On going  3. Increased R UE MMT 1/2 grade to increase tolerance for ADL and work activities. On going  4. Pt to report be conscious of impaired sitting and standing posture daily to decrease pain. On going   5. Pt to tolerate HEP to improve ROM and independence with ADL's. MET 1/13/25     Long Term Goals: 8 weeks  1.Report decreased in pain at worse less than  <   / =  2  /10  to increase tolerance for functional mobility. On going  2.  Patient will demonstrate improved overall function per FOTO Survey to CI = at least 1% but < 20% impaired, limited or restricted  score or less.On going  3.Increased R UE MMT 1 grade to increase tolerance for ADL and work activities.On going  4. Pt to report and demonstrate proper posture in standing and sitting to decrease pain. On going  5. Pt to be Independent with HEP to improve ROM and independence with ADL's.On going  6. Pt to improve passive range of motion flexion 180 deg and ER 75 deg to allow for improvement in IADLs. On going    PLAN     Plan of care Certification: 11/11/2024 to 3/11/25.     Outpatient Physical Therapy 3 times weekly for 20 weeks to include the following interventions: Electrical Stimulation , Manual Therapy, Moist Heat/ Ice, Neuromuscular Re-ed, Patient Education, Self Care, Therapeutic Activities, and Therapeutic Exercise. Dry needling    Jaime To, PTA  2/19/2025

## 2025-02-20 RX ORDER — IPRATROPIUM BROMIDE 42 UG/1
2 SPRAY, METERED NASAL 4 TIMES DAILY
Qty: 45 ML | Refills: 3 | Status: SHIPPED | OUTPATIENT
Start: 2025-02-20

## 2025-02-23 DIAGNOSIS — J98.8 RESPIRATORY INFECTION: ICD-10-CM

## 2025-02-23 NOTE — TELEPHONE ENCOUNTER
No care due was identified.  [facility] Embedded Care Due Messages. Reference number: [id number].   2/23/2025 6:54:15 AM CST

## 2025-02-24 ENCOUNTER — OFFICE VISIT (OUTPATIENT)
Dept: FAMILY MEDICINE | Facility: CLINIC | Age: 60
End: 2025-02-24
Payer: COMMERCIAL

## 2025-02-24 ENCOUNTER — CLINICAL SUPPORT (OUTPATIENT)
Dept: REHABILITATION | Facility: HOSPITAL | Age: 60
End: 2025-02-24
Payer: COMMERCIAL

## 2025-02-24 VITALS
BODY MASS INDEX: 40.04 KG/M2 | SYSTOLIC BLOOD PRESSURE: 122 MMHG | HEIGHT: 64 IN | DIASTOLIC BLOOD PRESSURE: 80 MMHG | TEMPERATURE: 98 F | WEIGHT: 234.56 LBS | OXYGEN SATURATION: 98 % | HEART RATE: 71 BPM | RESPIRATION RATE: 19 BRPM

## 2025-02-24 DIAGNOSIS — E78.49 OTHER HYPERLIPIDEMIA: Primary | Chronic | ICD-10-CM

## 2025-02-24 DIAGNOSIS — D50.9 IRON DEFICIENCY ANEMIA, UNSPECIFIED IRON DEFICIENCY ANEMIA TYPE: Chronic | ICD-10-CM

## 2025-02-24 DIAGNOSIS — R73.03 PREDIABETES: Chronic | ICD-10-CM

## 2025-02-24 DIAGNOSIS — M25.511 ACUTE PAIN OF RIGHT SHOULDER: Primary | ICD-10-CM

## 2025-02-24 DIAGNOSIS — M25.611 DECREASED ROM OF RIGHT SHOULDER: ICD-10-CM

## 2025-02-24 DIAGNOSIS — R29.898 WEAKNESS OF RIGHT UPPER EXTREMITY: ICD-10-CM

## 2025-02-24 DIAGNOSIS — I10 PRIMARY HYPERTENSION: Chronic | ICD-10-CM

## 2025-02-24 PROCEDURE — G2211 COMPLEX E/M VISIT ADD ON: HCPCS | Mod: S$GLB,,, | Performed by: FAMILY MEDICINE

## 2025-02-24 PROCEDURE — 3044F HG A1C LEVEL LT 7.0%: CPT | Mod: CPTII,S$GLB,, | Performed by: FAMILY MEDICINE

## 2025-02-24 PROCEDURE — 1159F MED LIST DOCD IN RCRD: CPT | Mod: CPTII,S$GLB,, | Performed by: FAMILY MEDICINE

## 2025-02-24 PROCEDURE — 97112 NEUROMUSCULAR REEDUCATION: CPT | Mod: PN

## 2025-02-24 PROCEDURE — 99214 OFFICE O/P EST MOD 30 MIN: CPT | Mod: S$GLB,,, | Performed by: FAMILY MEDICINE

## 2025-02-24 PROCEDURE — 99999 PR PBB SHADOW E&M-EST. PATIENT-LVL IV: CPT | Mod: PBBFAC,,, | Performed by: FAMILY MEDICINE

## 2025-02-24 PROCEDURE — 1160F RVW MEDS BY RX/DR IN RCRD: CPT | Mod: CPTII,S$GLB,, | Performed by: FAMILY MEDICINE

## 2025-02-24 PROCEDURE — 3074F SYST BP LT 130 MM HG: CPT | Mod: CPTII,S$GLB,, | Performed by: FAMILY MEDICINE

## 2025-02-24 PROCEDURE — 97140 MANUAL THERAPY 1/> REGIONS: CPT | Mod: PN

## 2025-02-24 PROCEDURE — 3079F DIAST BP 80-89 MM HG: CPT | Mod: CPTII,S$GLB,, | Performed by: FAMILY MEDICINE

## 2025-02-24 PROCEDURE — 3008F BODY MASS INDEX DOCD: CPT | Mod: CPTII,S$GLB,, | Performed by: FAMILY MEDICINE

## 2025-02-24 RX ORDER — VALSARTAN AND HYDROCHLOROTHIAZIDE 160; 25 MG/1; MG/1
1 TABLET ORAL DAILY
Qty: 90 TABLET | Refills: 3 | Status: SHIPPED | OUTPATIENT
Start: 2025-02-24 | End: 2026-02-24

## 2025-02-24 RX ORDER — ALBUTEROL SULFATE 90 UG/1
INHALANT RESPIRATORY (INHALATION)
Qty: 8 G | Refills: 0 | Status: SHIPPED | OUTPATIENT
Start: 2025-02-24 | End: 2025-02-24 | Stop reason: ALTCHOICE

## 2025-02-24 RX ORDER — ATORVASTATIN CALCIUM 20 MG/1
20 TABLET, FILM COATED ORAL DAILY
Qty: 90 TABLET | Refills: 3 | Status: SHIPPED | OUTPATIENT
Start: 2025-02-24 | End: 2026-02-24

## 2025-02-24 NOTE — PROGRESS NOTES
[facility]   Physical Therapy Treatment Note     Name: Mary Naranjo  [facility] Number: [id number]    Therapy Diagnosis:   Encounter Diagnoses   Name Primary?    Acute pain of right shoulder Yes    Decreased ROM of right shoulder     Weakness of right upper extremity      Physician: Maite Cruz MD    Visit Date: 2/24/2025    Physician Orders: PT Eval and Treat   Medical Diagnosis from Referral: M75.121 (ICD-10-CM) - Complete tear of right rotator cuff   Evaluation Date: 11/11/2024  Authorization Period Expiration: 12/31/25  Plan of Care Expiration: 3/11/25  Visit # / Visits authorized: 7/ 16    Progress Note Due: 3/12/24   FOTO: 1/1    PTA Visit: 4/5     Precautions: Standard, Plan to follow supraspinatus repair protocol (<3 cm in total size). Passive motion may begin at 2 weeks. Active motion at 6 weeks. No biceps resistive activity x 8 weeks. No cuff resistive activity x 12 weeks.   Date of surgery: 10/22/24, 17 weeks and 6 days (4 months) post-op       OPERATION:   Right Shoulder  1. Arthroscopic  supraspinatus rotator cuff repair   2. Arthroscopic biceps tenotomy   3. Extensive debridement    Time In: 3:05 pm   Time Out: 4:00 pm  Total Billable Time: 55 minutes    SUBJECTIVE     Pt reports: that she gets pain when reaching behind her back.    She was NOT compliant with home exercise program.  Response to previous treatment: good  Functional change: improved ROM    Pain: 5/10   Location: right shoulder      OBJECTIVE     Objective Measures updated at progress report unless specified.      Active Range of Motion in (degrees):   Shoulder Right (2/24/25) Left   Flexion 145 WFL   Abduction 90 WFL   ER Functional reach (C7) Functional reach T1   IR  Functional reach (to R posterior hip) Functional reach T7     Passive Range of Motion (degrees):   Shoulder Right (2/24/24)   Flexion  120 degrees   Abduction 95 degrees   ER @ 90 degree shoulder abduction 30, 7/10    IR @ 90 degree shoulder  "abduction 80, 8/10 pain     Upper Extremity Strength  (R) UE Initial eval  2/24/25 (L) UE Initial eval    Elbow flexion: NP due to surgery 4- Elbow flexion: 4+/5   Elbow extension: NP due to surgery 4- Elbow extension: 4+/5   Shoulder flexion: NP due to surgery 3+ Shoulder flexion: 4+/5   Shoulder Abduction: NP due to surgery 3+ Shoulder abduction: 4+/5   Shoulder ER NP due to surgery 3- Shoulder ER 4+/5   Shoulder IR NP due to surgery 3+ Shoulder IR 4+/5      TREATMENT     Exercises performed: in bold     Mary received the treatments listed below:      therapeutic activities to improve functional performance and functional mobility for 8  minutes, including:  UBE 8 min (4 min fwd and 4 min bkwd)    Pt performs at home:  Codman pendulum fwd/bwd, lat, CW&CCW 3x10 ea (pt perform at home)  Pulleys shoulder Flexion 3 min x 3 sec hold (facing the door)  Pulleys shoulder Scaption 3 min x 3 sec hold  (facing the door, chair angle at 45 degree toward Left side)  [place] SL shoulder flexion 3x10  Standing shoulder flexion stretch 5x10 sec     neuromuscular re-education activities to improve: Balance, Proprioception, Posture, and muscles motor control for 39 minutes. The following activities were included:  Supine shoulder flexion AAROM +3# wand 2x10  Supine shoulder Chest press AAROM +3# wand 2x10  Supine AAROM shoulder ER using +3# wand 20x  Supine Serratus Anterior punch +3# wand 2x10  Standing Wall slides flexion AAROM 2x10x3"  Standing Wall [place] with green medicine ball CW/CCW 3x30" each  SL shoulder ER, 2#, 2x10  SL Shoulder Abduction 1#, 2x10  Prone Y/T/I/Rows 2# 2x10 free weight on Y, may try 1# next visit)  standing shoulder rows RTB 2x10x3"  Standing shoulder extension RTB 2x10x3"  +Standing Scaption 2x10 (difficulty due to limited of ROM)    Consider to add:  IR stretch  No Money  Horizontal Abduction    received the following manual therapy techniques: soft tissue mobilization were applied to the: R upper " trapezius for 08 minutes, including:   PROM shoulder ER, flexion, and ABD  R GHJ mobs (grade 2-3) posterior and inferior    received therapeutic exercises to develop strength and ROM for 00  minutes including:    PATIENT EDUCATION AND HOME EXERCISES     Home Exercises Provided and Patient Education Provided     Education provided:   Pt was instructed to follow protocol for this surgery.   Pt educated to perform shoulder rolls, scapular retractions, UT and levator scap stretches at home, along with wrist exercises. Pt also educated to apply a heating pack to R upper trap to further decrease muscle tightness and tone.    Written Home Exercises Provided: Patient instructed to cont prior HEP. Exercises were reviewed and Mary was able to demonstrate them prior to the end of the session.  Mary demonstrated good  understanding of the education provided. See EMR under Patient Instructions for exercises provided during therapy sessions    ASSESSMENT   Patient is now 4 months post-op R RTC repair, biceps tenotomy, and extensive debridement. Patient presents to the clinic with mild symptom irritability pre-session and mild irritability post-session. Manual therapy was performed to the R shoulder to improve joint play and allow for optimal movement during corrective exercises.  Patient was re-assessed today. Patient has met 4/5 STGs and 2/6 LTGs. Patient demonstrates improvement in shoulder flexion, abduction, and IR ROM; however, she continues to be limited in shoulder ER ROM secondary to pain. She also continues to demonstrate decreased R shoulder functional strength.  Today's session continued with previous exercises as they remain relevant for functional shoulder strengthening. Patient was challenged with IR strength due to limited functional IR ROM secondary to pain. She still compensates with shoulder shrugging when reaching overhead despite cueing. Will continue to progress per post-op protocol and per patient's  tolerance.    Mary Is progressing well towards her goals.   Pt prognosis is Good.     Pt will continue to benefit from skilled outpatient physical therapy to address the deficits listed in the problem list box on initial evaluation, provide pt/family education and to maximize pt's level of independence in the home and community environment.     Pt's spiritual, cultural and educational needs considered and pt agreeable to plan of care and goals.     Anticipated barriers to physical therapy: none     GOALS: Short Term Goals: 4 weeks  1.Report decreased in pain at worse less than  <   / =  5  /10  to increase tolerance for functional mobility. MET 1/13/25  2. Pt to improve R shoulder passive range of motion flexion 160 deg and ER 60 deg to allow for improved functional mobility to allow for improvement in IADLs. On going  3. Increased R UE MMT 1/2 grade to increase tolerance for ADL and work activities. MET 2/24/25  4. Pt to report be conscious of impaired sitting and standing posture daily to decrease pain. MET 2/24/25  5. Pt to tolerate HEP to improve ROM and independence with ADL's. MET 1/13/25     Long Term Goals: 8 weeks  1.Report decreased in pain at worse less than  <   / =  2  /10  to increase tolerance for functional mobility. On going  2.  Patient will demonstrate improved overall function per FOTO Survey to CI = at least 1% but < 20% impaired, limited or restricted score or less.On going  3.Increased R UE MMT 1 grade to increase tolerance for ADL and work activities.On going  4. Pt to report and demonstrate proper posture in standing and sitting to decrease pain. MET 2/24/25  5. Pt to be Independent with HEP to improve ROM and independence with ADL's.MET 2/24/25  6. Pt to improve passive range of motion flexion 180 deg and ER 75 deg to allow for improvement in IADLs. On going    PLAN     Plan of care Certification: 11/11/2024 to 3/11/25.     Outpatient Physical Therapy 3 times weekly for 20 weeks to  include the following interventions: Electrical Stimulation , Manual Therapy, Moist Heat/ Ice, Neuromuscular Re-ed, Patient Education, Self Care, Therapeutic Activities, and Therapeutic Exercise. Dry needling    Brandon Allen, PT, DPT

## 2025-02-24 NOTE — LETTER
February 24, 2025      [facility]  [address]  [address]  [place] [address]  Phone: [phone]       Patient: Mary Naranjo   YOB: 1965  Date of Visit: 02/24/2025    To Whom It May Concern:    Sheila Naranjo  was at [facility] on 02/24/2025. The patient may return to work/school on 02/25/2025 with no restrictions. If you have any questions or concerns, or if I can be of further assistance, please do not hesitate to contact me.    Sincerely,    Devora Riojas MA

## 2025-02-24 NOTE — PROGRESS NOTES
"  Patient Name: Mary Naranjo    : 1965  MRN: [id number]      Subjective:     Patient ID: Mary is a 59 y.o. female    Chief Complaint:  Follow-up    History of Present Illness    Mary presents today for follow up of blood pressure and cholesterol.    She checks blood pressure daily in the morning with average readings around 123/70s.    She takes Valsartan/Hydrochlorothiazide for blood pressure control and atorvastatin for cholesterol management. Her supplements include vitamin D, slow-release iron, omega fish oil, and a daily multivitamin.    She tolerates slow-release iron supplementation without side effects and denies blood in urine or stools.    ROS:  General: -fever, -chills, -fatigue, -weight gain, -weight loss  Eyes: -vision changes, -redness, -discharge  ENT: -ear pain, -nasal congestion, -sore throat  Cardiovascular: -chest pain, -palpitations, -lower extremity edema  Respiratory: -cough, -shortness of breath  Gastrointestinal: -abdominal pain, -nausea, -vomiting, -diarrhea, -constipation, -blood in stool  Genitourinary: -dysuria, -hematuria, -frequency  Musculoskeletal: -joint pain, -muscle pain  Skin: -rash, -lesion  Neurological: -headache, -dizziness, -numbness, -tingling  Psychiatric: -anxiety, -depression, -sleep difficulty       Objective:   /80 (BP Location: Left arm, Patient Position: Sitting)   Pulse 71   Temp 98 °F (36.7 °C) (Oral)   Resp 19   Ht 5' 4" (1.626 m)   Wt 106.4 kg (234 lb 9.1 oz)   LMP 01/15/2018   SpO2 98%   BMI 40.26 kg/m²     Physical Exam  Constitutional:       General: She is not in acute distress.     Appearance: She is obese. She is not ill-appearing.   HENT:      Head: Normocephalic and atraumatic.      Right Ear: External ear normal.      Left Ear: External ear normal.      Nose: Nose normal.   Eyes:      Extraocular Movements: Extraocular movements intact.      Pupils: Pupils are equal, round, and reactive to light.   Cardiovascular:      Rate and " Rhythm: Normal rate and regular rhythm.      Pulses: Normal pulses.   Pulmonary:      Effort: Pulmonary effort is normal. No respiratory distress.      Breath sounds: No wheezing or rales.   Abdominal:      General: Abdomen is flat. Bowel sounds are normal. There is no distension.      Tenderness: There is no abdominal tenderness. There is no guarding.   Musculoskeletal:      Cervical back: Normal range of motion. No rigidity.      Right lower leg: No edema.      Left lower leg: No edema.   Lymphadenopathy:      Cervical: No cervical adenopathy.   Skin:     Capillary Refill: Capillary refill takes less than 2 seconds.   Neurological:      General: No focal deficit present.      Mental Status: She is alert.   Psychiatric:         Behavior: Behavior normal.         Thought Content: Thought content normal.        Lab Visit on 02/14/2025   Component Date Value Ref Range Status    WBC 02/14/2025 7.32  3.90 - 12.70 K/uL Final    RBC 02/14/2025 4.48  4.00 - 5.40 M/uL Final    Hemoglobin 02/14/2025 11.0 (L)  12.0 - 16.0 g/dL Final    Hematocrit 02/14/2025 36.4 (L)  37.0 - 48.5 % Final    MCV 02/14/2025 81 (L)  82 - 98 fL Final    MCH 02/14/2025 24.6 (L)  27.0 - 31.0 pg Final    MCHC 02/14/2025 30.2 (L)  32.0 - 36.0 g/dL Final    RDW 02/14/2025 15.8 (H)  11.5 - 14.5 % Final    Platelets 02/14/2025 332  150 - 450 K/uL Final    MPV 02/14/2025 10.1  9.2 - 12.9 fL Final    Immature Granulocytes 02/14/2025 0.3  0.0 - 0.5 % Final    Gran # (ANC) 02/14/2025 3.4  1.8 - 7.7 K/uL Final    Immature Grans (Abs) 02/14/2025 0.02  0.00 - 0.04 K/uL Final    Comment: Mild elevation in immature granulocytes is non specific and   can be seen in a variety of conditions including stress response,   acute inflammation, trauma and pregnancy. Correlation with other   laboratory and clinical findings is essential.      Lymph # 02/14/2025 2.8  1.0 - 4.8 K/uL Final    Mono # 02/14/2025 0.6  0.3 - 1.0 K/uL Final    Eos # 02/14/2025 0.5  0.0 - 0.5 K/uL  Final    Baso # 02/14/2025 0.05  0.00 - 0.20 K/uL Final    nRBC 02/14/2025 0  0 /100 WBC Final    Gran % 02/14/2025 46.0  38.0 - 73.0 % Final    Lymph % 02/14/2025 38.0  18.0 - 48.0 % Final    Mono % 02/14/2025 8.6  4.0 - 15.0 % Final    Eosinophil % 02/14/2025 6.4  0.0 - 8.0 % Final    Basophil % 02/14/2025 0.7  0.0 - 1.9 % Final    Differential Method 02/14/2025 Automated   Final    Sodium 02/14/2025 142  136 - 145 mmol/L Final    Potassium 02/14/2025 3.9  3.5 - 5.1 mmol/L Final    Chloride 02/14/2025 106  95 - 110 mmol/L Final    CO2 02/14/2025 27  23 - 29 mmol/L Final    Glucose 02/14/2025 92  70 - 110 mg/dL Final    BUN 02/14/2025 15  6 - 20 mg/dL Final    Creatinine 02/14/2025 0.8  0.5 - 1.4 mg/dL Final    Calcium 02/14/2025 9.7  8.7 - 10.5 mg/dL Final    Total Protein 02/14/2025 7.5  6.0 - 8.4 g/dL Final    Albumin 02/14/2025 3.9  3.5 - 5.2 g/dL Final    Total Bilirubin 02/14/2025 0.8  0.1 - 1.0 mg/dL Final    Comment: For infants and newborns, interpretation of results should be based  on gestational age, weight and in agreement with clinical  observations.    Premature Infant recommended reference ranges:  Up to 24 hours.............<8.0 mg/dL  Up to 48 hours............<12.0 mg/dL  3-5 days..................<15.0 mg/dL  6-29 days.................<15.0 mg/dL      Alkaline Phosphatase 02/14/2025 104  40 - 150 U/L Final    AST 02/14/2025 26  10 - 40 U/L Final    ALT 02/14/2025 43  10 - 44 U/L Final    eGFR 02/14/2025 >60  >60 mL/min/1.73 m^2 Final    Anion Gap 02/14/2025 9  8 - 16 mmol/L Final    Hemoglobin A1C 02/14/2025 5.8 (H)  4.0 - 5.6 % Final    Comment: ADA Screening Guidelines:  5.7-6.4%  Consistent with prediabetes  >or=6.5%  Consistent with diabetes    High levels of fetal hemoglobin interfere with the HbA1C  assay. Heterozygous hemoglobin variants (HbS, HgC, etc)do  not significantly interfere with this assay.   However, presence of multiple variants may affect accuracy.      Estimated Avg Glucose  02/14/2025 120  68 - 131 mg/dL Final    Cholesterol 02/14/2025 143  120 - 199 mg/dL Final    Comment: The National Cholesterol Education Program (NCEP) has set the  following guidelines (reference ranges) for Cholesterol:  Optimal.....................<200 mg/dL  Borderline High.............200-239 mg/dL  High........................> or = 240 mg/dL      Triglycerides 02/14/2025 62  30 - 150 mg/dL Final    Comment: The National Cholesterol Education Program (NCEP) has set the  following guidelines (reference values) for triglycerides:  Normal......................<150 mg/dL  Borderline High.............150-199 mg/dL  High........................200-499 mg/dL      HDL 02/14/2025 50  40 - 75 mg/dL Final    Comment: The National Cholesterol Education Program (NCEP) has set the  following guidelines (reference values) for HDL Cholesterol:  Low...............<40 mg/dL  Optimal...........>60 mg/dL      LDL Cholesterol 02/14/2025 80.6  63.0 - 159.0 mg/dL Final    Comment: The National Cholesterol Education Program (NCEP) has set the  following guidelines (reference values) for LDL Cholesterol:  Optimal.......................<130 mg/dL  Borderline High...............130-159 mg/dL  High..........................160-189 mg/dL  Very High.....................>190 mg/dL      HDL/Cholesterol Ratio 02/14/2025 35.0  20.0 - 50.0 % Final    Total Cholesterol/HDL Ratio 02/14/2025 2.9  2.0 - 5.0 Final    Non-HDL Cholesterol 02/14/2025 93  mg/dL Final    Comment: Risk category and Non-HDL cholesterol goals:  Coronary heart disease (CHD)or equivalent (10-year risk of CHD >20%):  Non-HDL cholesterol goal     <130 mg/dL  Two or more CHD risk factors and 10-year risk of CHD <= 20%:  Non-HDL cholesterol goal     <160 mg/dL  0 to 1 CHD risk factor:  Non-HDL cholesterol goal     <190 mg/dL      Iron 02/14/2025 48  30 - 160 ug/dL Final    Transferrin 02/14/2025 269  200 - 375 mg/dL Final    TIBC 02/14/2025 398  250 - 450 ug/dL Final    Saturated  Iron 02/14/2025 12 (L)  20 - 50 % Final    Ferritin 02/14/2025 244  20.0 - 300.0 ng/mL Final   Admission on 01/29/2025, Discharged on 01/29/2025   Component Date Value Ref Range Status    QRS Duration 01/29/2025 82  ms Final    [facility] QTC Calculation 01/29/2025 438  ms Final    WBC 01/29/2025 6.78  3.90 - 12.70 K/uL Final    RBC 01/29/2025 5.05  4.00 - 5.40 M/uL Final    Hemoglobin 01/29/2025 12.9  12.0 - 16.0 g/dL Final    Hematocrit 01/29/2025 40.2  37.0 - 48.5 % Final    MCV 01/29/2025 80 (L)  82 - 98 fL Final    MCH 01/29/2025 25.5 (L)  27.0 - 31.0 pg Final    MCHC 01/29/2025 32.1  32.0 - 36.0 g/dL Final    RDW 01/29/2025 15.4 (H)  11.5 - 14.5 % Final    Platelets 01/29/2025 257  150 - 450 K/uL Final    MPV 01/29/2025 9.7  9.2 - 12.9 fL Final    Immature Granulocytes 01/29/2025 0.3  0.0 - 0.5 % Final    Gran # (ANC) 01/29/2025 3.0  1.8 - 7.7 K/uL Final    Immature Grans (Abs) 01/29/2025 0.02  0.00 - 0.04 K/uL Final    Comment: Mild elevation in immature granulocytes is non specific and   can be seen in a variety of conditions including stress response,   acute inflammation, trauma and pregnancy. Correlation with other   laboratory and clinical findings is essential.      Lymph # 01/29/2025 3.0  1.0 - 4.8 K/uL Final    Mono # 01/29/2025 0.6  0.3 - 1.0 K/uL Final    Eos # 01/29/2025 0.1  0.0 - 0.5 K/uL Final    Baso # 01/29/2025 0.03  0.00 - 0.20 K/uL Final    nRBC 01/29/2025 0  0 /100 WBC Final    Gran % 01/29/2025 43.8  38.0 - 73.0 % Final    Lymph % 01/29/2025 44.4  18.0 - 48.0 % Corrected    Comment: CORRECTED RESULT; previously reported as 44.4 White cells-Atypical   (reactive) lymphocytes present on 01/29/2025 at 22:04.      Mono % 01/29/2025 9.3  4.0 - 15.0 % Final    Eosinophil % 01/29/2025 1.8  0.0 - 8.0 % Final    Basophil % 01/29/2025 0.4  0.0 - 1.9 % Final    Differential Method 01/29/2025 Automated   Final    Sodium 01/29/2025 137  136 - 145 mmol/L Final    Potassium 01/29/2025 3.7  3.5 - 5.1 mmol/L  Final    Chloride 01/29/2025 101  95 - 110 mmol/L Final    CO2 01/29/2025 20 (L)  23 - 29 mmol/L Final    Glucose 01/29/2025 91  70 - 110 mg/dL Final    BUN 01/29/2025 21 (H)  6 - 20 mg/dL Final    Creatinine 01/29/2025 1.0  0.5 - 1.4 mg/dL Final    Calcium 01/29/2025 9.8  8.7 - 10.5 mg/dL Final    Total Protein 01/29/2025 8.4  6.0 - 8.4 g/dL Final    Albumin 01/29/2025 4.2  3.5 - 5.2 g/dL Final    Total Bilirubin 01/29/2025 0.6  0.1 - 1.0 mg/dL Final    Comment: For infants and newborns, interpretation of results should be based  on gestational age, weight and in agreement with clinical  observations.    Premature Infant recommended reference ranges:  Up to 24 hours.............<8.0 mg/dL  Up to 48 hours............<12.0 mg/dL  3-5 days..................<15.0 mg/dL  6-29 days.................<15.0 mg/dL      Alkaline Phosphatase 01/29/2025 126  40 - 150 U/L Final    AST 01/29/2025 25  10 - 40 U/L Final    ALT 01/29/2025 45 (H)  10 - 44 U/L Final    eGFR 01/29/2025 >60  >60 mL/min/1.73 m^2 Final    Anion Gap 01/29/2025 16  8 - 16 mmol/L Final    Troponin I 01/29/2025 <0.006  0.000 - 0.026 ng/mL Final    Comment: The reference interval for Troponin I represents the 99th percentile   cutoff   for our facility and is consistent with 3rd generation assay   performance.      BNP 01/29/2025 <10  0 - 99 pg/mL Final    Values of less than 100 pg/ml are consistent with non-CHF populations.        Assessment        ICD-10-CM ICD-9-CM   1. Other hyperlipidemia  E78.49 272.4   2. Primary hypertension  I10 401.9   3. Iron deficiency anemia, unspecified iron deficiency anemia type  D50.9 280.9         Plan:   Assessment & Plan    IMPRESSION:  Reviewed blood test results: kidney function, liver function, and electrolytes are satisfactory  Cholesterol numbers similar to previous results, deemed satisfactory  Blood sugar remains in pre-diabetic range (5.8), considered stable  Noted slight increase in anemia: hemoglobin decreased  from 12.9 to 11, iron levels dropped from 13 to 12  Will increase iron supplementation to address declining levels  Acknowledged patient's ongoing shoulder issue, noted follow-up with Dr. Lopez scheduled for May 5th  Reviewed previous endoscopy schedule, noted alternating strategy between endoscopy and MR LIZ    HYPERTENSION:  Continue Valsartan and Hydrochlorothiazide for blood pressure control.  Mary checks blood pressure regularly, approximately daily in the morning, with average readings around 123/70.  Confirmed that the patient's blood pressure readings are within acceptable range.    HYPERLIPIDEMIA:  Continue atorvastatin for cholesterol management.  Evaluated cholesterol levels, which remain consistent with previous test results and are satisfactory.    ANEMIA:  Increased iron supplementation to twice daily for at least 1 month.  Ordered iron level check after 1 month of increased supplementation.  Instructed the patient to contact the office for iron level results via message, with no need for an in-person visit.  Monitored blood count and iron levels.  Hemoglobin decreased from 12.9 to 11, and iron decreased from 13 to 12.  Assessed that the patient is slightly more anemic than previously.  Iron levels are not critically low, but require attention to prevent further decrease.  Inquired about potential blood loss sources, including hematuria, melena, and menstruation.  Mary is currently taking slow-release iron supplements.    HISTIOPANCREATIC CYSTS:  Will arrange for endoscopic ultrasound (EUS) in approximately 1 year for histiopancreatic cysts.  Informed the patient that they will likely receive a call in a few months to be placed on the wait list for the procedure.    SHOULDER ISSUE:  Confirmed ongoing shoulder issue with the patient.  Mary had shoulder surgery and is currently undergoing therapy.    FAMILY HISTORY OF PANCREATIC CANCER:  Will arrange for endoscopic ultrasound (EUS) in  approximately 1 year, considering the family history of pancreatic cancer in the patient's twin sister.    MEDICATIONS/SUPPLEMENTS:  Continued vitamin D supplementation.  Continued omega fish oil supplementation.  Continued multi-vitamin (1 a day).  Discontinued albuterol, benzonatate, bronfed, and Atrovent nasal spray.    FOLLOW UP:  Scheduled follow-up visit with Dr. Lopez for May 5th.  Follow up in August for annual appointment.           1. Other hyperlipidemia  Overview:  Lab Results   Component Value Date    CHOL 143 02/14/2025    CHOL 144 08/15/2024    CHOL 208 (H) 02/08/2024     Lab Results   Component Value Date    HDL 50 02/14/2025    HDL 50 08/15/2024    HDL 48 02/08/2024     Lab Results   Component Value Date    LDLCALC 80.6 02/14/2025    LDLCALC 78.4 08/15/2024    LDLCALC 144.8 02/08/2024     Lab Results   Component Value Date    TRIG 62 02/14/2025    TRIG 78 08/15/2024    TRIG 76 02/08/2024     Lab Results   Component Value Date    CHOLHDL 35.0 02/14/2025    CHOLHDL 34.7 08/15/2024    CHOLHDL 23.1 02/08/2024     The 10-year ASCVD risk score (Harman ROBERTS, et al., 2019) is: 4.1%    Values used to calculate the score:      Age: 59 years      Sex: Female      Is Non- : Yes      Diabetic: No      Tobacco smoker: No      Systolic Blood Pressure: 122 mmHg      Is BP treated: Yes      HDL Cholesterol: 50 mg/dL      Total Cholesterol: 143 mg/dL     Orders:  -     atorvastatin (LIPITOR) 20 MG tablet; Take 1 tablet (20 mg total) by mouth once daily.  Dispense: 90 tablet; Refill: 3  Lipids at goal.  Continue atorvastatin 20 milligram daily.      2. Primary hypertension  -     valsartan-hydrochlorothiazide (DIOVAN-HCT) 160-25 mg per tablet; Take 1 tablet by mouth once daily.  Dispense: 90 tablet; Refill: 3  -     Comprehensive Metabolic Panel; Future; Expected date: 08/24/2025  Fair blood pressure control.    Continue valsartan-hydrochlorothiazide regimen.      3. Iron deficiency anemia,  unspecified iron deficiency anemia type  Overview:  Lab Results   Component Value Date    HGB 11.0 (L) 02/14/2025    HGB 12.9 01/29/2025    HGB 11.5 (L) 09/30/2024      Lab Results   Component Value Date    HCT 36.4 (L) 02/14/2025    HCT 40.2 01/29/2025    HCT 37.6 09/30/2024     Lab Results   Component Value Date    MCV 81 (L) 02/14/2025    MCV 80 (L) 01/29/2025    MCV 81 (L) 09/30/2024      Lab Results   Component Value Date    RETIC 1.0 08/08/2023    RETIC 0.8 06/18/2019       Lab Results   Component Value Date    IRON 48 02/14/2025    IRON 55 08/15/2024    IRON 61 02/08/2024      Lab Results   Component Value Date    FESATURATED 12 (L) 02/14/2025    FESATURATED 13 (L) 08/15/2024    FESATURATED 14 (L) 02/08/2024      Lab Results   Component Value Date    TIBC 398 02/14/2025    TIBC 420 08/15/2024    TIBC 450 02/08/2024      Lab Results   Component Value Date    FERRITIN 244 02/14/2025    FERRITIN 187 08/15/2024    FERRITIN 173 02/08/2024     Lab Results   Component Value Date    TRANSFERRIN 269 02/14/2025    TRANSFERRIN 284 08/15/2024    TRANSFERRIN 304 02/08/2024      Lab Results   Component Value Date    SEDRATE 20 06/18/2019      Lab Results   Component Value Date    CRP 10.0 (H) 06/18/2019       Orders:  -     Ferritin; Future; Expected date: 04/07/2025  -     Iron and TIBC; Future; Expected date: 04/07/2025  -     CBC auto differential; Future; Expected date: 04/07/2025  -     CBC Auto Differential; Future; Expected date: 08/24/2025  -     Iron and TIBC; Future; Expected date: 08/24/2025  -     Ferritin; Future; Expected date: 08/24/2025  Iron level slightly lower than previous.  Increase SlowFe to 2 tablets daily.  Continue monitoring     4. Prediabetes  Overview:  Lab Results   Component Value Date    HGBA1C 5.8 (H) 02/14/2025    HGBA1C 5.8 (H) 08/15/2024    HGBA1C 5.6 02/08/2024   Discussed implications on prediabetes and risk of progression to diabetes.  Dietary changes discussed and encouraged-  decreased carb intake- sweets, breads, rice, pasta, potato. Also advised to stop soft drinks.  Also encouraged lifestyle changes- increased physical activity.               -Ramon Olivares Jr., MD, AAHIVS      This note was generated with the assistance of ambient listening technology. Verbal consent was obtained by the patient and accompanying visitor(s) for the recording of patient appointment to facilitate this note. I attest to having reviewed and edited the generated note for accuracy, though some syntax or spelling errors may persist. Please contact the author of this note for any clarification.      Patient Instructions   To help with the anemia, increase the iron to twice a day      Follow up in about 6 months (around 8/24/2025) for Annual (fasting labs a week prior).   Future Appointments   Date Time Provider Department Center   3/21/2025  9:45 AM Gayatri Tian MD [facility]   3/24/2025  9:40 AM LAB, [facility] [place] - B   5/5/2025  7:45 AM Maite Cruz MD [facility] - B   8/18/2025  8:00 AM LAB, [facility] [place] LAB [facility] - B   8/25/2025 10:20 AM Ramon Olivares Jr., MD [facility]

## 2025-02-28 ENCOUNTER — CLINICAL SUPPORT (OUTPATIENT)
Dept: REHABILITATION | Facility: HOSPITAL | Age: 60
End: 2025-02-28
Payer: COMMERCIAL

## 2025-02-28 DIAGNOSIS — M25.511 ACUTE PAIN OF RIGHT SHOULDER: Primary | ICD-10-CM

## 2025-02-28 DIAGNOSIS — R29.898 WEAKNESS OF RIGHT UPPER EXTREMITY: ICD-10-CM

## 2025-02-28 DIAGNOSIS — M25.611 DECREASED ROM OF RIGHT SHOULDER: ICD-10-CM

## 2025-02-28 PROCEDURE — 97140 MANUAL THERAPY 1/> REGIONS: CPT | Mod: PN

## 2025-02-28 PROCEDURE — 97112 NEUROMUSCULAR REEDUCATION: CPT | Mod: PN

## 2025-02-28 NOTE — PROGRESS NOTES
[facility]   Physical Therapy Treatment Note     Name: Mary Naranjo  [facility] Number: [id number]    Therapy Diagnosis:   Encounter Diagnoses   Name Primary?    Acute pain of right shoulder Yes    Decreased ROM of right shoulder     Weakness of right upper extremity      Physician: Maite Cruz MD    Visit Date: 2/28/2025    Physician Orders: PT Eval and Treat   Medical Diagnosis from Referral: M75.121 (ICD-10-CM) - Complete tear of right rotator cuff   Evaluation Date: 11/11/2024  Authorization Period Expiration: 12/31/25  Plan of Care Expiration: 3/11/25  Visit # / Visits authorized: 7/ 16    Progress Note Due: 3/12/24   FOTO: 1/1    PTA Visit: 4/5     Precautions: Standard, Plan to follow supraspinatus repair protocol (<3 cm in total size). Passive motion may begin at 2 weeks. Active motion at 6 weeks. No biceps resistive activity x 8 weeks. No cuff resistive activity x 12 weeks.   Date of surgery: 10/22/24, 17 weeks and 6 days (4 months) post-op       OPERATION:   Right Shoulder  1. Arthroscopic  supraspinatus rotator cuff repair   2. Arthroscopic biceps tenotomy   3. Extensive debridement    Time In: 9:00 pm   Time Out: 9:50 pm  Total Billable Time: 50 minutes    SUBJECTIVE     Pt reports: that she gets pain when reaching behind her back. She states that she wants to discharge today and will continue to do her exercises at home to futher improve her ROM and strength.    She was NOT compliant with home exercise program.  Response to previous treatment: good  Functional change: improved ROM    Pain: 5/10   Location: right shoulder      OBJECTIVE     Objective Measures updated at progress report unless specified.      Active Range of Motion in (degrees):   Shoulder Right (2/24/25) Left   Flexion 120 WFL   Abduction 95 WFL   ER AROM Functional reach (C7) Functional reach T1   IR  AROM Functional reach (to R posterior hip) Functional reach T7     Passive Range of Motion (degrees):  "  Shoulder Right (2/24/24)   Flexion  145 degrees   Abduction 95 degrees   ER @ 90 degree shoulder abduction 30, 7/10    IR @ 90 degree shoulder abduction 80, 8/10 pain     Upper Extremity Strength  (R) UE Initial eval  2/24/25 (L) UE Initial eval    Elbow flexion: NP due to surgery 4- Elbow flexion: 4+/5   Elbow extension: NP due to surgery 4- Elbow extension: 4+/5   Shoulder flexion: NP due to surgery 3+ Shoulder flexion: 4+/5   Shoulder Abduction: NP due to surgery 3+ Shoulder abduction: 4+/5   Shoulder ER NP due to surgery 3- Shoulder ER 4+/5   Shoulder IR NP due to surgery 3+ Shoulder IR 4+/5      TREATMENT     Exercises performed: in bold     Mary received the treatments listed below:      therapeutic activities to improve functional performance and functional mobility for 8  minutes, including:  UBE 8 min (4 min fwd and 4 min bkwd)    Pt performs at home:  Codman pendulum fwd/bwd, lat, CW&CCW 3x10 ea (pt perform at home)  Pulleys shoulder Flexion 3 min x 3 sec hold (facing the door)  Pulleys shoulder Scaption 3 min x 3 sec hold  (facing the door, chair angle at 45 degree toward Left side)  Ravia SL shoulder flexion 3x10  Standing shoulder flexion stretch 5x10 sec     neuromuscular re-education activities to improve: Balance, Proprioception, Posture, and muscles motor control for 32 minutes. The following activities were included:  Supine shoulder flexion AAROM +3# wand 2x10  Supine shoulder Chest press AAROM +3# wand 2x10  Supine AAROM shoulder ER using +3# wand 20x  Supine Serratus Anterior punch +3# wand 2x10  Standing Wall slides flexion AAROM 2x10x3"  Standing Wall [place] with green medicine ball CW/CCW 3x30" each  SL shoulder ER, 2#, 2x10  SL Shoulder Abduction 1#, 2x10  Prone Y/T/I/Rows 2# 2x10 free weight on Y, may try 1# next visit)  standing shoulder rows RTB 2x10x3"  Standing shoulder extension RTB 2x10x3"  +Standing Scaption 2x10 (difficulty due to limited of ROM)    Consider to add:  IR " stretch  No Money  Horizontal Abduction    received the following manual therapy techniques: soft tissue mobilization were applied to the: R upper trapezius for 10 minutes, including:   PROM shoulder ER, flexion, and ABD  R GHJ mobs (grade 2-3) posterior and inferior    received therapeutic exercises to develop strength and ROM for 00  minutes including:    PATIENT EDUCATION AND HOME EXERCISES     Home Exercises Provided and Patient Education Provided     Education provided:   Pt was instructed to follow protocol for this surgery.   Pt educated to perform shoulder rolls, scapular retractions, UT and levator scap stretches at home, along with wrist exercises. Pt also educated to apply a heating pack to R upper trap to further decrease muscle tightness and tone.    Written Home Exercises Provided: Patient instructed to cont prior HEP. Exercises were reviewed and Mary was able to demonstrate them prior to the end of the session.  Mary demonstrated good  understanding of the education provided. See EMR under Patient Instructions for exercises provided during therapy sessions    ASSESSMENT   Patient is now 4 months post-op R RTC repair, biceps tenotomy, and extensive debridement. Patient presents to the clinic with mild symptom irritability pre-session and mild irritability post-session. Manual therapy was performed to the R shoulder to improve joint play and allow for optimal movement during corrective exercises.  Patient was re-assessed on 2/24/25. Patient has met 4/5 STGs and 2/6 LTGs. Patient demonstrates improvement in shoulder flexion, abduction, and IR ROM; however, she continues to be limited in shoulder ER and abduction ROM secondary to pain. She also continues to demonstrate decreased R shoulder functional strength.  Today's session continued with previous exercises as they remain relevant for functional shoulder strengthening. Patient was challenged with IR strength due to limited functional IR ROM  secondary to pain. She still compensates with shoulder shrugging when reaching overhead despite cueing. Patient requested to be discharged from PT today with continuation of HEP. Patient was provided with an updated HEP. Patient demonstrated understanding.     Mary Is progressing well towards her goals.   Pt prognosis is Good.     Pt will continue to benefit from skilled outpatient physical therapy to address the deficits listed in the problem list box on initial evaluation, provide pt/family education and to maximize pt's level of independence in the home and community environment.     Pt's spiritual, cultural and educational needs considered and pt agreeable to plan of care and goals.     Anticipated barriers to physical therapy: none     GOALS: Short Term Goals: 4 weeks  1.Report decreased in pain at worse less than  <   / =  5  /10  to increase tolerance for functional mobility. MET 1/13/25  2. Pt to improve R shoulder passive range of motion flexion 160 deg and ER 60 deg to allow for improved functional mobility to allow for improvement in IADLs. On going  3. Increased R UE MMT 1/2 grade to increase tolerance for ADL and work activities. MET 2/24/25  4. Pt to report be conscious of impaired sitting and standing posture daily to decrease pain. MET 2/24/25  5. Pt to tolerate HEP to improve ROM and independence with ADL's. MET 1/13/25     Long Term Goals: 8 weeks  1.Report decreased in pain at worse less than  <   / =  2  /10  to increase tolerance for functional mobility. On going  2.  Patient will demonstrate improved overall function per FOTO Survey to CI = at least 1% but < 20% impaired, limited or restricted score or less.On going  3.Increased R UE MMT 1 grade to increase tolerance for ADL and work activities.On going  4. Pt to report and demonstrate proper posture in standing and sitting to decrease pain. MET 2/24/25  5. Pt to be Independent with HEP to improve ROM and independence with ADL's.MET  2/24/25  6. Pt to improve passive range of motion flexion 180 deg and ER 75 deg to allow for improvement in IADLs. On going    PLAN     Patient is discharged today.     Brandon Allen, PT, DPT

## 2025-03-21 ENCOUNTER — OFFICE VISIT (OUTPATIENT)
Dept: OPHTHALMOLOGY | Facility: CLINIC | Age: 60
End: 2025-03-21
Payer: COMMERCIAL

## 2025-03-21 DIAGNOSIS — H26.493 PCO (POSTERIOR CAPSULAR OPACIFICATION), BILATERAL: ICD-10-CM

## 2025-03-21 DIAGNOSIS — H04.123 DRY EYE SYNDROME OF BOTH EYES: ICD-10-CM

## 2025-03-21 DIAGNOSIS — Z96.1 PSEUDOPHAKIA OF BOTH EYES: Primary | ICD-10-CM

## 2025-03-21 PROCEDURE — 99999 PR PBB SHADOW E&M-EST. PATIENT-LVL I: CPT | Mod: PBBFAC,,, | Performed by: STUDENT IN AN ORGANIZED HEALTH CARE EDUCATION/TRAINING PROGRAM

## 2025-03-21 NOTE — PROGRESS NOTES
Subjective:  HPI    DLS: 1/07/2025 With Dr. Spear    Pt here for PCO/YAG Eval per Dr. Spear;  Pt states vision has been blurry. Pt states she had cataract surgery a   couple years ago by a doctor at [facility]. Pt states she does   notice floaters, denies any flashes or diplopia.     Meds;  NO GTTS      Last edited by Nydia Nguyen on 3/21/2025  9:44 AM.        Exam:  See encounter report for full exam    Assessment:  1. Pseudophakia of both eyes  2. PCO (posterior capsular opacification), bilateral  Referral for YAG OU  [facility]VA 20/20    3. Dry eye syndrome of both eyes  C/o blurriness that improves with blinking    Plan:  - Start ATs QID OU    Return annual optometry    Gayatri Tian MD  [facility]

## 2025-03-24 ENCOUNTER — RESULTS FOLLOW-UP (OUTPATIENT)
Dept: FAMILY MEDICINE | Facility: CLINIC | Age: 60
End: 2025-03-24

## 2025-03-24 ENCOUNTER — LAB VISIT (OUTPATIENT)
Dept: LAB | Facility: HOSPITAL | Age: 60
End: 2025-03-24
Attending: FAMILY MEDICINE
Payer: COMMERCIAL

## 2025-03-24 DIAGNOSIS — D50.9 IRON DEFICIENCY ANEMIA, UNSPECIFIED IRON DEFICIENCY ANEMIA TYPE: ICD-10-CM

## 2025-03-24 LAB
ABSOLUTE EOSINOPHIL (OHS): 0.33 K/UL
ABSOLUTE MONOCYTE (OHS): 0.46 K/UL (ref 0.3–1)
ABSOLUTE NEUTROPHIL COUNT (OHS): 2.14 K/UL (ref 1.8–7.7)
BASOPHILS # BLD AUTO: 0.05 K/UL
BASOPHILS NFR BLD AUTO: 0.9 %
ERYTHROCYTE [DISTWIDTH] IN BLOOD BY AUTOMATED COUNT: 16.2 % (ref 11.5–14.5)
FERRITIN SERPL-MCNC: 201.4 NG/ML (ref 20–300)
HCT VFR BLD AUTO: 38.7 % (ref 37–48.5)
HGB BLD-MCNC: 11.8 GM/DL (ref 12–16)
IMM GRANULOCYTES # BLD AUTO: 0.01 K/UL (ref 0–0.04)
IMM GRANULOCYTES NFR BLD AUTO: 0.2 % (ref 0–0.5)
IRON SATN MFR SERPL: 24 % (ref 20–50)
IRON SERPL-MCNC: 91 UG/DL (ref 30–160)
LYMPHOCYTES # BLD AUTO: 2.44 K/UL (ref 1–4.8)
MCH RBC QN AUTO: 25.5 PG (ref 27–50)
MCHC RBC AUTO-ENTMCNC: 30.5 G/DL (ref 32–36)
MCV RBC AUTO: 84 FL (ref 82–98)
NUCLEATED RBC (/100WBC) (OHS): 0 /100 WBC
PLATELET # BLD AUTO: 306 K/UL (ref 150–450)
PMV BLD AUTO: 10.7 FL (ref 9.2–12.9)
RBC # BLD AUTO: 4.62 M/UL (ref 4–5.4)
RELATIVE EOSINOPHIL (OHS): 6.1 %
RELATIVE LYMPHOCYTE (OHS): 44.9 % (ref 18–48)
RELATIVE MONOCYTE (OHS): 8.5 % (ref 4–15)
RELATIVE NEUTROPHIL (OHS): 39.4 % (ref 38–73)
TIBC SERPL-MCNC: 386 UG/DL (ref 250–450)
TRANSFERRIN SERPL-MCNC: 261 MG/DL (ref 200–375)
WBC # BLD AUTO: 5.43 K/UL (ref 3.9–12.7)

## 2025-03-24 PROCEDURE — 85025 COMPLETE CBC W/AUTO DIFF WBC: CPT

## 2025-03-24 PROCEDURE — 36415 COLL VENOUS BLD VENIPUNCTURE: CPT | Mod: PN

## 2025-03-24 PROCEDURE — 84466 ASSAY OF TRANSFERRIN: CPT

## 2025-03-24 PROCEDURE — 82728 ASSAY OF FERRITIN: CPT

## 2025-04-10 ENCOUNTER — HOSPITAL ENCOUNTER (EMERGENCY)
Facility: HOSPITAL | Age: 60
Discharge: HOME | End: 2025-04-10
Attending: EMERGENCY MEDICINE
Payer: COMMERCIAL

## 2025-04-10 VITALS
RESPIRATION RATE: 20 BRPM | WEIGHT: 227 LBS | HEART RATE: 80 BPM | BODY MASS INDEX: 38.76 KG/M2 | SYSTOLIC BLOOD PRESSURE: 118 MMHG | DIASTOLIC BLOOD PRESSURE: 58 MMHG | OXYGEN SATURATION: 99 % | TEMPERATURE: 98 F | HEIGHT: 64 IN

## 2025-04-10 DIAGNOSIS — K52.9 COLITIS: Primary | ICD-10-CM

## 2025-04-10 LAB
ALBUMIN SERPL-MCNC: 4 G/DL (ref 3.3–5.5)
ALBUMIN SERPL-MCNC: 4.2 G/DL (ref 3.3–5.5)
ALP SERPL-CCNC: 92 U/L (ref 42–141)
ALP SERPL-CCNC: 96 U/L (ref 42–141)
APTT PPP: 25 SECONDS (ref 21–32)
BILIRUB SERPL-MCNC: 1.3 MG/DL (ref 0.2–1.6)
BILIRUB SERPL-MCNC: 1.3 MG/DL (ref 0.2–1.6)
BUN SERPL-MCNC: 10 MG/DL (ref 7–22)
CALCIUM SERPL-MCNC: 10.1 MG/DL (ref 8–10.3)
CHLORIDE SERPL-SCNC: 106 MMOL/L (ref 98–108)
CREAT SERPL-MCNC: 0.8 MG/DL (ref 0.6–1.2)
GLUCOSE SERPL-MCNC: 102 MG/DL (ref 73–118)
HCT, POC: NORMAL
HGB, POC: NORMAL (ref 14–18)
MCH, POC: NORMAL
MCHC, POC: NORMAL
MCV, POC: NORMAL
MPV, POC: NORMAL
POC ALT (SGPT): 28 U/L (ref 10–47)
POC ALT (SGPT): 28 U/L (ref 10–47)
POC AMYLASE: 62 U/L (ref 14–97)
POC AST (SGOT): 29 U/L (ref 11–38)
POC AST (SGOT): 30 U/L (ref 11–38)
POC GGT: 27 U/L (ref 5–65)
POC PLATELET COUNT: NORMAL
POC TCO2: 29 MMOL/L (ref 18–33)
POTASSIUM BLD-SCNC: 3.5 MMOL/L (ref 3.6–5.1)
PROTEIN, POC: 6.8 G/DL (ref 6.4–8.1)
PROTEIN, POC: 7 G/DL (ref 6.4–8.1)
RBC, POC: NORMAL
RDW, POC: NORMAL
SODIUM BLD-SCNC: 139 MMOL/L (ref 128–145)
WBC, POC: NORMAL

## 2025-04-10 PROCEDURE — 25000003 PHARM REV CODE 250: Mod: ER | Performed by: EMERGENCY MEDICINE

## 2025-04-10 PROCEDURE — 82040 ASSAY OF SERUM ALBUMIN: CPT | Mod: 59,ER

## 2025-04-10 PROCEDURE — 96361 HYDRATE IV INFUSION ADD-ON: CPT | Mod: ER

## 2025-04-10 PROCEDURE — 85025 COMPLETE CBC W/AUTO DIFF WBC: CPT | Mod: ER

## 2025-04-10 PROCEDURE — 80053 COMPREHEN METABOLIC PANEL: CPT | Mod: ER

## 2025-04-10 PROCEDURE — 96374 THER/PROPH/DIAG INJ IV PUSH: CPT | Mod: ER

## 2025-04-10 PROCEDURE — 25000003 PHARM REV CODE 250: Mod: ER

## 2025-04-10 PROCEDURE — 25500020 PHARM REV CODE 255: Mod: ER | Performed by: EMERGENCY MEDICINE

## 2025-04-10 PROCEDURE — 99285 EMERGENCY DEPT VISIT HI MDM: CPT | Mod: 25,ER

## 2025-04-10 PROCEDURE — 85730 THROMBOPLASTIN TIME PARTIAL: CPT

## 2025-04-10 PROCEDURE — 82150 ASSAY OF AMYLASE: CPT | Mod: ER

## 2025-04-10 PROCEDURE — 63600175 PHARM REV CODE 636 W HCPCS: Mod: ER

## 2025-04-10 RX ORDER — ONDANSETRON HYDROCHLORIDE 2 MG/ML
4 INJECTION, SOLUTION INTRAVENOUS
Status: COMPLETED | OUTPATIENT
Start: 2025-04-10 | End: 2025-04-10

## 2025-04-10 RX ORDER — SODIUM CHLORIDE 9 MG/ML
1000 INJECTION, SOLUTION INTRAVENOUS
Status: COMPLETED | OUTPATIENT
Start: 2025-04-10 | End: 2025-04-10

## 2025-04-10 RX ORDER — AZITHROMYCIN 500 MG/1
500 TABLET, FILM COATED ORAL DAILY
Qty: 3 TABLET | Refills: 0 | Status: SHIPPED | OUTPATIENT
Start: 2025-04-10 | End: 2025-04-13

## 2025-04-10 RX ORDER — ONDANSETRON 4 MG/1
4 TABLET, ORALLY DISINTEGRATING ORAL EVERY 6 HOURS PRN
Qty: 14 TABLET | Refills: 0 | Status: SHIPPED | OUTPATIENT
Start: 2025-04-10 | End: 2025-04-16 | Stop reason: SDUPTHER

## 2025-04-10 RX ORDER — DICYCLOMINE HYDROCHLORIDE 10 MG/1
20 CAPSULE ORAL
Status: COMPLETED | OUTPATIENT
Start: 2025-04-10 | End: 2025-04-10

## 2025-04-10 RX ORDER — ONDANSETRON 4 MG/1
4 TABLET, ORALLY DISINTEGRATING ORAL
Status: COMPLETED | OUTPATIENT
Start: 2025-04-10 | End: 2025-04-10

## 2025-04-10 RX ADMIN — SODIUM CHLORIDE 1000 ML: 9 INJECTION, SOLUTION INTRAVENOUS at 02:04

## 2025-04-10 RX ADMIN — DICYCLOMINE HYDROCHLORIDE 20 MG: 10 CAPSULE ORAL at 02:04

## 2025-04-10 RX ADMIN — ONDANSETRON 4 MG: 2 INJECTION INTRAMUSCULAR; INTRAVENOUS at 02:04

## 2025-04-10 RX ADMIN — IOHEXOL 100 ML: 350 INJECTION, SOLUTION INTRAVENOUS at 03:04

## 2025-04-10 RX ADMIN — ONDANSETRON 4 MG: 4 TABLET, ORALLY DISINTEGRATING ORAL at 04:04

## 2025-04-10 NOTE — Clinical Note
"Mary Carlisle"Marcella was seen and treated in our emergency department on 4/10/2025.  She may return to work on 04/11/2025.       If you have any questions or concerns, please don't hesitate to call.      Adiel Chavez PA-C"

## 2025-04-10 NOTE — Clinical Note
"Mary Carlisle" Marcella was seen and treated in our emergency department on 4/10/2025.  She may return to work on 04/14/2025.       If you have any questions or concerns, please don't hesitate to call.      WILLIE Baker RN    "

## 2025-04-10 NOTE — DISCHARGE INSTRUCTIONS
You were seen in the emergency department today for colitis.  Please take all medications as prescribed and as we discussed.  Follow-up with specialist if instructed to do so.  It is important to remember that some problems are difficult to diagnose and may not be found during your Emergency Department visit. Be sure to follow up with your primary care doctor and review all labs/imaging/tests that were performed during this visit with them. Some labs/tests may be outside of the normal range and require non-emergent follow-up and further investigation to help diagnose/exclude/prevent complications or other medical conditions. Return to the emergency department for any new or worsening symptoms. Thank you for allowing me to care for you today, it was my pleasure. I hope you get to feeling better soon!

## 2025-04-10 NOTE — ED PROVIDER NOTES
Encounter Date: 4/10/2025    SCRIBE #1 NOTE: I, Idris Justin Do, am scribing for, and in the presence of,  Adiel Chavez PA-C. I have scribed the following portions of the note - Other sections scribed: HPI, ROS, PE.       History     Chief Complaint   Patient presents with    Vomiting     Pt presents w/ a c/o of vomiting, and diarrhea since yesterday. Lack of PO at home. Pt also reports rectal bleeding. Denies any hx of blood thinners or hemorrhoids.      Patient is a 59 y.o. female with a past medical history of hypertension and hyperlipidemia who presents to the Emergency Department for evaluation of nausea, vomiting, diarrhea x 1 day. Reports associated generalized abdominal pain. Also reports blood in stool, subjective fever. She states blood is leaking out of her rectum even without bowel movements.  Patient states eating a salmon dinner, also states eating popcorn with butter at the movie theater prior to her symptoms. Patient denies her spouse and friends having similar symptoms. She reports a history of diverticulitis. She denies a history of diabetes, known heart problems, and known kidney problems. She denies any recent sick contacts.  Patient states that she eats salads, rice, pork, and other mixed vegetables. She has taken [place] (2x) and Pepto-Bismol for the symptoms, denies any medications.   She denies dysuria, hematuria, cough, congestion, or sore throat. No rectal pain.         The history is provided by the patient. No  was used.     Review of patient's allergies indicates:  No Known Allergies  Past Medical History:   Diagnosis Date    Arthritis     COVID-19 02/08/2023    Dental bridge present     upper removable partial    Family history of pancreatic cancer     Fibrocystic breast     Migraines     Obesity     Pancreas cyst     Primary hypertension 02/02/2022    Sleep apnea      Past Surgical History:   Procedure Laterality Date    ARTHROSCOPIC REPAIR OF ROTATOR CUFF OF  SHOULDER Right 10/22/2024    Procedure: REPAIR, ROTATOR CUFF, ARTHROSCOPIC WITH EXTENSIVE DEBRIDEMENT;  Surgeon: Maite Cruz MD;  Location: [facility] OR;  Service: Orthopedics;  Laterality: Right;  PATEL&NEPHRHONDA RANGEL NOTIFIED-GG  RN PREOP 09/30/2024-----NEED H/P    BREAST BIOPSY      BREAST CYST ASPIRATION      BREAST SURGERY      Dont remember    COLONOSCOPY N/A 10/07/2015    Procedure: COLONOSCOPY;  Surgeon: Eagle Stack MD;  Location: [facility] ENDO;  Service: Endoscopy;  Laterality: N/A;    DILATION AND CURETTAGE OF UTERUS      ENDOSCOPIC ULTRASOUND OF UPPER GASTROINTESTINAL TRACT N/A 11/03/2020    Procedure: ULTRASOUND, UPPER GI TRACT, ENDOSCOPIC;  Surgeon: Kei Juarez MD;  Location: [facility] (2ND FLR);  Service: Endoscopy;  Laterality: N/A;  Covid-19 test 10/31/20 at [facility]    ENDOSCOPIC ULTRASOUND OF UPPER GASTROINTESTINAL TRACT N/A 07/14/2023    Procedure: ULTRASOUND, UPPER GI TRACT, ENDOSCOPIC;  Surgeon: Burak Garcia MD;  Location: [facility] (2ND FLR);  Service: Endoscopy;  Laterality: N/A;  6/15/23-Instructions via portal-DS  6/26 pre call complete  pre call 7/10, pt confirmed - mf    ESOPHAGOGASTRODUODENOSCOPY N/A 07/19/2019    Procedure: ESOPHAGOGASTRODUODENOSCOPY (EGD);  Surgeon: Cherrie Sheets MD;  Location: [facility];  Service: Endoscopy;  Laterality: N/A;    EYE SURGERY  December 2021    Cateract    fibrocystic breast       KNEE ARTHROSCOPY W/ MENISCAL REPAIR      TUBAL LIGATION       Family History   Problem Relation Name Age of Onset    Heart disease Mother Mone Skelton     Cataracts Mother Mone Skelton     Glaucoma Mother Mone Skelton     Cancer Father Papa Skelton         Prostate, lung, colon, brain, gallbladder, skin    Colon cancer Father Papa Skelton     Pancreatic cancer Sister Ervin 54    Breast cancer Sister Yajaira 42        unilateral    Cancer Sister Yajaira Sandifer 40        Breast    Cancer Sister Ervin Skelton         Pancreatic    No Known Problems Brother  Papa     Asthma Maternal Aunt Micheline Plunkett     Heart disease Maternal Aunt Micheline Plunkett     Heart disease Maternal Aunt Priscila Morejon     No Known Problems Maternal Grandmother      No Known Problems Maternal Grandfather      No Known Problems Paternal Grandmother      No Known Problems Paternal Grandfather      Cancer Maternal Cousin Quinten         unknown origin    Breast cancer Maternal Cousin      Cancer Maternal Cousin          prostate?    Prostate cancer Maternal Cousin      Cervical cancer Other Esperanza     Cancer Other Esperanza         possible ovarian cancer    COPD Neg Hx      Amblyopia Neg Hx      Blindness Neg Hx      Diabetes Neg Hx      Hypertension Neg Hx      Macular degeneration Neg Hx      Retinal detachment Neg Hx      Strabismus Neg Hx      Stroke Neg Hx      Thyroid disease Neg Hx       Social History[1]  Review of Systems   Constitutional:  Positive for fever. Negative for chills.   HENT:  Negative for congestion, ear pain, rhinorrhea, sore throat and trouble swallowing.    Respiratory:  Negative for cough and shortness of breath.    Cardiovascular:  Negative for chest pain.   Gastrointestinal:  Positive for abdominal pain, anal bleeding, blood in stool, diarrhea and vomiting. Negative for rectal pain.   Genitourinary:  Negative for dysuria, flank pain, frequency, hematuria, vaginal bleeding and vaginal discharge.   Musculoskeletal:  Negative for back pain, neck pain and neck stiffness.   Neurological:  Negative for dizziness, light-headedness, numbness and headaches.       Physical Exam     Initial Vitals [04/10/25 1405]   BP Pulse Resp Temp SpO2   (!) 116/53 82 20 98.3 °F (36.8 °C) 99 %      MAP       --         Physical Exam    Nursing note and vitals reviewed.  Constitutional: She appears well-developed and well-nourished.   HENT:   Head: Normocephalic and atraumatic.   Right Ear: External ear normal.   Left Ear: External ear normal.   Neck: Carotid bruit is not present.   Normal range  of motion.  Cardiovascular:  Normal rate, regular rhythm, normal heart sounds and intact distal pulses.     Exam reveals no gallop and no friction rub.       No murmur heard.  Pulmonary/Chest: Breath sounds normal. No respiratory distress. She has no wheezes. She has no rhonchi. She has no rales.   Abdominal: Abdomen is soft. Bowel sounds are normal. She exhibits no distension. There is no abdominal tenderness. There is no rebound and no guarding.   Genitourinary:    Genitourinary Comments:  exam chaperoned by Gwen Coleman LPN. There is minimal stool to the rectal vault. Positive Guaiac result. No active bleeding from rectum.     Musculoskeletal:         General: Normal range of motion.      Cervical back: Normal range of motion.     Neurological: She is alert and oriented to person, place, and time. GCS score is 15. GCS eye subscore is 4. GCS verbal subscore is 5. GCS motor subscore is 6.   Psychiatric: She has a normal mood and affect.         ED Course   Procedures  Labs Reviewed   POCT CMP - Abnormal       Result Value    Albumin, POC 4.0      Alkaline Phosphatase, POC 96      ALT (SGPT), POC 28      AST (SGOT), POC 30      POC BUN 10      Calcium, POC 10.1      POC Chloride 106      POC Creatinine 0.8      POC Glucose 102      POC Potassium 3.5 (*)     POC Sodium 139      Bilirubin, POC 1.3      POC TCO2 29      Protein, POC 6.8     APTT   POCT CBC    Hematocrit        Hemoglobin        RBC        WBC        MCV        MCH, POC        MCHC        RDW-CV        Platelet Count, POC        MPV       POCT CMP   POCT PROTIME-INR   POCT URINALYSIS W/O SCOPE   POCT LIVER PANEL   POCT LIVER PANEL    Albumin, POC 4.2      Alkaline Phosphatase, POC 92      ALT (SGPT), POC 28      Amylase, POC 62      AST (SGOT), POC 29      POC GGT 27      Bilirubin, POC 1.3      Protein, POC 7.0            Imaging Results              CTA Acute GI Bleed, Abdomen and Pelvis (Final result)  Result time 04/10/25 16:05:49      Final  result by Anthony Blankenship MD (04/10/25 16:05:49)                   Impression:      No active arterial extravasation identified.    There is wall thickening and pericolonic stranding diffusely surrounding the descending colon favoring colitis.  No evidence of abscess or perforation.      Electronically signed by: Anthony Blankenship MD  Date:    04/10/2025  Time:    16:05               Narrative:    EXAMINATION:  CTA ACUTE GI BLEED, ABDOMEN AND PELVIS    CLINICAL HISTORY:  rectal bleeding, abdominal pain;    TECHNIQUE:  Low dose axial images, sagittal and coronal reformations were obtained from the lung bases to the pubic symphysis before and following the IV administration of 100 mL of Omnipaque 350.  No oral contrast was given.    COMPARISON:  MRI 08/24/2024    FINDINGS:  Limited evaluation of the lung bases show no concerning masses or nodules.    The liver is normal in size.  No solid mass or biliary ductal dilatation.  The gallbladder is unremarkable.    Spleen, adrenal glands, and pancreas show no focal abnormalities.    Bilateral kidneys are normal in size and position.  No hydronephrosis or nephrolithiasis.  Urinary bladder shows no focal wall thickening.    Gastrointestinal tract shows no evidence of obstruction.  There is wall thickening of the descending colon with pericolonic stranding diffusely surrounding the descending colon.  Normal appendix is present.  There is no evidence of active arterial extravasation into the gastrointestinal tract.  There is no free fluid or free gas.  No concerning lymphadenopathy.    Vascular structures show normal course and caliber.  No evidence of an acute fracture or destructive osseous lesion.  There is degenerative change of the lumbar spine.                                       Medications   0.9% NaCl infusion (1,000 mLs Intravenous New Bag 4/10/25 8775)   ondansetron injection 4 mg (4 mg Intravenous Given 4/10/25 5230)   dicyclomine capsule 20 mg (20 mg Oral  Given 4/10/25 1449)   iohexoL (OMNIPAQUE 350) injection 100 mL (100 mLs Intravenous Given 4/10/25 1556)     Medical Decision Making  This is an emergent evaluation of a 59 y.o. female with a past medical history of hypertension and hyperlipidemia who presents to the Emergency Department for evaluation of nausea, vomiting, diarrhea x 1 day. Reports associated generalized abdominal pain.    Physical exam as above.    Differential diagnosis includes but is not limited to diverticulitis, gastroenteritis, infectious diarrhea, hemorrhoids, anal fissure, appendicitis, cholecystitis, GERD/gastritis, dehydration, electrolyte derangement, active GI bleed.    Workup initiated with basic labs, coags, amylase, urinalysis, CTA abdomen and pelvis.  Ordered fluids, Zofran, Bentyl.  Vital signs, chart, labs, and/or imaging were all reviewed.  See ED course below and interpretations above. My overall impression is colitis. Serial abdominal exam benign. Will discharge home with azithromycin, zofran with GI follow up. Vital signs are reassuring. Patient/Caregiver is stable for discharge at this time.  Patient/Caregiver was informed of results, plan of care, and are comfortable with this.  All questions and concerns were addressed. Discussed strict return precautions with the patient/caregiver. Instructed follow up with primary care provider within 1 week.      Adiel Chavez PA-C    DISCLAIMER: This note was prepared with [employer] voice recognition transcription software. Garbled syntax, mangled pronouns, and other bizarre constructions may be attributed to that software system.       Amount and/or Complexity of Data Reviewed  External Data Reviewed: notes.     Details: See HPI.  Labs: ordered. Decision-making details documented in ED Course.  Radiology: ordered. Decision-making details documented in ED Course.    Risk  Prescription drug management.            Scribe Attestation:   Scribe #1: I performed the above scribed service and the  documentation accurately describes the services I performed. I attest to the accuracy of the note.        ED Course as of 04/10/25 1619   Thu Apr 10, 2025   1422 BP(!): 116/53 [TM]   1422 Temp: 98.3 °F (36.8 °C) [TM]   1422 Pulse: 82 [TM]   1422 Resp: 20 [TM]   1422 SpO2: 99 % [TM]   1523 POCT CBC  CBC is without leukocytosis, mild anemia 11.3/33.4. Normal platelets.  [TM]   1524 POCT CMP(!)  Potassium of 3.5. [TM]   1526 POCT Liver Panel  WNL [TM]   1609 CTA Acute GI Bleed, Abdomen and Pelvis  No active arterial extravasation identified.     There is wall thickening and pericolonic stranding diffusely surrounding the descending colon favoring colitis.  No evidence of abscess or perforation.      [TM]   1616 Informed patient of results.  Serial abdominal exam benign.  Tolerating p.o..  We will discharge home with the empiric antibiotics for colitis, Zofran for nausea, GI follow-up. [TM]      ED Course User Index  [TM] Adiel Chavez PA-C                           Clinical Impression:  Final diagnoses:  [K52.9] Colitis (Primary)       I, Adiel Chavez PA-C, personally performed the services described in this documentation. All medical record entries made by the scribe were at my direction and in my presence. I have reviewed the chart and agree that the record reflects my personal performance and is accurate and complete.      DISCLAIMER: This note was prepared with [employer] voice recognition transcription software. Garbled syntax, mangled pronouns, and other bizarre constructions may be attributed to that software system.     ED Disposition Condition    Discharge Stable          ED Prescriptions       Medication Sig Dispense Start Date End Date Auth. Provider    azithromycin (ZITHROMAX) 500 MG tablet Take 1 tablet (500 mg total) by mouth once daily. Take first 2 tablets together, then 1 every day until finished. for 3 days 3 tablet 4/10/2025 4/13/2025 Adiel Chavez PA-C    ondansetron (ZOFRAN-ODT) 4 MG TbDL Take  1 tablet (4 mg total) by mouth every 6 (six) hours as needed. 14 tablet 4/10/2025 -- Adiel Chavez PA-C          Follow-up Information       Follow up With Specialties Details Why Contact Info    [facility] ED Emergency Medicine Go to  As needed, If symptoms worsen, or new symptoms develop [address]  [facility] [id number] [phone]    Primary care doctor  Schedule an appointment as soon as possible for a visit in 3 days                   [1]   Social History  Tobacco Use    Smoking status: Never    Smokeless tobacco: Never   Substance Use Topics    Alcohol use: No    Drug use: No        Adiel Chavez PA-C  04/10/25 [id number]

## 2025-04-10 NOTE — Clinical Note
Flaco accompanied their spouse to the emergency department on 4/10/2025. They may return to work on 04/11/2025.      If you have any questions or concerns, please don't hesitate to call.      Adiel Chavez PA-C

## 2025-04-15 NOTE — PROGRESS NOTES
Postoperative Visit    History of Present Illness:   Mary is 7 months s/p right rotator cuff repair (full thickness SS) biceps tenotomy and extensive debridement  (DOS- 10/22/24)  Pain exacerbated by mopping this week   Improving with ibuprofen and rest    Physical Examination:    Vitals:    05/05/25 0737   PainSc: 0-No pain   PainLoc: Shoulder     NAD  right upper extremity:  Incision over the shoulder is healing well with suture in place   AROM: , ER 50  Good strength with cuff testing    Assessment/Plan:  59 y.o. female s/p right rotator cuff repair, biceps tenotomy, and extensive debridement (DOS-10/22/24)    Plan  NSAIDs PRN  Activity as tolerated  RTC PRN     All questions were answered in detail. The patient is in full agreement with the treatment plan and will proceed accordingly.       Work Status: Full duty   Date of release to work: 1/27/25

## 2025-04-16 ENCOUNTER — OFFICE VISIT (OUTPATIENT)
Dept: GASTROENTEROLOGY | Facility: CLINIC | Age: 60
End: 2025-04-16
Payer: COMMERCIAL

## 2025-04-16 VITALS
HEART RATE: 74 BPM | BODY MASS INDEX: 39.4 KG/M2 | DIASTOLIC BLOOD PRESSURE: 74 MMHG | HEIGHT: 64 IN | SYSTOLIC BLOOD PRESSURE: 115 MMHG | WEIGHT: 230.81 LBS

## 2025-04-16 DIAGNOSIS — K52.9 COLITIS: ICD-10-CM

## 2025-04-16 DIAGNOSIS — R19.7 DIARRHEA, UNSPECIFIED TYPE: Primary | ICD-10-CM

## 2025-04-16 PROCEDURE — 99999 PR PBB SHADOW E&M-EST. PATIENT-LVL III: CPT | Mod: PBBFAC,,,

## 2025-04-16 RX ORDER — ONDANSETRON 4 MG/1
4 TABLET, ORALLY DISINTEGRATING ORAL EVERY 6 HOURS PRN
Qty: 30 TABLET | Refills: 2 | Status: SHIPPED | OUTPATIENT
Start: 2025-04-16

## 2025-04-16 NOTE — PROGRESS NOTES
"    [facility] Follow-UP Note    Reason for Follow-Up:  The primary encounter diagnosis was Diarrhea, unspecified type. A diagnosis of Colitis was also pertinent to this visit.    PCP:   Ramon Olivares Jr.   [address] / [place] [address]      HPI:  This is a 59 y.o. female with HTN and IBS-C last seen in GI clinic on 8/23/2023 for evaluation of cramping abdominal pain, as well as some blood in her stool, which seemed to have started after she ran out of Linzess. Pt's Linzess 290 mcg was refilled.      Interval History:  Today, pt presents for ED f/u of N/V/D last week. Reports several episodes of bloody diarrhea the day of onset. She also vomited several times before also noticing blood in her vomit. CT scan with descending colitis. She was discharged with Azithromycin and Zofran. She reports finishing her antibiotics and feeling much better today, but is still experiencing some symptoms. Reports LBM was 2 days ago and soft/mushy with some blood in her stool. Vomiting has resolved, but she has intermittent nausea, relieved with Zofran. Reports mild LLQ pain, improved over the past few days, and intermittent reflux. No fever, chills, dysphagia, regurgitation, bloating, belching, loss of appetite, melena, or weight loss. Denies frequent NSAID use. Reports father with colon cancer diagnosed in his 60s. Her last colonoscopy in 2020 with hemorrhoids and repeat recommended in 5 years. Sister with pancreatic cancer. She does have a pancreatic cyst that is being monitored; due for EUS in a few months.    Objective Findings:    Vital Signs:  /74   Pulse 74   Ht 5' 4" (1.626 m)   Wt 104.7 kg (230 lb 13.2 oz)   LMP 01/15/2018   BMI 39.62 kg/m²   Body mass index is 39.62 kg/m².    Physical Exam:  General Appearance: Well appearing in no acute distress  Abdomen: Soft, non tender, non distended in all four quadrants. No hepatosplenomegaly, ascites, or mass    I have personally reviewed " labs and imaging results.     CTA Acute GI Bleed, Abdomen and Pelvis (04/10/2025):  Impression:  No active arterial extravasation identified.   There is wall thickening and pericolonic stranding diffusely surrounding the descending colon favoring colitis.  No evidence of abscess or perforation.    Assessment:  1. Diarrhea, unspecified type    2. Colitis       This is a 59 y.o F here for ED f/u for colitis. Doing better s/p azithro. Characterized by abdominal pain, N/V/D and bloody stools. Had hematemesis after several episodes of vomiting. Will plan for dual scopes to evaluate. She also has first degree family members with colon and pancreatic cancer, and is due for CRC surveillance this year.      - Ordered stool testing  - Ordered EGD/Colonoscopy  - Zofran PRN  - ED precautions discussed with patient    Order summary:  Orders Placed This Encounter    Clostridium difficile EIA    Stool culture    H. pylori antigen, stool    Giardia / Cryptosporidum, EIA    Calprotectin, Stool    Rotavirus antigen, stool    Pancreatic elastase, fecal    ondansetron (ZOFRAN-ODT) 4 MG TbDL     Thank you so much for allowing me to participate in the care of Michell Lofton Sarah Abukhader, PA-C [facility]

## 2025-04-24 ENCOUNTER — PATIENT MESSAGE (OUTPATIENT)
Dept: ORTHOPEDICS | Facility: CLINIC | Age: 60
End: 2025-04-24
Payer: COMMERCIAL

## 2025-04-29 DIAGNOSIS — R19.7 DIARRHEA, UNSPECIFIED TYPE: ICD-10-CM

## 2025-04-29 DIAGNOSIS — K62.5 RECTAL BLEEDING: Primary | ICD-10-CM

## 2025-04-29 DIAGNOSIS — K92.0 HEMATEMESIS, UNSPECIFIED WHETHER NAUSEA PRESENT: ICD-10-CM

## 2025-04-29 DIAGNOSIS — R10.9 ABDOMINAL PAIN, UNSPECIFIED ABDOMINAL LOCATION: ICD-10-CM

## 2025-04-30 ENCOUNTER — TELEPHONE (OUTPATIENT)
Dept: ENDOSCOPY | Facility: HOSPITAL | Age: 60
End: 2025-04-30
Payer: COMMERCIAL

## 2025-04-30 ENCOUNTER — CLINICAL SUPPORT (OUTPATIENT)
Dept: ENDOSCOPY | Facility: HOSPITAL | Age: 60
End: 2025-04-30
Payer: COMMERCIAL

## 2025-04-30 DIAGNOSIS — R19.7 DIARRHEA, UNSPECIFIED TYPE: ICD-10-CM

## 2025-04-30 DIAGNOSIS — K92.0 HEMATEMESIS, UNSPECIFIED WHETHER NAUSEA PRESENT: ICD-10-CM

## 2025-04-30 DIAGNOSIS — Z91.89 ENCOUNTER FOR SCREENING FOR COLORECTAL CANCER IN HIGH RISK PATIENT: ICD-10-CM

## 2025-04-30 DIAGNOSIS — Z12.11 ENCOUNTER FOR SCREENING FOR COLORECTAL CANCER IN HIGH RISK PATIENT: ICD-10-CM

## 2025-04-30 DIAGNOSIS — K62.5 RECTAL BLEEDING: Primary | ICD-10-CM

## 2025-04-30 DIAGNOSIS — K62.5 RECTAL BLEEDING: ICD-10-CM

## 2025-04-30 DIAGNOSIS — Z12.12 ENCOUNTER FOR SCREENING FOR COLORECTAL CANCER IN HIGH RISK PATIENT: ICD-10-CM

## 2025-04-30 RX ORDER — SODIUM, POTASSIUM,MAG SULFATES 17.5-3.13G
1 SOLUTION, RECONSTITUTED, ORAL ORAL DAILY
Qty: 1 KIT | Refills: 0 | Status: SHIPPED | OUTPATIENT
Start: 2025-04-30 | End: 2025-05-02

## 2025-04-30 NOTE — TELEPHONE ENCOUNTER
"----- Message -----  From: Nelly Miller PA-C  Sent: 4/16/2025   1:53 PM CDT  To: [facility] Endoscopy Schedulers  Subject: EGD/Colonoscopy                                  Procedure: EGD/Colonoscopy    Diagnosis: Rectal bleeding, Diarrhea, Abdominal pain, and Hematemesis    Procedure Timing: Within 12 weeks    *If within 4 weeks selected, please flavia as high priority*    *If greater than 12 weeks, please select "5-12 weeks" and delay sending until 3 months prior to requested date*    Location: Any Site    Additional Scheduling Information: No scheduling concerns    Prep Specifications:Standard prep    Is the patient taking a GLP-1 Agonist:no    Have you attached a patient to this message: yes              Ambulatory referral/consult to Endo Procedure : Patient Communication     Not Released  Not seen       Order Questions    Question Answer   What procedure is to be performed? Diagnostic Colonoscopy   Note: Used in the GI  workflow    EGD   Note: Used in the GI  workflow   Does the patient need a PEG tube? No   Note: Used in the GI  workflow   Does the patient need esophageal banding? No   Note: Used in the GI Scheduling workflow   Does the patient need esophageal dilation? No   Note: Used in the GI  workflow   Does the patient need a BRAVO? No   CPT Code: [place] EGD, FLEX, DIAGNOSTIC [76786]    [place] COLONOSCOPY,DIAGNOSTIC [71604]   In which geographic region does the patient intend to have this procedure performed? [place] ([place], [place], or [place] ONLY)                "

## 2025-05-05 ENCOUNTER — ANESTHESIA EVENT (OUTPATIENT)
Dept: ENDOSCOPY | Facility: HOSPITAL | Age: 60
End: 2025-05-05
Payer: COMMERCIAL

## 2025-05-05 ENCOUNTER — OFFICE VISIT (OUTPATIENT)
Dept: ORTHOPEDICS | Facility: CLINIC | Age: 60
End: 2025-05-05
Payer: COMMERCIAL

## 2025-05-05 DIAGNOSIS — M75.121 NONTRAUMATIC COMPLETE TEAR OF RIGHT ROTATOR CUFF: Primary | ICD-10-CM

## 2025-05-05 PROCEDURE — 3044F HG A1C LEVEL LT 7.0%: CPT | Mod: CPTII,S$GLB,, | Performed by: ORTHOPAEDIC SURGERY

## 2025-05-05 PROCEDURE — 99999 PR PBB SHADOW E&M-EST. PATIENT-LVL III: CPT | Mod: PBBFAC,,, | Performed by: ORTHOPAEDIC SURGERY

## 2025-05-05 PROCEDURE — 1160F RVW MEDS BY RX/DR IN RCRD: CPT | Mod: CPTII,S$GLB,, | Performed by: ORTHOPAEDIC SURGERY

## 2025-05-05 PROCEDURE — 99213 OFFICE O/P EST LOW 20 MIN: CPT | Mod: S$GLB,,, | Performed by: ORTHOPAEDIC SURGERY

## 2025-05-05 PROCEDURE — 1159F MED LIST DOCD IN RCRD: CPT | Mod: CPTII,S$GLB,, | Performed by: ORTHOPAEDIC SURGERY

## 2025-05-06 ENCOUNTER — HOSPITAL ENCOUNTER (OUTPATIENT)
Facility: HOSPITAL | Age: 60
Discharge: HOME | End: 2025-05-06
Attending: STUDENT IN AN ORGANIZED HEALTH CARE EDUCATION/TRAINING PROGRAM | Admitting: STUDENT IN AN ORGANIZED HEALTH CARE EDUCATION/TRAINING PROGRAM
Payer: COMMERCIAL

## 2025-05-06 ENCOUNTER — ANESTHESIA (OUTPATIENT)
Dept: ENDOSCOPY | Facility: HOSPITAL | Age: 60
End: 2025-05-06
Payer: COMMERCIAL

## 2025-05-06 VITALS
DIASTOLIC BLOOD PRESSURE: 62 MMHG | HEART RATE: 62 BPM | OXYGEN SATURATION: 100 % | TEMPERATURE: 98 F | RESPIRATION RATE: 17 BRPM | SYSTOLIC BLOOD PRESSURE: 125 MMHG

## 2025-05-06 DIAGNOSIS — K20.90 ESOPHAGITIS: Primary | ICD-10-CM

## 2025-05-06 DIAGNOSIS — K62.5 RECTAL BLEEDING: ICD-10-CM

## 2025-05-06 DIAGNOSIS — R93.3 ABNORMAL FINDING ON GI TRACT IMAGING: ICD-10-CM

## 2025-05-06 DIAGNOSIS — K92.0 HEMATEMESIS, UNSPECIFIED WHETHER NAUSEA PRESENT: ICD-10-CM

## 2025-05-06 DIAGNOSIS — R19.7 DIARRHEA, UNSPECIFIED TYPE: ICD-10-CM

## 2025-05-06 PROCEDURE — 43239 EGD BIOPSY SINGLE/MULTIPLE: CPT | Mod: 51,,, | Performed by: STUDENT IN AN ORGANIZED HEALTH CARE EDUCATION/TRAINING PROGRAM

## 2025-05-06 PROCEDURE — 37000008 HC ANESTHESIA 1ST 15 MINUTES: Performed by: STUDENT IN AN ORGANIZED HEALTH CARE EDUCATION/TRAINING PROGRAM

## 2025-05-06 PROCEDURE — 88305 TISSUE EXAM BY PATHOLOGIST: CPT | Mod: TC | Performed by: STUDENT IN AN ORGANIZED HEALTH CARE EDUCATION/TRAINING PROGRAM

## 2025-05-06 PROCEDURE — 63600175 PHARM REV CODE 636 W HCPCS: Performed by: NURSE ANESTHETIST, CERTIFIED REGISTERED

## 2025-05-06 PROCEDURE — 27201012 HC FORCEPS, HOT/COLD, DISP: Performed by: STUDENT IN AN ORGANIZED HEALTH CARE EDUCATION/TRAINING PROGRAM

## 2025-05-06 PROCEDURE — 88305 TISSUE EXAM BY PATHOLOGIST: CPT | Mod: 26,,, | Performed by: PATHOLOGY

## 2025-05-06 PROCEDURE — 37000009 HC ANESTHESIA EA ADD 15 MINS: Performed by: STUDENT IN AN ORGANIZED HEALTH CARE EDUCATION/TRAINING PROGRAM

## 2025-05-06 PROCEDURE — 25000003 PHARM REV CODE 250: Performed by: NURSE ANESTHETIST, CERTIFIED REGISTERED

## 2025-05-06 PROCEDURE — 45380 COLONOSCOPY AND BIOPSY: CPT | Mod: ,,, | Performed by: STUDENT IN AN ORGANIZED HEALTH CARE EDUCATION/TRAINING PROGRAM

## 2025-05-06 PROCEDURE — 45380 COLONOSCOPY AND BIOPSY: CPT | Performed by: STUDENT IN AN ORGANIZED HEALTH CARE EDUCATION/TRAINING PROGRAM

## 2025-05-06 PROCEDURE — 43239 EGD BIOPSY SINGLE/MULTIPLE: CPT | Performed by: STUDENT IN AN ORGANIZED HEALTH CARE EDUCATION/TRAINING PROGRAM

## 2025-05-06 RX ORDER — PROPOFOL 10 MG/ML
VIAL (ML) INTRAVENOUS CONTINUOUS PRN
Status: DISCONTINUED | OUTPATIENT
Start: 2025-05-06 | End: 2025-05-06

## 2025-05-06 RX ORDER — PROPOFOL 10 MG/ML
VIAL (ML) INTRAVENOUS
Status: COMPLETED
Start: 2025-05-06 | End: 2025-05-06

## 2025-05-06 RX ORDER — LIDOCAINE HYDROCHLORIDE 20 MG/ML
INJECTION, SOLUTION EPIDURAL; INFILTRATION; INTRACAUDAL; PERINEURAL
Status: DISCONTINUED
Start: 2025-05-06 | End: 2025-05-06 | Stop reason: HOSPADM

## 2025-05-06 RX ORDER — SODIUM CHLORIDE 9 MG/ML
INJECTION, SOLUTION INTRAVENOUS CONTINUOUS
Status: DISCONTINUED | OUTPATIENT
Start: 2025-05-06 | End: 2025-05-06 | Stop reason: HOSPADM

## 2025-05-06 RX ORDER — PROPOFOL 10 MG/ML
VIAL (ML) INTRAVENOUS
Status: DISCONTINUED | OUTPATIENT
Start: 2025-05-06 | End: 2025-05-06

## 2025-05-06 RX ORDER — PROPOFOL 10 MG/ML
VIAL (ML) INTRAVENOUS
Status: DISCONTINUED
Start: 2025-05-06 | End: 2025-05-06 | Stop reason: HOSPADM

## 2025-05-06 RX ORDER — SUCCINYLCHOLINE CHLORIDE 20 MG/ML
INJECTION INTRAMUSCULAR; INTRAVENOUS
Status: DISCONTINUED | OUTPATIENT
Start: 2025-05-06 | End: 2025-05-06

## 2025-05-06 RX ORDER — LIDOCAINE HYDROCHLORIDE 20 MG/ML
INJECTION INTRAVENOUS
Status: DISCONTINUED | OUTPATIENT
Start: 2025-05-06 | End: 2025-05-06

## 2025-05-06 RX ORDER — OMEPRAZOLE 20 MG/1
20 CAPSULE, DELAYED RELEASE ORAL
Qty: 180 CAPSULE | Refills: 1 | Status: SHIPPED | OUTPATIENT
Start: 2025-05-06 | End: 2026-05-06

## 2025-05-06 RX ORDER — LIDOCAINE HYDROCHLORIDE 10 MG/ML
1 INJECTION, SOLUTION EPIDURAL; INFILTRATION; INTRACAUDAL; PERINEURAL ONCE
Status: DISCONTINUED | OUTPATIENT
Start: 2025-05-06 | End: 2025-05-06 | Stop reason: HOSPADM

## 2025-05-06 RX ORDER — PHENYLEPHRINE HYDROCHLORIDE 10 MG/ML
INJECTION INTRAVENOUS
Status: DISCONTINUED | OUTPATIENT
Start: 2025-05-06 | End: 2025-05-06

## 2025-05-06 RX ADMIN — PROPOFOL 100 MG: 10 INJECTION, EMULSION INTRAVENOUS at 02:05

## 2025-05-06 RX ADMIN — SODIUM CHLORIDE: 0.9 INJECTION, SOLUTION INTRAVENOUS at 02:05

## 2025-05-06 RX ADMIN — LIDOCAINE HYDROCHLORIDE 100 MG: 20 INJECTION, SOLUTION INTRAVENOUS at 02:05

## 2025-05-06 RX ADMIN — SUCCINYLCHOLINE CHLORIDE 100 MG: 20 INJECTION, SOLUTION INTRAMUSCULAR; INTRAVENOUS at 02:05

## 2025-05-06 RX ADMIN — PHENYLEPHRINE HYDROCHLORIDE 100 MCG: 10 INJECTION INTRAVENOUS at 02:05

## 2025-05-06 RX ADMIN — PROPOFOL 200 MCG/KG/MIN: 10 INJECTION, EMULSION INTRAVENOUS at 02:05

## 2025-05-06 NOTE — PROVATION PATIENT INSTRUCTIONS
Discharge Summary/Instructions after an Endoscopic Procedure  Patient Name: Mary Naranjo  Patient MRN: [id number]  Patient YOB: 1965  Tuesday, May 6, 2025  Jacques Jones MD  Dear patient,  As a result of recent federal legislation (The Federal Cures Act), you may   receive lab or pathology results from your procedure in your [facility]   account before your physician is able to contact you. Your physician or   their representative will relay the results to you with their   recommendations at their soonest availability.  Thank you,  RESTRICTIONS:  During your procedure today, you received medications for sedation.  These   medications may affect your judgment, balance and coordination.  Therefore,   for 24 hours, you have the following restrictions:   - DO NOT drive a car, operate machinery, make legal/financial decisions,   sign important papers or drink alcohol.    ACTIVITY:  Today: no heavy lifting, straining or running due to procedural   sedation/anesthesia.  The following day: return to full activity including work.  DIET:  Eat and drink normally unless instructed otherwise.     TREATMENT FOR COMMON SIDE EFFECTS:  - Mild abdominal pain, nausea, belching, bloating or excessive gas:  rest,   eat lightly and use a heating pad.  - Sore Throat: treat with throat lozenges and/or gargle with warm salt   water.  - Because air was used during the procedure, expelling large amounts of air   from your rectum or belching is normal.  - If a bowel prep was taken, you may not have a bowel movement for 1-3 days.    This is normal.  SYMPTOMS TO WATCH FOR AND REPORT TO YOUR PHYSICIAN:  1. Abdominal pain or bloating, other than gas cramps.  2. Chest pain.  3. Back pain.  4. Signs of infection such as: chills or fever occurring within 24 hours   after the procedure.  5. Rectal bleeding, which would show as bright red, maroon, or black stools.   (A tablespoon of blood from the rectum is not serious, especially if    hemorrhoids are present.)  6. Vomiting.  7. Weakness or dizziness.  GO DIRECTLY TO THE NEAREST EMERGENCY ROOM IF YOU HAVE ANY OF THE FOLLOWING:      Difficulty breathing              Chills and/or fever over 101 F   Persistent vomiting and/or vomiting blood   Severe abdominal pain   Severe chest pain   Black, tarry stools   Bleeding- more than one tablespoon   Any other symptom or condition that you feel may need urgent attention  Your doctor recommends these additional instructions:  If any biopsies were taken, your doctors clinic will contact you in 1 to 2   weeks with any results.  - Await pathology results.   - Omeprazole 20mg twice daily (30 minutes before breakfast and dinner)  - Repeat EGD in two month to ensure healing.  For questions, problems or results please call your physician - Jacques Jones MD at Work:  [phone].  [facility] can be reached at [phone]     IF A COMPLICATION OR EMERGENCY SITUATION ARISES AND YOU ARE UNABLE TO REACH   YOUR PHYSICIAN - GO DIRECTLY TO THE EMERGENCY ROOM.  MD Jacques Hester MD  5/6/2025 3:10:34 PM  This report has been verified and signed electronically.  Dear patient,  As a result of recent federal legislation (The Federal Cures Act), you may   receive lab or pathology results from your procedure in your [facility]   account before your physician is able to contact you. Your physician or   their representative will relay the results to you with their   recommendations at their soonest availability.  Thank you,  [employer]OVATION   normal rate and rhythm, no chest pain and no edema.

## 2025-05-06 NOTE — PLAN OF CARE
Procedure and recovery complete, Awake and alert, VSS, denies complaints of pain, Mother at bedside, Dr. Jones discussed procedure findings with patient . Ambulates without assistance, gait steady. Discharge criteria met.

## 2025-05-06 NOTE — ANESTHESIA PROCEDURE NOTES
Intubation    Date/Time: 5/6/2025 2:21 PM    Performed by: Trish Leal CRNA  Authorized by: Jarod Osman Chi, MD    Intubation:     Induction:  Intravenous    Intubated:  Postinduction    Mask Ventilation:  Easy mask    Attempts:  1    Attempted By:  CRNA    Method of Intubation:  Video laryngoscopy    Blade:  Silverman 3    Laryngeal View Grade: Grade I - full view of cords      Difficult Airway Encountered?: No      Complications:  None    Airway Device:  Oral endotracheal tube    Airway Device Size:  7.5    Style/Cuff Inflation:  Cuffed (inflated to minimal occlusive pressure)    Tube secured:  22    Secured at:  The lips    Placement Verified By:  Capnometry    Complicating Factors:  None    Findings Post-Intubation:  BS equal bilateral and atraumatic/condition of teeth unchanged

## 2025-05-06 NOTE — ANESTHESIA PREPROCEDURE EVALUATION
05/06/2025  Mary Naranjo is a 60 y.o., female.      Pre-op Assessment    I have reviewed the Patient Summary Reports.     I have reviewed the Nursing Notes.       Review of Systems  Anesthesia Hx:  No problems with previous Anesthesia             Denies Family Hx of Anesthesia complications.    Denies Personal Hx of Anesthesia complications.                    Social:  Non-Smoker       Cardiovascular:     Hypertension   Denies MI.      Denies Dysrhythmias.       hyperlipidemia    2021 stress: negative for ischemia; EF 60%                           Pulmonary:        Sleep Apnea                Renal/:  Renal/ Normal                 Neurological:    Neuromuscular Disease,  Headaches                                 Endocrine:  Endocrine Normal                Physical Exam  General: Well nourished, Cooperative, Alert and Oriented    Airway:  Mallampati: II   Mouth Opening: Normal  TM Distance: 4 - 6 cm  Tongue: Normal  Neck ROM: Normal ROM    Dental:  Only 6 teeth on bottom  Chest/Lungs:  Normal Respiratory Rate    Heart:  Rate: Normal      Wt Readings from Last 3 Encounters:   04/16/25 104.7 kg (230 lb 13.2 oz)   04/10/25 103 kg (227 lb)   02/24/25 106.4 kg (234 lb 9.1 oz)     Temp Readings from Last 3 Encounters:   04/10/25 36.8 °C (98.2 °F)   02/24/25 36.7 °C (98 °F) (Oral)   01/29/25 36.6 °C (97.9 °F) (Oral)     BP Readings from Last 3 Encounters:   04/16/25 115/74   04/10/25 (!) 118/58   02/24/25 122/80     Pulse Readings from Last 3 Encounters:   04/16/25 74   04/10/25 80   02/24/25 71         Anesthesia Plan  Type of Anesthesia, risks & benefits discussed:    Anesthesia Type: Gen Natural Airway, MAC, Gen ETT  Intra-op Monitoring Plan: Standard ASA Monitors  Post Op Pain Control Plan: multimodal analgesia  Induction:  IV  Informed Consent: Informed consent signed with the Patient and all parties  understand the risks and agree with anesthesia plan.  All questions answered.   ASA Score: 2  Day of Surgery Review of History & Physical: H&P Update referred to the surgeon/provider.  Anesthesia Plan Notes: Pt had some bloody emesis this morning while drinking the prep solution. D/w pt option to reschedule vs. Proceeding under GETA, with increased risk of aspiration. Pt understands and wishes to proceed today.    Ready For Surgery From Anesthesia Perspective.     .

## 2025-05-06 NOTE — LETTER
May 6, 2025         Grace AZUL  [facility]  [place] [address]  Phone: [phone]       Patient: Mary Naranjo   YOB: 1965  Date of Visit: 05/06/2025    To Whom It May Concern:    Sheila Naranjo  was at [facility] on 05/06/2025. She may return to work/school on 05/07/25 with no restrictions. If you have any questions or concerns, or if I can be of further assistance, please do not hesitate to contact me.    Sincerely,    Terri Chance RN

## 2025-05-06 NOTE — PROVATION PATIENT INSTRUCTIONS
Discharge Summary/Instructions after an Endoscopic Procedure  Patient Name: Mary Naranjo  Patient MRN: [id number]  Patient YOB: 1965  Tuesday, May 6, 2025  Jacques Jones MD  Dear patient,  As a result of recent federal legislation (The Federal Cures Act), you may   receive lab or pathology results from your procedure in your [facility]   account before your physician is able to contact you. Your physician or   their representative will relay the results to you with their   recommendations at their soonest availability.  Thank you,  RESTRICTIONS:  During your procedure today, you received medications for sedation.  These   medications may affect your judgment, balance and coordination.  Therefore,   for 24 hours, you have the following restrictions:   - DO NOT drive a car, operate machinery, make legal/financial decisions,   sign important papers or drink alcohol.    ACTIVITY:  Today: no heavy lifting, straining or running due to procedural   sedation/anesthesia.  The following day: return to full activity including work.  DIET:  Eat and drink normally unless instructed otherwise.     TREATMENT FOR COMMON SIDE EFFECTS:  - Mild abdominal pain, nausea, belching, bloating or excessive gas:  rest,   eat lightly and use a heating pad.  - Sore Throat: treat with throat lozenges and/or gargle with warm salt   water.  - Because air was used during the procedure, expelling large amounts of air   from your rectum or belching is normal.  - If a bowel prep was taken, you may not have a bowel movement for 1-3 days.    This is normal.  SYMPTOMS TO WATCH FOR AND REPORT TO YOUR PHYSICIAN:  1. Abdominal pain or bloating, other than gas cramps.  2. Chest pain.  3. Back pain.  4. Signs of infection such as: chills or fever occurring within 24 hours   after the procedure.  5. Rectal bleeding, which would show as bright red, maroon, or black stools.   (A tablespoon of blood from the rectum is not serious, especially if    hemorrhoids are present.)  6. Vomiting.  7. Weakness or dizziness.  GO DIRECTLY TO THE NEAREST EMERGENCY ROOM IF YOU HAVE ANY OF THE FOLLOWING:      Difficulty breathing              Chills and/or fever over 101 F   Persistent vomiting and/or vomiting blood   Severe abdominal pain   Severe chest pain   Black, tarry stools   Bleeding- more than one tablespoon   Any other symptom or condition that you feel may need urgent attention  Your doctor recommends these additional instructions:  If any biopsies were taken, your doctors clinic will contact you in 1 to 2   weeks with any results.  - Discharge patient to home (ambulatory).   - Patient has a contact number available for emergencies.  The signs and   symptoms of potential delayed complications were discussed with the   patient.  Return to normal activities tomorrow.  Written discharge   instructions were provided to the patient.   - Resume previous diet.   - Continue present medications.   - Return to primary care physician as previously scheduled.   - Repeat colonoscopy within 1 year for screening purposes given inadequate   bowel prep.  For questions, problems or results please call your physician - Jacques Jones MD at Work:  [phone].  [facility] can be reached at [phone]     IF A COMPLICATION OR EMERGENCY SITUATION ARISES AND YOU ARE UNABLE TO REACH   YOUR PHYSICIAN - GO DIRECTLY TO THE EMERGENCY ROOM.  MD Jacques Hester MD  5/6/2025 3:25:56 PM  This report has been verified and signed electronically.  Dear patient,  As a result of recent federal legislation (The Federal Cures Act), you may   receive lab or pathology results from your procedure in your [facility]   account before your physician is able to contact you. Your physician or   their representative will relay the results to you with their   recommendations at their soonest availability.  Thank you,  PROVATION

## 2025-05-06 NOTE — H&P
Short Stay Endoscopy History and Physical    PCP - Ramon Olivares Jr., MD    Procedure - Colonoscopy + EGD  ASA - per anesthesia  Mallampati - per anesthesia  History of Anesthesia problems - no  Family history Anesthesia problems -  no   Plan of anesthesia - General    HPI:  This is a 60 y.o. female here for evaluation of : rectal bleeding, abdominal pain, descending colon colitis on CT      Medical History:  has a past medical history of Arthritis, COVID-19 (02/08/2023), Dental bridge present, Family history of pancreatic cancer, Fibrocystic breast, Migraines, Obesity, Pancreas cyst, Primary hypertension (02/02/2022), and Sleep apnea.    Surgical History:  has a past surgical history that includes Tubal ligation; fibrocystic breast ; Colonoscopy (N/A, 10/07/2015); Knee arthroscopy w/ meniscal repair; Breast biopsy; Breast cyst aspiration; Dilation and curettage of uterus; Esophagogastroduodenoscopy (N/A, 07/19/2019); Endoscopic ultrasound of upper gastrointestinal tract (N/A, 11/03/2020); Endoscopic ultrasound of upper gastrointestinal tract (N/A, 07/14/2023); Eye surgery (December 2021); Breast surgery; and Arthroscopic repair of rotator cuff of shoulder (Right, 10/22/2024).    Family History: family history includes Asthma in her maternal aunt; Breast cancer in her maternal cousin; Breast cancer (age of onset: 42) in her sister; Cancer in her father, maternal cousin, maternal cousin, sister, and another family member; Cancer (age of onset: 40) in her sister; Cataracts in her mother; Cervical cancer in an other family member; Colon cancer in her father; Glaucoma in her mother; Heart disease in her maternal aunt, maternal aunt, and mother; No Known Problems in her brother, maternal grandfather, maternal grandmother, paternal grandfather, and paternal grandmother; Pancreatic cancer (age of onset: 54) in her sister; Prostate cancer in her maternal cousin.    Social History:  reports that she has never smoked.  She has never used smokeless tobacco. She reports that she does not drink alcohol and does not use drugs.    Review of patient's allergies indicates:  No Known Allergies    Medications:   Prescriptions Prior to Admission[1]      Physical Exam:    Vital Signs:   Vitals:    05/06/25 1337   BP:    Pulse: 76   Resp:    Temp:        General Appearance: Well appearing in no acute distress  Head: Normocephalic, without obvious abnormality   Lungs: Non-labored breathing  Abdomen: Soft, non tender, non distended     Labs:  Lab Results   Component Value Date    WBC 5.43 03/24/2025    HGB 11.8 (L) 03/24/2025    HCT 38.7 03/24/2025     03/24/2025    CHOL 143 02/14/2025    TRIG 62 02/14/2025    HDL 50 02/14/2025    ALT 43 02/14/2025    AST 26 02/14/2025     02/14/2025    K 3.9 02/14/2025     02/14/2025    CREATININE 0.8 02/14/2025    BUN 15 02/14/2025    CO2 27 02/14/2025    TSH 1.742 05/06/2024    INR 1.0 12/01/2022    HGBA1C 5.8 (H) 02/14/2025       I have explained the risks and benefits of endoscopy procedures to the patient including but not limited to bleeding, perforation, infection, and death.  The patient was asked if they understand and allowed to ask any further questions to their satisfaction.    Jacques Jones MD          [1]   Medications Prior to Admission   Medication Sig Dispense Refill Last Dose/Taking    atorvastatin (LIPITOR) 20 MG tablet Take 1 tablet (20 mg total) by mouth once daily. 90 tablet 3 Past Week    cholecalciferol, vitamin D3, 50 mcg (2,000 unit) Chew Take 2,000 Units by mouth once daily. Per patient   Past Week    ferrous sulfate 140 mg (45 mg iron) TbSR Take 2 tablets by mouth once daily.   Past Week    multivit/folic acid/vit K1 (ONE-A-DAY WOMEN'S 50 PLUS ORAL) Take 1 capsule by mouth once daily. Per patient, One-a-Day Women's 50+ Multivitamin Complete   Past Week    omega-3 fatty acids/fish oil (FISH OIL-OMEGA-3 FATTY ACIDS) 300-1,000 mg capsule Take 1 capsule by mouth once  daily.   Past Week    valsartan-hydrochlorothiazide (DIOVAN-HCT) 160-25 mg per tablet Take 1 tablet by mouth once daily. 90 tablet 3 Past Week    ondansetron (ZOFRAN-ODT) 4 MG TbDL Take 1 tablet (4 mg total) by mouth every 6 (six) hours as needed (Take 1 tablet every 6 hours as needed for nausea). 30 tablet 2

## 2025-05-06 NOTE — TRANSFER OF CARE
Anesthesia Transfer of Care Note    Patient: Mary Naranjo    Procedure(s) Performed: Procedure(s) (LRB):  EGD (ESOPHAGOGASTRODUODENOSCOPY) (N/A)  COLONOSCOPY, SCREENING, HIGH RISK PATIENT (N/A)    Patient location: GI    Anesthesia Type: general    Transport from OR: Transported from OR on room air with adequate spontaneous ventilation    Post pain: adequate analgesia    Post assessment: no apparent anesthetic complications and tolerated procedure well    Post vital signs: stable    Level of consciousness: sedated and responds to stimulation    Nausea/Vomiting: no nausea/vomiting    Complications: none    Transfer of care protocol was followed    Last vitals: Visit Vitals  /71 (BP Location: Right arm, Patient Position: Lying)   Pulse 90   Temp 36.4 °C (97.5 °F) (Oral)   Resp 17   LMP 01/15/2018   SpO2 100%   Breastfeeding No

## 2025-05-07 ENCOUNTER — TELEPHONE (OUTPATIENT)
Dept: ENDOSCOPY | Facility: HOSPITAL | Age: 60
End: 2025-05-07

## 2025-05-07 ENCOUNTER — CLINICAL SUPPORT (OUTPATIENT)
Dept: ENDOSCOPY | Facility: HOSPITAL | Age: 60
End: 2025-05-07
Attending: STUDENT IN AN ORGANIZED HEALTH CARE EDUCATION/TRAINING PROGRAM
Payer: COMMERCIAL

## 2025-05-07 VITALS — HEIGHT: 64 IN | WEIGHT: 222 LBS | BODY MASS INDEX: 37.9 KG/M2

## 2025-05-07 DIAGNOSIS — K20.90 ESOPHAGITIS: ICD-10-CM

## 2025-05-07 NOTE — ANESTHESIA POSTPROCEDURE EVALUATION
Anesthesia Post Evaluation    Patient: Mary Naranjo    Procedure(s) Performed: Procedure(s) (LRB):  EGD (ESOPHAGOGASTRODUODENOSCOPY) (N/A)  COLONOSCOPY, SCREENING, HIGH RISK PATIENT (N/A)    Final Anesthesia Type: general      Patient location during evaluation: GI PACU  Patient participation: Yes- Able to Participate  Level of consciousness: awake and alert  Post-procedure vital signs: reviewed and stable  Pain management: adequate  Airway patency: patent    PONV status at discharge: No PONV  Anesthetic complications: no      Cardiovascular status: hemodynamically stable  Respiratory status: unassisted and spontaneous ventilation  Hydration status: euvolemic  Follow-up not needed.              Vitals Value Taken Time   /62 05/06/25 15:42   Temp 36.4 °C (97.5 °F) 05/06/25 15:15   Pulse 62 05/06/25 15:42   Resp 17 05/06/25 15:42   SpO2 100 % 05/06/25 15:42         Event Time   Out of Recovery 16:25:29         Pain/Ming Score: Ming Score: 10 (5/6/2025  3:15 PM)

## 2025-05-07 NOTE — TELEPHONE ENCOUNTER
"Jacques Jones MD  P [facility]  Procedure: EGD    Diagnosis: Esophagitis    Procedure Timing: Specified interval of two months or later from today (5/6/25)    *If within 4 weeks selected, please flavia as high priority*    *If greater than 12 weeks, please select "5-12 weeks" and delay sending until 3 months prior to requested date*    Location: Any Site    Additional Scheduling Information: No scheduling concerns    Prep Specifications:N/A    Is the patient taking a GLP-1 Agonist:no    Have you attached a patient to this message: yes  "

## 2025-05-07 NOTE — PLAN OF CARE
Patient is scheduled for a Upper Endoscopy (EGD) on 7/11/25 with Dr. MARY Jones  Referral for procedure from [facility]

## 2025-05-07 NOTE — TELEPHONE ENCOUNTER
Patient is scheduled for a Upper Endoscopy (EGD) on 7/11/25 with Dr. MARY Jones  Referral for procedure from [facility]

## 2025-05-08 LAB
ESTROGEN SERPL-MCNC: NORMAL PG/ML
INSULIN SERPL-ACNC: NORMAL U[IU]/ML
LAB AP CLINICAL INFORMATION: NORMAL
LAB AP GROSS DESCRIPTION: NORMAL
LAB AP PERFORMING LOCATION(S): NORMAL
LAB AP REPORT FOOTNOTES: NORMAL

## 2025-05-12 ENCOUNTER — RESULTS FOLLOW-UP (OUTPATIENT)
Dept: GASTROENTEROLOGY | Facility: HOSPITAL | Age: 60
End: 2025-05-12

## 2025-06-16 ENCOUNTER — TELEPHONE (OUTPATIENT)
Dept: ENDOSCOPY | Facility: HOSPITAL | Age: 60
End: 2025-06-16
Payer: COMMERCIAL

## 2025-06-18 ENCOUNTER — TELEPHONE (OUTPATIENT)
Dept: ENDOSCOPY | Facility: HOSPITAL | Age: 60
End: 2025-06-18
Payer: COMMERCIAL

## 2025-06-18 DIAGNOSIS — Z80.0 FAMILY HISTORY OF PANCREATIC CANCER: Primary | ICD-10-CM

## 2025-07-08 ENCOUNTER — TELEPHONE (OUTPATIENT)
Dept: ENDOSCOPY | Facility: HOSPITAL | Age: 60
End: 2025-07-08
Payer: COMMERCIAL

## 2025-07-08 NOTE — TELEPHONE ENCOUNTER
Pt cancelled EGD on 7/11/25 as she's having EUS on 7/31/25. Removed case from snapboard and removed date from case.

## 2025-07-08 NOTE — TELEPHONE ENCOUNTER
Pt cancelled EGD (f/u from Dr. Jones's previous EGD) as she is having an EUS on 7/31 as she feels it is redundant. Sending message to EUS to see if EGD could be done at the same time as EUS.

## 2025-07-30 ENCOUNTER — ANESTHESIA EVENT (OUTPATIENT)
Dept: ENDOSCOPY | Facility: HOSPITAL | Age: 60
End: 2025-07-30
Payer: COMMERCIAL

## 2025-07-31 ENCOUNTER — ANESTHESIA (OUTPATIENT)
Dept: ENDOSCOPY | Facility: HOSPITAL | Age: 60
End: 2025-07-31
Payer: COMMERCIAL

## 2025-07-31 ENCOUNTER — HOSPITAL ENCOUNTER (OUTPATIENT)
Facility: HOSPITAL | Age: 60
Discharge: HOME | End: 2025-07-31
Attending: INTERNAL MEDICINE | Admitting: INTERNAL MEDICINE
Payer: COMMERCIAL

## 2025-07-31 VITALS
SYSTOLIC BLOOD PRESSURE: 130 MMHG | BODY MASS INDEX: 38.41 KG/M2 | HEIGHT: 64 IN | DIASTOLIC BLOOD PRESSURE: 67 MMHG | RESPIRATION RATE: 17 BRPM | TEMPERATURE: 98 F | WEIGHT: 225 LBS | HEART RATE: 63 BPM | OXYGEN SATURATION: 100 %

## 2025-07-31 DIAGNOSIS — K86.2 PANCREATIC CYST: ICD-10-CM

## 2025-07-31 DIAGNOSIS — Z80.0 FAMILY HISTORY OF PANCREATIC CANCER: Primary | Chronic | ICD-10-CM

## 2025-07-31 PROCEDURE — 63600175 PHARM REV CODE 636 W HCPCS: Performed by: REGISTERED NURSE

## 2025-07-31 PROCEDURE — 37000009 HC ANESTHESIA EA ADD 15 MINS: Performed by: INTERNAL MEDICINE

## 2025-07-31 PROCEDURE — 37000008 HC ANESTHESIA 1ST 15 MINUTES: Performed by: INTERNAL MEDICINE

## 2025-07-31 PROCEDURE — 25000003 PHARM REV CODE 250: Performed by: REGISTERED NURSE

## 2025-07-31 PROCEDURE — 43259 EGD US EXAM DUODENUM/JEJUNUM: CPT | Performed by: INTERNAL MEDICINE

## 2025-07-31 PROCEDURE — 43259 EGD US EXAM DUODENUM/JEJUNUM: CPT | Mod: ,,, | Performed by: INTERNAL MEDICINE

## 2025-07-31 RX ORDER — SODIUM CHLORIDE 0.9 % (FLUSH) 0.9 %
10 SYRINGE (ML) INJECTION
Status: DISCONTINUED | OUTPATIENT
Start: 2025-07-31 | End: 2025-07-31 | Stop reason: HOSPADM

## 2025-07-31 RX ORDER — LIDOCAINE HYDROCHLORIDE 20 MG/ML
INJECTION INTRAVENOUS
Status: DISCONTINUED | OUTPATIENT
Start: 2025-07-31 | End: 2025-07-31

## 2025-07-31 RX ORDER — GLUCAGON 1 MG
1 KIT INJECTION
Status: DISCONTINUED | OUTPATIENT
Start: 2025-07-31 | End: 2025-07-31 | Stop reason: HOSPADM

## 2025-07-31 RX ORDER — PROPOFOL 10 MG/ML
VIAL (ML) INTRAVENOUS CONTINUOUS PRN
Status: DISCONTINUED | OUTPATIENT
Start: 2025-07-31 | End: 2025-07-31

## 2025-07-31 RX ADMIN — PROPOFOL 200 MCG/KG/MIN: 10 INJECTION, EMULSION INTRAVENOUS at 11:07

## 2025-07-31 RX ADMIN — LIDOCAINE HYDROCHLORIDE 100 MG: 20 INJECTION INTRAVENOUS at 11:07

## 2025-07-31 RX ADMIN — GLYCOPYRROLATE 0.2 MG: 0.2 INJECTION, SOLUTION INTRAMUSCULAR; INTRAVENOUS at 11:07

## 2025-07-31 RX ADMIN — PROPOFOL 70 MG: 10 INJECTION, EMULSION INTRAVENOUS at 11:07

## 2025-07-31 RX ADMIN — SODIUM CHLORIDE: 9 INJECTION, SOLUTION INTRAVENOUS at 11:07

## 2025-07-31 NOTE — H&P
Short Stay Endoscopy History and Physical    PCP - Ramon Olivares Jr., MD  Referring Physician - No referring provider defined for this encounter.    Procedure - EUS  ASA - per anesthesia  Mallampati - per anesthesia  History of Anesthesia problems - no  Family history Anesthesia problems -  no   Plan of anesthesia - General    HPI:  This is a 60 y.o. female here for evaluation of: family hx of pancreatic cancer; pancreatic cyst    Reflux - no  Dysphagia - no  Abdominal pain - no  Diarrhea - no    ROS:  Constitutional: No fevers, chills, No weight loss  CV: No chest pain  Pulm: No cough, No shortness of breath  GI: see HPI    Medical History:  has a past medical history of Arthritis, COVID-19 (02/08/2023), Dental bridge present, Family history of pancreatic cancer, Fibrocystic breast, Migraines, Obesity, Pancreas cyst, Primary hypertension (02/02/2022), and Sleep apnea.    Surgical History:  has a past surgical history that includes Tubal ligation; fibrocystic breast ; Colonoscopy (N/A, 10/07/2015); Knee arthroscopy w/ meniscal repair; Breast biopsy; Breast cyst aspiration; Dilation and curettage of uterus; Esophagogastroduodenoscopy (N/A, 07/19/2019); Endoscopic ultrasound of upper gastrointestinal tract (N/A, 11/03/2020); Endoscopic ultrasound of upper gastrointestinal tract (N/A, 07/14/2023); Eye surgery (December 2021); Breast surgery; Arthroscopic repair of rotator cuff of shoulder (Right, 10/22/2024); Esophagogastroduodenoscopy (N/A, 5/6/2025); and colonoscopy, screening, high risk patient (N/A, 5/6/2025).    Family History: family history includes Asthma in her maternal aunt; Breast cancer in her maternal cousin; Breast cancer (age of onset: 42) in her sister; Cancer in her father, maternal cousin, maternal cousin, sister, and another family member; Cancer (age of onset: 40) in her sister; Cataracts in her mother; Cervical cancer in an other family member; Colon cancer in her father; Glaucoma in her  mother; Heart disease in her maternal aunt, maternal aunt, and mother; No Known Problems in her brother, maternal grandfather, maternal grandmother, paternal grandfather, and paternal grandmother; Pancreatic cancer (age of onset: 54) in her sister; Prostate cancer in her maternal cousin..    Social History:  reports that she has never smoked. She has never used smokeless tobacco. She reports that she does not drink alcohol and does not use drugs.    Review of patient's allergies indicates:  No Known Allergies    Medications:   Prescriptions Prior to Admission[1]    Physical Exam:    Vital Signs:   Vitals:    07/31/25 1028   BP: 125/60   Pulse: 76   Resp: 18   Temp: 98.1 °F (36.7 °C)       General Appearance: Well appearing in no acute distress  Lungs: no labored breathing  CVS:  regular rate  Abdomen: non tender    Labs:  Lab Results   Component Value Date    WBC 5.43 03/24/2025    HGB 11.8 (L) 03/24/2025    HCT 38.7 03/24/2025     03/24/2025    CHOL 143 02/14/2025    TRIG 62 02/14/2025    HDL 50 02/14/2025    ALT 43 02/14/2025    AST 26 02/14/2025     02/14/2025    K 3.9 02/14/2025     02/14/2025    CREATININE 0.8 02/14/2025    BUN 15 02/14/2025    CO2 27 02/14/2025    TSH 1.742 05/06/2024    INR 1.0 12/01/2022    HGBA1C 5.8 (H) 02/14/2025       I have explained the risks and benefits of this endoscopic procedure to the patient including but not limited to bleeding, inflammation, infection, perforation, and death.      Burak Garcia MD        [1]   Medications Prior to Admission   Medication Sig Dispense Refill Last Dose/Taking    atorvastatin (LIPITOR) 20 MG tablet Take 1 tablet (20 mg total) by mouth once daily. 90 tablet 3 7/30/2025    cholecalciferol, vitamin D3, 50 mcg (2,000 unit) Chew Take 2,000 Units by mouth once daily. Per patient   7/30/2025    ferrous sulfate 140 mg (45 mg iron) TbSR Take 2 tablets by mouth once daily.   7/30/2025    multivit/folic acid/vit K1 (ONE-A-DAY WOMEN'S 50  PLUS ORAL) Take 1 capsule by mouth once daily. Per patient, One-a-Day Women's 50+ Multivitamin Complete   7/30/2025    omega-3 fatty acids/fish oil (FISH OIL-OMEGA-3 FATTY ACIDS) 300-1,000 mg capsule Take 1 capsule by mouth once daily.   7/30/2025    omeprazole (PRILOSEC) 20 MG capsule Take 1 capsule (20 mg total) by mouth 2 (two) times daily before meals. 180 capsule 1 7/30/2025    valsartan-hydrochlorothiazide (DIOVAN-HCT) 160-25 mg per tablet Take 1 tablet by mouth once daily. 90 tablet 3 7/30/2025    ondansetron (ZOFRAN-ODT) 4 MG TbDL Take 1 tablet (4 mg total) by mouth every 6 (six) hours as needed (Take 1 tablet every 6 hours as needed for nausea). 30 tablet 2

## 2025-07-31 NOTE — TRANSFER OF CARE
"Anesthesia Transfer of Care Note    Patient: Mary Naranjo    Procedure(s) Performed: Procedure(s) (LRB):  ULTRASOUND, UPPER GI TRACT, ENDOSCOPIC (N/A)  EGD (ESOPHAGOGASTRODUODENOSCOPY) (N/A)    Patient location: [facility]    Anesthesia Type: general    Transport from OR: Transported from OR on 6-10 L/min O2 by face mask with adequate spontaneous ventilation    Post pain: adequate analgesia    Post assessment: no apparent anesthetic complications and tolerated procedure well    Post vital signs: stable    Level of consciousness: sedated    Nausea/Vomiting: no nausea/vomiting    Complications: none    Transfer of care protocol was followedComments: Nurse at bedside, VSS, spont reg resp noted      Last vitals: Visit Vitals  BP (!) 117/58   Pulse 75   Temp 36.7 °C (98.1 °F) (Temporal)   Resp 20   Ht 5' 4" (1.626 m)   Wt 102.1 kg (225 lb)   LMP 01/15/2018   SpO2 100%   Breastfeeding No   BMI 38.62 kg/m²     "

## 2025-07-31 NOTE — PLAN OF CARE
Patient states they are ready to be discharged. Instructions given to patient and family. Both verbalize understanding. Patient tolerating po liquids with no difficulty. Patient states pain is at a tolerable level for them.

## 2025-07-31 NOTE — PROVATION PATIENT INSTRUCTIONS
Discharge Summary/Instructions after an Endoscopic Procedure  Patient Name: Mary Naranjo  Patient MRN: [id number]  Patient YOB: 1965 Thursday, July 31, 2025  Burak Garcia MD  Dear patient,  As a result of recent federal legislation (The Federal Cures Act), you may   receive lab or pathology results from your procedure in your [facility]   account before your physician is able to contact you. Your physician or   their representative will relay the results to you with their   recommendations at their soonest availability.  Thank you,  RESTRICTIONS:  During your procedure today, you received medications for sedation.  These   medications may affect your judgment, balance and coordination.  Therefore,   for 24 hours, you have the following restrictions:   - DO NOT drive a car, operate machinery, make legal/financial decisions,   sign important papers or drink alcohol.    ACTIVITY:  Today: no heavy lifting, straining or running due to procedural   sedation/anesthesia.  The following day: return to full activity including work.  DIET:  Eat and drink normally unless instructed otherwise.     TREATMENT FOR COMMON SIDE EFFECTS:  - Mild abdominal pain, nausea, belching, bloating or excessive gas:  rest,   eat lightly and use a heating pad.  - Sore Throat: treat with throat lozenges and/or gargle with warm salt   water.  - Because air was used during the procedure, expelling large amounts of air   from your rectum or belching is normal.  - If a bowel prep was taken, you may not have a bowel movement for 1-3 days.    This is normal.  SYMPTOMS TO WATCH FOR AND REPORT TO YOUR PHYSICIAN:  1. Abdominal pain or bloating, other than gas cramps.  2. Chest pain.  3. Back pain.  4. Signs of infection such as: chills or fever occurring within 24 hours   after the procedure.  5. Rectal bleeding, which would show as bright red, maroon, or black stools.   (A tablespoon of blood from the rectum is not serious, especially if    hemorrhoids are present.)  6. Vomiting.  7. Weakness or dizziness.  GO DIRECTLY TO THE NEAREST EMERGENCY ROOM IF YOU HAVE ANY OF THE FOLLOWING:      Difficulty breathing              Chills and/or fever over 101 F   Persistent vomiting and/or vomiting blood   Severe abdominal pain   Severe chest pain   Black, tarry stools   Bleeding- more than one tablespoon   Any other symptom or condition that you feel may need urgent attention  Your doctor recommends these additional instructions:  If any biopsies were taken, your doctors clinic will contact you in 1 to 2   weeks with any results.  - Discharge patient to home (ambulatory).   - Resume previous diet; Discharge to home (ambulatory); Resume outpatient   medications  - Return to primary care physician as previously scheduled.   - My team will arrange for serum lipase, hbg A1C, and CA 19-9 in near future   (as part of screening protocol).  - My team will also for the following in 1 year-> MRI of the abd with IV   contrast and labs (lipase, hbg A1C, and CA 19-9).  For questions, problems or results please call your physician - Burak Garcia MD at Work:  [phone].  [facility], EMERGENCY ROOM PHONE NUMBER: [phone]  IF A COMPLICATION OR EMERGENCY SITUATION ARISES AND YOU ARE UNABLE TO REACH   YOUR PHYSICIAN - GO DIRECTLY TO THE EMERGENCY ROOM.  Burak Garcia MD  7/31/2025 12:09:27 PM  This report has been verified and signed electronically.  Dear patient,  As a result of recent federal legislation (The Federal Cures Act), you may   receive lab or pathology results from your procedure in your [facility]   account before your physician is able to contact you. Your physician or   their representative will relay the results to you with their   recommendations at their soonest availability.  Thank you,  [employer]ATION

## 2025-07-31 NOTE — ANESTHESIA PREPROCEDURE EVALUATION
07/31/2025  Mary Naranjo is a 60 y.o., female.  Pre-operative evaluation for ULTRASOUND, UPPER GI TRACT, ENDOSCOPIC (N/A), EGD (ESOPHAGOGASTRODUODENOSCOPY) (N/A)    Chief Complaint:pancreatic cyst    PMH:  HTN  HLP  Recent colitis  Past Surgical History:   Procedure Laterality Date    ARTHROSCOPIC REPAIR OF ROTATOR CUFF OF SHOULDER Right 10/22/2024    Procedure: REPAIR, ROTATOR CUFF, ARTHROSCOPIC WITH EXTENSIVE DEBRIDEMENT;  Surgeon: Maite Cruz MD;  Location: [facility] OR;  Service: Orthopedics;  Laterality: Right;  PATEL&NEPHRHONDA JUAN CARLOS NOTIFIED-GG  RN PREOP 09/30/2024-----NEED H/P    BREAST BIOPSY      BREAST CYST ASPIRATION      BREAST SURGERY      Dont remember    COLONOSCOPY N/A 10/07/2015    Procedure: COLONOSCOPY;  Surgeon: Eagle Stack MD;  Location: [facility] ENDO;  Service: Endoscopy;  Laterality: N/A;    COLONOSCOPY, SCREENING, HIGH RISK PATIENT N/A 5/6/2025    Procedure: COLONOSCOPY, SCREENING, HIGH RISK PATIENT;  Surgeon: Jacques Jones MD;  Location: [facility];  Service: Gastroenterology;  Laterality: N/A;    DILATION AND CURETTAGE OF UTERUS      ENDOSCOPIC ULTRASOUND OF UPPER GASTROINTESTINAL TRACT N/A 11/03/2020    Procedure: ULTRASOUND, UPPER GI TRACT, ENDOSCOPIC;  Surgeon: Kei Juarez MD;  Location: [facility] ([address] [place] [address]);  Service: Endoscopy;  Laterality: N/A;  Covid-19 test 10/31/20 at [facility]    ENDOSCOPIC ULTRASOUND OF UPPER GASTROINTESTINAL TRACT N/A 07/14/2023    Procedure: ULTRASOUND, UPPER GI TRACT, ENDOSCOPIC;  Surgeon: Burak Garcia MD;  Location: [facility] ([address] [place] [address]);  Service: Endoscopy;  Laterality: N/A;  6/15/23-Instructions via portal-DS  6/26 pre call complete  pre call 7/10, pt confirmed - mf    ESOPHAGOGASTRODUODENOSCOPY N/A 07/19/2019    Procedure: ESOPHAGOGASTRODUODENOSCOPY (EGD);  Surgeon: Cherrie Sheets MD;  Location: [facility];  Service: Endoscopy;  Laterality:  "N/A;    ESOPHAGOGASTRODUODENOSCOPY N/A 2025    Procedure: EGD (ESOPHAGOGASTRODUODENOSCOPY);  Surgeon: Jacques Jones MD;  Location: [facility];  Service: Gastroenterology;  Laterality: N/A;  ref by Nelly Miller suprep sent [email or web address]    EYE SURGERY  2021    Cateract    fibrocystic breast       KNEE ARTHROSCOPY W/ MENISCAL REPAIR      TUBAL LIGATION           Vital Signs Range (Last 24H):  Temp:  [36.7 °C (98.1 °F)]   Pulse:  [76]   Resp:  [18]   BP: (125)/(60)   SpO2:  [98 %]       CBC:   No results for input(s): "WBC", "RBC", "HGB", "HCT", "PLT", "MCV", "MCH", "MCHC" in the last 720 hours.    CMP: No results for input(s): "NA", "K", "CL", "CO2", "BUN", "CREATININE", "GLU", "MG", "PHOS", "CALCIUM", "ALBUMIN", "PROT", "ALKPHOS", "ALT", "AST", "BILITOT" in the last 720 hours.    INR:  No results for input(s): "PT", "INR", "PROTIME", "APTT" in the last 720 hours.      Diagnostic Studies:      EKD Echo:     Pre-op Assessment    I have reviewed the Patient Summary Reports.     I have reviewed the Nursing Notes. I have reviewed the NPO Status.   I have reviewed the Medications.     Review of Systems  Anesthesia Hx:  No problems with previous Anesthesia                Social:  Non-Smoker, No Alcohol Use       Cardiovascular:  Cardiovascular Normal                                              Pulmonary:  Pulmonary Normal                       Neurological:  Neurology Normal                                          Physical Exam  General: Well nourished, Cooperative, Alert and Oriented    Airway:  Mallampati: I   Mouth Opening: Normal  TM Distance: Normal  Tongue: Normal  Neck ROM: Normal ROM    Dental:  Intact    Chest/Lungs:  Normal Respiratory Rate        Anesthesia Plan  Type of Anesthesia, risks & benefits discussed:    Anesthesia Type: Gen Natural Airway  Intra-op Monitoring Plan: Standard ASA Monitors  Post Op Pain Control Plan: multimodal analgesia  Induction:  IV  Informed Consent: " Informed consent signed with the Patient and all parties understand the risks and agree with anesthesia plan.  All questions answered.   ASA Score: 2  Day of Surgery Review of History & Physical: H&P Update referred to the surgeon/provider.    Ready For Surgery From Anesthesia Perspective.     .

## 2025-08-01 NOTE — ANESTHESIA POSTPROCEDURE EVALUATION
Anesthesia Post Evaluation    Patient: Mary Naranjo    Procedure(s) Performed: Procedure(s) (LRB):  ULTRASOUND, UPPER GI TRACT, ENDOSCOPIC (N/A)  EGD (ESOPHAGOGASTRODUODENOSCOPY) (N/A)    Final Anesthesia Type: general      Patient location during evaluation: PACU  Patient participation: Yes- Able to Participate  Level of consciousness: awake and alert and oriented  Post-procedure vital signs: reviewed and stable  Pain management: adequate  Airway patency: patent    PONV status at discharge: No PONV  Anesthetic complications: no      Cardiovascular status: hemodynamically stable  Respiratory status: unassisted, spontaneous ventilation and room air  Hydration status: euvolemic  Follow-up not needed.              Vitals Value Taken Time   /67 07/31/25 12:30   Temp 36.7 °C (98 °F) 07/31/25 12:30   Pulse 63 07/31/25 12:30   Resp 17 07/31/25 12:30   SpO2 100 % 07/31/25 12:30         No case tracking events are documented in the log.      Pain/Ming Score: Ming Score: 10 (7/31/2025 12:15 PM)

## 2025-08-18 ENCOUNTER — LAB VISIT (OUTPATIENT)
Dept: LAB | Facility: HOSPITAL | Age: 60
End: 2025-08-18
Attending: FAMILY MEDICINE
Payer: COMMERCIAL

## 2025-08-18 DIAGNOSIS — D50.9 IRON DEFICIENCY ANEMIA, UNSPECIFIED IRON DEFICIENCY ANEMIA TYPE: ICD-10-CM

## 2025-08-18 DIAGNOSIS — I10 PRIMARY HYPERTENSION: Chronic | ICD-10-CM

## 2025-08-18 LAB
ABSOLUTE EOSINOPHIL (OHS): 0.09 K/UL
ABSOLUTE MONOCYTE (OHS): 0.54 K/UL (ref 0.3–1)
ABSOLUTE NEUTROPHIL COUNT (OHS): 2.25 K/UL (ref 1.8–7.7)
ALBUMIN SERPL BCP-MCNC: 3.9 G/DL (ref 3.5–5.2)
ALP SERPL-CCNC: 112 UNIT/L (ref 40–150)
ALT SERPL W/O P-5'-P-CCNC: 32 UNIT/L (ref 10–44)
ANION GAP (OHS): 14 MMOL/L (ref 8–16)
AST SERPL-CCNC: 31 UNIT/L (ref 11–45)
BASOPHILS # BLD AUTO: 0.02 K/UL
BASOPHILS NFR BLD AUTO: 0.4 %
BILIRUB SERPL-MCNC: 0.7 MG/DL (ref 0.1–1)
BUN SERPL-MCNC: 13 MG/DL (ref 6–20)
CALCIUM SERPL-MCNC: 10 MG/DL (ref 8.7–10.5)
CHLORIDE SERPL-SCNC: 107 MMOL/L (ref 95–110)
CO2 SERPL-SCNC: 28 MMOL/L (ref 23–29)
CREAT SERPL-MCNC: 0.8 MG/DL (ref 0.5–1.4)
ERYTHROCYTE [DISTWIDTH] IN BLOOD BY AUTOMATED COUNT: 15.5 % (ref 11.5–14.5)
FERRITIN SERPL-MCNC: 180.5 NG/ML (ref 20–300)
GFR SERPLBLD CREATININE-BSD FMLA CKD-EPI: >60 ML/MIN/1.73/M2
GLUCOSE SERPL-MCNC: 94 MG/DL (ref 70–110)
HCT VFR BLD AUTO: 35.8 % (ref 37–48.5)
HGB BLD-MCNC: 10.7 GM/DL (ref 12–16)
IMM GRANULOCYTES # BLD AUTO: 0.01 K/UL (ref 0–0.04)
IMM GRANULOCYTES NFR BLD AUTO: 0.2 % (ref 0–0.5)
IRON SATN MFR SERPL: 15 % (ref 20–50)
IRON SERPL-MCNC: 59 UG/DL (ref 30–160)
LYMPHOCYTES # BLD AUTO: 2.48 K/UL (ref 1–4.8)
MCH RBC QN AUTO: 24.3 PG (ref 27–31)
MCHC RBC AUTO-ENTMCNC: 29.9 G/DL (ref 32–36)
MCV RBC AUTO: 81 FL (ref 82–98)
NUCLEATED RBC (/100WBC) (OHS): 0 /100 WBC
PLATELET # BLD AUTO: 276 K/UL (ref 150–450)
PMV BLD AUTO: 11 FL (ref 9.2–12.9)
POTASSIUM SERPL-SCNC: 4.4 MMOL/L (ref 3.5–5.1)
PROT SERPL-MCNC: 7.5 GM/DL (ref 6–8.4)
RBC # BLD AUTO: 4.4 M/UL (ref 4–5.4)
RELATIVE EOSINOPHIL (OHS): 1.7 %
RELATIVE LYMPHOCYTE (OHS): 46 % (ref 18–48)
RELATIVE MONOCYTE (OHS): 10 % (ref 4–15)
RELATIVE NEUTROPHIL (OHS): 41.7 % (ref 38–73)
SODIUM SERPL-SCNC: 149 MMOL/L (ref 136–145)
TIBC SERPL-MCNC: 391 UG/DL (ref 250–450)
TRANSFERRIN SERPL-MCNC: 264 MG/DL (ref 200–375)
WBC # BLD AUTO: 5.39 K/UL (ref 3.9–12.7)

## 2025-08-18 PROCEDURE — 36415 COLL VENOUS BLD VENIPUNCTURE: CPT | Mod: PN

## 2025-08-18 PROCEDURE — 83540 ASSAY OF IRON: CPT

## 2025-08-18 PROCEDURE — 85025 COMPLETE CBC W/AUTO DIFF WBC: CPT

## 2025-08-18 PROCEDURE — 80053 COMPREHEN METABOLIC PANEL: CPT

## 2025-08-18 PROCEDURE — 82728 ASSAY OF FERRITIN: CPT

## 2025-09-03 ENCOUNTER — TELEPHONE (OUTPATIENT)
Dept: FAMILY MEDICINE | Facility: CLINIC | Age: 60
End: 2025-09-03
Payer: COMMERCIAL

## 2025-09-03 ENCOUNTER — TELEPHONE (OUTPATIENT)
Dept: GASTROENTEROLOGY | Facility: CLINIC | Age: 60
End: 2025-09-03
Payer: COMMERCIAL

## 2025-09-03 DIAGNOSIS — Z12.31 SCREENING MAMMOGRAM, ENCOUNTER FOR: Primary | ICD-10-CM

## 2025-09-04 DIAGNOSIS — Z80.0 FAMILY HISTORY OF PANCREATIC CANCER: Primary | Chronic | ICD-10-CM

## (undated) DEVICE — DRAPE U SPLIT SHEET 54X76IN

## (undated) DEVICE — PROBE ARTHO ENERGY 90 DEG

## (undated) DEVICE — SPONGE GAUZE 16PLY 4X4

## (undated) DEVICE — PAD SANITARY OB STERILE

## (undated) DEVICE — GLOVE SURGICAL LATEX SZ 6.5

## (undated) DEVICE — SOL 9P NACL IRR PIC IL

## (undated) DEVICE — GEMINI ARTHREX SR8

## (undated) DEVICE — Device

## (undated) DEVICE — SEE MEDLINE ITEM 152531

## (undated) DEVICE — SOL IRR NACL .9% 3000ML

## (undated) DEVICE — NDL SPINAL 18GX3.5 SPINOCAN

## (undated) DEVICE — CANNULA ARTHRO 7MMX7CM

## (undated) DEVICE — TUBING HYSTEROSCOPIC OUTFLOW

## (undated) DEVICE — PASSER SUTURE FIRSTPASS ST

## (undated) DEVICE — SLING ARM ULTRA III PAD LG

## (undated) DEVICE — SOL NACL IRR 3000ML

## (undated) DEVICE — SET CYSTO IRRIGATION UNIV SPIK

## (undated) DEVICE — TUBE SET INFLOW/OUTFLOW

## (undated) DEVICE — BLANKET LOWER BODY 55.9X40.2IN

## (undated) DEVICE — ELECTRODE REM PLYHSV RETURN 9

## (undated) DEVICE — SET INFLOW TUBE HYSTER

## (undated) DEVICE — DRESSING GAUZE XEROFORM 5X9

## (undated) DEVICE — CANNULA ARTHRO 8.25MM X 7CM

## (undated) DEVICE — SEE MEDLINE ITEM 157166

## (undated) DEVICE — DEVICE TRUECLEAR INCISOR 2.9

## (undated) DEVICE — PAD ABDOMINAL STERILE 8X10IN

## (undated) DEVICE — GOWN NONREINF SET-IN SLV XL

## (undated) DEVICE — KIT ANTIFOG

## (undated) DEVICE — SUT MCRYL PLUS 4-0 PS2 27IN

## (undated) DEVICE — SUT ETHILON 3-0 PS2 18 BLK

## (undated) DEVICE — SUT 3/0 36IN COATED VICRYL

## (undated) DEVICE — TOWEL OR XRAY BLUE 17X26IN

## (undated) DEVICE — PEROXIDE HYDROGEN 3% 16OZ

## (undated) DEVICE — BLADE GREAT WHITE 4.2 6/BX

## (undated) DEVICE — SEE MEDLINE ITEM 157181

## (undated) DEVICE — PAD PREP 50/CA

## (undated) DEVICE — LINER GLOVE POWDERFREE SZ 7

## (undated) DEVICE — SEE L#152161

## (undated) DEVICE — BLADE SURG CARBON STEEL SZ11

## (undated) DEVICE — PACK ARTHROSCOPY W/ISO BAC

## (undated) DEVICE — PAD SHOULDER POLAR CARE XL

## (undated) DEVICE — DRAPE STERI INSTRUMENT 1018

## (undated) DEVICE — STOCKINET 4INX48

## (undated) DEVICE — GLOVE SURGICAL LATEX SZ 6

## (undated) DEVICE — GLOVE BIOGEL ECLIPSE SZ 6

## (undated) DEVICE — APPLICATOR CHLORAPREP ORN 26ML

## (undated) DEVICE — BLANKET UPPER BODY 78.7X29.9IN

## (undated) DEVICE — SEE MEDLINE ITEM 154981

## (undated) DEVICE — BLADE VORTEX SHAVER 4.5MMX13CM

## (undated) DEVICE — SEE MEDLINE ITEM 157117

## (undated) DEVICE — PAD SHOULDER CARE POLAR

## (undated) DEVICE — SEE MEDLINE ITEM 156946

## (undated) DEVICE — CATH SELF-CATH FEMALE 14FR 6IN

## (undated) DEVICE — DRAPE INCISE IOBAN 2 23X17IN

## (undated) DEVICE — SUT 0 36IN PDS II VIO MONO

## (undated) DEVICE — DRESSING TELFA N ADH 3X8